# Patient Record
Sex: MALE | Race: WHITE | NOT HISPANIC OR LATINO | Employment: PART TIME | URBAN - METROPOLITAN AREA
[De-identification: names, ages, dates, MRNs, and addresses within clinical notes are randomized per-mention and may not be internally consistent; named-entity substitution may affect disease eponyms.]

---

## 2018-01-19 ENCOUNTER — OFFICE VISIT CONVERTED (OUTPATIENT)
Dept: FAMILY MEDICINE CLINIC | Facility: CLINIC | Age: 73
End: 2018-01-19
Attending: FAMILY MEDICINE

## 2018-02-01 ENCOUNTER — CONVERSION ENCOUNTER (OUTPATIENT)
Dept: CARDIOLOGY | Facility: CLINIC | Age: 73
End: 2018-02-01

## 2018-02-01 ENCOUNTER — OFFICE VISIT CONVERTED (OUTPATIENT)
Dept: CARDIOLOGY | Facility: CLINIC | Age: 73
End: 2018-02-01
Attending: INTERNAL MEDICINE

## 2018-02-06 ENCOUNTER — OFFICE VISIT CONVERTED (OUTPATIENT)
Dept: PULMONOLOGY | Facility: CLINIC | Age: 73
End: 2018-02-06
Attending: INTERNAL MEDICINE

## 2018-05-31 ENCOUNTER — OFFICE VISIT CONVERTED (OUTPATIENT)
Dept: PULMONOLOGY | Facility: CLINIC | Age: 73
End: 2018-05-31
Attending: INTERNAL MEDICINE

## 2018-07-19 ENCOUNTER — OFFICE VISIT CONVERTED (OUTPATIENT)
Dept: FAMILY MEDICINE CLINIC | Facility: CLINIC | Age: 73
End: 2018-07-19
Attending: FAMILY MEDICINE

## 2018-09-11 ENCOUNTER — OFFICE VISIT CONVERTED (OUTPATIENT)
Dept: OTOLARYNGOLOGY | Facility: CLINIC | Age: 73
End: 2018-09-11
Attending: OTOLARYNGOLOGY

## 2018-09-11 ENCOUNTER — CONVERSION ENCOUNTER (OUTPATIENT)
Dept: OTOLARYNGOLOGY | Facility: CLINIC | Age: 73
End: 2018-09-11

## 2018-09-18 ENCOUNTER — CONVERSION ENCOUNTER (OUTPATIENT)
Dept: CARDIOLOGY | Facility: CLINIC | Age: 73
End: 2018-09-18
Attending: INTERNAL MEDICINE

## 2018-09-18 ENCOUNTER — CONVERSION ENCOUNTER (OUTPATIENT)
Dept: CARDIOLOGY | Facility: CLINIC | Age: 73
End: 2018-09-18

## 2018-09-26 ENCOUNTER — OFFICE VISIT CONVERTED (OUTPATIENT)
Dept: PULMONOLOGY | Facility: CLINIC | Age: 73
End: 2018-09-26
Attending: INTERNAL MEDICINE

## 2018-10-26 ENCOUNTER — OFFICE VISIT CONVERTED (OUTPATIENT)
Dept: PULMONOLOGY | Facility: CLINIC | Age: 73
End: 2018-10-26
Attending: INTERNAL MEDICINE

## 2018-11-26 ENCOUNTER — OFFICE VISIT CONVERTED (OUTPATIENT)
Dept: PULMONOLOGY | Facility: CLINIC | Age: 73
End: 2018-11-26
Attending: INTERNAL MEDICINE

## 2018-12-03 ENCOUNTER — OFFICE VISIT CONVERTED (OUTPATIENT)
Dept: OTOLARYNGOLOGY | Facility: CLINIC | Age: 73
End: 2018-12-03
Attending: OTOLARYNGOLOGY

## 2018-12-06 ENCOUNTER — CONVERSION ENCOUNTER (OUTPATIENT)
Dept: CARDIOLOGY | Facility: CLINIC | Age: 73
End: 2018-12-06

## 2018-12-06 ENCOUNTER — OFFICE VISIT CONVERTED (OUTPATIENT)
Dept: CARDIOLOGY | Facility: CLINIC | Age: 73
End: 2018-12-06
Attending: INTERNAL MEDICINE

## 2019-01-14 ENCOUNTER — OFFICE VISIT CONVERTED (OUTPATIENT)
Dept: PULMONOLOGY | Facility: CLINIC | Age: 74
End: 2019-01-14
Attending: INTERNAL MEDICINE

## 2019-01-21 ENCOUNTER — OFFICE VISIT CONVERTED (OUTPATIENT)
Dept: FAMILY MEDICINE CLINIC | Facility: CLINIC | Age: 74
End: 2019-01-21
Attending: FAMILY MEDICINE

## 2019-01-21 ENCOUNTER — CONVERSION ENCOUNTER (OUTPATIENT)
Dept: FAMILY MEDICINE CLINIC | Facility: CLINIC | Age: 74
End: 2019-01-21

## 2019-01-24 ENCOUNTER — HOSPITAL ENCOUNTER (OUTPATIENT)
Dept: GASTROENTEROLOGY | Facility: HOSPITAL | Age: 74
Setting detail: HOSPITAL OUTPATIENT SURGERY
Discharge: HOME | End: 2019-01-24
Attending: INTERNAL MEDICINE

## 2019-01-24 LAB
CONV RHEUMATOID FACTOR IGM: 15.9 [IU]/ML (ref 0–14)
CRP SERPL HS-MCNC: <0.15 MG/DL (ref 0–0.5)
EPI CELLS NFR FLD: 20 %
ERYTHROCYTE [SEDIMENTATION RATE] IN BLOOD: 2 MM/H (ref 0–20)
LYMPHOCYTES NFR FLD MANUAL: 28 %
MACROPHAGE FLUID: 24 /100{WBCS}
NEUTROPHILS NFR FLD MANUAL: 28 %
VISUAL PRESENCE OF BLOOD: NORMAL

## 2019-01-26 LAB
BACTERIA SPEC AEROBE CULT: NORMAL
CCP IGA+IGG SERPL IA-ACNC: 9 UNITS (ref 0–19)

## 2019-01-28 LAB — CONV NOCARDIA STAIN (PARTIAL,MODIFIED ACID FAST): NORMAL

## 2019-01-29 ENCOUNTER — OFFICE VISIT CONVERTED (OUTPATIENT)
Dept: GASTROENTEROLOGY | Facility: CLINIC | Age: 74
End: 2019-01-29
Attending: INTERNAL MEDICINE

## 2019-02-04 ENCOUNTER — HOSPITAL ENCOUNTER (OUTPATIENT)
Dept: LAB | Facility: HOSPITAL | Age: 74
Discharge: HOME | End: 2019-02-04
Attending: UROLOGY

## 2019-02-04 LAB — PSA SERPL-MCNC: 0.76 NG/ML (ref 0–4)

## 2019-02-05 ENCOUNTER — OFFICE VISIT CONVERTED (OUTPATIENT)
Dept: UROLOGY | Facility: CLINIC | Age: 74
End: 2019-02-05
Attending: UROLOGY

## 2019-02-05 LAB
CONV COMMENT: NORMAL
CONV LEGIONELLA CULTURE: NORMAL

## 2019-02-19 ENCOUNTER — HOSPITAL ENCOUNTER (OUTPATIENT)
Dept: GASTROENTEROLOGY | Facility: HOSPITAL | Age: 74
Setting detail: HOSPITAL OUTPATIENT SURGERY
Discharge: HOME | End: 2019-02-19
Attending: INTERNAL MEDICINE

## 2019-03-07 ENCOUNTER — TRANSCRIBE ORDERS (OUTPATIENT)
Dept: ADMINISTRATIVE | Facility: HOSPITAL | Age: 74
End: 2019-03-07

## 2019-03-07 ENCOUNTER — OFFICE VISIT CONVERTED (OUTPATIENT)
Dept: PULMONOLOGY | Facility: CLINIC | Age: 74
End: 2019-03-07
Attending: INTERNAL MEDICINE

## 2019-03-07 DIAGNOSIS — R06.09 OTHER FORM OF DYSPNEA: Primary | ICD-10-CM

## 2019-03-08 ENCOUNTER — TRANSCRIBE ORDERS (OUTPATIENT)
Dept: CARDIOLOGY | Facility: CLINIC | Age: 74
End: 2019-03-08

## 2019-03-08 DIAGNOSIS — R06.00 DYSPNEA, UNSPECIFIED TYPE: Primary | ICD-10-CM

## 2019-03-12 ENCOUNTER — APPOINTMENT (OUTPATIENT)
Dept: CARDIOLOGY | Facility: HOSPITAL | Age: 74
End: 2019-03-12

## 2019-03-19 ENCOUNTER — HOSPITAL ENCOUNTER (OUTPATIENT)
Dept: CARDIOLOGY | Facility: HOSPITAL | Age: 74
Discharge: HOME | End: 2019-03-19
Admitting: INTERNAL MEDICINE

## 2019-03-19 VITALS
SYSTOLIC BLOOD PRESSURE: 134 MMHG | BODY MASS INDEX: 26.95 KG/M2 | WEIGHT: 210 LBS | HEART RATE: 75 BPM | HEIGHT: 74 IN | DIASTOLIC BLOOD PRESSURE: 98 MMHG

## 2019-03-19 DIAGNOSIS — R06.00 DYSPNEA, UNSPECIFIED TYPE: ICD-10-CM

## 2019-03-19 PROCEDURE — 94621 CARDIOPULM EXERCISE TESTING: CPT

## 2019-03-19 PROCEDURE — 94621 CARDIOPULM EXERCISE TESTING: CPT | Performed by: INTERNAL MEDICINE

## 2019-03-25 LAB
BH CV SE - AT - BREATHING RESERVE: 82.9
BH CV SE - AT - P ETCO2: 39
BH CV SE - AT - PEAK METS: 2.4
BH CV SE - AT - PEAK RER: 0.99
BH CV SE - AT - PEAK VO2: 8.5
BH CV SE - AT - RESPIRATORY RATE: 15
BH CV SE - AT - RESTING O2 PULSE: 98
BH CV SE - AT - VE/VCO2 SLOPE: 32
BH CV SE - REST - BREATHING RESERVE: 92.9
BH CV SE - REST - P ETCO2: 35
BH CV SE - REST - PEAK METS: 0.9
BH CV SE - REST - PEAK RER: 0.93
BH CV SE - REST - PEAK VO2: 3
BH CV SE - REST - RESPIRATORY RATE: 14
BH CV SE - REST - RESTING O2 PULSE: 98
BH CV SE - REST - VE/VCO2 SLOPE: 39
BH CV SE - VO2 MAX - BREATHING RESERVE: 54.3
BH CV SE - VO2 MAX - P ETCO2: 38
BH CV SE - VO2 MAX - PEAK METS: 4.7
BH CV SE - VO2 MAX - PEAK RER: 1.32
BH CV SE - VO2 MAX - RESPIRATORY RATE: 24
BH CV SE - VO2 MAX - RESTING O2 PULSE: 98
BH CV SE - VO2 MAX - VE/VCO2 SLOPE: 32
BH CV STRESS - BICYCLE POST ESTIMATED PEAK WATTS: 128 WATTS
BH CV STRESS BP STAGE 1: NORMAL
BH CV STRESS BP STAGE 2: NORMAL
BH CV STRESS BP STAGE 3: NORMAL
BH CV STRESS DURATION MIN STAGE 1: 3
BH CV STRESS DURATION MIN STAGE 2: 3
BH CV STRESS DURATION MIN STAGE 3: 3
BH CV STRESS DURATION MIN STAGE 4: 0
BH CV STRESS DURATION SEC STAGE 1: 0
BH CV STRESS DURATION SEC STAGE 2: 0
BH CV STRESS DURATION SEC STAGE 3: 0
BH CV STRESS DURATION SEC STAGE 4: 15
BH CV STRESS HR STAGE 1: 81
BH CV STRESS HR STAGE 2: 83
BH CV STRESS HR STAGE 3: 107
BH CV STRESS HR STAGE 4: 111
BH CV STRESS O2 STAGE 1: 98
BH CV STRESS O2 STAGE 2: 98
BH CV STRESS O2 STAGE 3: 98
BH CV STRESS PROTOCOL 1: NORMAL
BH CV STRESS STAGE 1: 1
BH CV STRESS STAGE 2: 2
BH CV STRESS STAGE 3: 3
BH CV STRESS STAGE 4: 4
BHC CV SE - VO2 MAX - PEAK VO2: 16.6
MAXIMAL PREDICTED HEART RATE: 147 BPM
PERCENT MAX PREDICTED HR: 75.51 %
STRESS BASELINE BP: NORMAL MMHG
STRESS BASELINE HR: 75 BPM
STRESS O2 SAT REST: 98 %
STRESS PERCENT HR: 89 %
STRESS POST ESTIMATED WORKLOAD: 4.7 METS
STRESS POST EXERCISE DUR MIN: 9 MIN
STRESS POST EXERCISE DUR SEC: 11 SEC
STRESS POST O2 SAT PEAK: 98 %
STRESS POST PEAK BP: NORMAL MMHG
STRESS POST PEAK HR: 111 BPM
STRESS TARGET HR: 125 BPM

## 2019-03-28 ENCOUNTER — OFFICE VISIT CONVERTED (OUTPATIENT)
Dept: CARDIOLOGY | Facility: CLINIC | Age: 74
End: 2019-03-28
Attending: INTERNAL MEDICINE

## 2019-03-28 ENCOUNTER — CONVERSION ENCOUNTER (OUTPATIENT)
Dept: CARDIOLOGY | Facility: CLINIC | Age: 74
End: 2019-03-28

## 2019-04-24 ENCOUNTER — HOSPITAL ENCOUNTER (OUTPATIENT)
Dept: OTHER | Facility: HOSPITAL | Age: 74
Discharge: HOME | End: 2019-04-24

## 2019-04-24 LAB
T4 FREE SERPL-MCNC: 1.5 NG/DL (ref 0.9–1.8)
TSH SERPL-ACNC: 2.42 M[IU]/L (ref 0.27–4.2)

## 2019-05-03 ENCOUNTER — OFFICE VISIT CONVERTED (OUTPATIENT)
Dept: PULMONOLOGY | Facility: CLINIC | Age: 74
End: 2019-05-03
Attending: INTERNAL MEDICINE

## 2019-05-14 ENCOUNTER — OFFICE VISIT CONVERTED (OUTPATIENT)
Dept: CARDIOLOGY | Facility: CLINIC | Age: 74
End: 2019-05-14
Attending: INTERNAL MEDICINE

## 2019-05-14 ENCOUNTER — CONVERSION ENCOUNTER (OUTPATIENT)
Dept: CARDIOLOGY | Facility: CLINIC | Age: 74
End: 2019-05-14

## 2019-07-22 ENCOUNTER — HOSPITAL ENCOUNTER (OUTPATIENT)
Dept: FAMILY MEDICINE CLINIC | Facility: CLINIC | Age: 74
Discharge: HOME | End: 2019-07-22
Attending: FAMILY MEDICINE

## 2019-07-22 ENCOUNTER — CONVERSION ENCOUNTER (OUTPATIENT)
Dept: FAMILY MEDICINE CLINIC | Facility: CLINIC | Age: 74
End: 2019-07-22

## 2019-07-22 ENCOUNTER — OFFICE VISIT CONVERTED (OUTPATIENT)
Dept: FAMILY MEDICINE CLINIC | Facility: CLINIC | Age: 74
End: 2019-07-22
Attending: FAMILY MEDICINE

## 2019-07-22 LAB
ALBUMIN SERPL-MCNC: 4.1 G/DL (ref 3.5–5)
ALBUMIN/GLOB SERPL: 1.5 {RATIO} (ref 1.4–2.6)
ALP SERPL-CCNC: 48 U/L (ref 56–155)
ALT SERPL-CCNC: 13 U/L (ref 10–40)
ANION GAP SERPL CALC-SCNC: 17 MMOL/L (ref 8–19)
AST SERPL-CCNC: 14 U/L (ref 15–50)
BASOPHILS # BLD AUTO: 0.03 10*3/UL (ref 0–0.2)
BASOPHILS NFR BLD AUTO: 0.7 % (ref 0–3)
BILIRUB SERPL-MCNC: 0.69 MG/DL (ref 0.2–1.3)
BUN SERPL-MCNC: 18 MG/DL (ref 5–25)
BUN/CREAT SERPL: 14 {RATIO} (ref 6–20)
CALCIUM SERPL-MCNC: 9.1 MG/DL (ref 8.7–10.4)
CHLORIDE SERPL-SCNC: 105 MMOL/L (ref 99–111)
CONV ABS IMM GRAN: 0.02 10*3/UL (ref 0–0.2)
CONV CO2: 24 MMOL/L (ref 22–32)
CONV IMMATURE GRAN: 0.5 % (ref 0–1.8)
CONV TOTAL PROTEIN: 6.8 G/DL (ref 6.3–8.2)
CREAT UR-MCNC: 1.3 MG/DL (ref 0.7–1.2)
DEPRECATED RDW RBC AUTO: 42.3 FL (ref 35.1–43.9)
EOSINOPHIL # BLD AUTO: 0.07 10*3/UL (ref 0–0.7)
EOSINOPHIL # BLD AUTO: 1.6 % (ref 0–7)
ERYTHROCYTE [DISTWIDTH] IN BLOOD BY AUTOMATED COUNT: 13.4 % (ref 11.6–14.4)
GFR SERPLBLD BASED ON 1.73 SQ M-ARVRAT: 54 ML/MIN/{1.73_M2}
GLOBULIN UR ELPH-MCNC: 2.7 G/DL (ref 2–3.5)
GLUCOSE SERPL-MCNC: 91 MG/DL (ref 70–99)
HBA1C MFR BLD: 13.9 G/DL (ref 14–18)
HCT VFR BLD AUTO: 43.8 % (ref 42–52)
LYMPHOCYTES # BLD AUTO: 1.57 10*3/UL (ref 1–5)
MCH RBC QN AUTO: 27.6 PG (ref 27–31)
MCHC RBC AUTO-ENTMCNC: 31.7 G/DL (ref 33–37)
MCV RBC AUTO: 87.1 FL (ref 80–96)
MONOCYTES # BLD AUTO: 0.33 10*3/UL (ref 0.2–1.2)
MONOCYTES NFR BLD AUTO: 7.4 % (ref 3–10)
NEUTROPHILS # BLD AUTO: 2.42 10*3/UL (ref 2–8)
NEUTROPHILS NFR BLD AUTO: 54.4 % (ref 30–85)
NRBC CBCN: 0 % (ref 0–0.7)
OSMOLALITY SERPL CALC.SUM OF ELEC: 295 MOSM/KG (ref 273–304)
PLATELET # BLD AUTO: 222 10*3/UL (ref 130–400)
PMV BLD AUTO: 10.9 FL (ref 9.4–12.4)
POTASSIUM SERPL-SCNC: 4.4 MMOL/L (ref 3.5–5.3)
RBC # BLD AUTO: 5.03 10*6/UL (ref 4.7–6.1)
SODIUM SERPL-SCNC: 142 MMOL/L (ref 135–147)
T4 FREE SERPL-MCNC: 1.4 NG/DL (ref 0.9–1.8)
TSH SERPL-ACNC: 2.45 M[IU]/L (ref 0.27–4.2)
VARIANT LYMPHS NFR BLD MANUAL: 35.4 % (ref 20–45)
WBC # BLD AUTO: 4.44 10*3/UL (ref 4.8–10.8)

## 2019-07-23 RX ORDER — UBIDECARENONE 75 MG
CAPSULE ORAL
COMMUNITY
End: 2021-04-16 | Stop reason: ALTCHOICE

## 2019-07-23 RX ORDER — LEVOTHYROXINE SODIUM 0.1 MG/1
100 TABLET ORAL DAILY
Status: ON HOLD | COMMUNITY
End: 2022-01-10 | Stop reason: SDUPTHER

## 2019-07-23 RX ORDER — SULINDAC 200 MG/1
200 TABLET ORAL 2 TIMES DAILY
COMMUNITY
End: 2020-04-08 | Stop reason: ALTCHOICE

## 2019-07-24 ENCOUNTER — LAB (OUTPATIENT)
Dept: LAB | Facility: HOSPITAL | Age: 74
End: 2019-07-24

## 2019-07-24 ENCOUNTER — TRANSCRIBE ORDERS (OUTPATIENT)
Dept: LAB | Facility: HOSPITAL | Age: 74
End: 2019-07-24

## 2019-07-24 ENCOUNTER — OFFICE VISIT (OUTPATIENT)
Dept: CARDIOLOGY | Facility: CLINIC | Age: 74
End: 2019-07-24

## 2019-07-24 VITALS
SYSTOLIC BLOOD PRESSURE: 180 MMHG | BODY MASS INDEX: 28.11 KG/M2 | HEIGHT: 74 IN | HEART RATE: 64 BPM | DIASTOLIC BLOOD PRESSURE: 100 MMHG | WEIGHT: 219 LBS

## 2019-07-24 DIAGNOSIS — Z13.6 SCREENING FOR ISCHEMIC HEART DISEASE: ICD-10-CM

## 2019-07-24 DIAGNOSIS — I20.8 ANGINA AT REST (HCC): Primary | ICD-10-CM

## 2019-07-24 DIAGNOSIS — Z01.810 PRE-OPERATIVE CARDIOVASCULAR EXAMINATION: Primary | ICD-10-CM

## 2019-07-24 DIAGNOSIS — I20.8 ANGINA DECUBITUS (HCC): ICD-10-CM

## 2019-07-24 DIAGNOSIS — Z01.810 PRE-OPERATIVE CARDIOVASCULAR EXAMINATION: ICD-10-CM

## 2019-07-24 PROBLEM — I20.89 ANGINA AT REST: Status: ACTIVE | Noted: 2019-07-24

## 2019-07-24 LAB
ANION GAP SERPL CALCULATED.3IONS-SCNC: 10.5 MMOL/L (ref 5–15)
BASOPHILS # BLD AUTO: 0.03 10*3/MM3 (ref 0–0.2)
BASOPHILS NFR BLD AUTO: 0.5 % (ref 0–1.5)
BUN BLD-MCNC: 17 MG/DL (ref 8–23)
BUN/CREAT SERPL: 12.7 (ref 7–25)
CALCIUM SPEC-SCNC: 9 MG/DL (ref 8.6–10.5)
CHLORIDE SERPL-SCNC: 105 MMOL/L (ref 98–107)
CO2 SERPL-SCNC: 26.5 MMOL/L (ref 22–29)
CREAT BLD-MCNC: 1.34 MG/DL (ref 0.76–1.27)
DEPRECATED RDW RBC AUTO: 42.7 FL (ref 37–54)
EOSINOPHIL # BLD AUTO: 0.09 10*3/MM3 (ref 0–0.4)
EOSINOPHIL NFR BLD AUTO: 1.6 % (ref 0.3–6.2)
ERYTHROCYTE [DISTWIDTH] IN BLOOD BY AUTOMATED COUNT: 13.3 % (ref 12.3–15.4)
GFR SERPL CREATININE-BSD FRML MDRD: 52 ML/MIN/1.73
GLUCOSE BLD-MCNC: 96 MG/DL (ref 65–99)
HCT VFR BLD AUTO: 46 % (ref 37.5–51)
HGB BLD-MCNC: 14.5 G/DL (ref 13–17.7)
IMM GRANULOCYTES # BLD AUTO: 0.02 10*3/MM3 (ref 0–0.05)
IMM GRANULOCYTES NFR BLD AUTO: 0.4 % (ref 0–0.5)
LYMPHOCYTES # BLD AUTO: 1.89 10*3/MM3 (ref 0.7–3.1)
LYMPHOCYTES NFR BLD AUTO: 33.7 % (ref 19.6–45.3)
MCH RBC QN AUTO: 27.6 PG (ref 26.6–33)
MCHC RBC AUTO-ENTMCNC: 31.5 G/DL (ref 31.5–35.7)
MCV RBC AUTO: 87.5 FL (ref 79–97)
MONOCYTES # BLD AUTO: 0.44 10*3/MM3 (ref 0.1–0.9)
MONOCYTES NFR BLD AUTO: 7.8 % (ref 5–12)
NEUTROPHILS # BLD AUTO: 3.14 10*3/MM3 (ref 1.7–7)
NEUTROPHILS NFR BLD AUTO: 56 % (ref 42.7–76)
NRBC BLD AUTO-RTO: 0.2 /100 WBC (ref 0–0.2)
PLATELET # BLD AUTO: 207 10*3/MM3 (ref 140–450)
PMV BLD AUTO: 10.8 FL (ref 6–12)
POTASSIUM BLD-SCNC: 4.3 MMOL/L (ref 3.5–5.2)
RBC # BLD AUTO: 5.26 10*6/MM3 (ref 4.14–5.8)
SODIUM BLD-SCNC: 142 MMOL/L (ref 136–145)
WBC NRBC COR # BLD: 5.61 10*3/MM3 (ref 3.4–10.8)

## 2019-07-24 PROCEDURE — 36415 COLL VENOUS BLD VENIPUNCTURE: CPT

## 2019-07-24 PROCEDURE — 85025 COMPLETE CBC W/AUTO DIFF WBC: CPT

## 2019-07-24 PROCEDURE — 80048 BASIC METABOLIC PNL TOTAL CA: CPT

## 2019-07-24 PROCEDURE — 93000 ELECTROCARDIOGRAM COMPLETE: CPT | Performed by: INTERNAL MEDICINE

## 2019-07-24 PROCEDURE — 99204 OFFICE O/P NEW MOD 45 MIN: CPT | Performed by: INTERNAL MEDICINE

## 2019-07-24 NOTE — PROGRESS NOTES
Subjective:     Encounter Date:07/24/2019      Patient ID: Elmer Garvin is a 74 y.o. male.    Chief Complaint:  Hypertension   This is a chronic problem. The problem is controlled. Associated symptoms include malaise/fatigue and shortness of breath.   Shortness of Breath   This is a recurrent problem. The current episode started more than 1 month ago.     74-year old gentleman who presents today for evaluation.  Patient has been complaining of extreme shortness of breath.  Patient said several years ago he had seen Dr. Boo.  At that time he had a mild blockage in 1 of his coronary arteries and his shortness of breath was thought to be secondary to slow heart rates.  He had a pacemaker placed and actually said his symptoms resolved and he was feeling very well.  He then started having more more episodes of shortness of breath.  He now complains of substernal chest burning associated with the shortness of breath.  He said sometimes the burning can get so bad that he gets nauseated and feels like he is going to vomit.  He was seen by pulmonologist and not found to have a pulmonary etiology but with the symptoms was referred for further evaluation.  He had seen another cardiologist in town and subsequently sought me out for my opinion.    Review of Systems   Constitution: Positive for malaise/fatigue.   Respiratory: Positive for shortness of breath.    Musculoskeletal: Positive for joint pain and joint swelling.   All other systems reviewed and are negative.        ECG 12 Lead  Date/Time: 7/24/2019 3:18 PM  Performed by: Cyrus Mccray MD  Authorized by: Cyrus Mccray MD   Comparison: compared with previous ECG   Similar to previous ECG  Comparison to previous ECG: I cannot read date on ECG done at [facility] it is a faxed copy.  Rhythm: sinus rhythm    Clinical impression: normal ECG               Objective:     Physical Exam   Constitutional: He is oriented to person, place, and time. He  appears well-developed.   HENT:   Head: Normocephalic.   Eyes: Conjunctivae are normal.   Neck: Normal range of motion.   Cardiovascular: Normal rate, regular rhythm and normal heart sounds.   Pulmonary/Chest: Breath sounds normal.   Abdominal: Soft. Bowel sounds are normal.   Musculoskeletal: Normal range of motion. He exhibits no edema.   Neurological: He is alert and oriented to person, place, and time.   Skin: Skin is warm and dry.   Psychiatric: He has a normal mood and affect. His behavior is normal.   Vitals reviewed.      Lab Review:       Assessment:          Diagnosis Plan   1. Angina at rest (CMS/[facility])  Case Request Cath Lab: Coronary angiography, Left heart cath, Left ventriculography, Right heart cath          Plan:       1.  Sick sinus syndrome status post pacemaker.  Ultimately we will get him changed over to my office to follow.  2.  Exertional burning in his chest with shortness of breath.  Sounds to be classic angina it recurrent exertional nature and is occurring more frequently increase intensity and taking less to bring it on.  In light of that we will set him up for a right and left heart cath.  The right heart cath is to assess the dyspnea if no significant coronary artery disease is found although I have a very high suspicion that that is what we are going to find.  Risk and benefits of the cath were reviewed.

## 2019-07-25 ENCOUNTER — HOSPITAL ENCOUNTER (OUTPATIENT)
Facility: HOSPITAL | Age: 74
Setting detail: HOSPITAL OUTPATIENT SURGERY
Discharge: HOME | End: 2019-07-25
Attending: INTERNAL MEDICINE | Admitting: INTERNAL MEDICINE

## 2019-07-25 VITALS
DIASTOLIC BLOOD PRESSURE: 73 MMHG | RESPIRATION RATE: 16 BRPM | TEMPERATURE: 97.4 F | SYSTOLIC BLOOD PRESSURE: 149 MMHG | WEIGHT: 213 LBS | BODY MASS INDEX: 27.34 KG/M2 | HEIGHT: 74 IN | OXYGEN SATURATION: 97 % | HEART RATE: 60 BPM

## 2019-07-25 DIAGNOSIS — I20.8 ANGINA AT REST (HCC): ICD-10-CM

## 2019-07-25 PROCEDURE — 93460 R&L HRT ART/VENTRICLE ANGIO: CPT | Performed by: INTERNAL MEDICINE

## 2019-07-25 PROCEDURE — C1769 GUIDE WIRE: HCPCS | Performed by: INTERNAL MEDICINE

## 2019-07-25 PROCEDURE — 99152 MOD SED SAME PHYS/QHP 5/>YRS: CPT | Performed by: INTERNAL MEDICINE

## 2019-07-25 PROCEDURE — 25010000002 FENTANYL CITRATE (PF) 100 MCG/2ML SOLUTION: Performed by: INTERNAL MEDICINE

## 2019-07-25 PROCEDURE — 85018 HEMOGLOBIN: CPT

## 2019-07-25 PROCEDURE — C1894 INTRO/SHEATH, NON-LASER: HCPCS | Performed by: INTERNAL MEDICINE

## 2019-07-25 PROCEDURE — 25010000002 HYDRALAZINE PER 20 MG: Performed by: INTERNAL MEDICINE

## 2019-07-25 PROCEDURE — 85014 HEMATOCRIT: CPT

## 2019-07-25 PROCEDURE — 0 IOPAMIDOL PER 1 ML: Performed by: INTERNAL MEDICINE

## 2019-07-25 PROCEDURE — 25010000002 HEPARIN (PORCINE) PER 1000 UNITS: Performed by: INTERNAL MEDICINE

## 2019-07-25 PROCEDURE — 25010000002 MIDAZOLAM PER 1 MG: Performed by: INTERNAL MEDICINE

## 2019-07-25 RX ORDER — SODIUM CHLORIDE 0.9 % (FLUSH) 0.9 %
3-10 SYRINGE (ML) INJECTION AS NEEDED
Status: DISCONTINUED | OUTPATIENT
Start: 2019-07-25 | End: 2019-07-25 | Stop reason: HOSPADM

## 2019-07-25 RX ORDER — ASPIRIN 81 MG/1
81 TABLET, CHEWABLE ORAL DAILY
COMMUNITY
End: 2021-04-16 | Stop reason: ALTCHOICE

## 2019-07-25 RX ORDER — FENTANYL CITRATE 50 UG/ML
INJECTION, SOLUTION INTRAMUSCULAR; INTRAVENOUS AS NEEDED
Status: DISCONTINUED | OUTPATIENT
Start: 2019-07-25 | End: 2019-07-25 | Stop reason: HOSPADM

## 2019-07-25 RX ORDER — MIDAZOLAM HYDROCHLORIDE 1 MG/ML
INJECTION INTRAMUSCULAR; INTRAVENOUS AS NEEDED
Status: DISCONTINUED | OUTPATIENT
Start: 2019-07-25 | End: 2019-07-25 | Stop reason: HOSPADM

## 2019-07-25 RX ORDER — LIDOCAINE HYDROCHLORIDE 20 MG/ML
INJECTION, SOLUTION INFILTRATION; PERINEURAL AS NEEDED
Status: DISCONTINUED | OUTPATIENT
Start: 2019-07-25 | End: 2019-07-25 | Stop reason: HOSPADM

## 2019-07-25 RX ORDER — SODIUM CHLORIDE 9 MG/ML
100 INJECTION, SOLUTION INTRAVENOUS CONTINUOUS
Status: DISCONTINUED | OUTPATIENT
Start: 2019-07-25 | End: 2019-07-25 | Stop reason: HOSPADM

## 2019-07-25 RX ORDER — VIT C/B6/B5/MAGNESIUM/HERB 173 50-5-6-5MG
1000 CAPSULE ORAL
COMMUNITY
End: 2020-04-08 | Stop reason: ALTCHOICE

## 2019-07-25 RX ORDER — SODIUM CHLORIDE 0.9 % (FLUSH) 0.9 %
3 SYRINGE (ML) INJECTION EVERY 12 HOURS SCHEDULED
Status: DISCONTINUED | OUTPATIENT
Start: 2019-07-25 | End: 2019-07-25 | Stop reason: HOSPADM

## 2019-07-25 RX ORDER — LIDOCAINE HYDROCHLORIDE 10 MG/ML
0.1 INJECTION, SOLUTION EPIDURAL; INFILTRATION; INTRACAUDAL; PERINEURAL ONCE AS NEEDED
Status: DISCONTINUED | OUTPATIENT
Start: 2019-07-25 | End: 2019-07-25 | Stop reason: HOSPADM

## 2019-07-25 RX ORDER — SODIUM CHLORIDE 9 MG/ML
125 INJECTION, SOLUTION INTRAVENOUS CONTINUOUS
Status: DISCONTINUED | OUTPATIENT
Start: 2019-07-25 | End: 2019-07-25 | Stop reason: HOSPADM

## 2019-07-25 RX ORDER — SODIUM CHLORIDE 9 MG/ML
INJECTION, SOLUTION INTRAVENOUS CONTINUOUS PRN
Status: COMPLETED | OUTPATIENT
Start: 2019-07-25 | End: 2019-07-25

## 2019-07-25 RX ORDER — CHOLECALCIFEROL (VITAMIN D3) 125 MCG
10 CAPSULE ORAL
COMMUNITY
End: 2022-01-10 | Stop reason: HOSPADM

## 2019-07-25 RX ORDER — OMEPRAZOLE 40 MG/1
40 CAPSULE, DELAYED RELEASE ORAL DAILY
COMMUNITY
End: 2021-04-16

## 2019-07-25 RX ORDER — HYDRALAZINE HYDROCHLORIDE 20 MG/ML
10 INJECTION INTRAMUSCULAR; INTRAVENOUS ONCE
Status: COMPLETED | OUTPATIENT
Start: 2019-07-25 | End: 2019-07-25

## 2019-07-25 RX ADMIN — SODIUM CHLORIDE 125 ML/HR: 9 INJECTION, SOLUTION INTRAVENOUS at 09:22

## 2019-07-25 RX ADMIN — HYDRALAZINE HYDROCHLORIDE 10 MG: 20 INJECTION INTRAMUSCULAR; INTRAVENOUS at 10:11

## 2019-07-26 LAB
HCT VFR BLDA CALC: 40 % (ref 38–51)
HCT VFR BLDA CALC: 41 % (ref 38–51)
HGB BLDA-MCNC: 13.6 G/DL (ref 12–17)
HGB BLDA-MCNC: 13.9 G/DL (ref 12–17)
SAO2 % BLDA: 75 % (ref 95–98)
SAO2 % BLDA: 98 % (ref 95–98)

## 2019-07-31 ENCOUNTER — TELEPHONE (OUTPATIENT)
Dept: CARDIOLOGY | Facility: CLINIC | Age: 74
End: 2019-07-31

## 2019-07-31 NOTE — TELEPHONE ENCOUNTER
"Irma:    This patients wife called and states that he had a heart cath a week ago. Yesterday he noticed a \"pop\" in his right wrist and it is now swollen and discolored.  I recommended that he go to the ER.  "

## 2019-08-15 ENCOUNTER — CLINICAL SUPPORT NO REQUIREMENTS (OUTPATIENT)
Dept: CARDIOLOGY | Facility: CLINIC | Age: 74
End: 2019-08-15

## 2019-08-15 ENCOUNTER — TELEPHONE (OUTPATIENT)
Dept: CARDIOLOGY | Facility: CLINIC | Age: 74
End: 2019-08-15

## 2019-08-15 ENCOUNTER — OFFICE VISIT (OUTPATIENT)
Dept: CARDIOLOGY | Facility: CLINIC | Age: 74
End: 2019-08-15

## 2019-08-15 VITALS
DIASTOLIC BLOOD PRESSURE: 92 MMHG | HEART RATE: 72 BPM | SYSTOLIC BLOOD PRESSURE: 162 MMHG | HEIGHT: 74 IN | OXYGEN SATURATION: 98 % | BODY MASS INDEX: 27.59 KG/M2 | WEIGHT: 215 LBS

## 2019-08-15 DIAGNOSIS — I49.5 SICK SINUS SYNDROME (HCC): Primary | ICD-10-CM

## 2019-08-15 DIAGNOSIS — R06.02 SHORTNESS OF BREATH: Primary | ICD-10-CM

## 2019-08-15 DIAGNOSIS — Z95.0 PACEMAKER: ICD-10-CM

## 2019-08-15 DIAGNOSIS — R53.83 FATIGUE, UNSPECIFIED TYPE: ICD-10-CM

## 2019-08-15 PROCEDURE — 99213 OFFICE O/P EST LOW 20 MIN: CPT | Performed by: NURSE PRACTITIONER

## 2019-08-15 PROCEDURE — 93280 PM DEVICE PROGR EVAL DUAL: CPT | Performed by: INTERNAL MEDICINE

## 2019-08-15 PROCEDURE — 93000 ELECTROCARDIOGRAM COMPLETE: CPT | Performed by: NURSE PRACTITIONER

## 2019-08-15 NOTE — PROGRESS NOTES
[facility]      Patient Name: Elmer Garvin  :1945  Age: 74 y.o.  Primary Cardiologist: Cyrus Mccray MD  Encounter Provider:  EDWINA Herzog      Chief Complaint:   Chief Complaint   Patient presents with   • Follow-up     Hospital          HPI  Elmer Garvin is a 74 y.o. male who presents today for reevaluation. Pt has a  history significant for coronary artery disease, sick sinus syndrome with pacemaker present.  Patient was initially evaluated by Dr. Mccray on 2019 for shortness of breath.  At that time patient was complaining of extreme shortness of breath, he had been evaluated by pulmonology but not found to have any pulmonology etiology.  Dr. Mccray felt that patient's shortness of breath was consistent with angina.  He was scheduled for a right and left diagnostic heart catheterization.  Right heart catheterization was normal and revealed systolic function and diastolic pressure.  Patient exhibited very mild nonobstructive coronary artery disease on left diagnostic heart catheterization.    Patient states that he continues to have episodes of shortness of breath.  He states that it is not gotten any worse but it is also not improved.  He states that he has constant fatigue and that the shortness of breath worsens with very minimal to moderate exertion.  He also notes that shortness of breath is worse in the heat.  He has had pulmonary function tests, sleep apnea evaluation and multiple biopsies by pulmonology.  They are unable to determine a pulmonology etiology.  Patient does see endocrinology and has routine thyroid checks as well as other routine blood work all of which have been unremarkable.  Patient's pacemaker was last interrogated in May.  He does wish to have pacemaker interrogation moved to our facility.    The following portions of the patient's history were reviewed and updated as appropriate: allergies, current medications, past family  "history, past medical history, past social history, past surgical history and problem list.      Review of Systems   Constitution: Negative for malaise/fatigue.   Cardiovascular: Positive for dyspnea on exertion. Negative for chest pain and leg swelling.   Respiratory: Positive for shortness of breath.    Musculoskeletal: Positive for joint pain.   Neurological: Positive for excessive daytime sleepiness. Negative for light-headedness.   All other systems reviewed and are negative.      OBJECTIVE:   Vital Signs  Vitals:    08/15/19 1325   BP: 162/92   Pulse: 72   SpO2: 98%     Estimated body mass index is 27.6 kg/m² as calculated from the following:    Height as of this encounter: 188 cm (74\").    Weight as of this encounter: 97.5 kg (215 lb).    Physical Exam   Constitutional: He is oriented to person, place, and time. Vital signs are normal. He appears well-developed and well-nourished.   Eyes: Conjunctivae are normal.   Neck: Carotid bruit is not present.   Cardiovascular: Normal rate, regular rhythm and normal heart sounds.   No murmur heard.  Pulmonary/Chest: Effort normal and breath sounds normal.   Abdominal: Normal appearance.   Musculoskeletal: Normal range of motion.   No pedal edema   Neurological: He is alert and oriented to person, place, and time. GCS eye subscore is 4. GCS verbal subscore is 5. GCS motor subscore is 6.   Skin: Skin is warm and dry.   Psychiatric: He has a normal mood and affect. His speech is normal and behavior is normal. Judgment and thought content normal. Cognition and memory are normal.         ECG 12 Lead  Date/Time: 8/15/2019 1:35 PM  Performed by: Dori Ramirez APRN  Authorized by: Dori Ramirez APRN   Comparison: compared with previous ECG from 7/24/2019  Rhythm: sinus rhythm  Rate: normal  BPM: 67  Conduction: 1st degree AV block  ST Segments: ST segments normal  T Waves: T waves normal  QRS axis: normal    Clinical impression: abnormal EKG            Cardiac " Procedures:  1. Left and right diagnostic heart catheterization 7/25/2019.  Normal right heart cath with normal LV systolic function, normal LV end-diastolic pressure very mild nonobstructive coronary artery disease.  Recommending considering noncardiac causes of symptoms.        ASSESSMENT:      Diagnosis Plan   1. Shortness of breath  ECG 12 Lead   2. Pacemaker     3. Fatigue, unspecified type           PLAN OF CARE:     1. Shortness of breath.  Patient continues to have episodes of shortness of breath that worsened with exertion and when he is in the heat.  He recently had left and right diagnostic heart catheterization which was unrevealing.  He has had full evaluation by pulmonology in [place].  Patient states that he routinely sees a endocrinologist who checks his thyroid levels and vitamin levels.  It is unclear what the source of patient's shortness of breath and fatigue is.  I am going to have his pacemaker interrogated today to establish pacemaker checks with our office.  I have recommended that he follow-up with his primary care physician.  Wife is questioning if patient could have something wrong with his liver.  I deferred this to her his primary care physician.  2. Fatigue.  As above.  3. Pacemaker.  To be interrogated in our office today and then future checks will also be done at our office.  4. Follow-up in 4 months with Dr. Mccray.  Sooner for problems or complications.             Discharge Medications           Accurate as of 8/15/19  1:40 PM. If you have any questions, ask your nurse or doctor.               Continue These Medications      Instructions Start Date   aspirin 81 MG chewable tablet   81 mg, Oral, Daily      Co Q-10 200 MG capsule   Oral      levothyroxine 100 MCG tablet  Commonly known as:  SYNTHROID, LEVOTHROID   100 mcg, Oral, Daily      losartan 50 MG tablet 2 tablet, hydrochlorothiazide 25 MG tablet 1 tablet   Oral, Daily      melatonin 5 MG tablet tablet   10 mg, Oral       omeprazole 40 MG capsule  Commonly known as:  priLOSEC   40 mg, Oral, Daily      sulindac 200 MG tablet  Commonly known as:  CLINORIL   200 mg, Oral, 2 Times Daily      Turmeric 500 MG capsule   1,000 mg, Oral             Thank you for allowing me to participate in the care of your patient,      Sincerely,   EDWINA Herzog  [facility]  08/15/19  1:40 PM    **Sarah Disclaimer:**  Much of this encounter note is an electronic transcription/translation of spoken language to printed text. The electronic translation of spoken language may permit erroneous, or at times, nonsensical words or phrases to be inadvertently transcribed. Although I have reviewed the note for such errors, some may still exist.

## 2019-09-24 ENCOUNTER — HOSPITAL ENCOUNTER (OUTPATIENT)
Dept: FAMILY MEDICINE CLINIC | Facility: CLINIC | Age: 74
Discharge: HOME | End: 2019-09-24
Attending: FAMILY MEDICINE

## 2019-09-24 ENCOUNTER — OFFICE VISIT CONVERTED (OUTPATIENT)
Dept: FAMILY MEDICINE CLINIC | Facility: CLINIC | Age: 74
End: 2019-09-24
Attending: FAMILY MEDICINE

## 2019-09-24 ENCOUNTER — CONVERSION ENCOUNTER (OUTPATIENT)
Dept: FAMILY MEDICINE CLINIC | Facility: CLINIC | Age: 74
End: 2019-09-24

## 2019-09-24 LAB
ALBUMIN SERPL-MCNC: 4.4 G/DL (ref 3.5–5)
ALBUMIN/GLOB SERPL: 1.8 {RATIO} (ref 1.4–2.6)
ALP SERPL-CCNC: 50 U/L (ref 56–155)
ALT SERPL-CCNC: 17 U/L (ref 10–40)
ANION GAP SERPL CALC-SCNC: 11 MMOL/L (ref 8–19)
AST SERPL-CCNC: 15 U/L (ref 15–50)
BASOPHILS # BLD AUTO: 0.04 10*3/UL (ref 0–0.2)
BASOPHILS NFR BLD AUTO: 0.8 % (ref 0–3)
BILIRUB SERPL-MCNC: 0.64 MG/DL (ref 0.2–1.3)
BUN SERPL-MCNC: 18 MG/DL (ref 5–25)
BUN/CREAT SERPL: 16 {RATIO} (ref 6–20)
CALCIUM SERPL-MCNC: 9.1 MG/DL (ref 8.7–10.4)
CHLORIDE SERPL-SCNC: 106 MMOL/L (ref 99–111)
CONV ABS IMM GRAN: 0.02 10*3/UL (ref 0–0.2)
CONV CO2: 25 MMOL/L (ref 22–32)
CONV IMMATURE GRAN: 0.4 % (ref 0–1.8)
CONV TOTAL PROTEIN: 6.9 G/DL (ref 6.3–8.2)
CREAT UR-MCNC: 1.15 MG/DL (ref 0.7–1.2)
DEPRECATED RDW RBC AUTO: 44.3 FL (ref 35.1–43.9)
EOSINOPHIL # BLD AUTO: 0.06 10*3/UL (ref 0–0.7)
EOSINOPHIL # BLD AUTO: 1.2 % (ref 0–7)
ERYTHROCYTE [DISTWIDTH] IN BLOOD BY AUTOMATED COUNT: 13.6 % (ref 11.6–14.4)
GFR SERPLBLD BASED ON 1.73 SQ M-ARVRAT: >60 ML/MIN/{1.73_M2}
GLOBULIN UR ELPH-MCNC: 2.5 G/DL (ref 2–3.5)
GLUCOSE SERPL-MCNC: 108 MG/DL (ref 70–99)
HCT VFR BLD AUTO: 44.1 % (ref 42–52)
HGB BLD-MCNC: 13.7 G/DL (ref 14–18)
LYMPHOCYTES # BLD AUTO: 1.65 10*3/UL (ref 1–5)
LYMPHOCYTES NFR BLD AUTO: 33.2 % (ref 20–45)
MAGNESIUM SERPL-MCNC: 1.95 MG/DL (ref 1.6–2.3)
MCH RBC QN AUTO: 27.6 PG (ref 27–31)
MCHC RBC AUTO-ENTMCNC: 31.1 G/DL (ref 33–37)
MCV RBC AUTO: 88.7 FL (ref 80–96)
MONOCYTES # BLD AUTO: 0.32 10*3/UL (ref 0.2–1.2)
MONOCYTES NFR BLD AUTO: 6.4 % (ref 3–10)
NEUTROPHILS # BLD AUTO: 2.88 10*3/UL (ref 2–8)
NEUTROPHILS NFR BLD AUTO: 58 % (ref 30–85)
NRBC CBCN: 0 % (ref 0–0.7)
OSMOLALITY SERPL CALC.SUM OF ELEC: 288 MOSM/KG (ref 273–304)
PLATELET # BLD AUTO: 207 10*3/UL (ref 130–400)
PMV BLD AUTO: 10.9 FL (ref 9.4–12.4)
POTASSIUM SERPL-SCNC: 4.1 MMOL/L (ref 3.5–5.3)
RBC # BLD AUTO: 4.97 10*6/UL (ref 4.7–6.1)
SODIUM SERPL-SCNC: 138 MMOL/L (ref 135–147)
T4 FREE SERPL-MCNC: 1.4 NG/DL (ref 0.9–1.8)
TSH SERPL-ACNC: 2.53 M[IU]/L (ref 0.27–4.2)
WBC # BLD AUTO: 4.97 10*3/UL (ref 4.8–10.8)

## 2019-10-08 ENCOUNTER — OFFICE VISIT CONVERTED (OUTPATIENT)
Dept: GASTROENTEROLOGY | Facility: CLINIC | Age: 74
End: 2019-10-08
Attending: PHYSICIAN ASSISTANT

## 2019-10-08 ENCOUNTER — HOSPITAL ENCOUNTER (OUTPATIENT)
Dept: LAB | Facility: HOSPITAL | Age: 74
Discharge: HOME | End: 2019-10-08
Attending: INTERNAL MEDICINE

## 2019-10-08 ENCOUNTER — HOSPITAL ENCOUNTER (OUTPATIENT)
Dept: GENERAL RADIOLOGY | Facility: HOSPITAL | Age: 74
Discharge: HOME | End: 2019-10-08
Attending: INTERNAL MEDICINE

## 2019-10-08 LAB
CONV RHEUMATOID FACTOR IGM: 17 [IU]/ML (ref 0–14)
CREAT UR-MCNC: 1.32 MG/DL (ref 0.7–1.2)
ERYTHROCYTE [SEDIMENTATION RATE] IN BLOOD: 2 MM/H (ref 0–20)

## 2019-10-10 ENCOUNTER — HOSPITAL ENCOUNTER (OUTPATIENT)
Dept: ULTRASOUND IMAGING | Facility: HOSPITAL | Age: 74
Discharge: HOME | End: 2019-10-10
Attending: PHYSICIAN ASSISTANT

## 2019-10-10 LAB
ALBUMIN SERPL-MCNC: 4.4 G/DL (ref 3.5–5)
ALBUMIN/GLOB SERPL: 1.6 {RATIO} (ref 1.4–2.6)
ALP SERPL-CCNC: 42 U/L (ref 56–155)
ALT SERPL-CCNC: 15 U/L (ref 10–40)
ANION GAP SERPL CALC-SCNC: 16 MMOL/L (ref 8–19)
AST SERPL-CCNC: 13 U/L (ref 15–50)
BASOPHILS # BLD AUTO: 0.03 10*3/UL (ref 0–0.2)
BASOPHILS NFR BLD AUTO: 0.5 % (ref 0–3)
BILIRUB SERPL-MCNC: 0.95 MG/DL (ref 0.2–1.3)
BUN SERPL-MCNC: 18 MG/DL (ref 5–25)
BUN/CREAT SERPL: 14 {RATIO} (ref 6–20)
CALCIUM SERPL-MCNC: 9.3 MG/DL (ref 8.7–10.4)
CCP IGA+IGG SERPL IA-ACNC: 8 UNITS (ref 0–19)
CHLORIDE SERPL-SCNC: 102 MMOL/L (ref 99–111)
CONV ABS IMM GRAN: 0.01 10*3/UL (ref 0–0.2)
CONV CO2: 26 MMOL/L (ref 22–32)
CONV IMMATURE GRAN: 0.2 % (ref 0–1.8)
CONV TOTAL PROTEIN: 7.2 G/DL (ref 6.3–8.2)
CREAT UR-MCNC: 1.33 MG/DL (ref 0.7–1.2)
DEPRECATED RDW RBC AUTO: 44.2 FL (ref 35.1–43.9)
EOSINOPHIL # BLD AUTO: 0.07 10*3/UL (ref 0–0.7)
EOSINOPHIL # BLD AUTO: 1.2 % (ref 0–7)
ERYTHROCYTE [DISTWIDTH] IN BLOOD BY AUTOMATED COUNT: 13.8 % (ref 11.6–14.4)
FOLATE SERPL-MCNC: 9.4 NG/ML (ref 4.8–20)
GFR SERPLBLD BASED ON 1.73 SQ M-ARVRAT: 52 ML/MIN/{1.73_M2}
GLOBULIN UR ELPH-MCNC: 2.8 G/DL (ref 2–3.5)
GLUCOSE SERPL-MCNC: 106 MG/DL (ref 70–99)
HCT VFR BLD AUTO: 43.1 % (ref 42–52)
HGB BLD-MCNC: 13.6 G/DL (ref 14–18)
IRON SATN MFR SERPL: 29 % (ref 20–55)
IRON SERPL-MCNC: 106 UG/DL (ref 70–180)
LIPASE SERPL-CCNC: 23 U/L (ref 5–51)
LYMPHOCYTES # BLD AUTO: 1.65 10*3/UL (ref 1–5)
LYMPHOCYTES NFR BLD AUTO: 28.4 % (ref 20–45)
MCH RBC QN AUTO: 27.8 PG (ref 27–31)
MCHC RBC AUTO-ENTMCNC: 31.6 G/DL (ref 33–37)
MCV RBC AUTO: 88.1 FL (ref 80–96)
MONOCYTES # BLD AUTO: 0.44 10*3/UL (ref 0.2–1.2)
MONOCYTES NFR BLD AUTO: 7.6 % (ref 3–10)
NEUTROPHILS # BLD AUTO: 3.62 10*3/UL (ref 2–8)
NEUTROPHILS NFR BLD AUTO: 62.1 % (ref 30–85)
NRBC CBCN: 0 % (ref 0–0.7)
OSMOLALITY SERPL CALC.SUM OF ELEC: 292 MOSM/KG (ref 273–304)
PLATELET # BLD AUTO: 191 10*3/UL (ref 130–400)
PMV BLD AUTO: 10.5 FL (ref 9.4–12.4)
POTASSIUM SERPL-SCNC: 4.3 MMOL/L (ref 3.5–5.3)
RBC # BLD AUTO: 4.89 10*6/UL (ref 4.7–6.1)
SODIUM SERPL-SCNC: 140 MMOL/L (ref 135–147)
T4 FREE SERPL-MCNC: 1.4 NG/DL (ref 0.9–1.8)
TESTOST SERPL-MCNC: 378 NG/DL (ref 193–740)
TIBC SERPL-MCNC: 362 UG/DL (ref 245–450)
TRANSFERRIN SERPL-MCNC: 253 MG/DL (ref 215–365)
TSH SERPL-ACNC: 2.22 M[IU]/L (ref 0.27–4.2)
VIT B12 SERPL-MCNC: 215 PG/ML (ref 211–911)
WBC # BLD AUTO: 5.82 10*3/UL (ref 4.8–10.8)

## 2019-10-12 LAB — TESTOSTERONE, FREE: 6 PG/ML (ref 6.6–18.1)

## 2019-10-13 LAB
CONV EBV EARLY ANTIGEN: <5 U/ML (ref 0–10.9)
CONV EBV NUCLEAR ANTIGEN: 33 U/ML (ref 0–21.9)
CONV EPSTEIN BARR VIRAL CAPSID IGM: 41.5 U/ML (ref 0–43.9)
CONV EPSTEIN BARR VIRUS ANTIBODY TO VIRAL CAPSID IGG: 298 U/ML (ref 0–21.9)

## 2019-11-22 ENCOUNTER — CLINICAL SUPPORT NO REQUIREMENTS (OUTPATIENT)
Dept: CARDIOLOGY | Facility: CLINIC | Age: 74
End: 2019-11-22

## 2019-11-22 DIAGNOSIS — I49.5 SICK SINUS SYNDROME (HCC): Primary | ICD-10-CM

## 2019-11-22 PROCEDURE — 93294 REM INTERROG EVL PM/LDLS PM: CPT | Performed by: INTERNAL MEDICINE

## 2019-11-22 PROCEDURE — 93296 REM INTERROG EVL PM/IDS: CPT | Performed by: INTERNAL MEDICINE

## 2020-01-20 ENCOUNTER — HOSPITAL ENCOUNTER (OUTPATIENT)
Dept: LAB | Facility: HOSPITAL | Age: 75
Discharge: HOME | End: 2020-01-20
Attending: INTERNAL MEDICINE

## 2020-01-20 LAB
ALBUMIN SERPL-MCNC: 4.5 G/DL (ref 3.5–5)
ALP SERPL-CCNC: 46 U/L (ref 56–155)
ALT SERPL-CCNC: 25 U/L (ref 10–40)
AST SERPL-CCNC: 20 U/L (ref 15–50)
BILIRUB SERPL-MCNC: 0.41 MG/DL (ref 0.2–1.3)
CONV BILI, CONJUGATED: <0.2 MG/DL (ref 0–0.6)
CONV TOTAL PROTEIN: 7.5 G/DL (ref 6.3–8.2)
CONV UNCONJUGATED BILIRUBIN: 0.2 MG/DL (ref 0–1.1)
CREAT UR-MCNC: 1.31 MG/DL (ref 0.7–1.2)

## 2020-02-10 ENCOUNTER — HOSPITAL ENCOUNTER (OUTPATIENT)
Dept: LAB | Facility: HOSPITAL | Age: 75
Discharge: HOME | End: 2020-02-10
Attending: UROLOGY

## 2020-02-10 LAB — PSA SERPL-MCNC: 0.74 NG/ML (ref 0–4)

## 2020-02-12 ENCOUNTER — OFFICE VISIT CONVERTED (OUTPATIENT)
Dept: UROLOGY | Facility: CLINIC | Age: 75
End: 2020-02-12
Attending: UROLOGY

## 2020-02-12 ENCOUNTER — HOSPITAL ENCOUNTER (OUTPATIENT)
Dept: SURGERY | Facility: CLINIC | Age: 75
Discharge: HOME | End: 2020-02-12
Attending: UROLOGY

## 2020-02-12 LAB
APPEARANCE UR: CLEAR
BILIRUB UR QL: NEGATIVE
COLOR UR: YELLOW
CONV COLLECTION SOURCE (UA): NORMAL
CONV UROBILINOGEN IN URINE BY AUTOMATED TEST STRIP: 0.2 {EHRLICHU}/DL (ref 0.1–1)
GLUCOSE UR QL: NEGATIVE MG/DL
HGB UR QL STRIP: NEGATIVE
KETONES UR QL STRIP: NEGATIVE MG/DL
LEUKOCYTE ESTERASE UR QL STRIP: NEGATIVE
NITRITE UR QL STRIP: NEGATIVE
PH UR STRIP.AUTO: 6.5 [PH] (ref 5–8)
PROT UR QL: NEGATIVE MG/DL
SP GR UR: 1.01 (ref 1–1.03)

## 2020-03-24 ENCOUNTER — TELEMEDICINE CONVERTED (OUTPATIENT)
Dept: FAMILY MEDICINE CLINIC | Facility: CLINIC | Age: 75
End: 2020-03-24
Attending: FAMILY MEDICINE

## 2020-04-01 ENCOUNTER — CLINICAL SUPPORT NO REQUIREMENTS (OUTPATIENT)
Dept: CARDIOLOGY | Facility: CLINIC | Age: 75
End: 2020-04-01

## 2020-04-01 DIAGNOSIS — I49.5 SICK SINUS SYNDROME (HCC): Primary | ICD-10-CM

## 2020-04-01 PROCEDURE — 93296 REM INTERROG EVL PM/IDS: CPT | Performed by: INTERNAL MEDICINE

## 2020-04-01 PROCEDURE — 93294 REM INTERROG EVL PM/LDLS PM: CPT | Performed by: INTERNAL MEDICINE

## 2020-04-08 ENCOUNTER — OFFICE VISIT (OUTPATIENT)
Dept: CARDIOLOGY | Facility: CLINIC | Age: 75
End: 2020-04-08

## 2020-04-08 VITALS
HEIGHT: 74 IN | DIASTOLIC BLOOD PRESSURE: 82 MMHG | BODY MASS INDEX: 26.18 KG/M2 | SYSTOLIC BLOOD PRESSURE: 137 MMHG | HEART RATE: 73 BPM | WEIGHT: 204 LBS

## 2020-04-08 DIAGNOSIS — I10 ESSENTIAL HYPERTENSION: Primary | ICD-10-CM

## 2020-04-08 DIAGNOSIS — I49.5 SSS (SICK SINUS SYNDROME) (HCC): ICD-10-CM

## 2020-04-08 PROCEDURE — 99213 OFFICE O/P EST LOW 20 MIN: CPT | Performed by: INTERNAL MEDICINE

## 2020-04-08 RX ORDER — SENNOSIDES 8.6 MG
650 CAPSULE ORAL EVERY 8 HOURS PRN
COMMUNITY
End: 2021-04-16 | Stop reason: ALTCHOICE

## 2020-04-08 RX ORDER — LOSARTAN POTASSIUM 100 MG/1
100 TABLET ORAL DAILY
COMMUNITY
End: 2021-09-27 | Stop reason: SDUPTHER

## 2020-04-09 PROBLEM — I10 HYPERTENSION: Status: ACTIVE | Noted: 2020-04-09

## 2020-04-09 PROBLEM — I49.5 SSS (SICK SINUS SYNDROME): Status: ACTIVE | Noted: 2020-04-09

## 2020-04-09 NOTE — PROGRESS NOTES
Subjective:     Encounter Date:4/8/2020      Patient ID: Elmer Garvin is a 75 y.o. male.    Chief Complaint:  Hypertension   This is a chronic problem. The problem is controlled. Associated symptoms include malaise/fatigue and shortness of breath.   Shortness of Breath   This is a recurrent problem. The current episode started more than 1 month ago.     75-year-old gentleman who is evaluated today via video visit due to the current pandemic.  Patient has a history of hypertension shortness of breath and some mild coronary artery disease as well as sick sinus syndrome.  Clinically he has been doing great he really had no complaints says he felt good.  He has had some shortness of breath but it has not been significant..  Blood pressure has been running good    Review of Systems   Constitution: Positive for malaise/fatigue.   Respiratory: Positive for shortness of breath.    Musculoskeletal: Positive for joint pain and joint swelling.   All other systems reviewed and are negative.      Procedures         Objective:     Physical Exam   Constitutional: He is oriented to person, place, and time. He appears well-developed.   HENT:   Head: Normocephalic.   Eyes: Conjunctivae are normal.   Musculoskeletal: He exhibits no edema.   Neurological: He is alert and oriented to person, place, and time.   Skin: Skin is warm and dry.   Psychiatric: He has a normal mood and affect. His behavior is normal.   Vitals reviewed.      Lab Review:       Assessment:          Diagnosis Plan   1. Essential hypertension     2. SSS (sick sinus syndrome) (CMS/[facility])            Plan:       1.  Sick sinus syndrome status post pacemaker.  Patient's pacemaker was checked through our office.  2.  Chronic shortness of breath.  Overall may be a little bit better encouraged to continue to exercise.  3.  Patient told to follow-up in 1 year sooner if issues

## 2020-04-13 ENCOUNTER — HOSPITAL ENCOUNTER (OUTPATIENT)
Dept: LAB | Facility: HOSPITAL | Age: 75
Discharge: HOME | End: 2020-04-13
Attending: INTERNAL MEDICINE

## 2020-04-13 LAB
ALBUMIN SERPL-MCNC: 4.3 G/DL (ref 3.5–5)
ALBUMIN/GLOB SERPL: 1.6 {RATIO} (ref 1.4–2.6)
ALP SERPL-CCNC: 36 U/L (ref 56–155)
ALT SERPL-CCNC: 21 U/L (ref 10–40)
ANION GAP SERPL CALC-SCNC: 17 MMOL/L (ref 8–19)
AST SERPL-CCNC: 18 U/L (ref 15–50)
BILIRUB SERPL-MCNC: 0.41 MG/DL (ref 0.2–1.3)
BUN SERPL-MCNC: 17 MG/DL (ref 5–25)
BUN/CREAT SERPL: 14 {RATIO} (ref 6–20)
CALCIUM SERPL-MCNC: 9.2 MG/DL (ref 8.7–10.4)
CHLORIDE SERPL-SCNC: 105 MMOL/L (ref 99–111)
CHOLEST SERPL-MCNC: 222 MG/DL (ref 107–200)
CHOLEST/HDLC SERPL: 4.4 {RATIO} (ref 3–6)
CONV CO2: 25 MMOL/L (ref 22–32)
CONV TOTAL PROTEIN: 7 G/DL (ref 6.3–8.2)
CREAT UR-MCNC: 1.19 MG/DL (ref 0.7–1.2)
ERYTHROCYTE [SEDIMENTATION RATE] IN BLOOD: 2 MM/H (ref 0–20)
EST. AVERAGE GLUCOSE BLD GHB EST-MCNC: 126 MG/DL
GFR SERPLBLD BASED ON 1.73 SQ M-ARVRAT: 60 ML/MIN/{1.73_M2}
GLOBULIN UR ELPH-MCNC: 2.7 G/DL (ref 2–3.5)
GLUCOSE SERPL-MCNC: 97 MG/DL (ref 70–99)
HBA1C MFR BLD: 6 % (ref 3.5–5.7)
HDLC SERPL-MCNC: 50 MG/DL (ref 40–60)
LDLC SERPL CALC-MCNC: 150 MG/DL (ref 70–100)
OSMOLALITY SERPL CALC.SUM OF ELEC: 295 MOSM/KG (ref 273–304)
POTASSIUM SERPL-SCNC: 4.5 MMOL/L (ref 3.5–5.3)
SODIUM SERPL-SCNC: 142 MMOL/L (ref 135–147)
T4 FREE SERPL-MCNC: 1.5 NG/DL (ref 0.9–1.8)
TRIGL SERPL-MCNC: 108 MG/DL (ref 40–150)
TSH SERPL-ACNC: 3.51 M[IU]/L (ref 0.27–4.2)
VLDLC SERPL-MCNC: 22 MG/DL (ref 5–37)

## 2020-09-24 ENCOUNTER — OFFICE VISIT CONVERTED (OUTPATIENT)
Dept: FAMILY MEDICINE CLINIC | Facility: CLINIC | Age: 75
End: 2020-09-24
Attending: FAMILY MEDICINE

## 2020-09-24 ENCOUNTER — HOSPITAL ENCOUNTER (OUTPATIENT)
Dept: LAB | Facility: HOSPITAL | Age: 75
Discharge: HOME | End: 2020-09-24
Attending: FAMILY MEDICINE

## 2020-09-24 LAB
ALBUMIN SERPL-MCNC: 4.1 G/DL (ref 3.5–5)
ALBUMIN/GLOB SERPL: 1.5 {RATIO} (ref 1.4–2.6)
ALP SERPL-CCNC: 67 U/L (ref 56–155)
ALT SERPL-CCNC: 25 U/L (ref 10–40)
ANION GAP SERPL CALC-SCNC: 16 MMOL/L (ref 8–19)
AST SERPL-CCNC: 20 U/L (ref 15–50)
BASOPHILS # BLD AUTO: 0.02 10*3/UL (ref 0–0.2)
BASOPHILS NFR BLD AUTO: 0.4 % (ref 0–3)
BILIRUB SERPL-MCNC: 0.46 MG/DL (ref 0.2–1.3)
BUN SERPL-MCNC: 16 MG/DL (ref 5–25)
BUN/CREAT SERPL: 13 {RATIO} (ref 6–20)
CALCIUM SERPL-MCNC: 9.3 MG/DL (ref 8.7–10.4)
CHLORIDE SERPL-SCNC: 103 MMOL/L (ref 99–111)
CHOLEST SERPL-MCNC: 194 MG/DL (ref 107–200)
CHOLEST/HDLC SERPL: 4.1 {RATIO} (ref 3–6)
CONV ABS IMM GRAN: 0.02 10*3/UL (ref 0–0.2)
CONV CO2: 27 MMOL/L (ref 22–32)
CONV IMMATURE GRAN: 0.4 % (ref 0–1.8)
CONV TOTAL PROTEIN: 6.8 G/DL (ref 6.3–8.2)
CREAT UR-MCNC: 1.28 MG/DL (ref 0.7–1.2)
DEPRECATED RDW RBC AUTO: 43 FL (ref 35.1–43.9)
EOSINOPHIL # BLD AUTO: 0.11 10*3/UL (ref 0–0.7)
EOSINOPHIL # BLD AUTO: 2.2 % (ref 0–7)
ERYTHROCYTE [DISTWIDTH] IN BLOOD BY AUTOMATED COUNT: 13.3 % (ref 11.6–14.4)
EST. AVERAGE GLUCOSE BLD GHB EST-MCNC: 123 MG/DL
GFR SERPLBLD BASED ON 1.73 SQ M-ARVRAT: 55 ML/MIN/{1.73_M2}
GLOBULIN UR ELPH-MCNC: 2.7 G/DL (ref 2–3.5)
GLUCOSE SERPL-MCNC: 92 MG/DL (ref 70–99)
HBA1C MFR BLD: 5.9 % (ref 3.5–5.7)
HCT VFR BLD AUTO: 44.1 % (ref 42–52)
HDLC SERPL-MCNC: 47 MG/DL (ref 40–60)
HGB BLD-MCNC: 13.7 G/DL (ref 14–18)
LDLC SERPL CALC-MCNC: 125 MG/DL (ref 70–100)
LYMPHOCYTES # BLD AUTO: 1.5 10*3/UL (ref 1–5)
LYMPHOCYTES NFR BLD AUTO: 29.6 % (ref 20–45)
MCH RBC QN AUTO: 27.5 PG (ref 27–31)
MCHC RBC AUTO-ENTMCNC: 31.1 G/DL (ref 33–37)
MCV RBC AUTO: 88.4 FL (ref 80–96)
MONOCYTES # BLD AUTO: 0.33 10*3/UL (ref 0.2–1.2)
MONOCYTES NFR BLD AUTO: 6.5 % (ref 3–10)
NEUTROPHILS # BLD AUTO: 3.09 10*3/UL (ref 2–8)
NEUTROPHILS NFR BLD AUTO: 60.9 % (ref 30–85)
NRBC CBCN: 0 % (ref 0–0.7)
OSMOLALITY SERPL CALC.SUM OF ELEC: 293 MOSM/KG (ref 273–304)
PLATELET # BLD AUTO: 238 10*3/UL (ref 130–400)
PMV BLD AUTO: 10.2 FL (ref 9.4–12.4)
POTASSIUM SERPL-SCNC: 4.7 MMOL/L (ref 3.5–5.3)
RBC # BLD AUTO: 4.99 10*6/UL (ref 4.7–6.1)
SODIUM SERPL-SCNC: 141 MMOL/L (ref 135–147)
TRIGL SERPL-MCNC: 112 MG/DL (ref 40–150)
VLDLC SERPL-MCNC: 22 MG/DL (ref 5–37)
WBC # BLD AUTO: 5.07 10*3/UL (ref 4.8–10.8)

## 2020-09-25 LAB
T4 FREE SERPL-MCNC: 1.4 NG/DL (ref 0.9–1.8)
TSH SERPL-ACNC: 3.14 M[IU]/L (ref 0.27–4.2)

## 2020-12-09 ENCOUNTER — OFFICE VISIT CONVERTED (OUTPATIENT)
Dept: FAMILY MEDICINE CLINIC | Facility: CLINIC | Age: 75
End: 2020-12-09
Attending: FAMILY MEDICINE

## 2020-12-09 ENCOUNTER — CONVERSION ENCOUNTER (OUTPATIENT)
Dept: FAMILY MEDICINE CLINIC | Facility: CLINIC | Age: 75
End: 2020-12-09

## 2021-02-18 ENCOUNTER — HOSPITAL ENCOUNTER (OUTPATIENT)
Dept: LAB | Facility: HOSPITAL | Age: 76
Discharge: HOME | End: 2021-02-18
Attending: UROLOGY

## 2021-02-18 LAB — PSA SERPL-MCNC: 0.83 NG/ML (ref 0–4)

## 2021-02-25 ENCOUNTER — CONVERSION ENCOUNTER (OUTPATIENT)
Dept: SURGERY | Facility: CLINIC | Age: 76
End: 2021-02-25

## 2021-02-25 ENCOUNTER — OFFICE VISIT CONVERTED (OUTPATIENT)
Dept: UROLOGY | Facility: CLINIC | Age: 76
End: 2021-02-25
Attending: UROLOGY

## 2021-03-25 ENCOUNTER — OFFICE VISIT CONVERTED (OUTPATIENT)
Dept: FAMILY MEDICINE CLINIC | Facility: CLINIC | Age: 76
End: 2021-03-25
Attending: FAMILY MEDICINE

## 2021-03-25 ENCOUNTER — HOSPITAL ENCOUNTER (OUTPATIENT)
Dept: FAMILY MEDICINE CLINIC | Facility: CLINIC | Age: 76
Discharge: HOME | End: 2021-03-25
Attending: FAMILY MEDICINE

## 2021-03-25 LAB
ALBUMIN SERPL-MCNC: 4 G/DL (ref 3.5–5)
ALBUMIN/GLOB SERPL: 1.5 {RATIO} (ref 1.4–2.6)
ALP SERPL-CCNC: 46 U/L (ref 56–155)
ALT SERPL-CCNC: 11 U/L (ref 10–40)
ANION GAP SERPL CALC-SCNC: 15 MMOL/L (ref 8–19)
AST SERPL-CCNC: 14 U/L (ref 15–50)
BILIRUB SERPL-MCNC: 0.44 MG/DL (ref 0.2–1.3)
BUN SERPL-MCNC: 16 MG/DL (ref 5–25)
BUN/CREAT SERPL: 13 {RATIO} (ref 6–20)
CALCIUM SERPL-MCNC: 9 MG/DL (ref 8.7–10.4)
CHLORIDE SERPL-SCNC: 104 MMOL/L (ref 99–111)
CHOLEST SERPL-MCNC: 183 MG/DL (ref 107–200)
CHOLEST/HDLC SERPL: 4.3 {RATIO} (ref 3–6)
CONV CO2: 28 MMOL/L (ref 22–32)
CONV TOTAL PROTEIN: 6.7 G/DL (ref 6.3–8.2)
CREAT UR-MCNC: 1.24 MG/DL (ref 0.7–1.2)
EST. AVERAGE GLUCOSE BLD GHB EST-MCNC: 117 MG/DL
GFR SERPLBLD BASED ON 1.73 SQ M-ARVRAT: 57 ML/MIN/{1.73_M2}
GLOBULIN UR ELPH-MCNC: 2.7 G/DL (ref 2–3.5)
GLUCOSE SERPL-MCNC: 89 MG/DL (ref 70–99)
HBA1C MFR BLD: 5.7 % (ref 3.5–5.7)
HDLC SERPL-MCNC: 43 MG/DL (ref 40–60)
LDLC SERPL CALC-MCNC: 109 MG/DL (ref 70–100)
OSMOLALITY SERPL CALC.SUM OF ELEC: 297 MOSM/KG (ref 273–304)
POTASSIUM SERPL-SCNC: 4 MMOL/L (ref 3.5–5.3)
SODIUM SERPL-SCNC: 143 MMOL/L (ref 135–147)
T4 FREE SERPL-MCNC: 1.5 NG/DL (ref 0.9–1.8)
TRIGL SERPL-MCNC: 157 MG/DL (ref 40–150)
TSH SERPL-ACNC: 2.87 M[IU]/L (ref 0.27–4.2)
VLDLC SERPL-MCNC: 31 MG/DL (ref 5–37)

## 2021-04-16 ENCOUNTER — CLINICAL SUPPORT NO REQUIREMENTS (OUTPATIENT)
Dept: CARDIOLOGY | Facility: CLINIC | Age: 76
End: 2021-04-16

## 2021-04-16 ENCOUNTER — OFFICE VISIT (OUTPATIENT)
Dept: CARDIOLOGY | Facility: CLINIC | Age: 76
End: 2021-04-16

## 2021-04-16 VITALS
SYSTOLIC BLOOD PRESSURE: 112 MMHG | HEART RATE: 67 BPM | DIASTOLIC BLOOD PRESSURE: 78 MMHG | BODY MASS INDEX: 26.44 KG/M2 | OXYGEN SATURATION: 98 % | WEIGHT: 206 LBS | HEIGHT: 74 IN

## 2021-04-16 DIAGNOSIS — I49.5 SICK SINUS SYNDROME (HCC): Primary | ICD-10-CM

## 2021-04-16 DIAGNOSIS — I49.5 SSS (SICK SINUS SYNDROME) (HCC): ICD-10-CM

## 2021-04-16 DIAGNOSIS — Z95.0 PACEMAKER: ICD-10-CM

## 2021-04-16 DIAGNOSIS — I10 ESSENTIAL HYPERTENSION: Primary | ICD-10-CM

## 2021-04-16 PROCEDURE — 99213 OFFICE O/P EST LOW 20 MIN: CPT | Performed by: NURSE PRACTITIONER

## 2021-04-16 PROCEDURE — 93280 PM DEVICE PROGR EVAL DUAL: CPT | Performed by: INTERNAL MEDICINE

## 2021-04-16 RX ORDER — FLUTICASONE PROPIONATE 50 MCG
SPRAY, SUSPENSION (ML) NASAL
Status: ON HOLD | COMMUNITY
Start: 2021-03-29 | End: 2022-01-10 | Stop reason: SDUPTHER

## 2021-04-16 NOTE — PROGRESS NOTES
CARDIOLOGY        Patient Name: Elmer Garvin  :1945  Age: 76 y.o.  Primary Cardiologist: Cyrus Mccray MD  Encounter Provider:  EDWINA Herzog    Date of Service: 21          CHIEF COMPLAINT / REASON FOR OFFICE VISIT     Hypertension (1 yr f/u )      HISTORY OF PRESENT ILLNESS       HPI  Elmer Garvin is a 76 y.o. male who presents today for annual evaluation.     Pt has a  history significant for hypertension, SSS with PPM.    Patient has done well over the past year.  He had his device interrogated prior to my appointment today.  Largest complaint is joint pain secondary to arthritis.  He reports that he was previously taking an NSAID for his arthritis pain but reports that his PCP recommended that it be discontinued when his creatinine started elevating and this caused his blood pressure to elevate.  Since discontinuing this medication his blood pressure has been more normotensive in the 110s-120s.  He reports that he does have some intermittent episodes of lightheadedness that are mainly positional.  He is concerned that his blood pressure may be becoming too low, it has never been below 110.  Patient states that he does have some dyspnea with exertion when bending and with moderate exertion that is chronic and unchanged.  He does not routinely exercise secondary to his arthritis pain but he does remain active.  He does reports that he is having problems with the pain from his arthritis as his PCP told him that he could not even take Tylenol for his pain.  He is unsure why this was told to him as he has no history of any liver diseases.  He denies any chest discomfort, shortness of breath at rest, palpitations, lower extremity edema, increased fatigue.    The following portions of the patient's history were reviewed and updated as appropriate: allergies, current medications, past family history, past medical history, past social history, past surgical history and problem  "list.      VITAL SIGNS     Visit Vitals  /78 (BP Location: Left arm, Patient Position: Sitting, Cuff Size: Adult)   Pulse 67   Ht 188 cm (74\")   Wt 93.4 kg (206 lb)   SpO2 98%   BMI 26.45 kg/m²         Wt Readings from Last 3 Encounters:   04/16/21 93.4 kg (206 lb)   04/08/20 92.5 kg (204 lb)   08/15/19 97.5 kg (215 lb)     Body mass index is 26.45 kg/m².      REVIEW OF SYSTEMS   Review of Systems   Constitutional: Negative for chills, fever, weight gain and weight loss.   Cardiovascular: Positive for dyspnea on exertion. Negative for leg swelling.   Respiratory: Negative for cough, snoring and wheezing.    Hematologic/Lymphatic: Negative for bleeding problem. Does not bruise/bleed easily.   Skin: Negative for color change.   Musculoskeletal: Positive for joint pain. Negative for falls and myalgias.   Gastrointestinal: Negative for melena.   Genitourinary: Negative for hematuria.   Neurological: Negative for excessive daytime sleepiness.   Psychiatric/Behavioral: Negative for depression. The patient is not nervous/anxious.            PHYSICAL EXAMINATION     Constitutional:       Appearance: Normal appearance. Well-developed.   Eyes:      Conjunctiva/sclera: Conjunctivae normal.   Neck:      Vascular: No carotid bruit.   Pulmonary:      Effort: Pulmonary effort is normal.      Breath sounds: Normal breath sounds.   Cardiovascular:      Normal rate. Regular rhythm. Normal S1. Normal S2.      Murmurs: There is no murmur.      No gallop. No click. No rub.   Musculoskeletal: Normal range of motion.      Comments: No pedal edema Skin:     General: Skin is warm and dry.   Neurological:      Mental Status: Alert and oriented to person, place, and time.      GCS: GCS eye subscore is 4. GCS verbal subscore is 5. GCS motor subscore is 6.   Psychiatric:         Speech: Speech normal.         Behavior: Behavior normal.         Thought Content: Thought content normal.         Judgment: Judgment normal.           REVIEWED " DATA     Procedures    Cardiac Procedures:  1. Left and right diagnostic heart catheterization 7/25/2019.  Normal right heart cath with normal LV systolic function, normal LV end-diastolic pressure very mild nonobstructive coronary artery disease.  Recommending considering noncardiac causes of symptoms.            ASSESSMENT & PLAN      Diagnosis Plan   1. Essential hypertension  Comprehensive Metabolic Panel   2. SSS (sick sinus syndrome) (CMS/[facility])     3. Pacemaker           SUMMARY/DISCUSSION  1. Hypertension.  Blood pressure currently very controlled today at 117/78.  Patient reports that at home and is averaging in the 110s-120s.  He does have intermittent episodes of positional lightheadedness.  I advised patient to continue to monitor the symptoms and if they become more frequent to let me know and we would decrease his losartan to 75 mg/day.  At this time with minimal symptoms and patient's blood pressure being controlled we will continue 100 mg/day.  2. SSS with pacemaker.  Interrogated in clinic today with 2 episodes of an atrial tachycardia lasting 3 seconds.  3. Patient's largest complaint is joint pain from arthritis since needing to discontinue his NSAID secondary to elevating creatinine.  I informed patient that he should be able to safely take Tylenol as long as he does not have any history of liver disease.  He would like to assess both his renal and liver functions and this will be done on a CMP that will be drawn in [facility].  If liver functions are normal I informed patient that he should be able to take Tylenol 1000 mg every 4 hours as needed for pain.  4. No changes to his regimen today.  He will follow-up with Dr. Mccray in 1 year.  Sooner for problems or complications.        MEDICATIONS         Discharge Medications          Accurate as of April 16, 2021  1:34 PM. If you have any questions, ask your nurse or doctor.            Continue These Medications      Instructions Start Date    fluticasone 50 MCG/ACT nasal spray  Commonly known as: FLONASE   No dose, route, or frequency recorded.      levothyroxine 100 MCG tablet  Commonly known as: SYNTHROID, LEVOTHROID   100 mcg, Oral, Daily      losartan 100 MG tablet  Commonly known as: COZAAR   100 mg, Oral, Daily      melatonin 5 MG tablet tablet   10 mg, Oral         Stop These Medications    omeprazole 40 MG capsule  Commonly known as: priLOSEC  Stopped by: EDWINA Herzog                **Dragon Disclaimer:   Much of this encounter note is an electronic transcription/translation of spoken language to printed text. The electronic translation of spoken language may permit erroneous, or at times, nonsensical words or phrases to be inadvertently transcribed. Although I have reviewed the note for such errors, some may still exist.

## 2021-04-19 ENCOUNTER — OFFICE VISIT CONVERTED (OUTPATIENT)
Dept: OTOLARYNGOLOGY | Facility: CLINIC | Age: 76
End: 2021-04-19
Attending: OTOLARYNGOLOGY

## 2021-04-19 ENCOUNTER — HOSPITAL ENCOUNTER (OUTPATIENT)
Dept: LAB | Facility: HOSPITAL | Age: 76
Discharge: HOME | End: 2021-04-19

## 2021-04-19 ENCOUNTER — CONVERSION ENCOUNTER (OUTPATIENT)
Dept: OTOLARYNGOLOGY | Facility: CLINIC | Age: 76
End: 2021-04-19

## 2021-04-19 LAB
ALBUMIN SERPL-MCNC: 4 G/DL (ref 3.5–5)
ALBUMIN/GLOB SERPL: 1.5 {RATIO} (ref 1.4–2.6)
ALP SERPL-CCNC: 46 U/L (ref 56–155)
ALT SERPL-CCNC: 11 U/L (ref 10–40)
ANION GAP SERPL CALC-SCNC: 14 MMOL/L (ref 8–19)
AST SERPL-CCNC: 12 U/L (ref 15–50)
BILIRUB SERPL-MCNC: 0.28 MG/DL (ref 0.2–1.3)
BUN SERPL-MCNC: 13 MG/DL (ref 5–25)
BUN/CREAT SERPL: 11 {RATIO} (ref 6–20)
CALCIUM SERPL-MCNC: 9.1 MG/DL (ref 8.7–10.4)
CHLORIDE SERPL-SCNC: 103 MMOL/L (ref 99–111)
CONV CO2: 26 MMOL/L (ref 22–32)
CONV TOTAL PROTEIN: 6.7 G/DL (ref 6.3–8.2)
CREAT UR-MCNC: 1.2 MG/DL (ref 0.7–1.2)
GFR SERPLBLD BASED ON 1.73 SQ M-ARVRAT: 59 ML/MIN/{1.73_M2}
GLOBULIN UR ELPH-MCNC: 2.7 G/DL (ref 2–3.5)
GLUCOSE SERPL-MCNC: 129 MG/DL (ref 70–99)
OSMOLALITY SERPL CALC.SUM OF ELEC: 290 MOSM/KG (ref 273–304)
POTASSIUM SERPL-SCNC: 4.1 MMOL/L (ref 3.5–5.3)
SODIUM SERPL-SCNC: 139 MMOL/L (ref 135–147)

## 2021-04-27 ENCOUNTER — HOSPITAL ENCOUNTER (OUTPATIENT)
Dept: CT IMAGING | Facility: HOSPITAL | Age: 76
Discharge: HOME | End: 2021-04-27
Attending: OTOLARYNGOLOGY

## 2021-05-10 ENCOUNTER — OFFICE VISIT CONVERTED (OUTPATIENT)
Dept: OTOLARYNGOLOGY | Facility: CLINIC | Age: 76
End: 2021-05-10
Attending: OTOLARYNGOLOGY

## 2021-05-13 NOTE — PROGRESS NOTES
Progress Note      Patient Name: Elmer Garvin   Patient ID: [id number]   Sex: Male   YOB: 1945    Primary Care Provider: Erik Macias DO   Referring Provider: Jeanette Blum MD    Visit Date: September 24, 2020    Provider: Erik Macias DO   Location: [facility]   Location Address: [address] [place] [address]  [id number]   Location Phone: [phone]          Chief Complaint  · 6 month follow up - HTN, Hypothyroidism, MONROE, Muscle Cramps  · medication refills      History Of Present Illness  Elmer Garvin is a 75 year old /White male who presents for evaluation and treatment of: HTN, hypothyroid, and GERD. He states that he does NOT check his BP. He is taking all his meds as prescribed.       Past Medical History  Disease Name Date Onset Notes   Arthritis --  --    Asthma 01/19/2018 He sees Dr Guadarrama on a regular basis.    Barretts esophagus --  --    BPH (benign prostatic hypertrophy) --  --    Cataract --  --    Deviated nasal septum --  --    Family history of prostate cancer in father 02/06/2019 --    GERD (gastroesophageal reflux disease) 01/19/2018 --    Hypertension --  --    Hypertrophy of both inferior nasal turbinates --  --    Hypothyroidism --  --    MRSA (methicillin resistant staph aureus) culture positive from buttock boils --  --    Nasal obstruction --  --    Pacemaker --  --    Pre-diabetes 04/02/2020 --          Past Surgical History  Procedure Name Date Notes   Cataract exctraction with lens implant --  --    Colonoscopy 2019 --    EGD 2007 2018 --    Incision and Drainage of Abscess --  --    Pacemaker --  --          Medication List  Name Date Started Instructions   cetirizine 10 mg oral tablet  take 1 tablet (10 mg) by oral route once daily   famotidine 20 mg oral tablet  take 1 tablet (20 mg) by oral route 2 hours before cluster injections   fluticasone propionate 50 mcg/actuation nasal spray,suspension  spray  1 spray (50 mcg) in each nostril by intranasal route once daily   losartan 100 mg oral tablet  take 1 tablet (100 mg) by oral route once daily   montelukast 10 mg oral tablet  take 1 tablet (10 mg) by oral route once daily in the evening   omeprazole 20 mg oral capsule,delayed release(DR/EC)  take 1 capsule (20 mg) by oral route once daily before a meal   prednisone 20 mg oral tablet  take 1 tab by mouth 2 hours before cluster injections   sulindac 200 mg oral tablet  take 1 tablet (200 mg) by oral route 2 times per day with food   Synthroid 100 mcg oral tablet 2020 take 1 tablet (100 mcg) by oral route once daily for 90 days   Tylenol Extra Strength 500 mg oral tablet  --          Allergy List  Allergen Name Date Reaction Notes   NO KNOWN DRUG ALLERGIES --  --  --          Family Medical History  Disease Name Relative/Age Notes   Emphysema Brother/   --    Breast Neoplasm, Malignant Mother/49      Stroke Uncle/   --    - No Family History of Colorectal Cancer  --    Heart Attack (MI) Sister/   --    Prostate cancer Father/70   --    Family history of breast cancer Mother/   Mother  Mother/40s   Diabetes  Child         Social History  Finding Status Start/Stop Quantity Notes   Alcohol Never --/-- --  does not drink  2019 - drinks no   Caffeine Current every day 0/0 --  drinks regularly; coffee; 3-4 times per day   lives with spouse --  --/-- --  --     --  --/-- --  --    Second hand smoke exposure Never 0/0 --  no   Tobacco Former --/-- --  former smoker  Quit 1976   Working --  --/-- --  --          Review of Systems  · Constitutional  o Admits  o : fatigue  · Eyes  o Denies  o : blurred vision, changes in vision  · HENT  o Denies  o : headaches  · Cardiovascular  o Denies  o : chest pain, irregular heart beats, rapid heart rate  · Respiratory  o Admits  o : cough, dyspnea on exertion  o Denies  o : shortness of breath, wheezing  · Gastrointestinal  o Denies  o : nausea, vomiting,  "diarrhea, constipation, reflux  · Endocrine  o Admits  o : central obesity  o Denies  o : polyuria, polydipsia  · Psychiatric  o Denies  o : anxiety, depression      Vitals  Date Time BP Position Site L\R Cuff Size HR RR TEMP (F) WT  HT  BMI kg/m2 BSA m2 O2 Sat HC       09/24/2019 11:27 /65 Sitting    78 - R  97.6 216lbs 3oz 6'  2\" 27.76 2.26 98 %    10/08/2019 01:41 /72 Sitting    80 - R   212lbs 16oz 6'  2\" 27.35 2.25 100 %    09/24/2020 10:18 /75 Sitting    65 - R  98.2 214lbs 4oz 6'  1\" 28.27 2.24 99 %    09/24/2020 10:53 /72 Sitting                     Physical Examination  · Constitutional  o Appearance  o : well-nourished, well developed, alert, in no acute distress, well-tended appearance  · Head and Face  o Head  o :   § Inspection  § : atraumatic, normocephalic  o Face  o :   § Inspection  § : no facial lesions  o HEENT  o : Unremarkable  · Eyes  o Conjunctivae  o : conjunctivae normal  o Sclerae  o : sclerae white  o Pupils and Irises  o : pupils equal and round, pupils reactive to light bilaterally  o Eyelids/Ocular Adnexae  o : eyelid appearance normal  · Ears, Nose, Mouth and Throat  o Ears  o :   § External Ears  § : appearance within normal limits, no lesions present  § Otoscopic Examination  § : tympanic membrane appearance within normal limits bilaterally without perforations, mobility normal  o Nose  o :   § External Nose  § : appearance normal  o Oral Cavity  o :   § Oral Mucosa  § : oral mucosa normal  § Lips  § : lip appearance normal  § Teeth  § : normal dentition for age  § Gums  § : gums pink, non-swollen, no bleeding present  § Tongue  § : tongue appearance normal  § Palate  § : hard palate normal, soft palate appearance normal  o Throat  o :   § Oropharynx  § : no inflammation or lesions present, tonsils within normal limits  · Neck  o Inspection/Palpation  o : normal appearance, no masses or tenderness, trachea midline  o Thyroid  o : gland size normal, nontender, " no nodules or masses present on palpation  · Respiratory  o Respiratory Effort  o : breathing unlabored  o Auscultation of Lungs  o : normal breath sounds  · Cardiovascular  o Heart  o :   § Auscultation of Heart  § : regular rate, normal rhythm, no murmurs present  o Peripheral Vascular System  o :   § Extremities  § : no edema  · Lymphatic  o Neck  o : no lymphadenopathy           Assessment  · Screening for depression     V79.0/Z13.89  · Fatigue     780.79/R53.83  will repeat labs  · GERD (gastroesophageal reflux disease)     530.81/K21.9  controlled  · Family history of prostate cancer in father     V16.42/Z80.42  will update   · Pre-diabetes     790.29/R73.03  will update   · Hypertension     401.9/I10  his initial BP is elevated. Upon repeat it was very good      Plan  · Orders  o ACO-18: Positive screen for clinical depression using a standardized tool and a follow-up plan documented () - V79.0/Z13.89 - 09/24/2020  o CBC with Auto Diff [facility] ([id number]) - 780.79/R53.83 - 09/24/2020  o CMP [facility] ([id number]) - 790.29/R73.03, 401.9/I10, 780.79/R53.83 - 09/24/2020  o Hgb A1c [facility] ([id number]) - 790.29/R73.03 - 09/24/2020  o Lipid Panel [facility] ([id number]) - 790.29/R73.03, 401.9/I10 - 09/24/2020  o Thyroid Profile (43078, 48024, THYII) - 780.79/R53.83 - 09/24/2020  o ACO-39: Current medications updated and reviewed () - - 09/24/2020  o PSA Screening, Ultrasensitive, MEDICARE [facility] () - V16.42/Z80.42 - 09/24/2020  · Medications  o Medications have been Reconciled  o Transition of Care or Provider Policy  · Instructions  o Depression Screen completed and scanned into the EMR under the designated folder within the patient's documents.  o Today's PHQ-9 result is 7  o Maintain a healthy weight. Avoid tight fitting clothes. Avoid fried, fatty foods, tomato sauce, chocolate, mint, garlic, onion, alcohol. caffeine. Eat smaller meals, dont lie down after a meal, dont smoke. Elevate the head of your bed 6-9 inches.  o Patient is taking  medications as prescribed and doing well.   o Take all medications as prescribed/directed.  o Patient instructed/educated on their diet and exercise program.  o Patient was educated/instructed on their diagnosis, treatment and medications prior to discharge from the clinic today.  o Patient instructed to seek medical attention urgently for new or worsening symptoms.  o Call the office with any concerns or questions.  o Bring all medicines with their bottles to each office visit.  · Disposition  o Call or Return if symptoms worsen or persist.  o Return Visit Request in/on 6 months +/- 2 days ([id number]).            Electronically Signed by: Erik Macias, DO -Author on September 24, 2020 10:59:56 AM

## 2021-05-13 NOTE — PROGRESS NOTES
Progress Note      Patient Name: Elmer Garvin   Patient ID: [id number]   Sex: Male   YOB: 1945    Primary Care Provider: Erik Macias DO   Referring Provider: Jeanette Blum MD    Visit Date: December 9, 2020    Provider: Erik Macias DO   Location: [facility]   Location Address: [address] [place] [address]  [id number]   Location Phone: [phone]          Chief Complaint  · Welcome to Medicare Visit      History Of Present Illness  Elmer Garvin is a 75 year old /White male who presents for evaluation and treatment of:   The patient is a 75 year old /White male who has come to this office for his Welcome to Medicare Visit. His Primary Care Provider is Erik Macias DO. His comprehensive Care Team list, including suppliers, has been updated on the Facesheet. His medical/surgical/family history, height, weight, BMI, and blood pressure have been reviewed and are in the chart.   Medications are listed in the medication list.   The active problem list includes: Arthritis, Asthma, Barretts esophagus, BPH (benign prostatic hypertrophy), Cataract, Deviated nasal septum, Family history of prostate cancer in father, GERD (gastroesophageal reflux disease), Hypertension, Hypertrophy of both inferior nasal turbinates, Hypothyroidism, MRSA (methicillin resistant staph aureus) culture positive from buttock boils, Nasal obstruction, Pacemaker, and Pre-diabetes   The patient does not have a history of substance use.   Patient reports his diet is adequate.   Patient reports his physical activity is adequate.   A hearing loss screen was completed today and the result is negative.   Patient does not have any risk factors for depression. Patient completed the PHQ-9 today and it has been scanned in the chart. The total score is 1-4.   The Timed-Up-and-Go screen was administered today and the result is negative.   The Bauman Index of  [place] in ADLs indicated full function (score of 6).   A Falls Risk Assessment has been completed, including a review of home fall hazards and medication review.   His level of safety is noted to be within normal limits. His balance/gait is within normal limits. There WHAT IS FALL ASSESSMENT in the past year. Patient-specific home safety recommendations have been reviewed and a copy has been given to patient.   He denies issues with leaking urine.   There are no additional risk factors identified.   Living Will/Advanced Directive previously executed but not in chart.   Personalized health advice was given to the patient and a written health screening schedule was established; see Plan for details.       Past Medical History  Disease Name Date Onset Notes   Arthritis --  --    Asthma 01/19/2018 He sees Dr Guadarrama on a regular basis.    Barretts esophagus --  --    BPH (benign prostatic hypertrophy) --  --    Cataract --  --    Deviated nasal septum --  --    Family history of prostate cancer in father 02/06/2019 --    GERD (gastroesophageal reflux disease) 01/19/2018 --    Hypertension --  --    Hypertrophy of both inferior nasal turbinates --  --    Hypothyroidism --  --    MRSA (methicillin resistant staph aureus) culture positive from buttock boils --  --    Nasal obstruction --  --    Pacemaker --  --    Pre-diabetes 04/02/2020 --          Past Surgical History  Procedure Name Date Notes   Cataract exctraction with lens implant --  --    Colonoscopy 2019 --    EGD 2007 2018 --    Incision and Drainage of Abscess --  --    Pacemaker --  --          Medication List  Name Date Started Instructions   cetirizine 10 mg oral tablet  take 1 tablet (10 mg) by oral route once daily   famotidine 20 mg oral tablet  take 1 tablet (20 mg) by oral route 2 hours before cluster injections   fluticasone propionate 50 mcg/actuation nasal spray,suspension  spray 1 spray (50 mcg) in each nostril by intranasal route once daily    losartan 100 mg oral tablet 2020 take 1 tablet (100 mg) by oral route once daily for 90 days   montelukast 10 mg oral tablet  take 1 tablet (10 mg) by oral route once daily in the evening   omeprazole 20 mg oral capsule,delayed release(DR/EC)  take 1 capsule (20 mg) by oral route once daily before a meal   prednisone 20 mg oral tablet  take 1 tab by mouth 2 hours before cluster injections   sulindac 200 mg oral tablet  take 1 tablet (200 mg) by oral route 2 times per day with food   Synthroid 100 mcg oral tablet 2020 take 1 tablet (100 mcg) by oral route once daily for 90 days   Tylenol Extra Strength 500 mg oral tablet  --          Allergy List  Allergen Name Date Reaction Notes   NO KNOWN DRUG ALLERGIES --  --  --          Family Medical History  Disease Name Relative/Age Notes   Emphysema Brother/   --    Breast Neoplasm, Malignant Mother/49      Stroke Uncle/   --    - No Family History of Colorectal Cancer  --    Heart Attack (MI) Sister/   --    Prostate cancer Father/70   --    Family history of breast cancer Mother/   Mother  Mother/40s   Diabetes  Child         Social History  Finding Status Start/Stop Quantity Notes   Alcohol Never --/-- --  does not drink  2019 - drinks no   Caffeine Current every day 0/0 --  drinks regularly; coffee; 3-4 times per day   lives with spouse --  --/-- --  --     --  --/-- --  --    Second hand smoke exposure Never 0/0 --  no   Tobacco Former --/-- --  former smoker  Quit    Working --  --/-- --  --          Review of Systems  · Constitutional  o Denies  o : fatigue  · Eyes  o Denies  o : blurred vision, changes in vision  · HENT  o Denies  o : headaches  · Cardiovascular  o Denies  o : chest pain, irregular heart beats, rapid heart rate, dyspnea on exertion  · Respiratory  o Denies  o : shortness of breath, wheezing, cough  · Gastrointestinal  o Denies  o : nausea, vomiting, diarrhea, constipation, abdominal pain, blood in stools,  "melena  · Genitourinary  o Denies  o : frequency, dysuria, hematuria  · Integument  o Denies  o : rash, new skin lesions  · Musculoskeletal  o Denies  o : joint pain, joint swelling, muscle pain  · Endocrine  o Admits  o : central obesity  o Denies  o : polyuria, polydipsia      Vitals  Date Time BP Position Site L\R Cuff Size HR RR TEMP (F) WT  HT  BMI kg/m2 BSA m2 O2 Sat FR L/min FiO2 HC       10/08/2019 01:41 /72 Sitting    80 - R   212lbs 16oz 6'  2\" 27.35 2.25 100 %      09/24/2020 10:18 /75 Sitting    65 - R  98.2 214lbs 4oz 6'  1\" 28.27 2.24 99 %  21%    12/09/2020 02:26 /72 Sitting    63 - R  97 214lbs 4oz 6'  1\" 28.27 2.24 98 %  21%              Results  · In-Office Procedures  o Medical procedure  § Vision screening (80093)   § Wearing glasses or contacts?: Yes   § OU (Both): 20/15       Assessment  · Screening for alcoholism     V79.1/Z13.89  he is a nondrinker  · Screening for depression     V79.0/Z13.89  negative  · Need for influenza vaccination     V04.81/Z23  he declined  · Need for pneumococcal vaccination     V03.82/Z23  he declined  · Need for hepatitis C screening test     V73.89/Z11.59  he declined  · Welcome to Medicare preventive visit     V70.0/Z00.00  Overall he is doing well. He declines routine immunizations.       Plan  · Orders  o Falls Risk Assessment Completed (0518F) - V70.0/Z00.00 - 12/09/2020  o Brief hearing screening (written) [facility] () - V70.0/Z00.00 - 12/09/2020  o Welcome to Medicare Exam () - V70.0/Z00.00 - 12/09/2020  o ACO-39: Current medications updated and reviewed (, 6774F) - - 12/09/2020  · Medications  o Medications have been Reconciled  o Transition of Care or Provider Policy  · Instructions  o Depression Screen completed and scanned into the EMR under the designated folder within the patient's documents.  o Flu vaccine declined.  o Pneumonia vaccine declined.  o Medicare suggests a once in a lifetime screening for Hepatitis C for all " Medicare beneficiaries born between 2455-2989.  o Written health screening schedule for next 5-10 years was established with patient; information scanned in chart and given to patient.  o Fall prevention methods discussed and a copy of recommendations given to patient.  o Take all medications as prescribed/directed.  o Patient instructed/educated on their diet and exercise program.  o Patient was educated/instructed on their diagnosis, treatment and medications prior to discharge from the clinic today.  o Patient instructed to seek medical attention urgently for new or worsening symptoms.  o Call the office with any concerns or questions.  o Bring all medicines with their bottles to each office visit.  · Disposition  o Call or Return if symptoms worsen or persist.  o needs routine f/u appointment for chronic health issues            Electronically Signed by: Erik Macias DO -Author on December 9, 2020 03:01:50 PM

## 2021-05-14 VITALS
DIASTOLIC BLOOD PRESSURE: 75 MMHG | HEART RATE: 65 BPM | SYSTOLIC BLOOD PRESSURE: 153 MMHG | OXYGEN SATURATION: 99 % | WEIGHT: 214.25 LBS | BODY MASS INDEX: 28.39 KG/M2 | HEIGHT: 73 IN | TEMPERATURE: 98.2 F

## 2021-05-14 VITALS — HEIGHT: 73 IN | BODY MASS INDEX: 28.23 KG/M2 | RESPIRATION RATE: 12 BRPM | WEIGHT: 213 LBS

## 2021-05-14 VITALS
TEMPERATURE: 97.2 F | BODY MASS INDEX: 28.01 KG/M2 | DIASTOLIC BLOOD PRESSURE: 57 MMHG | SYSTOLIC BLOOD PRESSURE: 121 MMHG | OXYGEN SATURATION: 99 % | WEIGHT: 211.37 LBS | HEART RATE: 65 BPM | HEIGHT: 73 IN

## 2021-05-14 VITALS — HEIGHT: 73 IN | TEMPERATURE: 97.4 F | BODY MASS INDEX: 28.41 KG/M2 | WEIGHT: 214.37 LBS

## 2021-05-14 VITALS
WEIGHT: 214.25 LBS | BODY MASS INDEX: 28.39 KG/M2 | HEIGHT: 73 IN | DIASTOLIC BLOOD PRESSURE: 72 MMHG | OXYGEN SATURATION: 98 % | HEART RATE: 63 BPM | TEMPERATURE: 97 F | SYSTOLIC BLOOD PRESSURE: 150 MMHG

## 2021-05-14 NOTE — PROGRESS NOTES
"   Progress Note      Patient Name: Elmer Garvin   Patient ID: [id number]   Sex: Male   YOB: 1945    Primary Care Provider: Erik Macias DO    Visit Date: April 19, 2021    Provider: Jason Kay MD   Location: [facility]   Location Address: [address] [place] [address], [address] [place] [address]  [id number]   Location Phone: [phone]          Chief Complaint     \"I am still having trouble breathing through my nose and it constantly feels like there is something on the right side.\"       History Of Present Illness     Elmer Garvin is a 76 year old /White male with past medical history significant for BPH, GERD, hypertension, and hypothyroidism who returns today for follow-up of right greater than left nasal congestion secondary to deviated nasal septum and inferior turbinate hypertrophy.  He was originally seen on 9/11/18 at which time he described difficulty breathing through both sides of his nose since the 1980s.  This was associated with occasional clear rhinorrhea and diminished sense of smell.  Examination that day revealed a slightly right deviated nasal septum and bilateral inferior turbinate hypertrophy as well as a narrow nasal cavity.  He was placed on a combination of Astepro and Flonase.  He was last seen on 12/3/2018 at which time he did not feel like the sprays helped much.  We discussed surgical options and he elected to continue with medical management.  He tells me that since that time he has recently started immunotherapy but has not noticed any change in his symptoms.  He continues to experience bilateral nasal congestion, thick rhinorrhea, right-sided facial pressure, and a foul taste in his mouth.  He tells me that he has not had any issues with epistaxis, diminished sense of smell, or cough.  He is currently only using fluticasone.  As an aside his wife mentions that he seems to be having more difficulty hearing even while wearing his hearing " aids.  He has had these for a number of years.         Past Medical History  Arthritis; Asthma; Barretts esophagus; BPH (benign prostatic hypertrophy); Cataract; Deviated nasal septum; Family history of prostate cancer in father; GERD (gastroesophageal reflux disease); Hypertension; Hypertrophy of both inferior nasal turbinates; Hypothyroidism; MRSA (methicillin resistant staph aureus) culture positive from buttock boils; Nasal congestion; Nasal obstruction; Pacemaker; Pre-diabetes         Past Surgical History  Cataract exctraction with lens implant; Colonoscopy; EGD; Incision and Drainage of Abscess; Pacemaker         Medication List  cetirizine 10 mg oral tablet; fluticasone propionate 50 mcg/actuation nasal spray,suspension; losartan 100 mg oral tablet; montelukast 10 mg oral tablet; Synthroid 100 mcg oral tablet; Tylenol Extra Strength 500 mg oral tablet         Allergy List  NO KNOWN DRUG ALLERGIES         Family Medical History  Emphysema; Breast Neoplasm, Malignant; Stroke; - No Family History of Colorectal Cancer; Heart Attack (MI); Prostate cancer; Family history of breast cancer; Diabetes         Social History  Alcohol (Never); Caffeine (Current every day); lives with spouse; ; Second hand smoke exposure (Never); Tobacco (Former); Working         Review of Systems  · Constitutional  o Denies  o : fever, night sweats, weight loss  · Eyes  o Denies  o : discharge from eye, impaired vision  · HENT  o Admits  o : *See HPI  · Cardiovascular  o Admits  o : irregular heart beats  o Denies  o : chest pain  · Respiratory  o Admits  o : shortness of breath  o Denies  o : wheezing, coughing up blood  · Gastrointestinal  o Admits  o : heartburn  o Denies  o : reflux, vomiting blood  · Genitourinary  o Denies  o : frequency  · Integument  o Denies  o : rash, skin dryness  · Neurologic  o Admits  o : dizziness  o Denies  o : seizures, loss of balance, loss of consciousness  · Endocrine  o Denies  o : cold  "intolerance, heat intolerance  · Heme-Lymph  o Denies  o : easy bleeding, anemia      Vitals  Date Time BP Position Site L\R Cuff Size HR RR TEMP (F) WT  HT  BMI kg/m2 BSA m2 O2 Sat FR L/min FiO2 HC       04/19/2021 02:00 PM        97.4 214lbs 6oz 6'  1\" 28.28 2.24             Physical Examination  · Constitutional  o Appearance  o : well developed, well-nourished, alert and in no acute distress, voice clear and strong  · Head and Face  o Head  o :   § Inspection  § : no deformities or lesions  o Face  o :   § Inspection  § : No facial lesions; House-Brackmann I/VI bilaterally  § Palpation  § : No TMJ crepitus nor  muscle tenderness bilaterally  · Eyes  o Vision  o :   § Visual Fields  § : Extraocular movements are intact. No spontaneous or gaze-induced nystagmus.  o Conjunctivae  o : clear  o Sclerae  o : clear  o Pupils and Irises  o : pupils equal, round, and reactive to light.   · Ears, Nose, Mouth and Throat  o Ears  o :   § External Ears  § : appearance within normal limits, no lesions present  § Otoscopic Examination  § : tympanic membrane appearance within normal limits bilaterally without perforations, well-aerated middle ears  § Hearing  § : intact to conversational voice both ears  o Nose  o :   § External Nose  § : appearance normal  § Intranasal Exam  § : mucosa within normal limits, vestibules normal, no intranasal lesions present, septum slightly deviated to the right, bilateral inferior turbinates are hypertrophied, sinuses non tender to percussion  o Oral Cavity  o :   § Oral Mucosa  § : oral mucosa normal without pallor or cyanosis  § Lips  § : lip appearance normal  § Teeth  § : normal dentition for age  § Gums  § : gums pink, non-swollen, no bleeding present  § Tongue  § : tongue appearance normal; normal mobility  § Palate  § : hard palate normal, soft palate appearance normal with symmetric mobility  o Throat  o :   § Oropharynx  § : no inflammation or lesions present, tonsils within " normal limits  · Neck  o Inspection/Palpation  o : normal appearance, no masses or tenderness, trachea midline; thyroid size normal, nontender, no nodules or masses present on palpation  · Respiratory  o Respiratory Effort  o : breathing unlabored  o Inspection of Chest  o : normal appearance, no retractions  · Cardiovascular  o Heart  o : regular rate and rhythm  · Lymphatic  o Neck  o : no lymphadenopathy present  o Supraclavicular Nodes  o : no lymphadenopathy present  o Preauricular Nodes  o : no lymphadenopathy present  · Skin and Subcutaneous Tissue  o General Inspection  o : Regarding face and neck - there are no rashes present, no lesions present, and no areas of discoloration  · Neurologic  o Cranial Nerves  o : cranial nerves II-XII are grossly intact bilaterally  o Gait and Station  o : normal gait, able to stand without diffculty  · Psychiatric  o Judgement and Insight  o : judgment and insight intact  o Mood and Affect  o : mood normal, affect appropriate          Assessment  · Hearing loss     389.9/H91.90  · Chronic rhinosinusitis     473.9/J32.9  · Hypertrophy of both inferior nasal turbinates     478.0/J34.3      Plan  · Orders  o Audiometry, comprehensive, threshold evaluation and speech recognition [facility] ([id number]) - 389.9/H91.90 - 04/19/2021  o Tympanogram (Impedance Testing) [facility] ([id number]) - 389.9/H91.90 - 04/19/2021  o CT Maxillofacial Area without IV Contrast [facility] ([id number]) - 473.9/J32.9 - 04/19/2021   With sagittal images and navigation protocol please  · Medications  o Medications have been Reconciled  o Transition of Care or Provider Policy  · Instructions  o Impressions and findings were discussed with Mr. Garvin and his wife at great length. Currently, he returns today for evaluation of bilateral nasal congestion, thick rhinorrhea, right facial pressure, and foul taste in his mouth. This has been constant for years and has not improved on antibiotics, steroid sprays, antihistamine sprays, and now  immunotherapy. Examination today again reveals a slight right septal deviation with a prominent maxillary crest along with bilateral inferior turbinate hypertrophy. Options for further evaluation and management were discussed and he would like to pursue further work-up. Therefore, we will pursue a CT scan of his sinuses to rule out any chronic rhinosinusitis given the duration of his symptoms. We will also schedule him for an audiogram as his wife is concerned about his hearing.            Electronically Signed by: Jason Kay MD -Author on April 19, 2021 02:23:08 PM

## 2021-05-14 NOTE — PROGRESS NOTES
Progress Note      Patient Name: Elmer Garvin   Patient ID: [id number]   Sex: Male   YOB: 1945    Primary Care Provider: Erik Macias DO    Visit Date: 2021    Provider: Jeanette Blum MD   [facility]   Location Address: [address] [place] [address]  [id number]   Location Phone: [phone]          Chief Complaint  · pt here for urologic issues      History Of Present Illness     The patient is a very pleasant 72-year-old male who is seen in follow-up for BPH with urinary frequency. He is doing well at the present time.  He has had occasional nocturia and feels like he is emptying his bladder well.    Update 19: Presents for annual follow up. Patient without complaints. Denies bothersome LUTS, believes he is emptying his bladder completely. Denies bothersome nocturia. Had PSA drawn recently. Father  of prostate cancer in his 70s; otherwise good longevity in his family.   Also reports having recent lung biopsy, results are pending. Denies other changes in history since last visit.    Update 2021: Patient presents for and annual routine follow-up with PSA prior.  Patient reports some urinary hesitancy as well as we can affect of stream since last visit.  These things are not particularly bothersome.  Denies sensation of incomplete emptying.  No bothersome nocturia at this time.  Patient wishes to avoid medications if possible.  Biggest concern is his arthritis and being taken off his NSAID; notes significant discomfort due to this.  PVR today: 18 cc    PSA   2021: 0.83  19: 0.76       Past Medical History  Arthritis; Asthma; Barretts esophagus; BPH (benign prostatic hypertrophy); Cataract; Deviated nasal septum; Family history of prostate cancer in father; GERD (gastroesophageal reflux disease); Hypertension; Hypertrophy of both inferior nasal turbinates; Hypothyroidism; MRSA (methicillin resistant staph aureus) culture  "positive from buttock boils; Nasal obstruction; Pacemaker; Pre-diabetes         Past Surgical History  Cataract exctraction with lens implant; Colonoscopy; EGD; Incision and Drainage of Abscess; Pacemaker         Medication List  cetirizine 10 mg oral tablet; famotidine 20 mg oral tablet; fluticasone propionate 50 mcg/actuation nasal spray,suspension; losartan 100 mg oral tablet; montelukast 10 mg oral tablet; omeprazole 20 mg oral capsule,delayed release(DR/EC); prednisone 20 mg oral tablet; sulindac 200 mg oral tablet; Synthroid 100 mcg oral tablet; Tylenol Extra Strength 500 mg oral tablet         Allergy List  NO KNOWN DRUG ALLERGIES       Allergies Reconciled  Family Medical History  Emphysema; Breast Neoplasm, Malignant; Stroke; - No Family History of Colorectal Cancer; Heart Attack (MI); Prostate cancer; Family history of breast cancer; Diabetes         Social History  Alcohol (Never); Caffeine (Current every day); lives with spouse; ; Second hand smoke exposure (Never); Tobacco (Former); Working         Review of Systems  · Constitutional  o Denies  o : chills, fever  · Gastrointestinal  o Denies  o : nausea, vomiting      Vitals  Date Time BP Position Site L\R Cuff Size HR RR TEMP (F) WT  HT  BMI kg/m2 BSA m2 O2 Sat FR L/min FiO2 HC       02/25/2021 02:10 PM       12  212lbs 16oz 6'  1\" 28.1 2.23             Physical Examination  · Constitutional  o Appearance  o : Well nourished, well developed patient in no acute distress.  · Eyes  o Conjunctivae  o : Conjunctivae normal  o Sclerae  o : Sclerae white  · Ears, Nose, Mouth and Throat  o Ears  o :   § Hearing  § : Intact to conversational voice both ears  § Ears  § : Normal  o Nose  o :   § External Nose  § : Appearance normal  · Respiratory  o Respiratory Effort  o : Breathing unlabored without accessory muscle use  o Inspection of Chest  o : Normal appearance, no retractions  o Auscultation of Lungs  o : Normal breath " "sounds  · Genitourinary  o Bladder  o : nontender to palpation  · Skin and Subcutaneous Tissue  o General Inspection  o : No rashes, lesions, or areas of discoloration present  o General Palpation  o : Skin Turgor Normal  · Neurologic  o Mental Status Examination  o :   § Orientation  § : Alert and Oriented X3  o Gait and Station  o :   § Gait Screening  § : Ambulating without difficulty  · Psychiatric  o Mood and Affect  o : Mood normal, affect appropriate          Results  · In-Office Procedures  o Surgical procedure  § Bladder Scan/Residual Urine ([id number])   § Specimen vol Ur: 18       Assessment  · Benign prostatic hyperplasia, presence of lower urinary tract symptoms unspecified     600.00/N40.0  · Family history of prostate cancer in father     V16.42/Z80.42    Problems Reconciled  Plan  · Orders  o PSA Ultrasensitive, ANNUAL SCREENING [facility] ([id number], ) - V16.42/Z80.42 - 02/25/2022  · Medications  o Medications have been Reconciled  o Transition of Care or Provider Policy  · Instructions  o Electronically Identified Patient Education Materials Provided Electronically     10. Patient participated in the discussion and asked appropriate questions.  Patient wishes to continue PSA screening at this point.    Encourage patient to arrange follow-up with PCP for further discussion of management for his arthritis as current regimen with Tylenol is not adequately controlling his symptoms and CKD appears stable since 2019.    Follow up in 1 year with PSA prior or earlier as needed  All questions addressed      MDM low: 2 stable chronic illnesses; review of result of PSA; ordering of PSA with interpretation  \">Voiding without significant post void residual  BPH/LUTscurrently managed without intervention cont timed and double voiding; monitor bother to guide treatment    PSA screening; PSA 0.83 from 0.76  Discussed again AUA guidelines for prostate cancer detection noting that PSA screening is not recommended after the age " of 70 give the indolent nature of most prostate cancers but may be performed on an individual basis according risk factors, comorbidities, and life expectancy.  Discussed options with patient including to stop further PSA screening or to continue to monitor and trend psa with consideration of biopsy if PSA > 10. Patient participated in the discussion and asked appropriate questions.  Patient wishes to continue PSA screening at this point.    Encourage patient to arrange follow-up with PCP for further discussion of management for his arthritis as current regimen with Tylenol is not adequately controlling his symptoms and CKD appears stable since 2019.    Follow up in 1 year with PSA prior or earlier as needed  All questions addressed      MDM low: 2 stable chronic illnesses; review of result of PSA; ordering of PSA with interpretation               Electronically Signed by: Jeanette Blum MD -Author on February 25, 2021 06:23:43 PM

## 2021-05-14 NOTE — PROGRESS NOTES
Progress Note      Patient Name: Elmer Garvin   Patient ID: [id number]   Sex: Male   YOB: 1945    Primary Care Provider: Erik Macias DO    Visit Date: March 25, 2021    Provider: Erik Macias DO   Location: [facility]   Location Address: [address] [place] [address]  [id number]   Location Phone: [phone]          Chief Complaint  · 6 month follow up - GERD, HTN, Pre-Diabetes  · medication refills      History Of Present Illness  Elmer Garvin is a 76 year old /White male who presents for evaluation and treatment of: pre-DM, HTN, and hypothyroid.      pre-DM- He does NOT check his bs. He does not have any excessive thirst or urination.        HTN- he does NOT check is bp outside the office. He is taking his med        Hypothyroid- He takes his thyroid med every am.         Chronic sinusitis- He has been doing allergy injections for the past 6 months w/o any benefit. He wants to f/u with ENT again       Past Medical History  Disease Name Date Onset Notes   Arthritis --  --    Asthma 01/19/2018 He sees Dr Guadarrama on a regular basis.    Barretts esophagus --  --    BPH (benign prostatic hypertrophy) --  --    Cataract --  --    Deviated nasal septum --  --    Family history of prostate cancer in father 02/06/2019 --    GERD (gastroesophageal reflux disease) 01/19/2018 --    Hypertension --  --    Hypertrophy of both inferior nasal turbinates --  --    Hypothyroidism --  --    MRSA (methicillin resistant staph aureus) culture positive from buttock boils --  --    Nasal obstruction --  --    Pacemaker --  --    Pre-diabetes 04/02/2020 --          Past Surgical History  Procedure Name Date Notes   Cataract exctraction with lens implant --  --    Colonoscopy 2019 --    EGD 2007 2018 --    Incision and Drainage of Abscess --  --    Pacemaker --  --          Medication List  Name Date Started Instructions   cetirizine 10 mg oral tablet  take  1 tablet (10 mg) by oral route once daily   fluticasone propionate 50 mcg/actuation nasal spray,suspension  spray 1 spray (50 mcg) in each nostril by intranasal route once daily   losartan 100 mg oral tablet 2020 take 1 tablet (100 mg) by oral route once daily for 90 days   montelukast 10 mg oral tablet  take 1 tablet (10 mg) by oral route once daily in the evening   Synthroid 100 mcg oral tablet 2021 Take 1 tablet by mouth every morning *on empty stomach*   Tylenol Extra Strength 500 mg oral tablet  --          Allergy List  Allergen Name Date Reaction Notes   NO KNOWN DRUG ALLERGIES --  --  --          Family Medical History  Disease Name Relative/Age Notes   Emphysema Brother/   --    Breast Neoplasm, Malignant Mother/49      Stroke Uncle/   --    - No Family History of Colorectal Cancer  --    Heart Attack (MI) Sister/   --    Prostate cancer Father/70   --    Family history of breast cancer Mother/   Mother  Mother/40s   Diabetes  Child         Social History  Finding Status Start/Stop Quantity Notes   Alcohol Never --/-- --  does not drink  2019 - drinks no   Caffeine Current every day 0/0 --  drinks regularly; coffee; 3-4 times per day   lives with spouse --  --/-- --  --     --  --/-- --  --    Second hand smoke exposure Never 0/0 --  no   Tobacco Former --/-- --  former smoker  Quit 1976   Working --  --/-- --  --          Review of Systems  · Constitutional  o Admits  o : fatigue  · Eyes  o Denies  o : blurred vision, changes in vision  · HENT  o Denies  o : headaches  · Cardiovascular  o Denies  o : chest pain, irregular heart beats, rapid heart rate  · Respiratory  o Admits  o : dyspnea on exertion  o Denies  o : shortness of breath, wheezing, cough  · Gastrointestinal  o Denies  o : diarrhea, constipation  · Endocrine  o Admits  o : central obesity  o Denies  o : polyuria, polydipsia      Vitals  Date Time BP Position Site L\R Cuff Size HR RR TEMP (F) WT  HT  BMI  "kg/m2 BSA m2 O2 Sat FR L/min FiO2 HC       09/24/2020 10:18 /75 Sitting    65 - R  98.2 214lbs 4oz 6'  1\" 28.27 2.24 99 %  21%    09/24/2020 10:53 /72 Sitting                 12/09/2020 02:26 /72 Sitting    63 - R  97 214lbs 4oz 6'  1\" 28.27 2.24 98 %  21%    03/25/2021 09:55 /57 Sitting    65 - R  97.2 211lbs 6oz 6'  1\" 27.89 2.22 99 %  21%          Physical Examination  · Constitutional  o Appearance  o : well-nourished, well developed, alert, in no acute distress, well-tended appearance  · Head and Face  o Head  o :   § Inspection  § : atraumatic, normocephalic  o Face  o :   § Inspection  § : no facial lesions  o HEENT  o : Unremarkable  · Eyes  o Conjunctivae  o : conjunctivae normal  o Sclerae  o : sclerae white  o Pupils and Irises  o : pupils equal and round, pupils reactive to light bilaterally  o Eyelids/Ocular Adnexae  o : eyelid appearance normal  · Ears, Nose, Mouth and Throat  o Ears  o :   § External Ears  § : appearance within normal limits, no lesions present  § Otoscopic Examination  § : tympanic membrane appearance within normal limits bilaterally without perforations, mobility normal  o Nose  o :   § External Nose  § : appearance normal  o Oral Cavity  o :   § Oral Mucosa  § : oral mucosa normal  § Lips  § : lip appearance normal  § Teeth  § : normal dentition for age  § Gums  § : gums pink, non-swollen, no bleeding present  § Tongue  § : tongue appearance normal  § Palate  § : hard palate normal, soft palate appearance normal  o Throat  o :   § Oropharynx  § : no inflammation or lesions present, tonsils within normal limits  · Neck  o Inspection/Palpation  o : normal appearance, no masses or tenderness, trachea midline  o Thyroid  o : gland size normal, nontender, no nodules or masses present on palpation  · Respiratory  o Respiratory Effort  o : breathing unlabored  o Auscultation of Lungs  o : normal breath sounds  · Cardiovascular  o Heart  o :   § Auscultation of " Heart  § : regular rate, normal rhythm, no murmurs present  o Peripheral Vascular System  o :   § Extremities  § : no edema  · Lymphatic  o Neck  o : no lymphadenopathy               Assessment  · Hypothyroidism     244.9/E03.9  will update   · Pre-diabetes     790.29/R73.03  will update his A1C  · Hypertension     401.9/I10  stable  · Nasal obstruction     478.19/J34.89  will have him see Dr Kay again      Plan  · Orders  o CMP [facility] ([id number]) - 790.29/R73.03, 401.9/I10 - 03/25/2021  o Hgb A1c [facility] ([id number]) - 790.29/R73.03 - 03/25/2021  o Lipid Panel [facility] ([id number]) - 790.29/R73.03, 401.9/I10, 244.9/E03.9 - 03/25/2021  o Thyroid Profile (79907, 30208, THYII) - 244.9/E03.9 - 03/25/2021  o ACO-39: Current medications updated and reviewed (1159F, ) - - 03/25/2021  o ENT CONSULTATION ([facility]) - 478.19/J34.89 - 03/25/2021   Dr Kay  · Medications  o Medications have been Reconciled  o Transition of Care or Provider Policy  · Instructions  o Patient is taking medications as prescribed and doing well.   o Take all medications as prescribed/directed.  o Patient instructed/educated on their diet and exercise program.  o Patient was educated/instructed on their diagnosis, treatment and medications prior to discharge from the clinic today.  o Patient instructed to seek medical attention urgently for new or worsening symptoms.  o Call the office with any concerns or questions.  o Bring all medicines with their bottles to each office visit.  · Disposition  o Call or Return if symptoms worsen or persist.  o Return Visit Request in/on 6 months +/- 2 days ([id number]).            Electronically Signed by: Erik Macias DO -Author on March 25, 2021 10:37:25 AM

## 2021-05-15 VITALS
WEIGHT: 218 LBS | HEIGHT: 72 IN | BODY MASS INDEX: 29.53 KG/M2 | DIASTOLIC BLOOD PRESSURE: 88 MMHG | SYSTOLIC BLOOD PRESSURE: 138 MMHG | HEART RATE: 73 BPM

## 2021-05-15 VITALS
DIASTOLIC BLOOD PRESSURE: 72 MMHG | WEIGHT: 213 LBS | HEART RATE: 80 BPM | SYSTOLIC BLOOD PRESSURE: 123 MMHG | BODY MASS INDEX: 27.34 KG/M2 | OXYGEN SATURATION: 100 % | HEIGHT: 74 IN

## 2021-05-15 VITALS
SYSTOLIC BLOOD PRESSURE: 126 MMHG | BODY MASS INDEX: 29.77 KG/M2 | HEIGHT: 74 IN | DIASTOLIC BLOOD PRESSURE: 88 MMHG | HEART RATE: 78 BPM | WEIGHT: 232 LBS

## 2021-05-15 VITALS — RESPIRATION RATE: 12 BRPM | HEIGHT: 73 IN | BODY MASS INDEX: 28.3 KG/M2 | WEIGHT: 213.5 LBS

## 2021-05-15 VITALS
WEIGHT: 219 LBS | SYSTOLIC BLOOD PRESSURE: 158 MMHG | DIASTOLIC BLOOD PRESSURE: 84 MMHG | SYSTOLIC BLOOD PRESSURE: 152 MMHG | BODY MASS INDEX: 28.11 KG/M2 | HEIGHT: 74 IN | HEART RATE: 79 BPM | DIASTOLIC BLOOD PRESSURE: 82 MMHG

## 2021-05-15 VITALS
DIASTOLIC BLOOD PRESSURE: 96 MMHG | HEART RATE: 74 BPM | BODY MASS INDEX: 29.66 KG/M2 | SYSTOLIC BLOOD PRESSURE: 152 MMHG | WEIGHT: 219 LBS | HEIGHT: 72 IN

## 2021-05-15 VITALS
TEMPERATURE: 97.6 F | OXYGEN SATURATION: 98 % | WEIGHT: 216.19 LBS | DIASTOLIC BLOOD PRESSURE: 65 MMHG | SYSTOLIC BLOOD PRESSURE: 119 MMHG | HEIGHT: 74 IN | BODY MASS INDEX: 27.74 KG/M2 | HEART RATE: 78 BPM

## 2021-05-16 VITALS
DIASTOLIC BLOOD PRESSURE: 84 MMHG | SYSTOLIC BLOOD PRESSURE: 142 MMHG | HEIGHT: 74 IN | WEIGHT: 215 LBS | HEART RATE: 68 BPM | BODY MASS INDEX: 27.59 KG/M2

## 2021-05-16 VITALS
HEART RATE: 84 BPM | HEIGHT: 74 IN | DIASTOLIC BLOOD PRESSURE: 80 MMHG | WEIGHT: 223 LBS | SYSTOLIC BLOOD PRESSURE: 138 MMHG | BODY MASS INDEX: 28.62 KG/M2

## 2021-05-16 VITALS
SYSTOLIC BLOOD PRESSURE: 135 MMHG | OXYGEN SATURATION: 98 % | DIASTOLIC BLOOD PRESSURE: 68 MMHG | HEIGHT: 73 IN | BODY MASS INDEX: 28.49 KG/M2 | WEIGHT: 215 LBS | HEART RATE: 67 BPM

## 2021-05-16 VITALS
OXYGEN SATURATION: 99 % | HEIGHT: 74 IN | RESPIRATION RATE: 16 BRPM | SYSTOLIC BLOOD PRESSURE: 154 MMHG | WEIGHT: 224.12 LBS | HEART RATE: 85 BPM | DIASTOLIC BLOOD PRESSURE: 74 MMHG | TEMPERATURE: 97.3 F | BODY MASS INDEX: 28.76 KG/M2

## 2021-05-16 VITALS
HEIGHT: 74 IN | BODY MASS INDEX: 28.3 KG/M2 | OXYGEN SATURATION: 95 % | HEART RATE: 64 BPM | RESPIRATION RATE: 16 BRPM | TEMPERATURE: 98 F | SYSTOLIC BLOOD PRESSURE: 125 MMHG | WEIGHT: 220.5 LBS | DIASTOLIC BLOOD PRESSURE: 66 MMHG

## 2021-05-16 VITALS
DIASTOLIC BLOOD PRESSURE: 82 MMHG | SYSTOLIC BLOOD PRESSURE: 143 MMHG | BODY MASS INDEX: 28.55 KG/M2 | HEIGHT: 74 IN | WEIGHT: 222.5 LBS | OXYGEN SATURATION: 98 % | TEMPERATURE: 98.7 F | HEART RATE: 77 BPM

## 2021-05-16 VITALS
HEART RATE: 77 BPM | BODY MASS INDEX: 28.91 KG/M2 | HEIGHT: 74 IN | OXYGEN SATURATION: 100 % | WEIGHT: 225.25 LBS | SYSTOLIC BLOOD PRESSURE: 157 MMHG | RESPIRATION RATE: 12 BRPM | DIASTOLIC BLOOD PRESSURE: 91 MMHG

## 2021-05-16 VITALS — HEIGHT: 74 IN | RESPIRATION RATE: 15 BRPM | BODY MASS INDEX: 28.36 KG/M2 | WEIGHT: 221 LBS

## 2021-05-16 VITALS
BODY MASS INDEX: 30.48 KG/M2 | HEART RATE: 88 BPM | SYSTOLIC BLOOD PRESSURE: 138 MMHG | WEIGHT: 225 LBS | HEIGHT: 72 IN | DIASTOLIC BLOOD PRESSURE: 92 MMHG

## 2021-05-16 VITALS
SYSTOLIC BLOOD PRESSURE: 144 MMHG | DIASTOLIC BLOOD PRESSURE: 80 MMHG | WEIGHT: 221 LBS | HEIGHT: 72 IN | HEART RATE: 70 BPM | BODY MASS INDEX: 29.93 KG/M2

## 2021-05-28 VITALS
SYSTOLIC BLOOD PRESSURE: 150 MMHG | SYSTOLIC BLOOD PRESSURE: 138 MMHG | DIASTOLIC BLOOD PRESSURE: 78 MMHG | HEIGHT: 74 IN | SYSTOLIC BLOOD PRESSURE: 140 MMHG | TEMPERATURE: 97.8 F | RESPIRATION RATE: 14 BRPM | WEIGHT: 214 LBS | HEIGHT: 74 IN | RESPIRATION RATE: 16 BRPM | BODY MASS INDEX: 28.77 KG/M2 | WEIGHT: 223.37 LBS | SYSTOLIC BLOOD PRESSURE: 134 MMHG | TEMPERATURE: 97.9 F | OXYGEN SATURATION: 97 % | OXYGEN SATURATION: 95 % | SYSTOLIC BLOOD PRESSURE: 131 MMHG | HEART RATE: 76 BPM | RESPIRATION RATE: 12 BRPM | RESPIRATION RATE: 16 BRPM | OXYGEN SATURATION: 97 % | HEART RATE: 77 BPM | BODY MASS INDEX: 27.46 KG/M2 | BODY MASS INDEX: 28.02 KG/M2 | HEART RATE: 79 BPM | TEMPERATURE: 98.3 F | OXYGEN SATURATION: 97 % | OXYGEN SATURATION: 97 % | HEART RATE: 79 BPM | RESPIRATION RATE: 12 BRPM | HEART RATE: 83 BPM | HEIGHT: 74 IN | HEART RATE: 72 BPM | TEMPERATURE: 98.1 F | HEIGHT: 74 IN | RESPIRATION RATE: 14 BRPM | WEIGHT: 206.12 LBS | BODY MASS INDEX: 28.69 KG/M2 | HEART RATE: 85 BPM | WEIGHT: 219.37 LBS | TEMPERATURE: 98.1 F | OXYGEN SATURATION: 97 % | WEIGHT: 218.37 LBS | OXYGEN SATURATION: 98 % | HEIGHT: 74 IN | RESPIRATION RATE: 14 BRPM | DIASTOLIC BLOOD PRESSURE: 87 MMHG | TEMPERATURE: 98 F | HEART RATE: 80 BPM | RESPIRATION RATE: 16 BRPM | WEIGHT: 214.56 LBS | SYSTOLIC BLOOD PRESSURE: 150 MMHG | BODY MASS INDEX: 26.13 KG/M2 | BODY MASS INDEX: 28.67 KG/M2 | TEMPERATURE: 98.2 F | SYSTOLIC BLOOD PRESSURE: 150 MMHG | WEIGHT: 223.56 LBS | HEIGHT: 74 IN | HEIGHT: 74 IN | TEMPERATURE: 98.2 F | SYSTOLIC BLOOD PRESSURE: 158 MMHG | DIASTOLIC BLOOD PRESSURE: 66 MMHG | WEIGHT: 224.19 LBS | BODY MASS INDEX: 26.45 KG/M2 | DIASTOLIC BLOOD PRESSURE: 76 MMHG | DIASTOLIC BLOOD PRESSURE: 78 MMHG | DIASTOLIC BLOOD PRESSURE: 72 MMHG | BODY MASS INDEX: 28.15 KG/M2 | DIASTOLIC BLOOD PRESSURE: 70 MMHG | HEIGHT: 76 IN | DIASTOLIC BLOOD PRESSURE: 67 MMHG | OXYGEN SATURATION: 97 %

## 2021-05-28 NOTE — PROGRESS NOTES
Patient: ANTONINO MUÑIZ     Acct: [id number]     Report: #[id number]  UNIT #: [id number]     : 1945    Encounter Date:2018  PRIMARY CARE: BRYANT ABRAHAM  ***Signed***  --------------------------------------------------------------------------------------------------------------------  Chief Complaint      Encounter Date      2018            Primary Care Provider      BRYANT ABRAHAM            Referring Provider      BRYANT ABRAHAM            Patient Complaint      Patient is complaining of      Pt here for 1m f/u, ct and pft results            VITALS      Height 6 ft 2 in / 187.96 cm      Weight 224 lbs 3 oz / 101.892494 kg      BSA 2.28 m2      BMI 28.8 kg/m2      Temperature 98.0 F / 36.67 C - Oral      Pulse 76      Respirations 14      Blood Pressure 131/72 Sitting, Right Arm      Pulse Oximetry 95%, Room air            HPI      The patient is a 73 year old white male here today for follow up.  The patient     has persistent shortness of breath and had extensive evaluation with pulmonary     function studies that were unremarkable, methacholine challenge was     unremarkable, has had bronchodilator, but no significant improvement in his     symptoms.  He still has shortness of breath, worse with exertion, better with     rest.  He does have complaints of excessive daytime sleepiness.  The patient has    an [place] sleepiness scale score of 12 here today in the office. The patient     states that he has been having significant fatigue as well, it is hard to     elucidate if he has shortness of breath or his shortness of breath is because of    severe fatigue. He takes no hypnotic medications, has not missed out on any new     sedating medications.  He does have some episodic chest pain and has been seen     by cardiology in the past for this.  Chest pain is no worse than normal.      Shortness of breath is limited, described as moderate to severe in nature,     better with rest  only, worse with exertion.            ROS      Constitutional:  Denies: Fatigue, Fever, Weight gain, Weight loss, Chills,     Insomnia, Other      Respiratory/Breathing:  Complains of: Shortness of air, Wheezing, Cough; Denies:    Hemoptysis, Pleuritic pain, Other      Endocrine:  Denies: Polydipsia, Polyuria, Heat/cold intolerance, Diabetes, Other      Eyes:  Denies: Blurred vision, Vision Changes, Other      Ears, nose, mouth, throat:  Denies: Mouth lesions, Thrush, Throat pain,     Hoarseness, Allergies/Hay Fever, Post Nasal Drip, Headaches, Recent Head Injury,    Nose Bleeding, Neck Stiffness, Thyroid Mass, Hearing Loss, Ear Fullness, Dry     Mouth, Nasal or Sinus Pain, Dry Lips, Nasal discharge, Nasal congestion, Other      Cardiovascular:  Denies: Palpitations, Syncope, Claudication, Chest Pain, Wake     up Gasping for air, Leg Swelling, Irregular Heart Rate, Cyanosis, Dyspnea on     Exertion, Other      Gastrointestinal:  Denies: Nausea, Constipation, Diarrhea, Abdominal pain,     Vomiting, Difficulty Swallowing, Reflux/Heartburn, Dysphagia, Jaundice,     Bloating, Melena, Bloody stools, Other      Genitourinary:  Denies: Urinary frequency, Incontinence, Hematuria, Urgency,     Nocturia, Dysuria, Testicular problems, Other      Musculoskeletal:  Denies: Joint Pain, Joint Stiffness, Joint Swelling, Myalgias,    Other      Hematologic/lymphatic:  DENIES: Lymphadenopathy, Bruising, Bleeding tendencies,     Other      Neurological:  Denies: Headache, Numbness, Weakness, Seizures, Other      Psychiatric:  Denies: Anxiety, Appropriate Effect, Depression, Other      Sleep:  No: Excessive daytime sleep, Morning Headache?, Snoring, Insomnia?, Stop    breathing at sleep?, Other      Integumentary:  Denies: Rash, Dry skin, Skin Warm to Touch, Other      Immunologic/Allergic:  Denies: Latex allergy, Seasonal allergies, Asthma, Urti    caria, Eczema, Other      Immunization status:  No: Up to date             FAMILY/SOCIAL/MEDICAL HX      Surgical History:  Yes: Eye Surgery, Head Surgery (CATARACTS BILATERALLY ),     Other Surgeries; No: AAA Repair, Abdominal Surgery, Adenoids, Angioplasty,     Appendectomy, Back Surgery, Bladder Surgery, Bowel Surgery, Breast Surgery,     CABG, Carotid Stenosis, Cholecystectomy, Ear Surgery, Hernia Surgery, Kidney     Surgery, Nose Surgery, Oral Surgery, Orthopedic Surgery, Prostatectomy, Rectal     Surgery, Spinal Surgery, Testicular Surgery, Throat Surgery, Tonsils, Valve     Replacement, Vascular Surgery      Stroke - Family Hx:  Uncle      Heart - Family Hx:  Brother, Sister, Uncle      Cancer/Type - Family Hx:  Mother, Father, Grandparent      Other Family Medical History:  Brother      Is Father Still Living?:  No      Is Mother Still Living?:  No       Family History:  Yes      Social History:  No Tobacco Use, No Alcohol Use, No Recreational Drug use      Smoking status:  Former smoker (71-2 ppd x 10 years)      Anticoagulation Therapy:  No      Antibiotic Prophylaxis:  No      Medical History:  Yes: Allergies, Arthritis, Deafness or Ringing Ears,     Hemorrhoids/Rectal Prob (REFLUX), High Blood Pressure (MED CONTROLLED), Reflux     Disease; No: Alcoholism, Anemia, Asthma, Atrial Fibrillation, Blood Disease, Bro    drew Bones, Cataracts, Chemical Dependency, Chemotherapy/Cancer, Chronic     Bronchitis/COPD, Emphysema, Chronic Liver Disease, Colon Trouble, Colitis,     Diverticulitis, Congestive Heart Failu, Convulsions, Depression, Anxiety,     Bipolar Disorder, PTSD, Diabetes, Epilepsy, Seizures, Forgetfullness, Glaucoma,     Gall Stones, Gout, Head Injury, Heart Attack, Heart Murmur, GERD, Hepatitis,     Hiatal Hernia, High Cholesterol, HIV (Do not ask - volu, Jaundice, Kidney or     Bladder Disease, Kidney Stones, Migrane Headaches, Mitral Valve Prolapse, Night     sweats, Phlebitis, Psychiatric Care, Rheumatic Fever, Sexually Transmitted Dis,     Shortness Of Breath, Sinus  Trouble, Skin Disease/Psoriais/Ecz, Stroke, Thyroid     Problem, Tuberculosis or Pos TB Te, Miscellaneous Medical/oth      Psychiatric History      None            PREVENTION      Hx Influenza Vaccination:  No      Influenza Vaccine Declined:  Yes      2 or More Falls Past Year?:  No      Fall Past Year with Injury?:  No      Hx Pneumococcal Vaccination:  No      Encouraged to follow-up with:  PCP regarding preventative exams.      Chart initiated by      Amairani Murphy MA            ALLERGIES/MEDICATIONS      Allergies:        Coded Allergies:             NO KNOWN DRUG ALLERGIES (Verified  Allergy, Severe, 11/26/18)      Medications    Last Reconciled on 10/26/18 10:27 by WILTON GONZALEZ MD      Amino Acids (L-Alanine) 1 Each Capsule      1 CAP PO, CAP         Reported         10/29/18       Omeprazole (Omeprazole*) 40 Mg Capsule      40 MG PO QDAY, #30 CAP 0 Refills         Reported         10/26/18       Montelukast Sodium (Singulair*) 10 Mg Tablet      10 MG PO HS, #30 TAB 11 Refills         Prov: Wilton Gonzalez         2/6/18       Levothyroxine (Synthroid) 100 Mcg Tablet      0.1 MG PO QDAY, #30 TAB 0 Refills         Reported         9/27/17       Aspirin Chew (Aspirin Chew) 81 Mg Tab.chew      81 MG PO QDAY, #30 TAB.CHEW 0 Refills         Reported         9/27/17       Sulindac (Clinoril) 200 Mg Tab      200 MG PO BID, TAB         Reported         9/27/17       Losartan Potassium (Losartan*) 100 Mg Tablet      100 MG PO QDAY, #30 TAB 0 Refills         Reported         9/27/17      Current Medications      Current Medications Reviewed 11/26/18            EXAM      GEN-patient appears stated age resting comfortable in no acute distress      Eyes-PERRL,  conjunctiva are normal in appearance extraocular muscles are     intact, no scleral icterus      Lymphatic-no swollen or enlarged cervical nodes, or axillary node, or femoral     nodes, or supraclavicular nodes      Mouth normal dentition, no erythema no  ulcerations oropharynx appears normal no     exudate no evidence of postnasal drip, MP(1)      Neck-there are no palpable supraclavicular or cervical adenopathy, thyroid is     normal in appearance no apparent nodules, there is no inspiratory or expiratory     stridor      Respiratory-patient exhibits normal work of breathing, speaking in full     sentences without difficulty, the chest is normal in appearance, clear to     auscultation with no wheezes rales or rhonchi, chest is normal to percussion on     both the right and left sides      Cardiovascular-the heart rate is normal and regular S1 and S2 present with no     murmur or extra heart sounds, there is no JVD or pedal edema present      GI-the abdomen is normal in appearance, bowel sounds present and normal in all     quadrants no hepatosplenomegaly or masses felt      Extremities-no clubbing is present, pulses present in all extremities, capillary     refill time is normal      Skin-skin is normal in appearance it is warm and dry, no rashes present, no     evidence of cyanosis, palpation reveals no masses      Neurological-the patient is alert and oriented to time place and person, moves     all 4 extremities, normal gait, normal affect and mood, CN2-12 intact      Psych-normal judgment and insight is good, normal mood and affect, alert and     oriented to person, place, and time, and date      Vtials      Vitals:             Height 6 ft 2 in / 187.96 cm           Weight 224 lbs 3 oz / 101.680054 kg           BSA 2.28 m2           BMI 28.8 kg/m2           Temperature 98.0 F / 36.67 C - Oral           Pulse 76           Respirations 14           Blood Pressure 131/72 Sitting, Right Arm           Pulse Oximetry 95%, Room air            REVIEW      Results Reviewed      PCCS Results Reviewed?:  Yes Prev Lab Results, Yes Prev Radiology Results, Yes     [facility]            Assessment      Excessive daytime sleepiness - G47.19            Notes       New Diagnostics      * Basic Sleep Study, Week         Dx: Excessive daytime sleepiness - G47.19      ASSESSMENT/PLAN:      1.  Excessive daytime sleepiness.  [place] sleepiness score of 12.  We will     obtain sleep study.      2. Shortness of breath.  Normal pulmonary function studies.  We will send back     to cardiology to assess for underlying cardiac causes of patient's persistent     shortness of breath.      3.  Reflux.  Continue following up with GI.      4.  I have personally reviewed laboratory data, imaging as well as previous     medical records.            Patient Education      Education resources provided:  Yes      Patient Education Provided:  Sleep Apnea                 Disclaimer: Converted document may not contain table formatting or lab diagrams. Please see [employer] [facility] for the authenticated document.

## 2021-05-28 NOTE — PROGRESS NOTES
Patient: ANTONINO MUÑIZ     Acct: [id number]     Report: #[id number]  UNIT #: [id number]     : 1945    Encounter Date:2019  PRIMARY CARE: BRYANT ABRAHAM  ***Signed***  --------------------------------------------------------------------------------------------------------------------  Chief Complaint      Encounter Date      Mar 7, 2019            Primary Care Provider      BRYANT ABRAHAM            Referring Provider      BRYANT ABRAHAM            Patient Complaint      Patient is complaining of      Pt here for f/u. dyspnea            VITALS      Height 6 ft 2 in / 187.96 cm      Weight 223 lbs 6 oz / 101.326532 kg      BSA 2.28 m2      BMI 28.7 kg/m2      Temperature 97.9 F / 36.61 C - Oral      Pulse 72      Respirations 16      Blood Pressure 138/67 Sitting, Right Arm      Pulse Oximetry 97%, room air            HPI      The patient is a very pleasant 73 year old white male with a past medical     history significant for dyspnea.  He had bronchoscopy in January that showed no     growth of any organisms and had a biopsy that was unremarkable.  She still feels    dyspneic everyday, worse with exertion, sometimes occurs spontaneously.  He does    have some associated chest pain and does see cardiology.            ROS      Constitutional:  Complains of: Fatigue; Denies: Fever, Weight gain, Weight loss,    Chills, Insomnia, Other      Respiratory/Breathing:  Complains of: Shortness of air, Wheezing, Cough; Denies:    Hemoptysis, Pleuritic pain, Other      Endocrine:  Denies: Polydipsia, Polyuria, Heat/cold intolerance, Diabetes, Other      Eyes:  Denies: Blurred vision, Vision Changes, Other      Ears, nose, mouth, throat:  Denies: Mouth lesions, Thrush, Throat pain,     Hoarseness, Allergies/Hay Fever, Post Nasal Drip, Headaches, Recent Head Injury,    Nose Bleeding, Neck Stiffness, Thyroid Mass, Hearing Loss, Ear Fullness, Dry     Mouth, Nasal or Sinus Pain, Dry Lips, Nasal discharge, Nasal  congestion, Other      Cardiovascular:  Denies: Palpitations, Syncope, Claudication, Chest Pain, Wake     up Gasping for air, Leg Swelling, Irregular Heart Rate, Cyanosis, Dyspnea on     Exertion, Other      Gastrointestinal:  Denies: Nausea, Constipation, Diarrhea, Abdominal pain,     Vomiting, Difficulty Swallowing, Reflux/Heartburn, Dysphagia, Jaundice,     Bloating, Melena, Bloody stools, Other      Genitourinary:  Denies: Urinary frequency, Incontinence, Hematuria, Urgency,     Nocturia, Dysuria, Testicular problems, Other      Musculoskeletal:  Denies: Joint Pain, Joint Stiffness, Joint Swelling, Myalgias,    Other      Hematologic/lymphatic:  DENIES: Lymphadenopathy, Bruising, Bleeding tendencies,     Other      Neurological:  Denies: Headache, Numbness, Weakness, Seizures, Other      Psychiatric:  Denies: Anxiety, Appropriate Effect, Depression, Other      Sleep:  No: Excessive daytime sleep, Morning Headache?, Snoring, Insomnia?, Stop    breathing at sleep?, Other      Integumentary:  Denies: Rash, Dry skin, Skin Warm to Touch, Other      Immunologic/Allergic:  Denies: Latex allergy, Seasonal allergies, Asthma,     Urticaria, Eczema, Other      Immunization status:  No: Up to date            FAMILY/SOCIAL/MEDICAL HX      Surgical History:  Yes: Bowel Surgery (COLONOSCOPY), Eye Surgery, Head Surgery     (CATARACTS BILATERALLY ), Other Surgeries; No: AAA Repair, Abdominal Surgery,     Adenoids, Angioplasty, Appendectomy, Back Surgery, Bladder Surgery, Breast     Surgery, CABG, Carotid Stenosis, Cholecystectomy, Ear Surgery, Hernia Surgery,     Kidney Surgery, Nose Surgery, Oral Surgery, Orthopedic Surgery, Prostatectomy,     Rectal Surgery, Spinal Surgery, Testicular Surgery, Throat Surgery, Tonsils,     Valve Replacement, Vascular Surgery      Stroke - Family Hx:  Uncle      Heart - Family Hx:  Brother, Sister, Uncle      Cancer/Type - Family Hx:  Mother, Father, Grandparent      Other Family Medical  History:  Brother      Is Father Still Living?:  No      Is Mother Still Living?:  No       Family History:  Yes      Social History:  No Tobacco Use, No Alcohol Use, No Recreational Drug use      Smoking status:  Former smoker (71-2 ppd x 10 years)      Anticoagulation Therapy:  No      Antibiotic Prophylaxis:  No      Medical History:  Yes: Allergies, Arthritis, 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), Hemorrhoids/Rectal Prob (REFLUX, POSSIBLE SMALL HIATAL HERNIA),     High Blood Pressure (MED CONTROLLED), Reflux Disease, Shortness Of Breath     (COUGH-SHORTNESS OF BREATH AT TIMES); No: Alcoholism, Anemia, Asthma, Atrial     Fibrillation, Blood Disease, Broken Bones, Cataracts, Chemical Dependency,     Chemotherapy/Cancer, Chronic Bronchitis/COPD, Emphysema, Chronic Liver Disease,     Colon Trouble, Colitis, Diverticulitis, Congestive Heart Failu, Convulsions,     Depression, Anxiety, Bipolar Disorder, PTSD, Diabetes, Epilepsy, Seizures,     Forgetfullness, Glaucoma, Gall Stones, Gout, Head Injury, Heart Attack, Heart     Murmur, GERD, Hepatitis, Hiatal Hernia, High Cholesterol, HIV (Do not ask -     volu, Jaundice, Kidney or Bladder Disease, Kidney Stones, Migrane Headaches,     Mitral Valve Prolapse, Night sweats, Phlebitis, Psychiatric Care, Rheumatic     Fever, Sexually Transmitted Dis, Sinus Trouble, Skin Disease/Psoriais/Ecz,     Stroke, Thyroid Problem, Tuberculosis or Pos TB Te, Miscellaneous Medical/oth      Psychiatric History      none            PREVENTION      Hx Influenza Vaccination:  No      Influenza Vaccine Declined:  Yes      2 or More Falls Past Year?:  No      Fall Past Year with Injury?:  No      Hx Pneumococcal Vaccination:  No      Encouraged to follow-up with:  PCP regarding preventative exams.      Chart initiated by      Jennifer Wakefield MA            ALLERGIES/MEDICATIONS      Allergies:        Coded Allergies:             NO KNOWN DRUG ALLERGIES (Verified  Allergy, Unknown,  3/7/19)      Medications    Last Reconciled on 3/7/19 16:49 by MAGUI GONZALEZ MD      Melatonin (Melatonin) Unknown Strength Tablet      PO HS, TAB         Reported         3/7/19       Turmeric Root Extract (Turmeric) 1 Each Capsule      1000 MG PO, CAP         Reported         3/7/19       Omeprazole (Omeprazole*) 40 Mg Capsule      40 MG PO QDAY, #30 CAP 0 Refills         Reported         10/26/18       Levothyroxine (Synthroid) 100 Mcg Tablet      0.1 MG PO QDAY, #30 TAB 0 Refills         Reported         9/27/17       Aspirin Chew (Aspirin Chew) 81 Mg Tab.chew      81 MG PO QDAY, #30 TAB 0 Refills         Reported         9/27/17       Sulindac (Clinoril) 200 Mg Tab      200 MG PO BID, TAB         Reported         9/27/17       Losartan Potassium (Losartan*) 100 Mg Tablet      100 MG PO QDAY, #30 TAB 0 Refills         Reported         9/27/17      Current Medications      Current Medications Reviewed 3/7/19            EXAM      GEN-patient appears stated age resting comfortable in no acute distress      Eyes-PERRL,  conjunctiva are normal in appearance extraocular muscles are     intact, no scleral icterus      Lymphatic-no swollen or enlarged cervical nodes, or axillary node, or femoral     nodes, or supraclavicular nodes      Mouth normal dentition, no erythema no ulcerations oropharynx appears normal no     exudate no evidence of postnasal drip, MP(1)      Neck-there are no palpable supraclavicular or cervical adenopathy, thyroid is     normal in appearance no apparent nodules, there is no inspiratory or expiratory     stridor      Respiratory-patient exhibits normal work of breathing, speaking in full     sentences without difficulty, the chest is normal in appearance, clear to     auscultation with no wheezes rales or rhonchi, chest is normal to percussion on     both the right and left sides      Cardiovascular-the heart rate is normal and regular S1 and S2 present with no     murmur or extra heart  sounds, there is no JVD or pedal edema present      GI-the abdomen is normal in appearance, bowel sounds present and normal in all     quadrants no hepatosplenomegaly or masses felt      Extremities-no clubbing is present, pulses present in all extremities, capillary     refill time is normal      Skin-skin is normal in appearance it is warm and dry, no rashes present, no     evidence of cyanosis, palpation reveals no masses      Neurological-the patient is alert and oriented to time place and person, moves     all 4 extremities, normal gait, normal affect and mood, CN2-12 intact      Psych-normal judgment and insight is good, normal mood and affect, alert and     oriented to person, place, and time, and date      Vtials      Vitals:             Height 6 ft 2 in / 187.96 cm           Weight 223 lbs 6 oz / 101.969163 kg           BSA 2.28 m2           BMI 28.7 kg/m2           Temperature 97.9 F / 36.61 C - Oral           Pulse 72           Respirations 16           Blood Pressure 138/67 Sitting, Right Arm           Pulse Oximetry 97%, room air            REVIEW      Results Reviewed      PCCS Results Reviewed?:  Yes Prev Lab Results, Yes Prev Radiology Results, Yes     Previous [facility] Records            Assessment      [place] (dyspnea on exertion) - R06.09            Notes      New Medications      * Turmeric Root Extract (Turmeric) 1 EACH CAPSULE: 1,000 MG PO      * Melatonin Unknown Strength TABLET: PO HS      Changed Medications      * Aspirin Chew 81 MG TAB.CHEW: 81 MG PO QDAY #30      New Diagnostics      * Cardiopulm Metabolic Stress Te, SCHEDULED PROCEDURE         Dx: [place] (dyspnea on exertion) - R06.09      ASSESSMENT/PLAN:       1. Dyspnea, unspecified.  Unclear etiology at this time, has had extensive     pulmonary evaluation.  Sees cardiology who does not feel this is from his heart.      At this time, we will obtain a cardiopulmonary exercise test.      2.  I have personally reviewed laboratory data, imaging  as well as previous     medical records.            Patient Education      Education resources provided:  Yes ([facility])                 Disclaimer: Converted document may not contain table formatting or lab diagrams. Please see [employer] [facility] for the authenticated document.

## 2021-05-28 NOTE — PROGRESS NOTES
"Patient: ANTONINO MUÑIZ     Acct: [id number]     Report: #[id number]  UNIT #: [id number]     : 1945    Encounter Date:10/26/2018  PRIMARY CARE: BRYANT ABRAHAM  ***Signed***  --------------------------------------------------------------------------------------------------------------------  Chief Complaint      Encounter Date      Oct 26, 2018            Primary Care Provider      BRYANT ABRAHAM            Referring Provider      BRYANT ABRAHAM            Patient Complaint      Patient is complaining of      Pt here for f/u, results, cough            VITALS      Height 6 ft 4 in / 193.04 cm      Weight 214 lbs 9 oz / 97.156712 kg      BSA 2.28 m2      BMI 26.1 kg/m2      Temperature 98.2 F / 36.78 C - Oral      Pulse 83      Respirations 14      Blood Pressure 134/76 Sitting, Left Arm      Pulse Oximetry 97%, Room air            HPI      The patient is a very pleasant 73 year old white male here today for follow up.             The patient has numerous complaints today including being weak and difficulty     swallowing.  The patient had extensive testing since his last office visit for     tick borne illness that was unremarkable. The patient also had methacholine     challenge test that was also unremarkable. The patient states that he has fatig    ue and weakness occurring over the past 5-6 months according him and his wife.     The patient does have some heart palpitations but denies having any chest pain     overtly at this time. He denies having any lower extremity edema. The patient     feels that he cannot do his activities due to his severe fatigue. He has been     tried on bronchodilator therapies with no significant improvement in his     symptoms. He describes his symptoms as severe in nature limiting his day to day     activities. His wife with him at this visit today asking numerous questions and     having numerous requests. She states she is tired of him just \"sitting around     the " "house\" without any energy, willingness or want to do any activities.             The patient is also complaining of some difficulty swallowing with significant     gastrointestinal esophageal reflux disease. He has history of Taylor's     esophagus and takes his proton pump inhibitor but not always. Oftentimes he will    have such severe reflux that he says he will \"vomit shortly after he eats     something and the food quickly comes out of his throat.\"  The patient has not     seen gastrointestinal services recently and has not had any recent scopes for     this. He denies any difficulty with food or anything sticking in his throat.             In regards to the patient's weakness, he has no focal motor weakness according     to him and denies having any visual changes and denies any difficulty with     shoulder muscle or hip muscle weakness. He also denies any difficulty chewing or    swallowing food.            ROS      Constitutional:  Denies: Fatigue, Fever, Weight gain, Weight loss, Chills,     Insomnia, Other      Respiratory/Breathing:  Complains of: Shortness of air, Wheezing, Cough; Denies:    Hemoptysis, Pleuritic pain, Other      Endocrine:  Denies: Polydipsia, Polyuria, Heat/cold intolerance, Diabetes, Other      Eyes:  Denies: Blurred vision, Vision Changes, Other      Ears, nose, mouth, throat:  Denies: Mouth lesions, Thrush, Throat pain,     Hoarseness, Allergies/Hay Fever, Post Nasal Drip, Headaches, Recent Head Injury,    Nose Bleeding, Neck Stiffness, Thyroid Mass, Hearing Loss, Ear Fullness, Dry     Mouth, Nasal or Sinus Pain, Dry Lips, Nasal discharge, Nasal congestion, Other      Cardiovascular:  Denies: Palpitations, Syncope, Claudication, Chest Pain, Wake     up Gasping for air, Leg Swelling, Irregular Heart Rate, Cyanosis, Dyspnea on     Exertion, Other      Gastrointestinal:  Denies: Nausea, Constipation, Diarrhea, Abdominal pain,     Vomiting, Difficulty Swallowing, Reflux/Heartburn, " Dysphagia, Jaundice,     Bloating, Melena, Bloody stools, Other      Genitourinary:  Denies: Urinary frequency, Incontinence, Hematuria, Urgency,     Nocturia, Dysuria, Testicular problems, Other      Musculoskeletal:  Denies: Joint Pain, Joint Stiffness, Joint Swelling, Myalgias,    Other      Hematologic/lymphatic:  DENIES: Lymphadenopathy, Bruising, Bleeding tendencies,     Other      Neurological:  Denies: Headache, Numbness, Weakness, Seizures, Other      Psychiatric:  Denies: Anxiety, Appropriate Effect, Depression, Other      Sleep:  No: Excessive daytime sleep, Morning Headache?, Snoring, Insomnia?, Stop    breathing at sleep?, Other      Integumentary:  Denies: Rash, Dry skin, Skin Warm to Touch, Other      Immunologic/Allergic:  Denies: Latex allergy, Seasonal allergies, Asthma,     Urticaria, Eczema, Other      Immunization status:  No: Up to date            FAMILY/SOCIAL/MEDICAL HX      Surgical History:  Yes: Eye Surgery, Other Surgeries; No: AAA Repair, Abdominal     Surgery, Adenoids, Angioplasty, Appendectomy, Back Surgery, Bladder Surgery,     Bowel Surgery, Breast Surgery, CABG, Carotid Stenosis, Cholecystectomy, Ear     Surgery, Head Surgery, Hernia Surgery, Kidney Surgery, Nose Surgery, Oral Surge    ry, Orthopedic Surgery, Prostatectomy, Rectal Surgery, Spinal Surgery,     Testicular Surgery, Throat Surgery, Tonsils, Valve Replacement, Vascular Surgery      Stroke - Family Hx:  Uncle      Heart - Family Hx:  Brother, Sister, Uncle      Cancer/Type - Family Hx:  Mother, Father, Grandparent      Other Family Medical History:  Brother      Is Father Still Living?:  No      Is Mother Still Living?:  No       Family History:  Yes      Social History:  No Tobacco Use, No Alcohol Use, No Recreational Drug use      Smoking status:  Former smoker (71-2 ppd x 10 years)      Anticoagulation Therapy:  No      Antibiotic Prophylaxis:  No      Medical History:  Yes: Allergies, Arthritis, High Blood Pressure,  Reflux     Disease; No: Alcoholism, Anemia, Asthma, Atrial Fibrillation, Blood Disease,     Broken Bones, Cataracts, Chemical Dependency, Chemotherapy/Cancer, Chronic     Bronchitis/COPD, Emphysema, Chronic Liver Disease, Colon Trouble, Colitis,     Diverticulitis, Congestive Heart Failu, Deafness or Ringing Ears, Convulsions,     Depression, Anxiety, Bipolar Disorder, PTSD, Diabetes, Epilepsy, Seizures,     Forgetfullness, Glaucoma, Gall Stones, Gout, Head Injury, Heart Attack, Heart     Murmur, GERD, Hemorrhoids/Rectal Prob, Hepatitis, Hiatal Hernia, High     Cholesterol, HIV (Do not ask - volu, Jaundice, Kidney or Bladder Disease, Kidney    Stones, Migrane Headaches, Mitral Valve Prolapse, Night sweats, Phlebitis,     Psychiatric Care, Rheumatic Fever, Sexually Transmitted Dis, Shortness Of B    reath, Sinus Trouble, Skin Disease/Psoriais/Ecz, Stroke, Thyroid Problem,     Tuberculosis or Pos TB Te, Miscellaneous Medical/oth      Psychiatric History      None            PREVENTION      Hx Influenza Vaccination:  No      Influenza Vaccine Declined:  Yes      2 or More Falls Past Year?:  No      Fall Past Year with Injury?:  No      Hx Pneumococcal Vaccination:  No      Encouraged to follow-up with:  PCP regarding preventative exams.      Chart initiated by      Amairani Murphy MA            ALLERGIES/MEDICATIONS      Allergies:        Coded Allergies:             NO KNOWN DRUG ALLERGIES (Verified  Allergy, Severe, 10/26/18)      Medications    Last Reconciled on 10/26/18 10:27 by WILTON GONZALEZ MD      Omeprazole (Omeprazole*) 40 Mg Capsule      40 MG PO QDAY, #30 CAP 0 Refills         Reported         10/26/18       Lansoprazole (Lansoprazole*) 15 Mg Capsule.dr      15 MG PO QDAY, CAP         Reported         5/31/18       Azelastine Hcl (Azelastine Nasal) 137 Mcg/0.137 Ml Spray.pump      2 PUFFS NARE EACH QDAY, #1 BOTTLE 3 Refills         Prov: Wilton Gonzalez         2/6/18       Michael-Fluticasone (Fluticasone 50  mcg) 16 Gm Spray.susp      2 PUFFS NARE EACH QDAY, #1 BOTTLE 3 Refills         Prov: Wilton Gong         2/6/18       Montelukast Sodium (Singulair*) 10 Mg Tablet      10 MG PO HS, #30 TAB 11 Refills         Prov: Wilton Gong         2/6/18       Levothyroxine (Synthroid) 100 Mcg Tablet      0.1 MG PO QDAY, #30 TAB 0 Refills         Reported         9/27/17       Aspirin Chew (Aspirin Chew) 81 Mg Tab.chew      81 MG PO QDAY, #30 TAB.CHEW 0 Refills         Reported         9/27/17       Sulindac (Clinoril) 200 Mg Tab      200 MG PO BID, TAB         Reported         9/27/17       Losartan Potassium (Losartan*) 100 Mg Tablet      100 MG PO QDAY, #30 TAB 0 Refills         Reported         9/27/17      Current Medications      Current Medications Reviewed 10/26/18            EXAM      GEN-patient appears stated age resting comfortable in no acute distress      Eyes-PERRL,  conjunctiva are normal in appearance extraocular muscles are     intact, no scleral icterus      Lymphatic-no swollen or enlarged cervical nodes, or axillary node, or femoral     nodes, or supraclavicular nodes      Mouth normal dentition, no erythema no ulcerations oropharynx appears normal no     exudate no evidence of postnasal drip, MP(1)      Neck-there are no palpable supraclavicular or cervical adenopathy, thyroid is     normal in appearance no apparent nodules, there is no inspiratory or expiratory     stridor      Respiratory-patient exhibits normal work of breathing, speaking in full     sentences without difficulty, the chest is normal in appearance, clear to     auscultation with no wheezes rales or rhonchi, chest is normal to percussion on     both the right and left sides      Cardiovascular-the heart rate is normal and regular S1 and S2 present with no     murmur or extra heart sounds, there is no JVD or pedal edema present      GI-the abdomen is normal in appearance, bowel sounds present and normal in all     quadrants no  hepatosplenomegaly or masses felt      Extremities-no clubbing is present, pulses present in all extremities, capillary     refill time is normal      Skin-skin is normal in appearance it is warm and dry, no rashes present, no     evidence of cyanosis, palpation reveals no masses      Neurological-the patient is alert and oriented to time place and person, moves     all 4 extremities, normal gait, normal affect and mood, CN2-12 intact      Psych-normal judgment and insight is good, normal mood and affect, alert and     oriented to person, place, and time, and date      Vtials      Vitals:             Height 6 ft 4 in / 193.04 cm           Weight 214 lbs 9 oz / 97.312010 kg           BSA 2.28 m2           BMI 26.1 kg/m2           Temperature 98.2 F / 36.78 C - Oral           Pulse 83           Respirations 14           Blood Pressure 134/76 Sitting, Left Arm           Pulse Oximetry 97%, Room air            REVIEW      Results Reviewed      [facility] Results Reviewed?:  Yes Prev Lab Results, Yes Prev Radiology Results, Yes     Previous [facility] Records            Assessment      SOB (shortness of breath) - R06.02            Weakness - R53.1            GERD (gastroesophageal reflux disease) - K21.9            Notes      New Medications      * Omeprazole (Omeprazole*) 40 MG CAPSULE: 40 MG PO QDAY #30      Discontinued Medications      * Famotidine 40 MG TABLET: 40 MG PO HS 30 Days #30      * Dexlansoprazole (Dexilant) 30 MG CAP.DR.BP: 30 MG PO QDAY #30      New Diagnostics      * Chest W/O Cont CT, SCHEDULED PROCEDURE         Dx: SOB (shortness of breath) - R06.02      * Upper Resp [place] [facility] Prof, Month         Dx: SOB (shortness of breath) - R06.02      * Echo Complete, Week         Dx: SOB (shortness of breath) - R06.02      * Myositis Panel, Routine         Dx: Weakness - R53.1      * Aldolase, Routine         Dx: Weakness - R53.1      * Cpk Isoenzymes, Routine         Dx: Weakness - R53.1      * CBC, Month         Dx:  Weakness - R53.1      New Referrals      * Gastroenterology, SCHEDULED PROCEDURE         Dx: GERD (gastroesophageal reflux disease) - K21.9      ASSESSMENT:      1. Fatigue.       2. Weakness.        3. Shortness of breath.       4. Severe reflux.            PLAN:      1. Obtain myositis panel, aldolase, CPK and CBC to assess for the possibility of     myositis.       2. We will obtain alpha-gal at the patient's request as well as upper [facility] respiratory immunology profile.       3. We will obtain echocardiogram to rule out pulmonary hypertension or cardiac     causes of the patient's dyspnea.       4. We have made referral for gastrointestinal who has agreed to scope the     patient this coming Monday.       5. I explained to the patient at this time if his symptoms are persistent     despite negative evaluation I would recommend that he have a sleep study to     assess for the possibility of obstructive sleep apnea.       6. We will see the patient back in 4-6 weeks.       7. I have personally reviewed laboratory data, imaging as well as previous     medical records.            Patient Education      Education resources provided:  Yes (sob)                 Disclaimer: Converted document may not contain table formatting or lab diagrams. Please see [employer] for the authenticated document.

## 2021-05-28 NOTE — PROGRESS NOTES
Patient: ANTONINO MUÑIZ     Acct: [id number]     Report: #[id number]  UNIT #: [id number]     : 1945    Encounter Date:2019  PRIMARY CARE: BRYANT ABRAHAM  ***Signed***  --------------------------------------------------------------------------------------------------------------------  Chief Complaint      Encounter Date      2019            Primary Care Provider      BRYANT ABRAHAM            Referring Provider      BRYANT ABRAHAM            Patient Complaint      Patient is complaining of      PT here for f/u, Excessive daytime sleepiness            VITALS      Height 6 ft 2 in / 187.96 cm      Weight 223 lbs 9 oz / 101.866037 kg      BSA 2.28 m2      BMI 28.7 kg/m2      Temperature 98.1 F / 36.72 C - Oral      Pulse 77      Respirations 14      Blood Pressure 150/87 Sitting, Right Arm      Pulse Oximetry 97%, Room air            HPI      The patient is a very pleasant 73 year old white male who presents here today     for follow up.             The patient had sleep apnea testing done since his last office visit that was     unremarkable. The patient is still complaining of fatigue and shortness of     breath as well as episodic cough. The patient has had extensive evaluation     including cardiac evaluation which is normal, echocardiogram was normal,     pulmonary function tests were normal, methacholine challenge normal and a high     resolution CT scan of the chest that was unremarkable. The patient states that     he has been tried on inhaled corticosteroid with no significant improvement in     his symptoms. He complains of fatigue with body aches in general and takes     Sulindac for but it is still persistent. He denies having any chest pain, lower     extremity edema or orthopnea. He has had some chronic chest pain but it has     mostly been at the costovertebral angle where he has arthritic changes. The     patient feels his shortness of breath is the same with no significant      progression since his last office visit. He complains of overwhelmingly of     fatigue which decreased his activity and his ability to do any kind of     activities. The patient does have some episodic cough and it is occasionally     productive. The patient does have significant exposures to mold which preceded     his entire symptoms. His cough is now unexplained for months now.            ROS      Constitutional:  Denies: Fatigue, Fever, Weight gain, Weight loss, Chills,     Insomnia, Other      Respiratory/Breathing:  Complains of: Shortness of air, Wheezing, Cough; Denies:    Hemoptysis, Pleuritic pain, Other      Endocrine:  Denies: Polydipsia, Polyuria, Heat/cold intolerance, Diabetes, Other      Eyes:  Denies: Blurred vision, Vision Changes, Other      Ears, nose, mouth, throat:  Denies: Mouth lesions, Thrush, Throat pain,     Hoarseness, Allergies/Hay Fever, Post Nasal Drip, Headaches, Recent Head Injury,    Nose Bleeding, Neck Stiffness, Thyroid Mass, Hearing Loss, Ear Fullness, Dry     Mouth, Nasal or Sinus Pain, Dry Lips, Nasal discharge, Nasal congestion, Other      Cardiovascular:  Denies: Palpitations, Syncope, Claudication, Chest Pain, Wake     up Gasping for air, Leg Swelling, Irregular Heart Rate, Cyanosis, Dyspnea on     Exertion, Other      Gastrointestinal:  Denies: Nausea, Constipation, Diarrhea, Abdominal pain,     Vomiting, Difficulty Swallowing, Reflux/Heartburn, Dysphagia, Jaundice,     Bloating, Melena, Bloody stools, Other      Genitourinary:  Denies: Urinary frequency, Incontinence, Hematuria, Urgency,     Nocturia, Dysuria, Testicular problems, Other      Musculoskeletal:  Denies: Joint Pain, Joint Stiffness, Joint Swelling, Myalgias,    Other      Neurological:  Denies: Headache, Numbness, Weakness, Seizures, Other      Psychiatric:  Denies: Anxiety, Appropriate Effect, Depression, Other      Sleep:  No: Excessive daytime sleep, Morning Headache?, Snoring, Insomnia?, Stop     breathing at sleep?, Other      Integumentary:  Denies: Rash, Dry skin, Skin Warm to Touch, Other      Immunologic/Allergic:  Denies: Latex allergy, Seasonal allergies, Asthma,     Urticaria, Eczema, Other      Immunization status:  No: Up to date            FAMILY/SOCIAL/MEDICAL HX      Surgical History:  Yes: Eye Surgery, Head Surgery (CATARACTS BILATERALLY ),     Other Surgeries; No: AAA Repair, Abdominal Surgery, Adenoids, Angioplasty,     Appendectomy, Back Surgery, Bladder Surgery, Bowel Surgery, Breast Surgery,     CABG, Carotid Stenosis, Cholecystectomy, Ear Surgery, Hernia Surgery, Kidney     Surgery, Nose Surgery, Oral Surgery, Orthopedic Surgery, Prostatectomy, Rectal     Surgery, Spinal Surgery, Testicular Surgery, Throat Surgery, Tonsils, Valve     Replacement, Vascular Surgery      Stroke - Family Hx:  Uncle      Heart - Family Hx:  Brother, Sister, Uncle      Cancer/Type - Family Hx:  Mother, Father, Grandparent      Other Family Medical History:  Brother      Is Father Still Living?:  No      Is Mother Still Living?:  No       Family History:  Yes      Social History:  No Tobacco Use, No Alcohol Use, No Recreational Drug use      Smoking status:  Former smoker (71-2 ppd x 10 years)      Anticoagulation Therapy:  No      Antibiotic Prophylaxis:  No      Medical History:  Yes: Allergies, Arthritis, Deafness or Ringing Ears,     Hemorrhoids/Rectal Prob (REFLUX), High Blood Pressure (MED CONTROLLED), Reflux     Disease; No: Alcoholism, Anemia, Asthma, Atrial Fibrillation, Blood Disease,     Broken Bones, Cataracts, Chemical Dependency, Chemotherapy/Cancer, Chronic     Bronchitis/COPD, Emphysema, Chronic Liver Disease, Colon Trouble, Colitis,     Diverticulitis, Congestive Heart Failu, Convulsions, Depression, Anxiety,     Bipolar Disorder, PTSD, Diabetes, Epilepsy, Seizures, Forgetfullness, Glaucoma,     Gall Stones, Gout, Head Injury, Heart Attack, Heart Murmur, GERD, Hepatitis,     Hiatal Hernia,  High Cholesterol, HIV (Do not ask - volu, Jaundice, Kidney or     Bladder Disease, Kidney Stones, Migrane Headaches, Mitral Valve Prolapse, Night     sweats, Phlebitis, Psychiatric Care, Rheumatic Fever, Sexually Transmitted Dis,     Shortness Of Breath, Sinus Trouble, Skin Disease/Psoriais/Ecz, Stroke, Thyroid     Problem, Tuberculosis or Pos TB Te, Miscellaneous Medical/oth            PREVENTION      Hx Influenza Vaccination:  No      Influenza Vaccine Declined:  Yes      2 or More Falls Past Year?:  No      Fall Past Year with Injury?:  No      Hx Pneumococcal Vaccination:  No      Encouraged to follow-up with:  PCP regarding preventative exams.      Chart initiated by      Amairani Murphy MA            ALLERGIES/MEDICATIONS      Allergies:        Coded Allergies:             NO KNOWN DRUG ALLERGIES (Verified  Allergy, Severe, 1/14/19)      Medications    Last Reconciled on 1/14/19 09:02 by MAGUI GONZALEZ MD      Omeprazole (Omeprazole*) 40 Mg Capsule      40 MG PO QDAY, #30 CAP 0 Refills         Reported         10/26/18       Levothyroxine (Synthroid) 100 Mcg Tablet      0.1 MG PO QDAY, #30 TAB 0 Refills         Reported         9/27/17       Aspirin Chew (Aspirin Chew) 81 Mg Tab.chew      81 MG PO QDAY, #30 TAB.CHEW 0 Refills         Reported         9/27/17       Sulindac (Clinoril) 200 Mg Tab      200 MG PO BID, TAB         Reported         9/27/17       Losartan Potassium (Losartan*) 100 Mg Tablet      100 MG PO QDAY, #30 TAB 0 Refills         Reported         9/27/17      Current Medications      Current Medications Reviewed 1/14/19            EXAM      GEN-patient appears stated age resting comfortable in no acute distress      Eyes-PERRL,  conjunctiva are normal in appearance extraocular muscles are     intact, no scleral icterus      Lymphatic-no swollen or enlarged cervical nodes, or axillary node, or femoral     nodes, or supraclavicular nodes      Mouth normal dentition, no erythema no ulcerations  oropharynx appears normal no     exudate no evidence of postnasal drip, MP(1)      Neck-there are no palpable supraclavicular or cervical adenopathy, thyroid is     normal in appearance no apparent nodules, there is no inspiratory or expiratory     stridor      Respiratory-patient exhibits normal work of breathing, speaking in full     sentences without difficulty, the chest is normal in appearance, clear to     auscultation with no wheezes rales or rhonchi, chest is normal to percussion on     both the right and left sides      Cardiovascular-the heart rate is normal and regular S1 and S2 present with no     murmur or extra heart sounds, there is no JVD or pedal edema present      GI-the abdomen is normal in appearance, bowel sounds present and normal in all     quadrants no hepatosplenomegaly or masses felt      Extremities-no clubbing is present, pulses present in all extremities, capillary     refill time is normal      Skin-skin is normal in appearance it is warm and dry, no rashes present, no     evidence of cyanosis, palpation reveals no masses      Neurological-the patient is alert and oriented to time place and person, moves     all 4 extremities, normal gait, normal affect and mood, CN2-12 intact      Psych-normal judgment and insight is good, normal mood and affect, alert and     oriented to person, place, and time, and date      Vtials      Vitals:             Height 6 ft 2 in / 187.96 cm           Weight 223 lbs 9 oz / 101.399851 kg           BSA 2.28 m2           BMI 28.7 kg/m2           Temperature 98.1 F / 36.72 C - Oral           Pulse 77           Respirations 14           Blood Pressure 150/87 Sitting, Right Arm           Pulse Oximetry 97%, Room air            REVIEW      Results Reviewed      PCCS Results Reviewed?:  Yes Prev Lab Results, Yes Prev Radiology Results, Yes     [facility]            Assessment      Fatigue - R53.83            Notes      Discontinued Medications      *  MONTELUKAST SODIUM (Singulair*) 10 MG TABLET: 10 MG PO HS #30      New Diagnostics      * CRP HIGH SENSITIVE, Routine         Dx: Fatigue - R53.83      * Cyclic Citrull Peptide, Routine         Dx: Fatigue - R53.83      * Sed Rate, Routine         Dx: Fatigue - R53.83      * Rheumatoid Factor IGM, Routine         Dx: Fatigue - R53.83      ASSESSMENT:      1.  Unexplained cough which is now chronic for greater than 3 months.       2. Unexplained dyspnea.       3. Fatigue.             PLAN:      1. At this time the patient has unexplained cough and dyspnea.  He is concerned     about the possibility of mold exposure.  He has episodic wheezing and coughing.     I have discussed with the patient that we have done a very broad evaluation     excluding bronchoscopy. The patient would like to have bronchoscopy at this time     and I feel that bronchoscopy is not unreasonable given his cough is explained     and he does have some exposure to mold. We will plan on obtaining bronchoscopy     with bronchoalveolar lavage and endobronchial biopsy.  We will look for atypical     infections with the bronchoalveolar lavage and we will look to exclude     infiltrative processes such as sarcoidosis or eosinophilic bronchoscopy with the     biopsy.       2. I am also concerned that the patient may have underlying rheumatoid arthritis     as he does have some joint pain. He has a RF screening factor that was obtained     6 years ago that was extraordinarily high and he is not on any DMARD agents.       3. We will obtain ESR, CRP, anti-citrullinated antibodies as well as rheumatoid     factor. I explained to the patient that rheumatoid arthritis can affect the lung     and cause bronchiolitis. The patient verbalized understanding. If the patient     is indeed found to have rheumatoid arthritis this could explain his fatigue as     well as his shortness of breath. He may actually benefit from nebulized     Pulmicort.       4.  I have  personally reviewed laboratory data, imaging as well as previous     medical records.            Patient Education      Education resources provided:  Yes      Patient Education Provided:  Bronchoscopy            Procedure Orders      Get Consent signed for:        Bronchoscopy with bronchoalveolar lavage and endobronchial biopsies.      Risks and Benefits:        I explained the risks versus benefits to the patient including      pneumothorax, respiratory failure, bleeding and death. The patient voices      understanding and wishes to proceed with the procedure.                 Disclaimer: Converted document may not contain table formatting or lab diagrams. Please see [employer] for the authenticated document.

## 2021-05-28 NOTE — PROGRESS NOTES
Patient: ANTONINO MUÑIZ     Acct: [id number]     Report: #[id number]  UNIT #: [id number]     : 1945    Encounter Date:2019  PRIMARY CARE: BRYANT ARBAHAM  ***Signed***  --------------------------------------------------------------------------------------------------------------------  Chief Complaint      Encounter Date      May 3, 2019            Primary Care Provider      BRYANT ABRAHAM            Referring Provider      BRYANT ABRAHAM            Patient Complaint      Patient is complaining of      Pt here for f/u, Dyspnea            VITALS      Height 6 ft 2 in / 187.96 cm      Weight 218 lbs 6 oz / 99.40169 kg      BSA 2.26 m2      BMI 28.0 kg/m2      Temperature 98.3 F / 36.83 C - Oral      Pulse 80      Respirations 16      Blood Pressure 158/78 Sitting, Right Arm      Pulse Oximetry 97%, Room air            HPI      The patient is a 74 year old male here today for follow up who has unexplained     dyspnea.  He had cardiac C-PET that showed blunted heart rate response and did     not appear to have any ventilatory limitations to breathing.  He still has     shortness of breath, worse with exertion, better with rest, feels it could be in    his bronchial tubes. He has had extensive evaluation including bronchoscopy,     methacholine challenge and pulmonary function studies and they were all     unremarkable.  Pulmonary function studies actually showed lung volumes in the     110 to 120% of predicted.            ROS      Constitutional:  Denies: Fatigue, Fever, Weight gain, Weight loss, Chills,     Insomnia, Other      Respiratory/Breathing:  Complains of: Shortness of air, Wheezing, Cough; Denies:    Hemoptysis, Pleuritic pain, Other      Endocrine:  Denies: Polydipsia, Polyuria, Heat/cold intolerance, Diabetes, Other      Eyes:  Denies: Blurred vision, Vision Changes, Other      Ears, nose, mouth, throat:  Complains of: Nasal discharge, Nasal congestion;     Denies: Mouth lesions,  Thrush, Throat pain, Hoarseness, Allergies/Hay Fever,     Post Nasal Drip, Headaches, Recent Head Injury, Nose Bleeding, Neck Stiffness,     Thyroid Mass, Hearing Loss, Ear Fullness, Dry Mouth, Nasal or Sinus Pain, Dry     Lips, Other      Cardiovascular:  Denies: Palpitations, Syncope, Claudication, Chest Pain, Wake     up Gasping for air, Leg Swelling, Irregular Heart Rate, Cyanosis, Dyspnea on     Exertion, Other      Gastrointestinal:  Denies: Nausea, Constipation, Diarrhea, Abdominal pain,     Vomiting, Difficulty Swallowing, Reflux/Heartburn, Dysphagia, Jaundice,     Bloating, Melena, Bloody stools, Other      Genitourinary:  Denies: Urinary frequency, Incontinence, Hematuria, Urgency,     Nocturia, Dysuria, Testicular problems, Other      Musculoskeletal:  Denies: Joint Pain, Joint Stiffness, Joint Swelling, Myalgias,    Other      Hematologic/lymphatic:  DENIES: Lymphadenopathy, Bruising, Bleeding tendencies,     Other      Neurological:  Denies: Headache, Numbness, Weakness, Seizures, Other      Psychiatric:  Denies: Anxiety, Appropriate Effect, Depression, Other      Sleep:  No: Excessive daytime sleep, Morning Headache?, Snoring, Insomnia?, Stop    breathing at sleep?, Other      Integumentary:  Denies: Rash, Dry skin, Skin Warm to Touch, Other      Immunologic/Allergic:  Complains of: Seasonal allergies; Denies: Latex allergy,     Asthma, Urticaria, Eczema, Other      Immunization status:  No: Up to date            FAMILY/SOCIAL/MEDICAL HX      Surgical History:  Yes: Bowel Surgery (COLONOSCOPY), Eye Surgery, Head Surgery     (CATARACTS BILATERALLY ), Other Surgeries; No: AAA Repair, Abdominal Surgery,     Adenoids, Angioplasty, Appendectomy, Back Surgery, Bladder Surgery, Breast     Surgery, CABG, Carotid Stenosis, Cholecystectomy, Ear Surgery, Hernia Surgery,     Kidney Surgery, Nose Surgery, Oral Surgery, Orthopedic Surgery, Prostatectomy,     Rectal Surgery, Spinal Surgery, Testicular Surgery, Throat  Surgery, Tonsils,     Valve Replacement, Vascular Surgery      Stroke - Family Hx:  Uncle      Heart - Family Hx:  Brother, Sister, Uncle      Cancer/Type - Family Hx:  Mother, Father, Grandparent      Other Family Medical History:  Brother      Is Father Still Living?:  No      Is Mother Still Living?:  No       Family History:  Yes      Social History:  No Tobacco Use, No Alcohol Use, No Recreational Drug use      Smoking status:  Former smoker (71-2 ppd x 10 years)      Anticoagulation Therapy:  No      Antibiotic Prophylaxis:  No      Medical History:  Yes: Allergies, Arthritis, 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), Hemorrhoids/Rectal Prob (REFLUX, POSSIBLE SMALL HIATAL HERNIA),     High Blood Pressure (MED CONTROLLED), Reflux Disease, Shortness Of Breath     (COUGH-SHORTNESS OF BREATH AT TIMES); No: Alcoholism, Anemia, Asthma, Atrial     Fibrillation, Blood Disease, Broken Bones, Cataracts, Chemical Dependency,     Chemotherapy/Cancer, Chronic Bronchitis/COPD, Emphysema, Chronic Liver Disease,     Colon Trouble, Colitis, Diverticulitis, Congestive Heart Failu, Convulsions, Dep    ression, Anxiety, Bipolar Disorder, PTSD, Diabetes, Epilepsy, Seizures,     Forgetfullness, Glaucoma, Gall Stones, Gout, Head Injury, Heart Attack, Heart     Murmur, GERD, Hepatitis, Hiatal Hernia, High Cholesterol, HIV (Do not ask -     volu, Jaundice, Kidney or Bladder Disease, Kidney Stones, Migrane Headaches,     Mitral Valve Prolapse, Night sweats, Phlebitis, Psychiatric Care, Rheumatic     Fever, Sexually Transmitted Dis, Sinus Trouble, Skin Disease/Psoriais/Ecz,     Stroke, Thyroid Problem, Tuberculosis or Pos TB Te, Miscellaneous Medical/oth      Psychiatric History      None            PREVENTION      Hx Influenza Vaccination:  No      Influenza Vaccine Declined:  Yes      2 or More Falls Past Year?:  No      Fall Past Year with Injury?:  No      Hx Pneumococcal Vaccination:  No      Encouraged to follow-up  with:  PCP regarding preventative exams.      Chart initiated by      Amairani Murphy MA            ALLERGIES/MEDICATIONS      Allergies:        Coded Allergies:             NO KNOWN DRUG ALLERGIES (Verified  Allergy, Unknown, 5/3/19)      Medications    Last Reconciled on 5/3/19 12:07 by MAGUI GONZALEZ MD      Melatonin (Melatonin) Unknown Strength Tablet      10 PO HS, TAB         Reported         3/7/19       Turmeric Root Extract (Turmeric) 1 Each Capsule      1000 MG PO, CAP         Reported         3/7/19       Omeprazole (Omeprazole*) 40 Mg Capsule      40 MG PO QDAY, #30 CAP 0 Refills         Reported         10/26/18       Levothyroxine (Synthroid) 100 Mcg Tablet      0.1 MG PO QDAY, #30 TAB 0 Refills         Reported         9/27/17       Aspirin Chew (Aspirin Chew) 81 Mg Tab.chew      81 MG PO QDAY, #30 TAB 0 Refills         Reported         9/27/17       Sulindac (Clinoril) 200 Mg Tab      200 MG PO BID, TAB         Reported         9/27/17       Losartan Potassium (Losartan*) 100 Mg Tablet      100 MG PO QDAY, #30 TAB 0 Refills         Reported         9/27/17      Current Medications      Current Medications Reviewed 5/3/19            EXAM      GEN-patient appears stated age resting comfortable in no acute distress      Eyes-PERRL,  conjunctiva are normal in appearance extraocular muscles are     intact, no scleral icterus      Lymphatic-no swollen or enlarged cervical nodes, or axillary node, or femoral     nodes, or supraclavicular nodes      Mouth normal dentition, no erythema no ulcerations oropharynx appears normal no     exudate no evidence of postnasal drip, MP(1)      Neck-there are no palpable supraclavicular or cervical adenopathy, thyroid is     normal in appearance no apparent nodules, there is no inspiratory or expiratory     stridor      Respiratory-patient exhibits normal work of breathing, speaking in full     sentences without difficulty, the chest is normal in appearance, clear to      auscultation with no wheezes rales or rhonchi, chest is normal to percussion on     both the right and left sides      Cardiovascular-the heart rate is normal and regular S1 and S2 present with no     murmur or extra heart sounds, there is no JVD or pedal edema present      GI-the abdomen is normal in appearance, bowel sounds present and normal in all     quadrants no hepatosplenomegaly or masses felt      Extremities-no clubbing is present, pulses present in all extremities, capillary     refill time is normal      Skin-skin is normal in appearance it is warm and dry, no rashes present, no     evidence of cyanosis, palpation reveals no masses      Neurological-the patient is alert and oriented to time place and person, moves     all 4 extremities, normal gait, normal affect and mood, CN2-12 intact      Psych-normal judgment and insight is good, normal mood and affect, alert and     oriented to person, place, and time, and date      Vtials      Vitals:             Height 6 ft 2 in / 187.96 cm           Weight 218 lbs 6 oz / 99.96414 kg           BSA 2.26 m2           BMI 28.0 kg/m2           Temperature 98.3 F / 36.83 C - Oral           Pulse 80           Respirations 16           Blood Pressure 158/78 Sitting, Right Arm           Pulse Oximetry 97%, Room air            REVIEW      Results Reviewed      PCCS Results Reviewed?:  Yes Prev Lab Results, Yes Prev Radiology Results, Yes     Previous [facility] Records            Assessment      Notes      Changed Medications      * Melatonin Unknown Strength TABLET: 10 PO HS      ASSESSMENT:       1.  Unexplained dyspnea.              PLAN:        1. Does not appear to be from pulmonary cause.      2. Explained to the patient has blunted heart rate response.      3.  At this point, it would not be unreasonable to proceed with left heart     catheterization along with right heart catheterization to rule out pulmonary     hypertension. We will defer this to cardiology at this  tie.      4. Patient needs to follow up with me on an as needed basis.            Patient Education      Education resources provided:  Yes (CPET)                 Disclaimer: Converted document may not contain table formatting or lab diagrams. Please see [employer] [facility] for the authenticated document.

## 2021-05-28 NOTE — PROGRESS NOTES
Patient: ANTONINO MUÑIZ     Acct: [id number]     Report: #[id number]  UNIT #: [id number]     : 1945    Encounter Date:2018  PRIMARY CARE: BRYANT ABRAHAM  ***Signed***  --------------------------------------------------------------------------------------------------------------------  Chief Complaint      Encounter Date      2018            Referring Provider      BRYANT ABRAHAM            Patient Complaint      Patient is complaining of      2 mo f/u            VITALS      Height 6 ft 2 in / 187.96 cm      Weight 206 lbs 2 oz / 93.628883 kg      BSA 2.22 m2      BMI 26.5 kg/m2      Temperature 97.8 F / 36.56 C - Oral      Pulse 79      Respirations 12      Blood Pressure 150/78 Sitting, Left Arm      Pulse Oximetry 97%, room air            HPI      The patient is a very pleasant 72 year old white male patient who is here today     for follow up.  The patient was started on Arnuity for his asthma since last     office visit. This does help with his symptoms. The patient feels that he has     less wheezing, dyspnea and less chest congestion.  The patient's symptoms still     are there, but not completely resolved.  He states that he still has some     occasional shortness of breath with wheezing, worse when he is around certain     fragrances as well as worse when the weather changes when it goes from hot to     cold. The patient's symptoms tend to flare. He has no increased cough and no     increased sputum production. The patient has persistent gastroesophageal reflux     symptoms despite being put on Pepcid.  The patient still has symptoms     approximately four nights per week according to him at this time.  The patient     is still complaining of some nasal congestion with postnasal drainage with no     aggravating or relieving factors at this time.            ROS      Constitutional:  Denies: Fatigue, Fever, Weight gain, Weight loss, Chills,     Insomnia, Other       Respiratory/Breathing:  Complains of: Shortness of air, Wheezing, Denies: Cough    , Hemoptysis, Pleuritic pain, Other      Endocrine:  Denies: Polydipsia, Polyuria, Heat/cold intolerance, Diabetes, Other      Eyes:  Denies: Blurred vision, Vision Changes, Other      Ears, nose, mouth, throat:  Denies: Mouth lesions, Thrush, Throat pain,     Hoarseness, Allergies/Hay Fever, Post Nasal Drip, Headaches, Recent Head Injury    , Nose Bleeding, Neck Stiffness, Thyroid Mass, Hearing Loss, Ear Fullness, Dry     Mouth, Nasal or Sinus Pain, Dry Lips, Nasal discharge, Nasal congestion, Other      Cardiovascular:  Denies: Palpitations, Syncope, Claudication, Chest Pain, Wake     up Gasping for air, Leg Swelling, Irregular Heart Rate, Cyanosis, Dyspnea on     Exertion, Other      Gastrointestinal:  Denies: Nausea, Constipation, Diarrhea, Abdominal pain,     Vomiting, Difficulty Swallowing, Reflux/Heartburn, Dysphagia, Jaundice, Bloating    , Melena, Bloody stools, Other      Genitourinary:  Denies: Urinary frequency, Incontinence, Hematuria, Urgency,     Nocturia, Dysuria, Testicular problems, Other      Musculoskeletal:  Denies: Joint Pain, Joint Stiffness, Joint Swelling, Myalgias    , Other      Hematologic/lymphatic:  DENIES: Lymphadenopathy, Bruising, Bleeding tendencies,     Other      Neurological:  Denies: Headache, Numbness, Weakness, Seizures, Other      Psychiatric:  Denies: Anxiety, Appropriate Effect, Depression, Other      Sleep:  No: Excessive daytime sleep, Morning Headache?, Snoring, Insomnia?,     Stop breathing at sleep?, Other      Integumentary:  Denies: Rash, Dry skin, Skin Warm to Touch, Other      Immunologic/Allergic:  Denies: Latex allergy, Seasonal allergies, Asthma,     Urticaria, Eczema, Other      Immunization status:  No: Up to date            FAMILY/SOCIAL/MEDICAL HX      Surgical History:  Yes: Eye Surgery, Other Surgeries      Stroke - Family Hx:  Uncle      Heart - Family Hx:  Brother,  Sister, Uncle      Cancer/Type - Family Hx:  Mother, Father, Grandparent      Other Family Medical History:  Brother      Is Father Still Living?:  No      Is Mother Still Living?:  No      Social History:  No Tobacco Use, No Alcohol Use, No Recreational Drug use      Smoking status:  Former smoker (71-2 ppd x 10 years)      Anticoagulation Therapy:  No      Antibiotic Prophylaxis:  No      Medical History:  Yes: Allergies, Arthritis, High Blood Pressure, Reflux Disease    , No: Sinus Trouble            Hx Influenza Vaccination:  No      Influenza Vaccine Declined:  Yes      2 or More Falls Past Year?:  No      Fall Past Year with Injury?:  No      Hx Pneumococcal Vaccination:  No      Encouraged to follow-up with:  PCP regarding preventative exams.      Chart initiated by      yahaira joy/ ma            ALLERGIES/MEDICATIONS      Allergies:        Coded Allergies:             NO KNOWN DRUG ALLERGIES (Verified  Allergy, Severe, 2/6/18)      Medications    Last Reconciled on 11/20/17 08:25 by MAGUI GONG MD      Fluticasone Furoate (Arnuity Ellipta) 100 Mcg Blst.w.dev      1 PUFF INH RTQDAY, #1 INH 6 Refills         Prov: Magui Gong         11/20/17       Famotidine (Famotidine) 40 Mg Tablet      40 MG PO HS for 30 Days, #30 TAB 6 Refills         Prov: Magui Gong         11/20/17       Neb-Levalbuterol (Xopenex) 1.25 Mg/3 Ml Vial.neb      1.25 MG INH RTQ4H, #120 ML 4 Refills         Prov: Magui Gong         10/6/17       Levothyroxine (Synthroid) 100 Mcg Tablet      0.1 MG PO QDAY, #30 TAB 0 Refills         Reported         9/27/17       Aspirin (Aspirin*) 81 Mg Tab.chew      81 MG PO QDAY, #30 TAB.CHEW 0 Refills         Reported         9/27/17       Sulindac (Clinoril) 200 Mg Tab      200 MG PO BID, TAB         Reported         9/27/17       Losartan Potassium (Losartan*) 100 Mg Tablet      100 MG PO QDAY, #30 TAB 0 Refills         Reported         9/27/17      Current Medications      Current  Medications Reviewed 2/6/18            EXAM      GEN-patient appears stated age resting comfortable in no acute distress      Eyes-PERRL,  conjunctiva are normal in appearance extraocular muscles are intact    , no scleral icterus      Lymphatic-no swollen or enlarged cervical nodes, or axillary node, or femoral     nodes, or supraclavicular nodes      Mouth normal dentition, no erythema no ulcerations oropharynx appears normal no     exudate no evidence of postnasal drip, MP(1)      Neck-there are no palpable supraclavicular or cervical adenopathy, thyroid is     normal in appearance no apparent nodules, there is no inspiratory or expiratory     stridor      Respiratory-patient exhibits normal work of breathing, speaking in full     sentences without difficulty, the chest is normal in appearance, clear to     auscultation with no wheezes rales or rhonchi, chest is normal to percussion on     both the right and left sides      Cardiovascular-the heart rate is normal and regular S1 and S2 present with no     murmur or extra heart sounds, there is no JVD or pedal edema present      GI-the abdomen is normal in appearance, bowel sounds present and normal in all     quadrants no hepatosplenomegaly or masses felt      Extremities-no clubbing is present, pulses present in all extremities,     capillary refill time is normal      Skin-skin is normal in appearance it is warm and dry, no rashes present, no     evidence of cyanosis, palpation reveals no masses      Neurological-the patient is alert and oriented to time place and person, moves     all 4 extremities, normal gait, normal affect and mood, CN2-12 intact      Psych-normal judgment and insight is good, normal mood and affect, alert and     oriented to person, place, and time, and date      Vtials      Vitals:             Height 6 ft 2 in / 187.96 cm           Weight 206 lbs 2 oz / 93.675202 kg           BSA 2.22 m2           BMI 26.5 kg/m2           Temperature 97.8  F / 36.56 C - Oral           Pulse 79           Respirations 12           Blood Pressure 150/78 Sitting, Left Arm           Pulse Oximetry 97%, room air            REVIEW      Results Reviewed      PCCS Results Reviewed?:  Yes Prev Lab Results, Yes Prev Radiology Results, Yes     Previous [facility] Records            PLAN      Assessment      Notes      New Medications      * MONTELUKAST SODIUM (Singulair*) 10 MG TABLET: 10 MG PO HS #30      * Fluticasone Furoate (Arnuity Ellipta) 200 MCG BLST.W.DEV: 1 PUFF INH RTQDAY #1      * AZELASTINE/FLUTICASONE (Dymista Nasal [place]) 23 GM SPRAY.PUMP: 1 SPRAYS NARE     EACH BID #1      * Dexlansoprazole (Dexilant) 30 MG CAP.BP: 30 MG PO QDAY #30      * Michael-Fluticasone (Fluticasone 50 mcg) 16 GM SPRAY.SUSP: 2 PUFFS NARE EACH QDAY     #1       Instructions: TO REPLACE DYMISTA       * AZELASTINE HCL (Azelastine Nasal*) 137 MCG/0.137 ML SPRAY.PUMP: 2 PUFFS NARE     EACH QDAY #1       Instructions: TO REPLACE DYMISTA      ASSESSMENT/PLAN:       1.  Mild intermittent asthma.  Increase Arnuity to 200 and start Singulair 10     mg po qhs.      2.  Allergic rhinitis.  We will start the patient on Dymista nasal spray.      3.  Gastroesophageal reflux disease. The patient's symptoms have persisted     despite Pepcid. We will discontinue Pepcid and start patient on Dexilant.       4.  I have personally reviewed laboratory data, imaging as well as previous     medical records.            Patient Education      Education resources provided:  Yes      Patient Education Provided:  Acute Asthma                 Disclaimer: Converted document may not contain table formatting or lab diagrams. Please see [employer] for the authenticated document.

## 2021-05-28 NOTE — PROGRESS NOTES
Patient: ANTONINO MUÑIZ     Acct: [id number]     Report: #[id number]  UNIT #: [id number]     : 1945    Encounter Date:2018  PRIMARY CARE: BRYANT ABRAHAM  ***Signed***  --------------------------------------------------------------------------------------------------------------------  Chief Complaint      Encounter Date      Sep 26, 2018            Primary Care Provider      BRYANT ABRAHAM            Referring Provider      BRYANT ABRAHAM            Patient Complaint      Patient is complaining of      4 month follow up            VITALS      Height 6 ft 2 in / 187.96 cm      Weight 214 lbs 0 oz / 97.975578 kg      BSA 2.27 m2      BMI 27.5 kg/m2      Temperature 98.1 F / 36.72 C - Oral      Pulse 79      Respirations 16      Blood Pressure 140/70 Sitting, Right Arm      Pulse Oximetry 97%, room air            HPI      The patient is a very pleasant 73 year old white male with numerous problems     today complaining of weakness throughout, no focal weakness. He does have     fatigue and wonders if it could be from a tick bite. He has no aggravating or     relieving factors, no worsening of his symptoms. He has shortness of breath     worse with exertion, better with rest. He thinks it could be from his weight     gain.  He also thinks it could be nasal congestion which he endorses. He has     symptoms despite taking over the counter allergy medications. He does not take     his inhalers as prescribed but when he does take his inhalers his breathing is     better.            ROS      Constitutional:  Complains of: Fatigue; Denies: Fever, Weight gain, Weight loss,    Chills, Insomnia, Other      Respiratory/Breathing:  Complains of: Shortness of air; Denies: Wheezing, Cough,    Hemoptysis, Pleuritic pain, Other      Endocrine:  Denies: Polydipsia, Polyuria, Heat/cold intolerance, Diabetes, Other      Eyes:  Denies: Blurred vision, Vision Changes, Other      Ears, nose, mouth, throat:  Denies:  Mouth lesions, Thrush, Throat pain, Ho    arseness, Allergies/Hay Fever, Post Nasal Drip, Headaches, Recent Head Injury,     Nose Bleeding, Neck Stiffness, Thyroid Mass, Hearing Loss, Ear Fullness, Dry     Mouth, Nasal or Sinus Pain, Dry Lips, Nasal discharge, Nasal congestion, Other      Cardiovascular:  Denies: Palpitations, Syncope, Claudication, Chest Pain, Wake     up Gasping for air, Leg Swelling, Irregular Heart Rate, Cyanosis, Dyspnea on     Exertion, Other      Gastrointestinal:  Denies: Nausea, Constipation, Diarrhea, Abdominal pain,     Vomiting, Difficulty Swallowing, Reflux/Heartburn, Dysphagia, Jaundice,     Bloating, Melena, Bloody stools, Other      Genitourinary:  Denies: Urinary frequency, Incontinence, Hematuria, Urgency,     Nocturia, Dysuria, Testicular problems, Other      Musculoskeletal:  Denies: Joint Pain, Joint Stiffness, Joint Swelling, Myalgias,    Other      Hematologic/lymphatic:  DENIES: Lymphadenopathy, Bruising, Bleeding tendencies,     Other      Neurological:  Denies: Headache, Numbness, Weakness, Seizures, Other      Psychiatric:  Denies: Anxiety, Appropriate Effect, Depression, Other      Sleep:  No: Excessive daytime sleep, Morning Headache?, Snoring, Insomnia?, Stop    breathing at sleep?, Other      Integumentary:  Denies: Rash, Dry skin, Skin Warm to Touch, Other      Immunologic/Allergic:  Denies: Latex allergy, Seasonal allergies, Asthma,     Urticaria, Eczema, Other      Immunization status:  No: Up to date            FAMILY/SOCIAL/MEDICAL HX      Surgical History:  Yes: Eye Surgery, Other Surgeries; No: AAA Repair, Abdominal     Surgery, Adenoids, Angioplasty, Appendectomy, Back Surgery, Bladder Surgery,     Bowel Surgery, Breast Surgery, CABG, Carotid Stenosis, Cholecystectomy, Ear     Surgery, Head Surgery, Hernia Surgery, Kidney Surgery, Nose Surgery, Oral     Surgery, Orthopedic Surgery, Prostatectomy, Rectal Surgery, Spinal Surgery,     Testicular Surgery, Throat  Surgery, Tonsils, Valve Replacement, Vascular Surgery      Stroke - Family Hx:  Uncle      Heart - Family Hx:  Brother, Sister, Uncle      Cancer/Type - Family Hx:  Mother, Father, Grandparent      Other Family Medical History:  Brother      Is Father Still Living?:  No      Is Mother Still Living?:  No       Family History:  Yes      Social History:  No Tobacco Use, No Alcohol Use, No Recreational Drug use      Smoking status:  Former smoker (71-2 ppd x 10 years)      Anticoagulation Therapy:  No      Antibiotic Prophylaxis:  No      Medical History:  Yes: Allergies, Arthritis, High Blood Pressure, Reflux     Disease; No: Alcoholism, Anemia, Asthma, Atrial Fibrillation, Blood Disease,     Broken Bones, Cataracts, Chemical Dependency, Chemotherapy/Cancer, Chronic     Bronchitis/COPD, Emphysema, Chronic Liver Disease, Colon Trouble, Colitis, Di    verticulitis, Congestive Heart Failu, Deafness or Ringing Ears, Convulsions,     Depression, Anxiety, Bipolar Disorder, PTSD, Diabetes, Epilepsy, Seizures,     Forgetfullness, Glaucoma, Gall Stones, Gout, Head Injury, Heart Attack, Heart     Murmur, GERD, Hemorrhoids/Rectal Prob, Hepatitis, Hiatal Hernia, High     Cholesterol, HIV (Do not ask - volu, Jaundice, Kidney or Bladder Disease, Kidney    Stones, Migrane Headaches, Mitral Valve Prolapse, Night sweats, Phlebitis,     Psychiatric Care, Rheumatic Fever, Sexually Transmitted Dis, Shortness Of     Breath, Sinus Trouble, Skin Disease/Psoriais/Ecz, Stroke, Thyroid Problem,     Tuberculosis or Pos TB Te, Miscellaneous Medical/oth      Psychiatric History      none            PREVENTION      Hx Influenza Vaccination:  No      Influenza Vaccine Declined:  Yes      2 or More Falls Past Year?:  No      Fall Past Year with Injury?:  No      Hx Pneumococcal Vaccination:  No      Encouraged to follow-up with:  PCP regarding preventative exams.      Chart initiated by      butch knapp ma            ALLERGIES/MEDICATIONS       Allergies:        Coded Allergies:             NO KNOWN DRUG ALLERGIES (Verified  Allergy, Severe, 9/26/18)      Medications    Last Reconciled on 9/26/18 11:02 by MAGUI GONG MD      Lansoprazole (Lansoprazole*) 15 Mg Capsule.      15 MG PO QDAY, CAP         Reported         5/31/18       Azelastine Hcl (Azelastine Nasal*) 137 Mcg/0.137 Ml Spray.pump      2 PUFFS NARE EACH QDAY, #1 BOTTLE 3 Refills         Prov: Magui Gong         2/6/18       Michael-Fluticasone (Fluticasone 50 mcg) 16 Gm Spray.susp      2 PUFFS NARE EACH QDAY, #1 BOTTLE 3 Refills         Prov: Magui Gong         2/6/18       Dexlansoprazole (Dexilant) 30 Mg Cap.bp      30 MG PO QDAY, #30 CAP 4 Refills         Prov: Magui Gong         2/6/18       Montelukast Sodium (Singulair*) 10 Mg Tablet      10 MG PO HS, #30 TAB 11 Refills         Prov: Magui Gong         2/6/18       Famotidine (Famotidine) 40 Mg Tablet      40 MG PO HS for 30 Days, #30 TAB 6 Refills         Prov: Magui Gong         11/20/17       Levothyroxine (Synthroid) 100 Mcg Tablet      0.1 MG PO QDAY, #30 TAB 0 Refills         Reported         9/27/17       Aspirin (Aspirin*) 81 Mg Tab.chew      81 MG PO QDAY, #30 TAB.CHEW 0 Refills         Reported         9/27/17       Sulindac (Clinoril) 200 Mg Tab      200 MG PO BID, TAB         Reported         9/27/17       Losartan Potassium (Losartan*) 100 Mg Tablet      100 MG PO QDAY, #30 TAB 0 Refills         Reported         9/27/17      Current Medications      Current Medications Reviewed 9/26/18            EXAM      GEN-patient appears stated age resting comfortable in no acute distress      Eyes-PERRL,  conjunctiva are normal in appearance extraocular muscles are     intact, no scleral icterus      Lymphatic-no swollen or enlarged cervical nodes, or axillary node, or femoral     nodes, or supraclavicular nodes      Mouth normal dentition, no erythema no ulcerations oropharynx appears normal no      exudate no evidence of postnasal drip, MP(1)      Neck-there are no palpable supraclavicular or cervical adenopathy, thyroid is     normal in appearance no apparent nodules, there is no inspiratory or expiratory     stridor      Respiratory-patient exhibits normal work of breathing, speaking in full     sentences without difficulty, the chest is normal in appearance, clear to     auscultation with no wheezes rales or rhonchi, chest is normal to percussion on     both the right and left sides      Cardiovascular-the heart rate is normal and regular S1 and S2 present with no     murmur or extra heart sounds, there is no JVD or pedal edema present      GI-the abdomen is normal in appearance, bowel sounds present and normal in all     quadrants no hepatosplenomegaly or masses felt      Extremities-no clubbing is present, pulses present in all extremities, capillary     refill time is normal      Skin-skin is normal in appearance it is warm and dry, no rashes present, no     evidence of cyanosis, palpation reveals no masses      Neurological-the patient is alert and oriented to time place and person, moves     all 4 extremities, normal gait, normal affect and mood, CN2-12 intact      Psych-normal judgment and insight is good, normal mood and affect, alert and     oriented to person, place, and time, and date      Vtials      Vitals:             Height 6 ft 2 in / 187.96 cm           Weight 214 lbs 0 oz / 97.651904 kg           BSA 2.27 m2           BMI 27.5 kg/m2           Temperature 98.1 F / 36.72 C - Oral           Pulse 79           Respirations 16           Blood Pressure 140/70 Sitting, Right Arm           Pulse Oximetry 97%, room air            REVIEW      Results Reviewed      PCCS Results Reviewed?:  Yes Prev Lab Results, Yes Prev Radiology Results, Yes     [facility]            Assessment      Fatigue - R53.83            SOB (shortness of breath) - R06.02            Notes      Discontinued  Medications      * Neb-Levalbuterol (Xopenex) 1.25 MG/3 ML VIAL.NEB: 1.25 MG INH RTQ4H #120         Instructions: DIAGNOSIS CODE REQUIRED PRIOR TO PRESCRIBING.      * Fluticasone Furoate (Arnuity Ellipta) 100 MCG BLST.W.DEV: 1 PUFF INH RTQDAY #1      * Fluticasone Furoate (Arnuity Ellipta) 200 MCG BLST.W.DEV: 1 PUFF INH RTQDAY #1         Instructions: INS. WONT PAY      * Fluticasone Furoate (Arnuity Ellipta) 100 MCG BLST.W.DEV: 1 PUFF INH RTQDAY #1      New Diagnostics      * Lyme Disease Igg/Igm, Routine         Dx: Fatigue - R53.83      * TICK BORNE DISEASE PANEL PCR, Routine      * Methacholine Challenge Test, SCHEDULED PROCEDURE         Dx: SOB (shortness of breath) - R06.02      ASSESSMENT/PLAN:       1. Fatigue.  Check lyme disease and tick panel at his request      2.  ? Mild intermittent asthma. Methacholine challenge test      3. Allergic rhinitis. We will start patient on Singulair.      4.  I have personally reviewed laboratory data, imaging as well as previous     medical records.            Patient Education      Education resources provided:  Yes                 Disclaimer: Converted document may not contain table formatting or lab diagrams. Please see [employer] for the authenticated document.

## 2021-05-28 NOTE — PROGRESS NOTES
"Patient: ANTONINO MUÑIZ     Acct: [id number]     Report: #[id number]  UNIT #: [id number]     : 1945    Encounter Date:2018  PRIMARY CARE: BRYANT ABRAHAM  ***Signed***  --------------------------------------------------------------------------------------------------------------------  Chief Complaint      Encounter Date      May 31, 2018            Primary Care Provider      BRYANT ABRAHAM            Referring Provider      BRYANT ABRAHAM            Patient Complaint      Patient is complaining of      f/u asthma            VITALS      Height 6 ft 2 in / 187.96 cm      Weight 219 lbs 6 oz / 99.815563 kg      BSA 2.30 m2      BMI 28.2 kg/m2      Temperature 98.2 F / 36.78 C - Oral      Pulse 85      Respirations 12      Blood Pressure 150/66 Sitting, Left Arm      Pulse Oximetry 98%, ROOMAIR@REST            HPI      The patient is a very pleasant 73 year old white male with history of asthma     here today for follow up. The patient had been complaining of some fatigue     going on now for the past two months. He has not had any change in his     medications.  He feels \"run down\" with poor energy. Asthma has been doing very     well.  He stopped taking his inhalers because his breathing has returned to his     baseline. He has however noticed he has decreased exercise tolerance when he is     out in the humid air.  He denies having any chest pains, heart palpitations or     lower extremity edema.  No fevers, chills or recent illnesses.  The patient is     complaining of some nasal congestion with postnasal drainage, not currently     taking any medications for his allergies at this time.  He has been plagued by     his allergies in the past, however has not wanted to take any medications     because he does not like taking medications.            ROS      Constitutional:  Denies: Fatigue, Fever, Weight gain, Weight loss, Chills,     Insomnia, Other      Respiratory/Breathing:  Complains of: " Shortness of air, Denies: Wheezing, Cough    , Hemoptysis, Pleuritic pain, Other      Endocrine:  Denies: Polydipsia, Polyuria, Heat/cold intolerance, Diabetes, Other      Eyes:  Denies: Blurred vision, Vision Changes, Other      Ears, nose, mouth, throat:  Denies: Mouth lesions, Thrush, Throat pain,     Hoarseness, Allergies/Hay Fever, Post Nasal Drip, Headaches, Recent Head Injury    , Nose Bleeding, Neck Stiffness, Thyroid Mass, Hearing Loss, Ear Fullness, Dry     Mouth, Nasal or Sinus Pain, Dry Lips, Nasal discharge, Nasal congestion, Other      Cardiovascular:  Denies: Palpitations, Syncope, Claudication, Chest Pain, Wake     up Gasping for air, Leg Swelling, Irregular Heart Rate, Cyanosis, Dyspnea on     Exertion, Other      Gastrointestinal:  Denies: Nausea, Constipation, Diarrhea, Abdominal pain,     Vomiting, Difficulty Swallowing, Reflux/Heartburn, Dysphagia, Jaundice, Bloating    , Melena, Bloody stools, Other      Genitourinary:  Denies: Urinary frequency, Incontinence, Hematuria, Urgency,     Nocturia, Dysuria, Testicular problems, Other      Musculoskeletal:  Denies: Joint Pain, Joint Stiffness, Joint Swelling, Myalgias    , Other      Hematologic/lymphatic:  DENIES: Lymphadenopathy, Bruising, Bleeding tendencies,     Other      Neurological:  Denies: Headache, Numbness, Weakness, Seizures, Other      Psychiatric:  Denies: Anxiety, Appropriate Effect, Depression, Other      Sleep:  No: Excessive daytime sleep, Morning Headache?, Snoring, Insomnia?,     Stop breathing at sleep?, Other      Integumentary:  Denies: Rash, Dry skin, Skin Warm to Touch, Other      Immunologic/Allergic:  Denies: Latex allergy, Seasonal allergies, Asthma,     Urticaria, Eczema, Other      Immunization status:  No: Up to date            FAMILY/SOCIAL/MEDICAL HX      Surgical History:  Yes: Eye Surgery, Other Surgeries      Stroke - Family Hx:  Uncle      Heart - Family Hx:  Brother, Sister, Uncle      Cancer/Type - Family Hx:   Mother, Father, Grandparent      Other Family Medical History:  Brother      Is Father Still Living?:  No      Is Mother Still Living?:  No      Social History:  No Tobacco Use, No Alcohol Use, No Recreational Drug use      Smoking status:  Former smoker (71-2 ppd x 10 years)      Anticoagulation Therapy:  No      Antibiotic Prophylaxis:  No      Medical History:  Yes: Allergies, Arthritis, High Blood Pressure, Reflux Disease    , No: Sinus Trouble            PREVENTION      Hx Influenza Vaccination:  No      Influenza Vaccine Declined:  Yes      2 or More Falls Past Year?:  No      Fall Past Year with Injury?:  No      Hx Pneumococcal Vaccination:  No      Encouraged to follow-up with:  PCP regarding preventative exams.      Chart initiated by      Ann-Marie hall ma            ALLERGIES/MEDICATIONS      Allergies:        Coded Allergies:             NO KNOWN DRUG ALLERGIES (Verified  Allergy, Severe, 5/31/18)      Medications    Last Reconciled on 5/31/18 10:42 by WILTON GONG MD      Lansoprazole (Lansoprazole*) 15 Mg Capsule.      15 MG PO QDAY, CAP         Reported         5/31/18       Fluticasone Furoate (Arnuity Ellipta) 200 Mcg Blst.w.dev      1 PUFF INH RTQDAY, #1 INH 11 Refills         Prov: Wilton Gong         5/31/18       Azelastine Hcl (Azelastine Nasal*) 137 Mcg/0.137 Ml Spray.pump      2 PUFFS NARE EACH QDAY, #1 BOTTLE 3 Refills         Prov: Wilton Gong         2/6/18       Michael-Fluticasone (Fluticasone 50 mcg) 16 Gm Spray.susp      2 PUFFS NARE EACH QDAY, #1 BOTTLE 3 Refills         Prov: Wilton Gong         2/6/18       Dexlansoprazole (Dexilant) 30 Mg Cap.bp      30 MG PO QDAY, #30 CAP 4 Refills         Prov: Wilton Gong         2/6/18       Montelukast Sodium (Singulair*) 10 Mg Tablet      10 MG PO HS, #30 TAB 11 Refills         Prov: Wilton Gong         2/6/18       Fluticasone Furoate (Arnuity Ellipta) 100 Mcg Blst.w.dev      1 PUFF INH RTQDAY, #1 INH 6 Refills          Prov: Wilton Gong         11/20/17       Famotidine (Famotidine) 40 Mg Tablet      40 MG PO HS for 30 Days, #30 TAB 6 Refills         Prov: Wilton Gong         11/20/17       Neb-Levalbuterol (Xopenex) 1.25 Mg/3 Ml Vial.neb      1.25 MG INH RTQ4H, #120 ML 4 Refills         Prov: Wilton Gong         10/6/17       Levothyroxine (Synthroid) 100 Mcg Tablet      0.1 MG PO QDAY, #30 TAB 0 Refills         Reported         9/27/17       Aspirin (Aspirin*) 81 Mg Tab.chew      81 MG PO QDAY, #30 TAB.CHEW 0 Refills         Reported         9/27/17       Sulindac (Clinoril) 200 Mg Tab      200 MG PO BID, TAB         Reported         9/27/17       Losartan Potassium (Losartan*) 100 Mg Tablet      100 MG PO QDAY, #30 TAB 0 Refills         Reported         9/27/17      Current Medications      Current Medications Reviewed 5/31/18            EXAM      GEN-patient appears stated age resting comfortable in no acute distress      Eyes-PERRL,  conjunctiva are normal in appearance extraocular muscles are intact    , no scleral icterus      Lymphatic-no swollen or enlarged cervical nodes, or axillary node, or femoral     nodes, or supraclavicular nodes      Mouth normal dentition, no erythema no ulcerations oropharynx appears normal no     exudate no evidence of postnasal drip, MP(1)      Neck-there are no palpable supraclavicular or cervical adenopathy, thyroid is     normal in appearance no apparent nodules, there is no inspiratory or expiratory     stridor      Respiratory-patient exhibits normal work of breathing, speaking in full     sentences without difficulty, the chest is normal in appearance, clear to     auscultation with no wheezes rales or rhonchi, chest is normal to percussion on     both the right and left sides      Cardiovascular-the heart rate is normal and regular S1 and S2 present with no     murmur or extra heart sounds, there is no JVD or pedal edema present      GI-the abdomen is normal in appearance,  bowel sounds present and normal in all     quadrants no hepatosplenomegaly or masses felt      Extremities-no clubbing is present, pulses present in all extremities,     capillary refill time is normal      Skin-skin is normal in appearance it is warm and dry, no rashes present, no     evidence of cyanosis, palpation reveals no masses      Neurological-the patient is alert and oriented to time place and person, moves     all 4 extremities, normal gait, normal affect and mood, CN2-12 intact      Psych-normal judgment and insight is good, normal mood and affect, alert and     oriented to person, place, and time, and date      Vtials      Vitals:             Height 6 ft 2 in / 187.96 cm           Weight 219 lbs 6 oz / 99.740972 kg           BSA 2.30 m2           BMI 28.2 kg/m2           Temperature 98.2 F / 36.78 C - Oral           Pulse 85           Respirations 12           Blood Pressure 150/66 Sitting, Left Arm           Pulse Oximetry 98%, ROOMAIR@REST            REVIEW      Results Reviewed      P[facility] Results Reviewed?:  Yes Prev Lab Results, Yes Prev Radiology Results, Yes     Previous [facility] Records            Assessment      Fatigue - R53.83            Notes      New Medications      * Lansoprazole (Lansoprazole*) 15 MG CAPSULE.DR: 15 MG PO QDAY      * Fluticasone Furoate (Arnuity Ellipta) 100 MCG BLST.W.DEV: 1 PUFF INH RTQDAY #1      Changed Medications      * Fluticasone Furoate (Arnuity Ellipta) 200 MCG BLST.W.DEV: 1 PUFF INH RTQDAY #1       Instructions: INS. WONT PAY      New Diagnostics      * CMP Comp Metabolic Panel, Month       Dx: Fatigue - R53.83      * CBC, Month       Dx: Fatigue - R53.83      ASSESSMENT/PLAN:       1. Fatigue.  We will check CBC and CMP, patient recently had thyroid function     study checked by his PCP.      2.  Mild intermittent asthma. Instructed the patient to resume his arnuity. We     will decrease dose to 100.      3. Allergic rhinitis. We will start patient on Singulair.       4.  I have personally reviewed laboratory data, imaging as well as previous     medical records.            Patient Education      Education resources provided:  Yes      Patient Education Provided:  Acute Asthma                 Disclaimer: Converted document may not contain table formatting or lab diagrams. Please see [employer] for the authenticated document.

## 2021-06-05 NOTE — PROGRESS NOTES
"   Progress Note      Patient Name: Elmer Garvin   Patient ID: [id number]   Sex: Male   YOB: 1945    Primary Care Provider: Erik Macias DO    Visit Date: May 10, 2021    Provider: Jason Kay MD   Location: [facility]   Location Address: [address] [place] [address], [address] [place] [address]  [id number]   Location Phone: [phone]          Chief Complaint     \"Everything is about the same.\"       History Of Present Illness     Elmer Garvin is a 76 year old /White male with past medical history significant for BPH, GERD, hypertension, and hypothyroidism who returns today for follow-up of right greater than left nasal congestion secondary to deviated nasal septum and inferior turbinate hypertrophy.  He was originally seen on 9/11/18 at which time he described difficulty breathing through both sides of his nose since the 1980s.  This was associated with occasional clear rhinorrhea and diminished sense of smell.  Examination that day revealed a slightly right deviated nasal septum and bilateral inferior turbinate hypertrophy as well as a narrow nasal cavity.  He was placed on a combination of Astepro and Flonase.  He was seen on 12/3/2018 at which time he did not feel like the sprays helped much.  We discussed surgical options and he elected to continue with medical management.  He was most recently seen on 4/19/2021 at which time he reported bilateral nasal congestion, thick rhinorrhea, right facial pressure, and a foul taste in his mouth.  His CT scan was ordered for further evaluation as it had not improved on antibiotics, steroid sprays, antihistamine sprays, and with immunotherapy.    He returns today for follow-up and tells me his symptoms are stable.  CT scan of the face without contrast on 4/27/2021 revealed bilateral maxillary sinus mucous retention cysts and mild mucosal thickening to the dependent maxillary sinus on the right with a periapical abscess and " surrounding bone erosion of tooth #3 with a likely communication into the maxillary sinus on the right.  There is also very mild mucosal thickening in the frontals and right anterior ethmoids.  He continues use Flonase, cetirizine, and Singulair.  He is also scheduled for an audiogram.           Past Medical History  Arthritis; Asthma; Barretts esophagus; BPH (benign prostatic hypertrophy); Cataract; Deviated nasal septum; Family history of prostate cancer in father; GERD (gastroesophageal reflux disease); Hypertension; Hypertrophy of both inferior nasal turbinates; Hypothyroidism; MRSA (methicillin resistant staph aureus) culture positive from buttock boils; Nasal congestion; Nasal obstruction; Pacemaker; Pre-diabetes         Past Surgical History  Cataract exctraction with lens implant; Colonoscopy; EGD; Incision and Drainage of Abscess; Pacemaker         Medication List  cetirizine 10 mg oral tablet; fluticasone propionate 50 mcg/actuation nasal spray,suspension; losartan 100 mg oral tablet; montelukast 10 mg oral tablet; Synthroid 100 mcg oral tablet         Allergy List  NO KNOWN DRUG ALLERGIES         Family Medical History  Emphysema; Breast Neoplasm, Malignant; Stroke; - No Family History of Colorectal Cancer; Heart Attack (MI); Prostate cancer; Family history of breast cancer         Social History  Alcohol (Never); Caffeine (Current every day); lives with spouse; ; Second hand smoke exposure (Never); Tobacco (Former); Working         Review of Systems  · Constitutional  o Denies  o : fever, night sweats, weight loss  · Eyes  o Denies  o : discharge from eye, impaired vision  · HENT  o Admits  o : *See HPI  · Cardiovascular  o Denies  o : chest pain, irregular heart beats  · Respiratory  o Denies  o : shortness of breath, wheezing, coughing up blood  · Gastrointestinal  o Denies  o : heartburn, reflux, vomiting blood  · Genitourinary  o Denies  o : frequency  · Integument  o Denies  o : rash, skin  "dryness  · Neurologic  o Denies  o : seizures, loss of balance, loss of consciousness, dizziness  · Endocrine  o Denies  o : cold intolerance, heat intolerance  · Heme-Lymph  o Denies  o : easy bleeding, anemia      Vitals  Date Time BP Position Site L\R Cuff Size HR RR TEMP (F) WT  HT  BMI kg/m2 BSA m2 O2 Sat FR L/min FiO2        05/10/2021 02:52 PM        97.4 213lbs 8oz 6'  1\" 28.17 2.23             Physical Examination  · Constitutional  o Appearance  o : well developed, well-nourished, alert and in no acute distress, voice clear and strong  · Head and Face  o Head  o :   § Inspection  § : no deformities or lesions  o Face  o :   § Inspection  § : No facial lesions; House-Brackmann I/VI bilaterally  § Palpation  § : No TMJ crepitus nor  muscle tenderness bilaterally  · Eyes  o Vision  o :   § Visual Fields  § : Extraocular movements are intact. No spontaneous or gaze-induced nystagmus.  o Conjunctivae  o : clear  o Sclerae  o : clear  o Pupils and Irises  o : pupils equal, round, and reactive to light.   · Ears, Nose, Mouth and Throat  o Ears  o :   § External Ears  § : appearance within normal limits, no lesions present  § Otoscopic Examination  § : tympanic membrane appearance within normal limits bilaterally without perforations, well-aerated middle ears  § Hearing  § : intact to conversational voice both ears  o Nose  o :   § External Nose  § : appearance normal  § Intranasal Exam  § : mucosa within normal limits, vestibules normal, no intranasal lesions present, septum slightly deviated to the right, bilateral inferior turbinate is hypertrophied,, sinuses non tender to percussion  o Oral Cavity  o :   § Oral Mucosa  § : oral mucosa normal without pallor or cyanosis  § Lips  § : lip appearance normal  § Teeth  § : Partial dentition for age  § Gums  § : gums pink, non-swollen, no bleeding present  § Tongue  § : tongue appearance normal; normal mobility  § Palate  § : hard palate normal, soft " palate appearance normal with symmetric mobility  o Throat  o :   § Oropharynx  § : no inflammation or lesions present, tonsils within normal limits  · Neck  o Inspection/Palpation  o : normal appearance, no masses or tenderness, trachea midline; thyroid size normal, nontender, no nodules or masses present on palpation  · Respiratory  o Respiratory Effort  o : breathing unlabored  o Inspection of Chest  o : normal appearance, no retractions  · Cardiovascular  o Heart  o : regular rate and rhythm  · Lymphatic  o Neck  o : no lymphadenopathy present  o Supraclavicular Nodes  o : no lymphadenopathy present  o Preauricular Nodes  o : no lymphadenopathy present  · Skin and Subcutaneous Tissue  o General Inspection  o : Regarding face and neck - there are no rashes present, no lesions present, and no areas of discoloration  · Neurologic  o Cranial Nerves  o : cranial nerves II-XII are grossly intact bilaterally  o Gait and Station  o : normal gait, able to stand without diffculty  · Psychiatric  o Judgement and Insight  o : judgment and insight intact  o Mood and Affect  o : mood normal, affect appropriate          Assessment  · Chronic rhinosinusitis     473.9/J32.9  · Periapical abscess of tooth with fistula     522.7/K04.6  · Hypertrophy of both inferior nasal turbinates     478.0/J34.3      Plan  · Medications  o Augmentin 875-125 mg oral tablet   SIG: take 1 tablet by oral route every 12 hours for 14 days   DISP: (56) Tablet with 0 refills  Prescribed on 05/10/2021     o prednisone 20 mg oral tablet   SIG: take 2 tablets (40 mg) by oral route once daily for 5 days   DISP: (10) Tablet with 0 refills  Prescribed on 05/10/2021     o Medications have been Reconciled  o Transition of Care or Provider Policy  · Instructions  o Impressions and findings were discussed Mr. Garvin and his wife at great length. Currently, we reviewed the images from his 4/27/2021 CT scan of the sinuses without contrast which revealed a  periapical abscess of tooth #3 eroding into the right maxillary sinus. There was right greater than left maxillary sinus mucosal thickening which was mild. We discussed the pathophysiology and natural history of this condition. We discussed the importance of seeing his dentist about removing the tooth. Options for management were discussed including continued observation while waiting for them to get in with his dentist versus antibiotic and steroid therapy. After a thorough discussion he would like to pursue placed on a course of Augmentin and prednisone. He will follow-up after completion in his audiogram or sooner if needed.            Electronically Signed by: Jason Kay MD -Author on May 10, 2021 05:42:17 PM

## 2021-06-07 ENCOUNTER — OFFICE VISIT (OUTPATIENT)
Dept: OTOLARYNGOLOGY | Facility: CLINIC | Age: 76
End: 2021-06-07

## 2021-06-07 ENCOUNTER — PROCEDURE VISIT (OUTPATIENT)
Dept: OTOLARYNGOLOGY | Facility: CLINIC | Age: 76
End: 2021-06-07

## 2021-06-07 VITALS — HEIGHT: 74 IN | WEIGHT: 206 LBS | TEMPERATURE: 97.4 F | BODY MASS INDEX: 26.44 KG/M2

## 2021-06-07 DIAGNOSIS — H93.13 TINNITUS OF BOTH EARS: ICD-10-CM

## 2021-06-07 DIAGNOSIS — H90.3 SENSORY HEARING LOSS, BILATERAL: ICD-10-CM

## 2021-06-07 DIAGNOSIS — H90.3 SENSORINEURAL HEARING LOSS (SNHL) OF BOTH EARS: Primary | ICD-10-CM

## 2021-06-07 PROCEDURE — 92557 COMPREHENSIVE HEARING TEST: CPT | Performed by: AUDIOLOGIST

## 2021-06-07 PROCEDURE — 99212 OFFICE O/P EST SF 10 MIN: CPT | Performed by: OTOLARYNGOLOGY

## 2021-06-07 PROCEDURE — 92567 TYMPANOMETRY: CPT | Performed by: AUDIOLOGIST

## 2021-06-07 RX ORDER — CETIRIZINE HYDROCHLORIDE 10 MG/1
TABLET ORAL
COMMUNITY
End: 2021-12-26

## 2021-06-07 RX ORDER — ALBUTEROL SULFATE 2.5 MG/3ML
SOLUTION RESPIRATORY (INHALATION)
COMMUNITY
End: 2021-12-26

## 2021-06-07 RX ORDER — MONTELUKAST SODIUM 10 MG/1
TABLET ORAL
COMMUNITY
End: 2021-12-26

## 2021-06-07 RX ORDER — ACETAMINOPHEN 500 MG
TABLET ORAL
COMMUNITY
End: 2021-12-26

## 2021-06-07 RX ORDER — FAMOTIDINE 20 MG/1
TABLET, FILM COATED ORAL
COMMUNITY
End: 2021-06-07 | Stop reason: SDUPTHER

## 2021-06-07 RX ORDER — OMEPRAZOLE 20 MG/1
CAPSULE, DELAYED RELEASE ORAL
COMMUNITY
End: 2021-12-26

## 2021-06-07 RX ORDER — SULINDAC 200 MG/1
TABLET ORAL
COMMUNITY
End: 2021-12-26

## 2021-06-07 NOTE — PROGRESS NOTES
Patient Name: Elmer Garvin   Visit Date: 06/07/2021   Patient ID: [id number]  Provider: Jason Kay MD    Sex: male  Location: [facility]   YOB: 1945  Location Address: [address] [place] [address], [address] [place] [address],?[id number]    Primary Care Provider Erik Macias,   Location Phone: [phone]    Referring Provider: No ref. provider found        Chief Complaint  Audiology test/results    History of Present Illness  Elmer Garvin is a 76 y.o. male who presents to [facility] today for follow-up of hearing loss.  He was most recently seen on 5/10/2021 for chronic rhinosinusitis.  CT scan of the face without contrast on 4/27/2021 revealed bilateral maxillary sinus mucous retention cyst and mild mucosal thickening of the dependent maxillary sinus mucosa on the right.  There was a periapical abscess and surrounding bone erosion of tooth #3 with a likely communication of the maxillary sinus.  There is also very mild mucosal thickening in the frontals and right anterior ethmoids.  He was placed on a 14-day course of Augmentin and burst of prednisone and we discussed that he follow-up with his dentist.    Returns today for evaluation of hearing loss.  His wife had mentioned previously that he seemed to be having more difficulty with his hearing even while wearing his hearing aids which he has done for 7 years.  He also experiences bilateral high-pitched tinnitus which is quite bothersome.  He denies any otalgia, otorrhea, or vertigo.  Audiogram on 6/7/2021 revealed bilateral normal downsloping to moderate to severe sensorineural hearing loss from 2000 8000 Hz.  SRT is 30 on the right and 20 on the left.  Speech discrimination is 64% bilaterally at 70 dB on the right and 65 dB on the left.  Tympanograms are type A.    Past Medical History:   Diagnosis Date   • Hypertension    • Shortness of breath    • SSS (sick sinus syndrome)  (CMS/[facility])        Past Surgical History:   Procedure Laterality Date   • CARDIAC CATHETERIZATION N/A 7/25/2019    Procedure: Coronary angiography;  Surgeon: Wilton Brown MD;  Location:  [facility];  Service: Cardiovascular   • CARDIAC CATHETERIZATION N/A 7/25/2019    Procedure: Left heart cath;  Surgeon: Wilton Brown MD;  Location:  [facility];  Service: Cardiovascular   • CARDIAC CATHETERIZATION N/A 7/25/2019    Procedure: Left ventriculography;  Surgeon: Wilton Brown MD;  Location:  [facility];  Service: Cardiovascular   • CARDIAC CATHETERIZATION N/A 7/25/2019    Procedure: Right heart cath;  Surgeon: Wilton Brown MD;  Location:  [facility]IVE [facility];  Service: Cardiovascular   • CATARACT EXTRACTION     • INSERT / REPLACE / REMOVE PACEMAKER  08/2014   • PACEMAKER IMPLANTATION           Current Outpatient Medications:   •  acetaminophen (TYLENOL) 500 MG tablet, , Disp: , Rfl:   •  albuterol (PROVENTIL) (2.5 MG/3ML) 0.083% nebulizer solution, albuterol sulfate 2.5 mg /3 mL (0.083 %) inhalation solution for nebulization uses it twice daily   Suspended, Disp: , Rfl:   •  cetirizine (zyrTEC) 10 MG tablet, cetirizine 10 mg oral tablet take 1 tablet (10 mg) by oral route once daily   Active, Disp: , Rfl:   •  fluticasone (FLONASE) 50 MCG/ACT nasal spray, , Disp: , Rfl:   •  levothyroxine (SYNTHROID, LEVOTHROID) 100 MCG tablet, Take 100 mcg by mouth Daily., Disp: , Rfl:   •  losartan (COZAAR) 100 MG tablet, Take 100 mg by mouth Daily., Disp: , Rfl:   •  melatonin 5 MG tablet tablet, Take 10 mg by mouth., Disp: , Rfl:   •  montelukast (SINGULAIR) 10 MG tablet, montelukast 10 mg oral tablet take 1 tablet (10 mg) by oral route once daily in the evening for 30 days   Suspended, Disp: , Rfl:   •  omeprazole (priLOSEC) 20 MG capsule, omeprazole 20 mg oral capsule,delayed release(DR/EC) take 1 capsule (20 mg) by oral route once daily before a meal    "Suspended, Disp: , Rfl:   •  sulindac (CLINORIL) 200 MG tablet, sulindac 200 mg oral tablet take 1 tablet (200 mg) by oral route 2 times per day with food for 30 days   Suspended, Disp: , Rfl:      Allergies   Allergen Reactions   • Latex Other (See Comments)     Sinus congestion       Social History     Tobacco Use   • Smoking status: Former Smoker     Years: 12.00     Types: Pipe   • Smokeless tobacco: Never Used   • Tobacco comment: CAFFEINE USE 3 CUPS COFFEE DAILY   Substance Use Topics   • Alcohol use: No   • Drug use: Not on file        Objective     Vital Signs:   Temp 97.4 °F (36.3 °C) (Temporal)   Ht 188 cm (74\")   Wt 93.4 kg (206 lb)   BMI 26.45 kg/m²       Physical Exam    General: Well developed, well nourished patient of stated age in no acute distress. Voice is strong and clear.   Head: Normocephalic and atraumatic.   Face: No lesions. House-Brackmann I/VI bilaterally.   Eyes: PERRLA, sclerae anicteric, no conjunctival injection. Extra ocular movements are intact and full. No nystagmus.   Ears: Auricles are normal in appearance. Bilateral external auditory canals are unremarkable. Bilateral tympanic membranes are clear and without effusion. Hearing normal to conversational voice.   Nose: External nose is normal in appearance. Bilateral nares are patent with normal appearing mucosa. Septum slightly deviated to the right.  Bilateral inferior turbinates are mildly hypertrophied.  No lesions.   Oral Cavity: Lips are normal in appearance. Oral mucosa is unremarkable. Gingiva is unremarkable.  Partial dentition for age. Tongue is unremarkable with good movement. Hard palate is unremarkable.   Oropharynx: Soft palate is unremarkable with full movement. Uvula is unremarkable. Bilateral tonsils are unremarkable. Posterior oropharynx is unremarkable.    Neck: Supple, no thyromegaly, no lymphadenopathy, trachea midline. Thyroid normal size and without nodules to palpation.   Respiratory: Clear to auscultation " bilaterally, nonlabored respirations    Cardiovascular: RRR, no murmurs, rubs, or gallops,   Psychiatric: Appropriate affect, cooperative   Neurologic: Oriented x 3, strength symmetric in all extremities, Cranial Nerves II-XII are grossly intact to confrontation   Skin: Warm and dry. No rashes.    Procedures     Result Review :               Assessment and Plan {CC Problem List  Visit Diagnosis  ROS  Review (Popup)  Health Maintenance  Quality  BestPractice  Medications  SmartSets  SnapShot Encounters  Media :23}   Diagnoses and all orders for this visit:    1. Sensorineural hearing loss (SNHL) of both ears (Primary)  Impressions and findings were discussed with Mr. Garvin and his wife at great length.  We reviewed the results of his 6/7/2021 audiogram.  We discussed that unfortunately speech discrimination is poor but not poor enough to qualify for cochlear implantation.  He is welcome to try an updated pair of hearing aids if he so desires.  He would like to follow-up on an as-needed basis.        Follow Up   Return if symptoms worsen or fail to improve.  Patient was given instructions and counseling regarding his condition or for health maintenance advice. Please see specific information pulled into the AVS if appropriate.

## 2021-07-15 VITALS — BODY MASS INDEX: 28.3 KG/M2 | TEMPERATURE: 97.4 F | HEIGHT: 73 IN | WEIGHT: 213.5 LBS

## 2021-07-20 ENCOUNTER — LAB (OUTPATIENT)
Dept: LAB | Facility: HOSPITAL | Age: 76
End: 2021-07-20

## 2021-07-20 ENCOUNTER — HOSPITAL ENCOUNTER (OUTPATIENT)
Dept: CARDIOLOGY | Facility: HOSPITAL | Age: 76
Discharge: HOME | End: 2021-07-20

## 2021-07-20 ENCOUNTER — TRANSCRIBE ORDERS (OUTPATIENT)
Dept: ADMINISTRATIVE | Facility: HOSPITAL | Age: 76
End: 2021-07-20

## 2021-07-20 DIAGNOSIS — R06.00 DYSPNEA, UNSPECIFIED TYPE: ICD-10-CM

## 2021-07-20 DIAGNOSIS — R06.00 DYSPNEA, UNSPECIFIED TYPE: Primary | ICD-10-CM

## 2021-07-20 DIAGNOSIS — R06.02 SHORTNESS OF BREATH: Primary | ICD-10-CM

## 2021-07-20 DIAGNOSIS — Z91.018 ALLERGY TO OTHER FOODS: ICD-10-CM

## 2021-07-20 DIAGNOSIS — J30.1 ALLERGIC RHINITIS DUE TO POLLEN, UNSPECIFIED SEASONALITY: ICD-10-CM

## 2021-07-20 DIAGNOSIS — K21.9 CHALASIA OF LOWER ESOPHAGEAL SPHINCTER: ICD-10-CM

## 2021-07-20 DIAGNOSIS — J30.89 PERENNIAL ALLERGIC RHINITIS: ICD-10-CM

## 2021-07-20 DIAGNOSIS — R06.02 SHORTNESS OF BREATH: ICD-10-CM

## 2021-07-20 LAB — QT INTERVAL: 379 MS

## 2021-07-20 PROCEDURE — 86003 ALLG SPEC IGE CRUDE XTRC EA: CPT

## 2021-07-20 PROCEDURE — 82785 ASSAY OF IGE: CPT

## 2021-07-20 PROCEDURE — 83516 IMMUNOASSAY NONANTIBODY: CPT

## 2021-07-20 PROCEDURE — 36415 COLL VENOUS BLD VENIPUNCTURE: CPT

## 2021-07-20 PROCEDURE — 93010 ELECTROCARDIOGRAM REPORT: CPT | Performed by: INTERNAL MEDICINE

## 2021-07-20 PROCEDURE — 93005 ELECTROCARDIOGRAM TRACING: CPT | Performed by: STUDENT IN AN ORGANIZED HEALTH CARE EDUCATION/TRAINING PROGRAM

## 2021-07-23 LAB
A ALTERNATA IGE QN: <0.1 KU/L
A FUMIGATUS IGE QN: <0.1 KU/L
BERMUDA GRASS IGE QN: <0.1 KU/L
BOXELDER IGE QN: <0.1 KU/L
C HERBARUM IGE QN: <0.1 KU/L
CALIF WALNUT POLN IGE QN: <0.1 KU/L
CAT DANDER IGE QN: <0.1 KU/L
CMN PIGWEED IGE QN: <0.1 KU/L
COMMON RAGWEED IGE QN: <0.1 KU/L
CONV CLASS DESCRIPTION: NORMAL
COTTONWOOD IGE QN: <0.1 KU/L
D FARINAE IGE QN: <0.1 KU/L
D PTERONYSS IGE QN: <0.1 KU/L
DOG DANDER IGE QN: <0.1 KU/L
IGE SERPL-ACNC: 32 IU/ML (ref 6–495)
LONDON PLANE IGE QN: <0.1 KU/L
MOUSE URINE PROT IGE QN: <0.1 KU/L
MT JUNIPER IGE QN: <0.1 KU/L
P NOTATUM IGE QN: <0.1 KU/L
PECAN/HICK TREE IGE QN: <0.1 KU/L
ROACH IGE QN: <0.1 KU/L
SALTWORT IGE QN: <0.1 KU/L
SHEEP SORREL IGE QN: <0.1 KU/L
SILVER BIRCH IGE QN: <0.1 KU/L
TIMOTHY IGE QN: <0.1 KU/L
WHITE ASH IGE QN: <0.1 KU/L
WHITE ELM IGE QN: <0.1 KU/L
WHITE MULBERRY IGE QN: <0.1 KU/L
WHITE OAK IGE QN: <0.1 KU/L

## 2021-07-26 LAB — ALPHA-GAL IGE QN: <0.1 KU/L

## 2021-08-10 ENCOUNTER — TELEPHONE (OUTPATIENT)
Dept: CARDIOLOGY | Facility: CLINIC | Age: 76
End: 2021-08-10

## 2021-08-10 NOTE — TELEPHONE ENCOUNTER
Dr Richmond at [facility] did an ECG on the patient and would like your input as to if the patient needs to be seen by you. According to the patients wife he has taken Vancomycin and may have damaged his heart from long tem use.

## 2021-09-27 ENCOUNTER — OFFICE VISIT (OUTPATIENT)
Dept: FAMILY MEDICINE CLINIC | Facility: CLINIC | Age: 76
End: 2021-09-27

## 2021-09-27 VITALS
DIASTOLIC BLOOD PRESSURE: 66 MMHG | SYSTOLIC BLOOD PRESSURE: 124 MMHG | RESPIRATION RATE: 14 BRPM | BODY MASS INDEX: 26.31 KG/M2 | HEART RATE: 62 BPM | WEIGHT: 205 LBS | HEIGHT: 74 IN | TEMPERATURE: 97.9 F | OXYGEN SATURATION: 100 %

## 2021-09-27 DIAGNOSIS — K22.70 BARRETT'S ESOPHAGUS WITHOUT DYSPLASIA: ICD-10-CM

## 2021-09-27 DIAGNOSIS — E03.9 ACQUIRED HYPOTHYROIDISM: ICD-10-CM

## 2021-09-27 DIAGNOSIS — I10 ESSENTIAL HYPERTENSION: Primary | ICD-10-CM

## 2021-09-27 DIAGNOSIS — J45.20 MILD INTERMITTENT ASTHMA WITHOUT COMPLICATION: ICD-10-CM

## 2021-09-27 DIAGNOSIS — R73.03 PRE-DIABETES: ICD-10-CM

## 2021-09-27 DIAGNOSIS — K21.9 GASTROESOPHAGEAL REFLUX DISEASE WITHOUT ESOPHAGITIS: ICD-10-CM

## 2021-09-27 PROBLEM — A49.02 MRSA (METHICILLIN RESISTANT STAPHYLOCOCCUS AUREUS): Status: ACTIVE | Noted: 2021-09-27

## 2021-09-27 LAB
ALBUMIN SERPL-MCNC: 4.1 G/DL (ref 3.5–5.2)
ALBUMIN/GLOB SERPL: 1.6 G/DL
ALP SERPL-CCNC: 54 U/L (ref 39–117)
ALT SERPL W P-5'-P-CCNC: 48 U/L (ref 1–41)
ANION GAP SERPL CALCULATED.3IONS-SCNC: 9.7 MMOL/L (ref 5–15)
AST SERPL-CCNC: 29 U/L (ref 1–40)
BILIRUB SERPL-MCNC: 0.6 MG/DL (ref 0–1.2)
BUN SERPL-MCNC: 22 MG/DL (ref 8–23)
BUN/CREAT SERPL: 18.6 (ref 7–25)
CALCIUM SPEC-SCNC: 9.2 MG/DL (ref 8.6–10.5)
CHLORIDE SERPL-SCNC: 102 MMOL/L (ref 98–107)
CHOLEST SERPL-MCNC: 202 MG/DL (ref 0–200)
CO2 SERPL-SCNC: 26.3 MMOL/L (ref 22–29)
CREAT SERPL-MCNC: 1.18 MG/DL (ref 0.76–1.27)
GFR SERPL CREATININE-BSD FRML MDRD: 60 ML/MIN/1.73
GLOBULIN UR ELPH-MCNC: 2.6 GM/DL
GLUCOSE SERPL-MCNC: 100 MG/DL (ref 65–99)
HBA1C MFR BLD: 5.7 % (ref 4.8–5.6)
HDLC SERPL-MCNC: 50 MG/DL (ref 40–60)
LDLC SERPL CALC-MCNC: 114 MG/DL (ref 0–100)
LDLC/HDLC SERPL: 2.17 {RATIO}
POTASSIUM SERPL-SCNC: 4.5 MMOL/L (ref 3.5–5.2)
PROT SERPL-MCNC: 6.7 G/DL (ref 6–8.5)
SODIUM SERPL-SCNC: 138 MMOL/L (ref 136–145)
T4 FREE SERPL-MCNC: 1.5 NG/DL (ref 0.93–1.7)
TRIGL SERPL-MCNC: 218 MG/DL (ref 0–150)
TSH SERPL DL<=0.05 MIU/L-ACNC: 2.02 UIU/ML (ref 0.27–4.2)
VLDLC SERPL-MCNC: 38 MG/DL (ref 5–40)

## 2021-09-27 PROCEDURE — 80053 COMPREHEN METABOLIC PANEL: CPT | Performed by: FAMILY MEDICINE

## 2021-09-27 PROCEDURE — 36415 COLL VENOUS BLD VENIPUNCTURE: CPT | Performed by: FAMILY MEDICINE

## 2021-09-27 PROCEDURE — 80061 LIPID PANEL: CPT | Performed by: FAMILY MEDICINE

## 2021-09-27 PROCEDURE — 99214 OFFICE O/P EST MOD 30 MIN: CPT | Performed by: FAMILY MEDICINE

## 2021-09-27 PROCEDURE — 83036 HEMOGLOBIN GLYCOSYLATED A1C: CPT | Performed by: FAMILY MEDICINE

## 2021-09-27 PROCEDURE — 84439 ASSAY OF FREE THYROXINE: CPT | Performed by: FAMILY MEDICINE

## 2021-09-27 PROCEDURE — 84443 ASSAY THYROID STIM HORMONE: CPT | Performed by: FAMILY MEDICINE

## 2021-09-27 RX ORDER — NYSTATIN 500000 [USP'U]/1
TABLET, COATED ORAL
COMMUNITY
Start: 2021-09-17 | End: 2021-12-26

## 2021-09-27 RX ORDER — MINOCYCLINE HYDROCHLORIDE 100 MG/1
CAPSULE ORAL
COMMUNITY
Start: 2021-09-02 | End: 2021-12-26

## 2021-09-27 RX ORDER — LOSARTAN POTASSIUM 100 MG/1
100 TABLET ORAL DAILY
Qty: 90 TABLET | Refills: 3 | Status: SHIPPED | OUTPATIENT
Start: 2021-09-27 | End: 2022-06-24 | Stop reason: SDUPTHER

## 2021-09-27 NOTE — PROGRESS NOTES
Chief Complaint   Patient presents with   • Follow-up     6 month follow up   • Heartburn   • Hypertension        Subjective     Elmer Garvin  has a past medical history of Hypertension, Shortness of breath, and SSS (sick sinus syndrome) (CMS/[facility]).    Hypertension-he does check his blood pressure outside the office intermittently.  He states the highest it is been is 139/79.  He takes his medication daily.    Asthma-he has lots of seasonal allergies.  They are well controlled and rarely ever has to use his inhaler.    Hypothyroidism-he takes his levothyroxine every morning as prescribed.    Reflux-he does well particularly when he monitors his diet.  He is due for another upper endoscopy February 2022.      PHQ-2 Depression Screening  Little interest or pleasure in doing things?     Feeling down, depressed, or hopeless?     PHQ-2 Total Score     PHQ-9 Depression Screening  Little interest or pleasure in doing things?     Feeling down, depressed, or hopeless?     Trouble falling or staying asleep, or sleeping too much?     Feeling tired or having little energy?     Poor appetite or overeating?     Feeling bad about yourself - or that you are a failure or have let yourself or your family down?     Trouble concentrating on things, such as reading the newspaper or watching television?     Moving or speaking so slowly that other people could have noticed? Or the opposite - being so fidgety or restless that you have been moving around a lot more than usual?     Thoughts that you would be better off dead, or of hurting yourself in some way?     PHQ-9 Total Score     If you checked off any problems, how difficult have these problems made it for you to do your work, take care of things at home, or get along with other people?       Allergies   Allergen Reactions   • Latex Other (See Comments)     Sinus congestion       Prior to Admission medications    Medication Sig Start Date End Date Taking? Authorizing Provider    acetaminophen (TYLENOL) 500 MG tablet    Yes Ragini Villavicencio MD   albuterol (PROVENTIL) (2.5 MG/3ML) 0.083% nebulizer solution albuterol sulfate 2.5 mg /3 mL (0.083 %) inhalation solution for nebulization uses it twice daily   Suspended   Yes Ragini Villavicencio MD   cetirizine (zyrTEC) 10 MG tablet cetirizine 10 mg oral tablet take 1 tablet (10 mg) by oral route once daily   Active   Yes Ragini Villavicencio MD   fluticasone (FLONASE) 50 MCG/ACT nasal spray  3/29/21  Yes Ragini Villavicencio MD   levothyroxine (SYNTHROID, LEVOTHROID) 100 MCG tablet Take 100 mcg by mouth Daily.   Yes Ragini Villavicencio MD   losartan (COZAAR) 100 MG tablet Take 100 mg by mouth Daily.   Yes Ragini Villavicencio MD   melatonin 5 MG tablet tablet Take 10 mg by mouth.   Yes Ragini Villavicencio MD   minocycline (MINOCIN,DYNACIN) 100 MG capsule  9/2/21   Ragini Villavicencio MD   montelukast (SINGULAIR) 10 MG tablet montelukast 10 mg oral tablet take 1 tablet (10 mg) by oral route once daily in the evening for 30 days   Suspended    Ragini Villavicencio MD   nystatin (MYCOSTATIN) 730751 units tablet  9/17/21   Ragini Villavicencio MD   omeprazole (priLOSEC) 20 MG capsule omeprazole 20 mg oral capsule,delayed release(DR/EC) take 1 capsule (20 mg) by oral route once daily before a meal   Suspended    Ragini Villavicencio MD   sulindac (CLINORIL) 200 MG tablet sulindac 200 mg oral tablet take 1 tablet (200 mg) by oral route 2 times per day with food for 30 days   Suspended    Ragini Villavicencio MD        Patient Active Problem List   Diagnosis   • Angina at rest (CMS/[facility])   • Hypertension   • SSS (sick sinus syndrome) (CMS/[facility])   • Mild intermittent asthma without complication   • Gastroesophageal reflux disease without esophagitis   • Taylor's esophagus without dysplasia   • Acquired hypothyroidism   • MRSA (methicillin resistant Staphylococcus aureus)   • Pre-diabetes        Past Surgical History:   Procedure  Laterality Date   • CARDIAC CATHETERIZATION N/A 7/25/2019    Procedure: Coronary angiography;  Surgeon: Wilton Brown MD;  Location:  [facility];  Service: Cardiovascular   • CARDIAC CATHETERIZATION N/A 7/25/2019    Procedure: Left heart cath;  Surgeon: Wilton Brown MD;  Location:  [facility];  Service: Cardiovascular   • CARDIAC CATHETERIZATION N/A 7/25/2019    Procedure: Left ventriculography;  Surgeon: Wilton Brown MD;  [facility];  Service: Cardiovascular   • CARDIAC CATHETERIZATION N/A 7/25/2019    Procedure: Right heart cath;  Surgeon: Wilton Brown MD;  Location: [facility] CATH INVASIVE LOCATION;  Service: Cardiovascular   • CATARACT EXTRACTION     • INSERT / REPLACE / REMOVE PACEMAKER  08/2014   • PACEMAKER IMPLANTATION         Social History     Socioeconomic History   • Marital status:      Spouse name: Not on file   • Number of children: Not on file   • Years of education: Not on file   • Highest education level: Not on file   Tobacco Use   • Smoking status: Former Smoker     Years: 12.00     Types: Pipe   • Smokeless tobacco: Never Used   • Tobacco comment: CAFFEINE USE 3 CUPS COFFEE DAILY   Substance and Sexual Activity   • Alcohol use: No   • Sexual activity: Defer       Family History   Problem Relation Age of Onset   • Cancer Mother    • Heart disease Father    • Cancer Father    • Heart disease Sister    • Sudden death Sister    • Heart disease Brother    • Cancer Paternal Grandfather        Family history, surgical history, past medical history, Allergies and med's reviewed with patient today and updated in [facility] EMR.     ROS:  Review of Systems   Constitutional: Positive for fatigue.   HENT: Positive for postnasal drip and rhinorrhea. Negative for congestion.    Eyes: Negative for blurred vision and visual disturbance.   Respiratory: Positive for cough. Negative for chest tightness, shortness of breath and wheezing.   "  Cardiovascular: Negative for chest pain and palpitations.   Gastrointestinal: Negative for abdominal pain, constipation, diarrhea and indigestion.   Allergic/Immunologic: Positive for environmental allergies.   Neurological: Negative for headache.   Psychiatric/Behavioral: Negative for depressed mood. The patient is not nervous/anxious.        OBJECTIVE:  Vitals:    09/27/21 0957   BP: 124/66   BP Location: Left arm   Patient Position: Sitting   Cuff Size: Large Adult   Pulse: 62   Resp: 14   Temp: 97.9 °F (36.6 °C)   SpO2: 100%   Weight: 93 kg (205 lb)   Height: 188 cm (74\")     No exam data present   Body mass index is 26.32 kg/m².  No LMP for male patient.    Physical Exam  Vitals and nursing note reviewed.   Constitutional:       General: He is not in acute distress.     Appearance: Normal appearance. He is normal weight.   HENT:      Head: Normocephalic.      Right Ear: Tympanic membrane, ear canal and external ear normal.      Left Ear: Tympanic membrane, ear canal and external ear normal.      Nose: Nose normal.      Mouth/Throat:      Mouth: Mucous membranes are moist.      Pharynx: Oropharynx is clear.   Eyes:      General: No scleral icterus.     Conjunctiva/sclera: Conjunctivae normal.      Pupils: Pupils are equal, round, and reactive to light.   Cardiovascular:      Rate and Rhythm: Normal rate and regular rhythm.      Pulses: Normal pulses.      Heart sounds: Normal heart sounds. No murmur heard.     Pulmonary:      Effort: Pulmonary effort is normal.      Breath sounds: Normal breath sounds. No wheezing, rhonchi or rales.   Musculoskeletal:      Cervical back: No rigidity or tenderness.   Lymphadenopathy:      Cervical: No cervical adenopathy.   Skin:     General: Skin is warm and dry.      Coloration: Skin is not jaundiced.      Findings: No rash.   Neurological:      General: No focal deficit present.      Mental Status: He is alert and oriented to person, place, and time.      Gait: Gait normal. "   Psychiatric:         Mood and Affect: Mood normal.         Thought Content: Thought content normal.         Judgment: Judgment normal.         Procedures    No visits with results within 30 Day(s) from this visit.   Latest known visit with results is:   Hospital Outpatient Visit on 07/20/2021   Component Date Value Ref Range Status   • QT Interval 07/20/2021 379  ms Final       ASSESSMENT/ PLAN:    Diagnoses and all orders for this visit:    1. Essential hypertension (Primary)  Assessment & Plan:  His blood pressure is very good.  We will continue to monitor.    Orders:  -     Comprehensive Metabolic Panel  -     Lipid Panel    2. Mild intermittent asthma without complication    3. Acquired hypothyroidism  Assessment & Plan:  Update his thyroid function with his labs today.    Orders:  -     TSH+Free T4    4. Gastroesophageal reflux disease without esophagitis  Assessment & Plan:  His reflux is well controlled.  He does have a history of Taylor's and due in about another 5 to 6 months for repeat upper endoscopy.      5. Taylor's esophagus without dysplasia    6. Pre-diabetes  Assessment & Plan:  We will update his A1c.    Orders:  -     Comprehensive Metabolic Panel  -     Lipid Panel  -     Hemoglobin A1c    Other orders  -     losartan (COZAAR) 100 MG tablet; Take 1 tablet by mouth Daily for 90 days.  Dispense: 90 tablet; Refill: 3      Orders Placed Today:     New Medications Ordered This Visit   Medications   • losartan (COZAAR) 100 MG tablet     Sig: Take 1 tablet by mouth Daily for 90 days.     Dispense:  90 tablet     Refill:  3        Management Plan:     An After Visit Summary was printed and given to the patient at discharge.    Follow-up: Return in about 6 months (around 3/27/2022) for Recheck.    Erik Macias DO 9/27/2021 10:19 EDT  This note was electronically signed.

## 2021-09-27 NOTE — ASSESSMENT & PLAN NOTE
His reflux is well controlled.  He does have a history of Taylor's and due in about another 5 to 6 months for repeat upper endoscopy.

## 2021-10-14 PROCEDURE — 93294 REM INTERROG EVL PM/LDLS PM: CPT | Performed by: INTERNAL MEDICINE

## 2021-10-14 PROCEDURE — 93296 REM INTERROG EVL PM/IDS: CPT | Performed by: INTERNAL MEDICINE

## 2021-12-26 ENCOUNTER — HOSPITAL ENCOUNTER (INPATIENT)
Facility: HOSPITAL | Age: 76
LOS: 15 days | Discharge: HOME HEALTH | End: 2022-01-10
Attending: EMERGENCY MEDICINE | Admitting: FAMILY MEDICINE

## 2021-12-26 ENCOUNTER — APPOINTMENT (OUTPATIENT)
Dept: GENERAL RADIOLOGY | Facility: HOSPITAL | Age: 76
End: 2021-12-26

## 2021-12-26 DIAGNOSIS — J18.9 MULTIFOCAL PNEUMONIA: ICD-10-CM

## 2021-12-26 DIAGNOSIS — F51.01 PRIMARY INSOMNIA: Primary | ICD-10-CM

## 2021-12-26 DIAGNOSIS — R26.2 DIFFICULTY WALKING: ICD-10-CM

## 2021-12-26 DIAGNOSIS — J96.01 ACUTE RESPIRATORY FAILURE WITH HYPOXIA: ICD-10-CM

## 2021-12-26 DIAGNOSIS — Z78.9 DECREASED ACTIVITIES OF DAILY LIVING (ADL): ICD-10-CM

## 2021-12-26 LAB
ALBUMIN SERPL-MCNC: 3.9 G/DL (ref 3.5–5.2)
ALBUMIN/GLOB SERPL: 1.1 G/DL
ALP SERPL-CCNC: 50 U/L (ref 39–117)
ALT SERPL W P-5'-P-CCNC: 39 U/L (ref 1–41)
ANION GAP SERPL CALCULATED.3IONS-SCNC: 15.6 MMOL/L (ref 5–15)
AST SERPL-CCNC: 77 U/L (ref 1–40)
BASE EXCESS BLDA CALC-SCNC: -2.5 MMOL/L (ref -2–2)
BASOPHILS # BLD AUTO: 0.01 10*3/MM3 (ref 0–0.2)
BASOPHILS NFR BLD AUTO: 0.2 % (ref 0–1.5)
BDY SITE: ABNORMAL
BILIRUB SERPL-MCNC: 0.5 MG/DL (ref 0–1.2)
BUN SERPL-MCNC: 26 MG/DL (ref 8–23)
BUN/CREAT SERPL: 17.7 (ref 7–25)
CALCIUM SPEC-SCNC: 9.2 MG/DL (ref 8.6–10.5)
CHLORIDE SERPL-SCNC: 97 MMOL/L (ref 98–107)
CO2 SERPL-SCNC: 23.4 MMOL/L (ref 22–29)
COHGB MFR BLD: 0.6 % (ref 0–1.5)
CREAT SERPL-MCNC: 1.47 MG/DL (ref 0.76–1.27)
CRP SERPL-MCNC: 7.49 MG/DL (ref 0–0.5)
D DIMER PPP FEU-MCNC: 1.08 MG/L (FEU) (ref 0–0.59)
D-LACTATE SERPL-SCNC: 1.6 MMOL/L (ref 0.5–2)
DEPRECATED RDW RBC AUTO: 43.7 FL (ref 37–54)
EOSINOPHIL # BLD AUTO: 0 10*3/MM3 (ref 0–0.4)
EOSINOPHIL NFR BLD AUTO: 0 % (ref 0.3–6.2)
ERYTHROCYTE [DISTWIDTH] IN BLOOD BY AUTOMATED COUNT: 14.1 % (ref 12.3–15.4)
FHHB: 8.7 % (ref 0–5)
FLUAV AG NPH QL: NEGATIVE
FLUBV AG NPH QL IA: NEGATIVE
GAS FLOW AIRWAY: 3 LPM
GFR SERPL CREATININE-BSD FRML MDRD: 47 ML/MIN/1.73
GLOBULIN UR ELPH-MCNC: 3.5 GM/DL
GLUCOSE SERPL-MCNC: 105 MG/DL (ref 65–99)
HCO3 BLDA-SCNC: 20.3 MMOL/L (ref 22–26)
HCT VFR BLD AUTO: 42.8 % (ref 37.5–51)
HGB BLD-MCNC: 14.2 G/DL (ref 13–17.7)
HGB BLDA-MCNC: 14.3 G/DL (ref 13.8–16.4)
HOLD SPECIMEN: NORMAL
HOLD SPECIMEN: NORMAL
IMM GRANULOCYTES # BLD AUTO: 0.02 10*3/MM3 (ref 0–0.05)
IMM GRANULOCYTES NFR BLD AUTO: 0.5 % (ref 0–0.5)
INHALED O2 CONCENTRATION: 32 %
LACTATE BLDA-SCNC: ABNORMAL MMOL/L
LYMPHOCYTES # BLD AUTO: 0.63 10*3/MM3 (ref 0.7–3.1)
LYMPHOCYTES NFR BLD AUTO: 15.6 % (ref 19.6–45.3)
MCH RBC QN AUTO: 28.2 PG (ref 26.6–33)
MCHC RBC AUTO-ENTMCNC: 33.2 G/DL (ref 31.5–35.7)
MCV RBC AUTO: 85.1 FL (ref 79–97)
METHGB BLD QL: 0.2 % (ref 0–1.5)
MODALITY: ABNORMAL
MONOCYTES # BLD AUTO: 0.26 10*3/MM3 (ref 0.1–0.9)
MONOCYTES NFR BLD AUTO: 6.4 % (ref 5–12)
NEUTROPHILS NFR BLD AUTO: 3.12 10*3/MM3 (ref 1.7–7)
NEUTROPHILS NFR BLD AUTO: 77.3 % (ref 42.7–76)
NRBC BLD AUTO-RTO: 0 /100 WBC (ref 0–0.2)
NT-PROBNP SERPL-MCNC: 136.4 PG/ML (ref 0–1800)
OXYHGB MFR BLDV: 90.5 % (ref 94–99)
PCO2 BLDA: 30 MM HG (ref 35–45)
PH BLDA: 7.45 PH UNITS (ref 7.35–7.45)
PLATELET # BLD AUTO: 174 10*3/MM3 (ref 140–450)
PMV BLD AUTO: 10.6 FL (ref 6–12)
PO2 BLD: 194 MM[HG] (ref 0–500)
PO2 BLDA: 62 MM HG (ref 80–100)
POTASSIUM SERPL-SCNC: 4.4 MMOL/L (ref 3.5–5.2)
PROCALCITONIN SERPL-MCNC: 0.19 NG/ML (ref 0–0.25)
PROT SERPL-MCNC: 7.4 G/DL (ref 6–8.5)
RBC # BLD AUTO: 5.03 10*6/MM3 (ref 4.14–5.8)
SAO2 % BLDCOA: 91.2 % (ref 95–99)
SODIUM SERPL-SCNC: 136 MMOL/L (ref 136–145)
TROPONIN T SERPL-MCNC: <0.01 NG/ML (ref 0–0.03)
WBC NRBC COR # BLD: 4.04 10*3/MM3 (ref 3.4–10.8)
WHOLE BLOOD HOLD SPECIMEN: NORMAL
WHOLE BLOOD HOLD SPECIMEN: NORMAL

## 2021-12-26 PROCEDURE — 86140 C-REACTIVE PROTEIN: CPT | Performed by: INTERNAL MEDICINE

## 2021-12-26 PROCEDURE — 84145 PROCALCITONIN (PCT): CPT | Performed by: INTERNAL MEDICINE

## 2021-12-26 PROCEDURE — 85379 FIBRIN DEGRADATION QUANT: CPT | Performed by: INTERNAL MEDICINE

## 2021-12-26 PROCEDURE — 99285 EMERGENCY DEPT VISIT HI MDM: CPT

## 2021-12-26 PROCEDURE — 87150 DNA/RNA AMPLIFIED PROBE: CPT | Performed by: EMERGENCY MEDICINE

## 2021-12-26 PROCEDURE — 83880 ASSAY OF NATRIURETIC PEPTIDE: CPT

## 2021-12-26 PROCEDURE — 83605 ASSAY OF LACTIC ACID: CPT | Performed by: EMERGENCY MEDICINE

## 2021-12-26 PROCEDURE — 25010000002 CEFTRIAXONE PER 250 MG: Performed by: EMERGENCY MEDICINE

## 2021-12-26 PROCEDURE — 36600 WITHDRAWAL OF ARTERIAL BLOOD: CPT | Performed by: EMERGENCY MEDICINE

## 2021-12-26 PROCEDURE — 25010000002 DEXAMETHASONE PER 1 MG: Performed by: EMERGENCY MEDICINE

## 2021-12-26 PROCEDURE — 87147 CULTURE TYPE IMMUNOLOGIC: CPT | Performed by: EMERGENCY MEDICINE

## 2021-12-26 PROCEDURE — 87804 INFLUENZA ASSAY W/OPTIC: CPT | Performed by: EMERGENCY MEDICINE

## 2021-12-26 PROCEDURE — 93005 ELECTROCARDIOGRAM TRACING: CPT

## 2021-12-26 PROCEDURE — 71045 X-RAY EXAM CHEST 1 VIEW: CPT

## 2021-12-26 PROCEDURE — 82805 BLOOD GASES W/O2 SATURATION: CPT | Performed by: EMERGENCY MEDICINE

## 2021-12-26 PROCEDURE — 85025 COMPLETE CBC W/AUTO DIFF WBC: CPT

## 2021-12-26 PROCEDURE — 87040 BLOOD CULTURE FOR BACTERIA: CPT | Performed by: EMERGENCY MEDICINE

## 2021-12-26 PROCEDURE — 99223 1ST HOSP IP/OBS HIGH 75: CPT | Performed by: INTERNAL MEDICINE

## 2021-12-26 PROCEDURE — 84484 ASSAY OF TROPONIN QUANT: CPT

## 2021-12-26 PROCEDURE — U0004 COV-19 TEST NON-CDC HGH THRU: HCPCS | Performed by: EMERGENCY MEDICINE

## 2021-12-26 PROCEDURE — 82375 ASSAY CARBOXYHB QUANT: CPT | Performed by: EMERGENCY MEDICINE

## 2021-12-26 PROCEDURE — 83050 HGB METHEMOGLOBIN QUAN: CPT | Performed by: EMERGENCY MEDICINE

## 2021-12-26 PROCEDURE — U0005 INFEC AGEN DETEC AMPLI PROBE: HCPCS | Performed by: EMERGENCY MEDICINE

## 2021-12-26 PROCEDURE — 80053 COMPREHEN METABOLIC PANEL: CPT

## 2021-12-26 RX ORDER — CEFTRIAXONE SODIUM 1 G/50ML
1 INJECTION, SOLUTION INTRAVENOUS ONCE
Status: COMPLETED | OUTPATIENT
Start: 2021-12-26 | End: 2021-12-26

## 2021-12-26 RX ORDER — GUAIFENESIN/DEXTROMETHORPHAN 100-10MG/5
10 SYRUP ORAL EVERY 4 HOURS PRN
Status: DISCONTINUED | OUTPATIENT
Start: 2021-12-26 | End: 2022-01-10 | Stop reason: HOSPADM

## 2021-12-26 RX ORDER — DEXAMETHASONE SODIUM PHOSPHATE 10 MG/ML
10 INJECTION INTRAMUSCULAR; INTRAVENOUS ONCE
Status: COMPLETED | OUTPATIENT
Start: 2021-12-26 | End: 2021-12-26

## 2021-12-26 RX ORDER — DEXAMETHASONE SODIUM PHOSPHATE 10 MG/ML
6 INJECTION INTRAMUSCULAR; INTRAVENOUS DAILY
Status: DISCONTINUED | OUTPATIENT
Start: 2021-12-27 | End: 2021-12-27

## 2021-12-26 RX ORDER — SODIUM CHLORIDE 0.9 % (FLUSH) 0.9 %
10 SYRINGE (ML) INJECTION AS NEEDED
Status: DISCONTINUED | OUTPATIENT
Start: 2021-12-26 | End: 2022-01-10 | Stop reason: HOSPADM

## 2021-12-26 RX ADMIN — CEFTRIAXONE SODIUM 1 G: 1 INJECTION, SOLUTION INTRAVENOUS at 20:50

## 2021-12-26 RX ADMIN — SODIUM CHLORIDE 500 ML: 9 INJECTION, SOLUTION INTRAVENOUS at 22:01

## 2021-12-26 RX ADMIN — DEXAMETHASONE SODIUM PHOSPHATE 10 MG: 10 INJECTION INTRAMUSCULAR; INTRAVENOUS at 20:50

## 2021-12-27 ENCOUNTER — APPOINTMENT (OUTPATIENT)
Dept: CARDIOLOGY | Facility: HOSPITAL | Age: 76
End: 2021-12-27

## 2021-12-27 ENCOUNTER — APPOINTMENT (OUTPATIENT)
Dept: CT IMAGING | Facility: HOSPITAL | Age: 76
End: 2021-12-27

## 2021-12-27 ENCOUNTER — APPOINTMENT (OUTPATIENT)
Dept: GENERAL RADIOLOGY | Facility: HOSPITAL | Age: 76
End: 2021-12-27

## 2021-12-27 LAB
ALBUMIN SERPL-MCNC: 3.2 G/DL (ref 3.5–5.2)
ALBUMIN/GLOB SERPL: 0.9 G/DL
ALP SERPL-CCNC: 42 U/L (ref 39–117)
ALT SERPL W P-5'-P-CCNC: 40 U/L (ref 1–41)
ANION GAP SERPL CALCULATED.3IONS-SCNC: 14.1 MMOL/L (ref 5–15)
AST SERPL-CCNC: 63 U/L (ref 1–40)
BACTERIA BLD CULT: ABNORMAL
BASOPHILS # BLD AUTO: 0.01 10*3/MM3 (ref 0–0.2)
BASOPHILS NFR BLD AUTO: 0.4 % (ref 0–1.5)
BILIRUB SERPL-MCNC: 0.4 MG/DL (ref 0–1.2)
BUN SERPL-MCNC: 23 MG/DL (ref 8–23)
BUN/CREAT SERPL: 17.7 (ref 7–25)
CALCIUM SPEC-SCNC: 8.7 MG/DL (ref 8.6–10.5)
CHLORIDE SERPL-SCNC: 99 MMOL/L (ref 98–107)
CO2 SERPL-SCNC: 22.9 MMOL/L (ref 22–29)
CREAT SERPL-MCNC: 1.3 MG/DL (ref 0.76–1.27)
CRP SERPL-MCNC: 6.72 MG/DL (ref 0–0.5)
DEPRECATED RDW RBC AUTO: 41.2 FL (ref 37–54)
EOSINOPHIL # BLD AUTO: 0 10*3/MM3 (ref 0–0.4)
EOSINOPHIL NFR BLD AUTO: 0 % (ref 0.3–6.2)
ERYTHROCYTE [DISTWIDTH] IN BLOOD BY AUTOMATED COUNT: 13.8 % (ref 12.3–15.4)
GFR SERPL CREATININE-BSD FRML MDRD: 54 ML/MIN/1.73
GLOBULIN UR ELPH-MCNC: 3.5 GM/DL
GLUCOSE SERPL-MCNC: 199 MG/DL (ref 65–99)
HCT VFR BLD AUTO: 38.9 % (ref 37.5–51)
HGB BLD-MCNC: 13.1 G/DL (ref 13–17.7)
IMM GRANULOCYTES # BLD AUTO: 0.01 10*3/MM3 (ref 0–0.05)
IMM GRANULOCYTES NFR BLD AUTO: 0.4 % (ref 0–0.5)
LYMPHOCYTES # BLD AUTO: 0.42 10*3/MM3 (ref 0.7–3.1)
LYMPHOCYTES NFR BLD AUTO: 18.5 % (ref 19.6–45.3)
MCH RBC QN AUTO: 27.7 PG (ref 26.6–33)
MCHC RBC AUTO-ENTMCNC: 33.7 G/DL (ref 31.5–35.7)
MCV RBC AUTO: 82.2 FL (ref 79–97)
MONOCYTES # BLD AUTO: 0.08 10*3/MM3 (ref 0.1–0.9)
MONOCYTES NFR BLD AUTO: 3.5 % (ref 5–12)
NEUTROPHILS NFR BLD AUTO: 1.75 10*3/MM3 (ref 1.7–7)
NEUTROPHILS NFR BLD AUTO: 77.2 % (ref 42.7–76)
NRBC BLD AUTO-RTO: 0 /100 WBC (ref 0–0.2)
PLAT MORPH BLD: NORMAL
PLATELET # BLD AUTO: 148 10*3/MM3 (ref 140–450)
PMV BLD AUTO: 10.8 FL (ref 6–12)
POTASSIUM SERPL-SCNC: 4.5 MMOL/L (ref 3.5–5.2)
PROT SERPL-MCNC: 6.7 G/DL (ref 6–8.5)
RBC # BLD AUTO: 4.73 10*6/MM3 (ref 4.14–5.8)
RBC MORPH BLD: NORMAL
SARS-COV-2 RNA PNL SPEC NAA+PROBE: DETECTED
SODIUM SERPL-SCNC: 136 MMOL/L (ref 136–145)
WBC MORPH BLD: NORMAL
WBC NRBC COR # BLD: 2.27 10*3/MM3 (ref 3.4–10.8)

## 2021-12-27 PROCEDURE — 94799 UNLISTED PULMONARY SVC/PX: CPT

## 2021-12-27 PROCEDURE — 25010000002 ENOXAPARIN PER 10 MG: Performed by: INTERNAL MEDICINE

## 2021-12-27 PROCEDURE — 25010000002 TOCILIZUMAB 400 MG/20ML SOLUTION 20 ML VIAL: Performed by: INTERNAL MEDICINE

## 2021-12-27 PROCEDURE — 87040 BLOOD CULTURE FOR BACTERIA: CPT | Performed by: INTERNAL MEDICINE

## 2021-12-27 PROCEDURE — 94640 AIRWAY INHALATION TREATMENT: CPT

## 2021-12-27 PROCEDURE — 25010000002 FUROSEMIDE PER 20 MG: Performed by: INTERNAL MEDICINE

## 2021-12-27 PROCEDURE — 25010000002 TOCILIZUMAB 80 MG/4ML SOLUTION 4 ML VIAL: Performed by: INTERNAL MEDICINE

## 2021-12-27 PROCEDURE — 0 IOPAMIDOL PER 1 ML: Performed by: INTERNAL MEDICINE

## 2021-12-27 PROCEDURE — M0249 HC INTRAVENOUS INFUSION TOCILIZUMAB 1ST DOSE: HCPCS | Performed by: INTERNAL MEDICINE

## 2021-12-27 PROCEDURE — 99291 CRITICAL CARE FIRST HOUR: CPT | Performed by: INTERNAL MEDICINE

## 2021-12-27 PROCEDURE — XW033H5 INTRODUCTION OF TOCILIZUMAB INTO PERIPHERAL VEIN, PERCUTANEOUS APPROACH, NEW TECHNOLOGY GROUP 5: ICD-10-PCS | Performed by: INTERNAL MEDICINE

## 2021-12-27 PROCEDURE — 86140 C-REACTIVE PROTEIN: CPT | Performed by: INTERNAL MEDICINE

## 2021-12-27 PROCEDURE — 85007 BL SMEAR W/DIFF WBC COUNT: CPT | Performed by: INTERNAL MEDICINE

## 2021-12-27 PROCEDURE — 36415 COLL VENOUS BLD VENIPUNCTURE: CPT | Performed by: INTERNAL MEDICINE

## 2021-12-27 PROCEDURE — 71045 X-RAY EXAM CHEST 1 VIEW: CPT

## 2021-12-27 PROCEDURE — 85025 COMPLETE CBC W/AUTO DIFF WBC: CPT | Performed by: INTERNAL MEDICINE

## 2021-12-27 PROCEDURE — 71275 CT ANGIOGRAPHY CHEST: CPT

## 2021-12-27 PROCEDURE — 93970 EXTREMITY STUDY: CPT

## 2021-12-27 PROCEDURE — 99233 SBSQ HOSP IP/OBS HIGH 50: CPT | Performed by: INTERNAL MEDICINE

## 2021-12-27 PROCEDURE — 25010000002 DEXAMETHASONE PER 1 MG: Performed by: INTERNAL MEDICINE

## 2021-12-27 PROCEDURE — 80053 COMPREHEN METABOLIC PANEL: CPT | Performed by: INTERNAL MEDICINE

## 2021-12-27 PROCEDURE — 93970 EXTREMITY STUDY: CPT | Performed by: SURGERY

## 2021-12-27 RX ORDER — IPRATROPIUM BROMIDE AND ALBUTEROL SULFATE 2.5; .5 MG/3ML; MG/3ML
3 SOLUTION RESPIRATORY (INHALATION)
Status: DISCONTINUED | OUTPATIENT
Start: 2021-12-27 | End: 2022-01-03

## 2021-12-27 RX ORDER — ONDANSETRON 2 MG/ML
4 INJECTION INTRAMUSCULAR; INTRAVENOUS EVERY 6 HOURS PRN
Status: DISCONTINUED | OUTPATIENT
Start: 2021-12-27 | End: 2022-01-10 | Stop reason: HOSPADM

## 2021-12-27 RX ORDER — LEVOTHYROXINE SODIUM 0.1 MG/1
100 TABLET ORAL
Status: DISCONTINUED | OUTPATIENT
Start: 2021-12-27 | End: 2022-01-10 | Stop reason: HOSPADM

## 2021-12-27 RX ORDER — NITROGLYCERIN 0.4 MG/1
0.4 TABLET SUBLINGUAL
Status: DISCONTINUED | OUTPATIENT
Start: 2021-12-27 | End: 2022-01-10 | Stop reason: HOSPADM

## 2021-12-27 RX ORDER — ARFORMOTEROL TARTRATE 15 UG/2ML
15 SOLUTION RESPIRATORY (INHALATION)
Status: DISCONTINUED | OUTPATIENT
Start: 2021-12-27 | End: 2022-01-10 | Stop reason: HOSPADM

## 2021-12-27 RX ORDER — ACETAMINOPHEN 325 MG/1
650 TABLET ORAL EVERY 4 HOURS PRN
Status: DISCONTINUED | OUTPATIENT
Start: 2021-12-27 | End: 2022-01-10 | Stop reason: HOSPADM

## 2021-12-27 RX ORDER — BUDESONIDE 0.5 MG/2ML
0.5 INHALANT ORAL
Status: DISCONTINUED | OUTPATIENT
Start: 2021-12-27 | End: 2022-01-10 | Stop reason: HOSPADM

## 2021-12-27 RX ORDER — DEXAMETHASONE SODIUM PHOSPHATE 10 MG/ML
10 INJECTION INTRAMUSCULAR; INTRAVENOUS DAILY
Status: DISCONTINUED | OUTPATIENT
Start: 2021-12-27 | End: 2022-01-08

## 2021-12-27 RX ORDER — FUROSEMIDE 10 MG/ML
40 INJECTION INTRAMUSCULAR; INTRAVENOUS ONCE
Status: COMPLETED | OUTPATIENT
Start: 2021-12-27 | End: 2021-12-27

## 2021-12-27 RX ORDER — MONTELUKAST SODIUM 10 MG/1
10 TABLET ORAL DAILY
Status: DISCONTINUED | OUTPATIENT
Start: 2021-12-27 | End: 2022-01-10 | Stop reason: HOSPADM

## 2021-12-27 RX ORDER — SODIUM CHLORIDE 0.9 % (FLUSH) 0.9 %
10 SYRINGE (ML) INJECTION AS NEEDED
Status: DISCONTINUED | OUTPATIENT
Start: 2021-12-27 | End: 2022-01-10 | Stop reason: HOSPADM

## 2021-12-27 RX ORDER — SODIUM CHLORIDE 0.9 % (FLUSH) 0.9 %
10 SYRINGE (ML) INJECTION EVERY 12 HOURS SCHEDULED
Status: DISCONTINUED | OUTPATIENT
Start: 2021-12-27 | End: 2022-01-10 | Stop reason: HOSPADM

## 2021-12-27 RX ORDER — PANTOPRAZOLE SODIUM 40 MG/1
40 TABLET, DELAYED RELEASE ORAL EVERY MORNING
Status: DISCONTINUED | OUTPATIENT
Start: 2021-12-27 | End: 2022-01-10 | Stop reason: HOSPADM

## 2021-12-27 RX ADMIN — DEXAMETHASONE SODIUM PHOSPHATE 10 MG: 10 INJECTION INTRAMUSCULAR; INTRAVENOUS at 16:33

## 2021-12-27 RX ADMIN — SODIUM CHLORIDE, PRESERVATIVE FREE 10 ML: 5 INJECTION INTRAVENOUS at 20:19

## 2021-12-27 RX ADMIN — FUROSEMIDE 40 MG: 10 INJECTION INTRAMUSCULAR; INTRAVENOUS at 15:23

## 2021-12-27 RX ADMIN — IOPAMIDOL 100 ML: 755 INJECTION, SOLUTION INTRAVENOUS at 19:03

## 2021-12-27 RX ADMIN — SODIUM CHLORIDE, PRESERVATIVE FREE 10 ML: 5 INJECTION INTRAVENOUS at 01:40

## 2021-12-27 RX ADMIN — ENOXAPARIN SODIUM 40 MG: 40 INJECTION SUBCUTANEOUS at 08:11

## 2021-12-27 RX ADMIN — PANTOPRAZOLE SODIUM 40 MG: 40 TABLET, DELAYED RELEASE ORAL at 06:18

## 2021-12-27 RX ADMIN — BUDESONIDE 0.5 MG: 0.5 SUSPENSION RESPIRATORY (INHALATION) at 15:19

## 2021-12-27 RX ADMIN — IPRATROPIUM BROMIDE AND ALBUTEROL SULFATE 3 ML: 2.5; .5 SOLUTION RESPIRATORY (INHALATION) at 12:29

## 2021-12-27 RX ADMIN — SODIUM CHLORIDE, PRESERVATIVE FREE 10 ML: 5 INJECTION INTRAVENOUS at 08:11

## 2021-12-27 RX ADMIN — ARFORMOTEROL TARTRATE 15 MCG: 15 SOLUTION RESPIRATORY (INHALATION) at 15:19

## 2021-12-27 RX ADMIN — TOCILIZUMAB 720 MG: 20 INJECTION, SOLUTION, CONCENTRATE INTRAVENOUS at 15:23

## 2021-12-27 RX ADMIN — LEVOTHYROXINE SODIUM 100 MCG: 0.1 TABLET ORAL at 06:18

## 2021-12-27 RX ADMIN — ENOXAPARIN SODIUM 100 MG: 100 INJECTION SUBCUTANEOUS at 20:18

## 2021-12-27 RX ADMIN — MONTELUKAST 10 MG: 10 TABLET, FILM COATED ORAL at 08:10

## 2021-12-28 LAB
ALBUMIN SERPL-MCNC: 4 G/DL (ref 3.5–5.2)
ALBUMIN/GLOB SERPL: 1 G/DL
ALP SERPL-CCNC: 49 U/L (ref 39–117)
ALT SERPL W P-5'-P-CCNC: 44 U/L (ref 1–41)
ANION GAP SERPL CALCULATED.3IONS-SCNC: 15.9 MMOL/L (ref 5–15)
AST SERPL-CCNC: 44 U/L (ref 1–40)
BACTERIA SPEC AEROBE CULT: ABNORMAL
BASOPHILS # BLD AUTO: 0.01 10*3/MM3 (ref 0–0.2)
BASOPHILS NFR BLD AUTO: 0.1 % (ref 0–1.5)
BH CV LOWER VASCULAR LEFT COMMON FEMORAL AUGMENT: NORMAL
BH CV LOWER VASCULAR LEFT COMMON FEMORAL COMPRESS: NORMAL
BH CV LOWER VASCULAR LEFT COMMON FEMORAL SPONT: NORMAL
BH CV LOWER VASCULAR LEFT DISTAL FEMORAL COMPRESS: NORMAL
BH CV LOWER VASCULAR LEFT GREATER SAPH AK COMPRESS: NORMAL
BH CV LOWER VASCULAR LEFT MID FEMORAL AUGMENT: NORMAL
BH CV LOWER VASCULAR LEFT MID FEMORAL COMPRESS: NORMAL
BH CV LOWER VASCULAR LEFT MID FEMORAL SPONT: NORMAL
BH CV LOWER VASCULAR LEFT PERONEAL COMPRESS: NORMAL
BH CV LOWER VASCULAR LEFT POPLITEAL AUGMENT: NORMAL
BH CV LOWER VASCULAR LEFT POPLITEAL COMPRESS: NORMAL
BH CV LOWER VASCULAR LEFT POPLITEAL SPONT: NORMAL
BH CV LOWER VASCULAR LEFT POSTERIOR TIBIAL COMPRESS: NORMAL
BH CV LOWER VASCULAR LEFT PROXIMAL FEMORAL COMPRESS: NORMAL
BH CV LOWER VASCULAR RIGHT COMMON FEMORAL AUGMENT: NORMAL
BH CV LOWER VASCULAR RIGHT COMMON FEMORAL COMPRESS: NORMAL
BH CV LOWER VASCULAR RIGHT COMMON FEMORAL SPONT: NORMAL
BH CV LOWER VASCULAR RIGHT DISTAL FEMORAL COMPRESS: NORMAL
BH CV LOWER VASCULAR RIGHT GREATER SAPH AK COMPRESS: NORMAL
BH CV LOWER VASCULAR RIGHT MID FEMORAL AUGMENT: NORMAL
BH CV LOWER VASCULAR RIGHT MID FEMORAL COMPRESS: NORMAL
BH CV LOWER VASCULAR RIGHT MID FEMORAL SPONT: NORMAL
BH CV LOWER VASCULAR RIGHT PERONEAL COMPRESS: NORMAL
BH CV LOWER VASCULAR RIGHT POPLITEAL AUGMENT: NORMAL
BH CV LOWER VASCULAR RIGHT POPLITEAL COMPRESS: NORMAL
BH CV LOWER VASCULAR RIGHT POPLITEAL SPONT: NORMAL
BH CV LOWER VASCULAR RIGHT POSTERIOR TIBIAL COMPRESS: NORMAL
BH CV LOWER VASCULAR RIGHT PROXIMAL FEMORAL COMPRESS: NORMAL
BILIRUB SERPL-MCNC: 0.4 MG/DL (ref 0–1.2)
BUN SERPL-MCNC: 29 MG/DL (ref 8–23)
BUN/CREAT SERPL: 21.6 (ref 7–25)
CALCIUM SPEC-SCNC: 9.9 MG/DL (ref 8.6–10.5)
CHLORIDE SERPL-SCNC: 98 MMOL/L (ref 98–107)
CO2 SERPL-SCNC: 22.1 MMOL/L (ref 22–29)
CREAT SERPL-MCNC: 1.34 MG/DL (ref 0.76–1.27)
D DIMER PPP FEU-MCNC: 0.85 MG/L (FEU) (ref 0–0.59)
DEPRECATED RDW RBC AUTO: 42 FL (ref 37–54)
EOSINOPHIL # BLD AUTO: 0 10*3/MM3 (ref 0–0.4)
EOSINOPHIL NFR BLD AUTO: 0 % (ref 0.3–6.2)
ERYTHROCYTE [DISTWIDTH] IN BLOOD BY AUTOMATED COUNT: 13.8 % (ref 12.3–15.4)
GFR SERPL CREATININE-BSD FRML MDRD: 52 ML/MIN/1.73
GLOBULIN UR ELPH-MCNC: 3.9 GM/DL
GLUCOSE SERPL-MCNC: 163 MG/DL (ref 65–99)
GRAM STN SPEC: ABNORMAL
HCT VFR BLD AUTO: 44.7 % (ref 37.5–51)
HGB BLD-MCNC: 14.8 G/DL (ref 13–17.7)
IMM GRANULOCYTES # BLD AUTO: 0.07 10*3/MM3 (ref 0–0.05)
IMM GRANULOCYTES NFR BLD AUTO: 0.9 % (ref 0–0.5)
INR PPP: 0.95 (ref 2–3)
ISOLATED FROM: ABNORMAL
LYMPHOCYTES # BLD AUTO: 0.9 10*3/MM3 (ref 0.7–3.1)
LYMPHOCYTES NFR BLD AUTO: 12 % (ref 19.6–45.3)
MAXIMAL PREDICTED HEART RATE: 145 BPM
MCH RBC QN AUTO: 27.7 PG (ref 26.6–33)
MCHC RBC AUTO-ENTMCNC: 33.1 G/DL (ref 31.5–35.7)
MCV RBC AUTO: 83.7 FL (ref 79–97)
MONOCYTES # BLD AUTO: 0.31 10*3/MM3 (ref 0.1–0.9)
MONOCYTES NFR BLD AUTO: 4.1 % (ref 5–12)
NEUTROPHILS NFR BLD AUTO: 6.22 10*3/MM3 (ref 1.7–7)
NEUTROPHILS NFR BLD AUTO: 82.9 % (ref 42.7–76)
NRBC BLD AUTO-RTO: 0 /100 WBC (ref 0–0.2)
PHOSPHATE SERPL-MCNC: 3.9 MG/DL (ref 2.5–4.5)
PLATELET # BLD AUTO: 251 10*3/MM3 (ref 140–450)
PMV BLD AUTO: 11 FL (ref 6–12)
POTASSIUM SERPL-SCNC: 4.3 MMOL/L (ref 3.5–5.2)
PROT SERPL-MCNC: 7.9 G/DL (ref 6–8.5)
PROTHROMBIN TIME: 10.1 SECONDS (ref 9.4–12)
RBC # BLD AUTO: 5.34 10*6/MM3 (ref 4.14–5.8)
SODIUM SERPL-SCNC: 136 MMOL/L (ref 136–145)
STRESS TARGET HR: 123 BPM
WBC NRBC COR # BLD: 7.51 10*3/MM3 (ref 3.4–10.8)

## 2021-12-28 PROCEDURE — 25010000002 DEXAMETHASONE PER 1 MG: Performed by: INTERNAL MEDICINE

## 2021-12-28 PROCEDURE — 25010000002 ENOXAPARIN PER 10 MG: Performed by: INTERNAL MEDICINE

## 2021-12-28 PROCEDURE — 25010000002 FUROSEMIDE PER 20 MG: Performed by: INTERNAL MEDICINE

## 2021-12-28 PROCEDURE — 84100 ASSAY OF PHOSPHORUS: CPT | Performed by: INTERNAL MEDICINE

## 2021-12-28 PROCEDURE — 94799 UNLISTED PULMONARY SVC/PX: CPT

## 2021-12-28 PROCEDURE — 99233 SBSQ HOSP IP/OBS HIGH 50: CPT | Performed by: FAMILY MEDICINE

## 2021-12-28 PROCEDURE — 80053 COMPREHEN METABOLIC PANEL: CPT | Performed by: INTERNAL MEDICINE

## 2021-12-28 PROCEDURE — 99233 SBSQ HOSP IP/OBS HIGH 50: CPT | Performed by: INTERNAL MEDICINE

## 2021-12-28 PROCEDURE — 85610 PROTHROMBIN TIME: CPT | Performed by: INTERNAL MEDICINE

## 2021-12-28 PROCEDURE — 85025 COMPLETE CBC W/AUTO DIFF WBC: CPT | Performed by: INTERNAL MEDICINE

## 2021-12-28 PROCEDURE — 85379 FIBRIN DEGRADATION QUANT: CPT | Performed by: INTERNAL MEDICINE

## 2021-12-28 RX ORDER — FLUTICASONE PROPIONATE 50 MCG
2 SPRAY, SUSPENSION (ML) NASAL DAILY
Status: DISCONTINUED | OUTPATIENT
Start: 2021-12-28 | End: 2022-01-10 | Stop reason: HOSPADM

## 2021-12-28 RX ORDER — FUROSEMIDE 10 MG/ML
40 INJECTION INTRAMUSCULAR; INTRAVENOUS ONCE
Status: COMPLETED | OUTPATIENT
Start: 2021-12-28 | End: 2021-12-28

## 2021-12-28 RX ORDER — CHOLECALCIFEROL (VITAMIN D3) 125 MCG
10 CAPSULE ORAL NIGHTLY
Status: DISCONTINUED | OUTPATIENT
Start: 2021-12-28 | End: 2022-01-10 | Stop reason: HOSPADM

## 2021-12-28 RX ADMIN — IPRATROPIUM BROMIDE AND ALBUTEROL SULFATE 3 ML: 2.5; .5 SOLUTION RESPIRATORY (INHALATION) at 00:03

## 2021-12-28 RX ADMIN — IPRATROPIUM BROMIDE AND ALBUTEROL SULFATE 3 ML: 2.5; .5 SOLUTION RESPIRATORY (INHALATION) at 12:05

## 2021-12-28 RX ADMIN — ENOXAPARIN SODIUM 100 MG: 100 INJECTION SUBCUTANEOUS at 08:52

## 2021-12-28 RX ADMIN — ARFORMOTEROL TARTRATE 15 MCG: 15 SOLUTION RESPIRATORY (INHALATION) at 07:33

## 2021-12-28 RX ADMIN — PANTOPRAZOLE SODIUM 40 MG: 40 TABLET, DELAYED RELEASE ORAL at 06:02

## 2021-12-28 RX ADMIN — BUDESONIDE 0.5 MG: 0.5 SUSPENSION RESPIRATORY (INHALATION) at 07:33

## 2021-12-28 RX ADMIN — IPRATROPIUM BROMIDE AND ALBUTEROL SULFATE 3 ML: 2.5; .5 SOLUTION RESPIRATORY (INHALATION) at 07:33

## 2021-12-28 RX ADMIN — FUROSEMIDE 40 MG: 10 INJECTION INTRAMUSCULAR; INTRAVENOUS at 06:44

## 2021-12-28 RX ADMIN — IPRATROPIUM BROMIDE AND ALBUTEROL SULFATE 3 ML: 2.5; .5 SOLUTION RESPIRATORY (INHALATION) at 18:29

## 2021-12-28 RX ADMIN — SODIUM CHLORIDE, PRESERVATIVE FREE 10 ML: 5 INJECTION INTRAVENOUS at 20:57

## 2021-12-28 RX ADMIN — DEXAMETHASONE SODIUM PHOSPHATE 10 MG: 10 INJECTION INTRAMUSCULAR; INTRAVENOUS at 08:52

## 2021-12-28 RX ADMIN — ARFORMOTEROL TARTRATE 15 MCG: 15 SOLUTION RESPIRATORY (INHALATION) at 18:29

## 2021-12-28 RX ADMIN — SODIUM CHLORIDE, PRESERVATIVE FREE 10 ML: 5 INJECTION INTRAVENOUS at 08:52

## 2021-12-28 RX ADMIN — BUDESONIDE 0.5 MG: 0.5 SUSPENSION RESPIRATORY (INHALATION) at 18:29

## 2021-12-28 RX ADMIN — FLUTICASONE PROPIONATE 2 SPRAY: 50 SPRAY, METERED NASAL at 15:53

## 2021-12-28 RX ADMIN — Medication 10 MG: at 20:57

## 2021-12-28 RX ADMIN — ENOXAPARIN SODIUM 100 MG: 100 INJECTION SUBCUTANEOUS at 20:57

## 2021-12-28 RX ADMIN — MONTELUKAST 10 MG: 10 TABLET, FILM COATED ORAL at 08:52

## 2021-12-28 RX ADMIN — LEVOTHYROXINE SODIUM 100 MCG: 0.1 TABLET ORAL at 06:02

## 2021-12-29 LAB
ALBUMIN SERPL-MCNC: 3.7 G/DL (ref 3.5–5.2)
ALP SERPL-CCNC: 44 U/L (ref 39–117)
ALT SERPL W P-5'-P-CCNC: 35 U/L (ref 1–41)
ANION GAP SERPL CALCULATED.3IONS-SCNC: 15.3 MMOL/L (ref 5–15)
AST SERPL-CCNC: 29 U/L (ref 1–40)
BASOPHILS # BLD AUTO: 0.02 10*3/MM3 (ref 0–0.2)
BASOPHILS NFR BLD AUTO: 0.2 % (ref 0–1.5)
BILIRUB CONJ SERPL-MCNC: <0.2 MG/DL (ref 0–0.3)
BILIRUB INDIRECT SERPL-MCNC: NORMAL MG/DL
BILIRUB SERPL-MCNC: 0.3 MG/DL (ref 0–1.2)
BUN SERPL-MCNC: 31 MG/DL (ref 8–23)
BUN/CREAT SERPL: 25.2 (ref 7–25)
CALCIUM SPEC-SCNC: 9.4 MG/DL (ref 8.6–10.5)
CHLORIDE SERPL-SCNC: 98 MMOL/L (ref 98–107)
CO2 SERPL-SCNC: 20.7 MMOL/L (ref 22–29)
CREAT SERPL-MCNC: 1.23 MG/DL (ref 0.76–1.27)
CRP SERPL-MCNC: 2.19 MG/DL (ref 0–0.5)
D DIMER PPP FEU-MCNC: 0.56 MG/L (FEU) (ref 0–0.59)
DEPRECATED RDW RBC AUTO: 40.3 FL (ref 37–54)
EOSINOPHIL # BLD AUTO: 0 10*3/MM3 (ref 0–0.4)
EOSINOPHIL NFR BLD AUTO: 0 % (ref 0.3–6.2)
ERYTHROCYTE [DISTWIDTH] IN BLOOD BY AUTOMATED COUNT: 13.6 % (ref 12.3–15.4)
ERYTHROCYTE [SEDIMENTATION RATE] IN BLOOD: 46 MM/HR (ref 0–20)
FERRITIN SERPL-MCNC: 488.2 NG/ML (ref 30–400)
GFR SERPL CREATININE-BSD FRML MDRD: 57 ML/MIN/1.73
GLUCOSE SERPL-MCNC: 183 MG/DL (ref 65–99)
HCT VFR BLD AUTO: 39.4 % (ref 37.5–51)
HGB BLD-MCNC: 13.4 G/DL (ref 13–17.7)
IMM GRANULOCYTES # BLD AUTO: 0.21 10*3/MM3 (ref 0–0.05)
IMM GRANULOCYTES NFR BLD AUTO: 2 % (ref 0–0.5)
LYMPHOCYTES # BLD AUTO: 0.85 10*3/MM3 (ref 0.7–3.1)
LYMPHOCYTES NFR BLD AUTO: 7.9 % (ref 19.6–45.3)
MAGNESIUM SERPL-MCNC: 2.3 MG/DL (ref 1.6–2.4)
MCH RBC QN AUTO: 27.7 PG (ref 26.6–33)
MCHC RBC AUTO-ENTMCNC: 34 G/DL (ref 31.5–35.7)
MCV RBC AUTO: 81.4 FL (ref 79–97)
MONOCYTES # BLD AUTO: 0.55 10*3/MM3 (ref 0.1–0.9)
MONOCYTES NFR BLD AUTO: 5.1 % (ref 5–12)
NEUTROPHILS NFR BLD AUTO: 84.8 % (ref 42.7–76)
NEUTROPHILS NFR BLD AUTO: 9.09 10*3/MM3 (ref 1.7–7)
NRBC BLD AUTO-RTO: 0 /100 WBC (ref 0–0.2)
PHOSPHATE SERPL-MCNC: 3.9 MG/DL (ref 2.5–4.5)
PLATELET # BLD AUTO: 259 10*3/MM3 (ref 140–450)
PMV BLD AUTO: 11.4 FL (ref 6–12)
POTASSIUM SERPL-SCNC: 4 MMOL/L (ref 3.5–5.2)
PROT SERPL-MCNC: 7.1 G/DL (ref 6–8.5)
RBC # BLD AUTO: 4.84 10*6/MM3 (ref 4.14–5.8)
RBC MORPH BLD: NORMAL
SMALL PLATELETS BLD QL SMEAR: ADEQUATE
SODIUM SERPL-SCNC: 134 MMOL/L (ref 136–145)
WBC MORPH BLD: NORMAL
WBC NRBC COR # BLD: 10.72 10*3/MM3 (ref 3.4–10.8)

## 2021-12-29 PROCEDURE — 25010000002 ENOXAPARIN PER 10 MG: Performed by: INTERNAL MEDICINE

## 2021-12-29 PROCEDURE — 99233 SBSQ HOSP IP/OBS HIGH 50: CPT | Performed by: FAMILY MEDICINE

## 2021-12-29 PROCEDURE — 94799 UNLISTED PULMONARY SVC/PX: CPT

## 2021-12-29 PROCEDURE — 86140 C-REACTIVE PROTEIN: CPT | Performed by: FAMILY MEDICINE

## 2021-12-29 PROCEDURE — 84100 ASSAY OF PHOSPHORUS: CPT | Performed by: FAMILY MEDICINE

## 2021-12-29 PROCEDURE — 85025 COMPLETE CBC W/AUTO DIFF WBC: CPT | Performed by: FAMILY MEDICINE

## 2021-12-29 PROCEDURE — 80048 BASIC METABOLIC PNL TOTAL CA: CPT | Performed by: FAMILY MEDICINE

## 2021-12-29 PROCEDURE — 25010000002 FUROSEMIDE PER 20 MG: Performed by: INTERNAL MEDICINE

## 2021-12-29 PROCEDURE — 85007 BL SMEAR W/DIFF WBC COUNT: CPT | Performed by: FAMILY MEDICINE

## 2021-12-29 PROCEDURE — 25010000002 DEXAMETHASONE PER 1 MG: Performed by: INTERNAL MEDICINE

## 2021-12-29 PROCEDURE — 83735 ASSAY OF MAGNESIUM: CPT | Performed by: FAMILY MEDICINE

## 2021-12-29 PROCEDURE — 80076 HEPATIC FUNCTION PANEL: CPT | Performed by: FAMILY MEDICINE

## 2021-12-29 PROCEDURE — 99233 SBSQ HOSP IP/OBS HIGH 50: CPT | Performed by: INTERNAL MEDICINE

## 2021-12-29 PROCEDURE — 82728 ASSAY OF FERRITIN: CPT | Performed by: FAMILY MEDICINE

## 2021-12-29 PROCEDURE — 85652 RBC SED RATE AUTOMATED: CPT | Performed by: FAMILY MEDICINE

## 2021-12-29 PROCEDURE — 94761 N-INVAS EAR/PLS OXIMETRY MLT: CPT

## 2021-12-29 PROCEDURE — 85379 FIBRIN DEGRADATION QUANT: CPT | Performed by: FAMILY MEDICINE

## 2021-12-29 RX ORDER — FUROSEMIDE 10 MG/ML
40 INJECTION INTRAMUSCULAR; INTRAVENOUS ONCE
Status: COMPLETED | OUTPATIENT
Start: 2021-12-29 | End: 2021-12-29

## 2021-12-29 RX ORDER — TEMAZEPAM 15 MG/1
15 CAPSULE ORAL NIGHTLY PRN
Status: DISCONTINUED | OUTPATIENT
Start: 2021-12-29 | End: 2022-01-10 | Stop reason: HOSPADM

## 2021-12-29 RX ADMIN — FUROSEMIDE 40 MG: 10 INJECTION INTRAMUSCULAR; INTRAVENOUS at 08:39

## 2021-12-29 RX ADMIN — SODIUM CHLORIDE, PRESERVATIVE FREE 10 ML: 5 INJECTION INTRAVENOUS at 21:22

## 2021-12-29 RX ADMIN — LEVOTHYROXINE SODIUM 100 MCG: 0.1 TABLET ORAL at 06:19

## 2021-12-29 RX ADMIN — ARFORMOTEROL TARTRATE 15 MCG: 15 SOLUTION RESPIRATORY (INHALATION) at 06:38

## 2021-12-29 RX ADMIN — IPRATROPIUM BROMIDE AND ALBUTEROL SULFATE 3 ML: 2.5; .5 SOLUTION RESPIRATORY (INHALATION) at 06:38

## 2021-12-29 RX ADMIN — ARFORMOTEROL TARTRATE 15 MCG: 15 SOLUTION RESPIRATORY (INHALATION) at 18:20

## 2021-12-29 RX ADMIN — BUDESONIDE 0.5 MG: 0.5 SUSPENSION RESPIRATORY (INHALATION) at 06:38

## 2021-12-29 RX ADMIN — BUDESONIDE 0.5 MG: 0.5 SUSPENSION RESPIRATORY (INHALATION) at 18:20

## 2021-12-29 RX ADMIN — PANTOPRAZOLE SODIUM 40 MG: 40 TABLET, DELAYED RELEASE ORAL at 06:19

## 2021-12-29 RX ADMIN — IPRATROPIUM BROMIDE AND ALBUTEROL SULFATE 3 ML: 2.5; .5 SOLUTION RESPIRATORY (INHALATION) at 18:20

## 2021-12-29 RX ADMIN — SODIUM CHLORIDE, PRESERVATIVE FREE 10 ML: 5 INJECTION INTRAVENOUS at 08:39

## 2021-12-29 RX ADMIN — DEXAMETHASONE SODIUM PHOSPHATE 10 MG: 10 INJECTION INTRAMUSCULAR; INTRAVENOUS at 08:39

## 2021-12-29 RX ADMIN — IPRATROPIUM BROMIDE AND ALBUTEROL SULFATE 3 ML: 2.5; .5 SOLUTION RESPIRATORY (INHALATION) at 11:37

## 2021-12-29 RX ADMIN — Medication 10 MG: at 21:22

## 2021-12-29 RX ADMIN — IPRATROPIUM BROMIDE AND ALBUTEROL SULFATE 3 ML: 2.5; .5 SOLUTION RESPIRATORY (INHALATION) at 00:46

## 2021-12-29 RX ADMIN — ENOXAPARIN SODIUM 40 MG: 40 INJECTION SUBCUTANEOUS at 08:40

## 2021-12-29 RX ADMIN — MONTELUKAST 10 MG: 10 TABLET, FILM COATED ORAL at 08:39

## 2021-12-29 RX ADMIN — FLUTICASONE PROPIONATE 2 SPRAY: 50 SPRAY, METERED NASAL at 08:40

## 2021-12-30 ENCOUNTER — APPOINTMENT (OUTPATIENT)
Dept: GENERAL RADIOLOGY | Facility: HOSPITAL | Age: 76
End: 2021-12-30

## 2021-12-30 LAB
ALBUMIN SERPL-MCNC: 4.1 G/DL (ref 3.5–5.2)
ALP SERPL-CCNC: 51 U/L (ref 39–117)
ALT SERPL W P-5'-P-CCNC: 54 U/L (ref 1–41)
ANION GAP SERPL CALCULATED.3IONS-SCNC: 14.3 MMOL/L (ref 5–15)
AST SERPL-CCNC: 47 U/L (ref 1–40)
BASOPHILS # BLD AUTO: 0.01 10*3/MM3 (ref 0–0.2)
BASOPHILS NFR BLD AUTO: 0.1 % (ref 0–1.5)
BILIRUB CONJ SERPL-MCNC: <0.2 MG/DL (ref 0–0.3)
BILIRUB INDIRECT SERPL-MCNC: ABNORMAL MG/DL
BILIRUB SERPL-MCNC: 0.4 MG/DL (ref 0–1.2)
BUN SERPL-MCNC: 41 MG/DL (ref 8–23)
BUN/CREAT SERPL: 35 (ref 7–25)
CALCIUM SPEC-SCNC: 9.6 MG/DL (ref 8.6–10.5)
CHLORIDE SERPL-SCNC: 98 MMOL/L (ref 98–107)
CO2 SERPL-SCNC: 23.7 MMOL/L (ref 22–29)
CREAT SERPL-MCNC: 1.17 MG/DL (ref 0.76–1.27)
DEPRECATED RDW RBC AUTO: 42.1 FL (ref 37–54)
EOSINOPHIL # BLD AUTO: 0 10*3/MM3 (ref 0–0.4)
EOSINOPHIL NFR BLD AUTO: 0 % (ref 0.3–6.2)
ERYTHROCYTE [DISTWIDTH] IN BLOOD BY AUTOMATED COUNT: 13.9 % (ref 12.3–15.4)
GFR SERPL CREATININE-BSD FRML MDRD: 61 ML/MIN/1.73
GLUCOSE SERPL-MCNC: 147 MG/DL (ref 65–99)
HCT VFR BLD AUTO: 41.5 % (ref 37.5–51)
HGB BLD-MCNC: 13.8 G/DL (ref 13–17.7)
IMM GRANULOCYTES # BLD AUTO: 0.15 10*3/MM3 (ref 0–0.05)
IMM GRANULOCYTES NFR BLD AUTO: 1.6 % (ref 0–0.5)
LYMPHOCYTES # BLD AUTO: 0.65 10*3/MM3 (ref 0.7–3.1)
LYMPHOCYTES NFR BLD AUTO: 7.1 % (ref 19.6–45.3)
MAGNESIUM SERPL-MCNC: 2.3 MG/DL (ref 1.6–2.4)
MCH RBC QN AUTO: 27.8 PG (ref 26.6–33)
MCHC RBC AUTO-ENTMCNC: 33.3 G/DL (ref 31.5–35.7)
MCV RBC AUTO: 83.7 FL (ref 79–97)
MONOCYTES # BLD AUTO: 0.29 10*3/MM3 (ref 0.1–0.9)
MONOCYTES NFR BLD AUTO: 3.2 % (ref 5–12)
NEUTROPHILS NFR BLD AUTO: 8.04 10*3/MM3 (ref 1.7–7)
NEUTROPHILS NFR BLD AUTO: 88 % (ref 42.7–76)
NRBC BLD AUTO-RTO: 0 /100 WBC (ref 0–0.2)
PHOSPHATE SERPL-MCNC: 3.6 MG/DL (ref 2.5–4.5)
PLATELET # BLD AUTO: 290 10*3/MM3 (ref 140–450)
PMV BLD AUTO: 11.2 FL (ref 6–12)
POTASSIUM SERPL-SCNC: 4.3 MMOL/L (ref 3.5–5.2)
PROT SERPL-MCNC: 7.3 G/DL (ref 6–8.5)
RBC # BLD AUTO: 4.96 10*6/MM3 (ref 4.14–5.8)
SODIUM SERPL-SCNC: 136 MMOL/L (ref 136–145)
WBC NRBC COR # BLD: 9.14 10*3/MM3 (ref 3.4–10.8)

## 2021-12-30 PROCEDURE — 84100 ASSAY OF PHOSPHORUS: CPT | Performed by: FAMILY MEDICINE

## 2021-12-30 PROCEDURE — 94799 UNLISTED PULMONARY SVC/PX: CPT

## 2021-12-30 PROCEDURE — 25010000002 ENOXAPARIN PER 10 MG: Performed by: INTERNAL MEDICINE

## 2021-12-30 PROCEDURE — 94761 N-INVAS EAR/PLS OXIMETRY MLT: CPT

## 2021-12-30 PROCEDURE — 99233 SBSQ HOSP IP/OBS HIGH 50: CPT | Performed by: FAMILY MEDICINE

## 2021-12-30 PROCEDURE — 87070 CULTURE OTHR SPECIMN AEROBIC: CPT | Performed by: INTERNAL MEDICINE

## 2021-12-30 PROCEDURE — 85025 COMPLETE CBC W/AUTO DIFF WBC: CPT | Performed by: FAMILY MEDICINE

## 2021-12-30 PROCEDURE — 87205 SMEAR GRAM STAIN: CPT | Performed by: INTERNAL MEDICINE

## 2021-12-30 PROCEDURE — 94760 N-INVAS EAR/PLS OXIMETRY 1: CPT

## 2021-12-30 PROCEDURE — 25010000002 FUROSEMIDE PER 20 MG: Performed by: INTERNAL MEDICINE

## 2021-12-30 PROCEDURE — 80076 HEPATIC FUNCTION PANEL: CPT | Performed by: FAMILY MEDICINE

## 2021-12-30 PROCEDURE — 83735 ASSAY OF MAGNESIUM: CPT | Performed by: FAMILY MEDICINE

## 2021-12-30 PROCEDURE — 80048 BASIC METABOLIC PNL TOTAL CA: CPT | Performed by: FAMILY MEDICINE

## 2021-12-30 PROCEDURE — 71045 X-RAY EXAM CHEST 1 VIEW: CPT

## 2021-12-30 PROCEDURE — 99291 CRITICAL CARE FIRST HOUR: CPT | Performed by: INTERNAL MEDICINE

## 2021-12-30 PROCEDURE — 25010000002 DEXAMETHASONE PER 1 MG: Performed by: INTERNAL MEDICINE

## 2021-12-30 RX ORDER — FUROSEMIDE 10 MG/ML
40 INJECTION INTRAMUSCULAR; INTRAVENOUS ONCE
Status: COMPLETED | OUTPATIENT
Start: 2021-12-30 | End: 2021-12-30

## 2021-12-30 RX ORDER — OXYMETAZOLINE HYDROCHLORIDE 0.05 G/100ML
2 SPRAY NASAL 2 TIMES DAILY
Status: COMPLETED | OUTPATIENT
Start: 2021-12-30 | End: 2022-01-01

## 2021-12-30 RX ADMIN — LEVOTHYROXINE SODIUM 100 MCG: 0.1 TABLET ORAL at 06:14

## 2021-12-30 RX ADMIN — Medication 2 SPRAY: at 11:46

## 2021-12-30 RX ADMIN — MONTELUKAST 10 MG: 10 TABLET, FILM COATED ORAL at 08:56

## 2021-12-30 RX ADMIN — IPRATROPIUM BROMIDE AND ALBUTEROL SULFATE 3 ML: 2.5; .5 SOLUTION RESPIRATORY (INHALATION) at 18:29

## 2021-12-30 RX ADMIN — SODIUM CHLORIDE, PRESERVATIVE FREE 10 ML: 5 INJECTION INTRAVENOUS at 08:56

## 2021-12-30 RX ADMIN — FLUTICASONE PROPIONATE 2 SPRAY: 50 SPRAY, METERED NASAL at 11:46

## 2021-12-30 RX ADMIN — Medication 10 MG: at 20:55

## 2021-12-30 RX ADMIN — IPRATROPIUM BROMIDE AND ALBUTEROL SULFATE 3 ML: 2.5; .5 SOLUTION RESPIRATORY (INHALATION) at 11:54

## 2021-12-30 RX ADMIN — DEXAMETHASONE SODIUM PHOSPHATE 10 MG: 10 INJECTION INTRAMUSCULAR; INTRAVENOUS at 08:55

## 2021-12-30 RX ADMIN — FUROSEMIDE 40 MG: 10 INJECTION, SOLUTION INTRAVENOUS at 08:56

## 2021-12-30 RX ADMIN — IPRATROPIUM BROMIDE AND ALBUTEROL SULFATE 3 ML: 2.5; .5 SOLUTION RESPIRATORY (INHALATION) at 00:58

## 2021-12-30 RX ADMIN — Medication 10 MG: at 19:55

## 2021-12-30 RX ADMIN — BUDESONIDE 0.5 MG: 0.5 SUSPENSION RESPIRATORY (INHALATION) at 18:29

## 2021-12-30 RX ADMIN — ARFORMOTEROL TARTRATE 15 MCG: 15 SOLUTION RESPIRATORY (INHALATION) at 18:29

## 2021-12-30 RX ADMIN — BUDESONIDE 0.5 MG: 0.5 SUSPENSION RESPIRATORY (INHALATION) at 06:56

## 2021-12-30 RX ADMIN — ENOXAPARIN SODIUM 40 MG: 40 INJECTION SUBCUTANEOUS at 08:56

## 2021-12-30 RX ADMIN — IPRATROPIUM BROMIDE AND ALBUTEROL SULFATE 3 ML: 2.5; .5 SOLUTION RESPIRATORY (INHALATION) at 06:56

## 2021-12-30 RX ADMIN — ARFORMOTEROL TARTRATE 15 MCG: 15 SOLUTION RESPIRATORY (INHALATION) at 06:56

## 2021-12-30 RX ADMIN — Medication 2 SPRAY: at 20:56

## 2021-12-30 RX ADMIN — TEMAZEPAM 15 MG: 15 CAPSULE ORAL at 19:54

## 2021-12-30 RX ADMIN — SODIUM CHLORIDE, PRESERVATIVE FREE 10 ML: 5 INJECTION INTRAVENOUS at 20:56

## 2021-12-30 RX ADMIN — SODIUM CHLORIDE, PRESERVATIVE FREE 10 ML: 5 INJECTION INTRAVENOUS at 19:55

## 2021-12-30 RX ADMIN — PANTOPRAZOLE SODIUM 40 MG: 40 TABLET, DELAYED RELEASE ORAL at 06:14

## 2021-12-31 LAB
ALBUMIN SERPL-MCNC: 3.8 G/DL (ref 3.5–5.2)
ALP SERPL-CCNC: 47 U/L (ref 39–117)
ALT SERPL W P-5'-P-CCNC: 70 U/L (ref 1–41)
ANION GAP SERPL CALCULATED.3IONS-SCNC: 13.8 MMOL/L (ref 5–15)
AST SERPL-CCNC: 42 U/L (ref 1–40)
BACTERIA SPEC AEROBE CULT: NORMAL
BASOPHILS # BLD AUTO: 0.02 10*3/MM3 (ref 0–0.2)
BASOPHILS NFR BLD AUTO: 0.2 % (ref 0–1.5)
BILIRUB CONJ SERPL-MCNC: <0.2 MG/DL (ref 0–0.3)
BILIRUB INDIRECT SERPL-MCNC: ABNORMAL MG/DL
BILIRUB SERPL-MCNC: 0.5 MG/DL (ref 0–1.2)
BUN SERPL-MCNC: 34 MG/DL (ref 8–23)
BUN/CREAT SERPL: 28.3 (ref 7–25)
CALCIUM SPEC-SCNC: 9.4 MG/DL (ref 8.6–10.5)
CHLORIDE SERPL-SCNC: 97 MMOL/L (ref 98–107)
CO2 SERPL-SCNC: 23.2 MMOL/L (ref 22–29)
CREAT SERPL-MCNC: 1.2 MG/DL (ref 0.76–1.27)
CRP SERPL-MCNC: 0.86 MG/DL (ref 0–0.5)
D DIMER PPP FEU-MCNC: 0.83 MG/L (FEU) (ref 0–0.59)
DEPRECATED RDW RBC AUTO: 40.8 FL (ref 37–54)
EOSINOPHIL # BLD AUTO: 0.02 10*3/MM3 (ref 0–0.4)
EOSINOPHIL NFR BLD AUTO: 0.2 % (ref 0.3–6.2)
ERYTHROCYTE [DISTWIDTH] IN BLOOD BY AUTOMATED COUNT: 13.8 % (ref 12.3–15.4)
ERYTHROCYTE [SEDIMENTATION RATE] IN BLOOD: 37 MM/HR (ref 0–20)
FERRITIN SERPL-MCNC: 519.6 NG/ML (ref 30–400)
GFR SERPL CREATININE-BSD FRML MDRD: 59 ML/MIN/1.73
GLUCOSE SERPL-MCNC: 131 MG/DL (ref 65–99)
HCT VFR BLD AUTO: 40.9 % (ref 37.5–51)
HGB BLD-MCNC: 13.8 G/DL (ref 13–17.7)
IMM GRANULOCYTES # BLD AUTO: 0.17 10*3/MM3 (ref 0–0.05)
IMM GRANULOCYTES NFR BLD AUTO: 2 % (ref 0–0.5)
LYMPHOCYTES # BLD AUTO: 0.7 10*3/MM3 (ref 0.7–3.1)
LYMPHOCYTES NFR BLD AUTO: 8.3 % (ref 19.6–45.3)
MAGNESIUM SERPL-MCNC: 2.5 MG/DL (ref 1.6–2.4)
MCH RBC QN AUTO: 27.9 PG (ref 26.6–33)
MCHC RBC AUTO-ENTMCNC: 33.7 G/DL (ref 31.5–35.7)
MCV RBC AUTO: 82.6 FL (ref 79–97)
MONOCYTES # BLD AUTO: 0.3 10*3/MM3 (ref 0.1–0.9)
MONOCYTES NFR BLD AUTO: 3.5 % (ref 5–12)
NEUTROPHILS NFR BLD AUTO: 7.25 10*3/MM3 (ref 1.7–7)
NEUTROPHILS NFR BLD AUTO: 85.8 % (ref 42.7–76)
NRBC BLD AUTO-RTO: 0 /100 WBC (ref 0–0.2)
PHOSPHATE SERPL-MCNC: 4.3 MG/DL (ref 2.5–4.5)
PLATELET # BLD AUTO: 273 10*3/MM3 (ref 140–450)
PMV BLD AUTO: 10.9 FL (ref 6–12)
POTASSIUM SERPL-SCNC: 4.7 MMOL/L (ref 3.5–5.2)
PROT SERPL-MCNC: 6.9 G/DL (ref 6–8.5)
RBC # BLD AUTO: 4.95 10*6/MM3 (ref 4.14–5.8)
SODIUM SERPL-SCNC: 134 MMOL/L (ref 136–145)
WBC NRBC COR # BLD: 8.46 10*3/MM3 (ref 3.4–10.8)

## 2021-12-31 PROCEDURE — 85025 COMPLETE CBC W/AUTO DIFF WBC: CPT | Performed by: FAMILY MEDICINE

## 2021-12-31 PROCEDURE — 86140 C-REACTIVE PROTEIN: CPT | Performed by: FAMILY MEDICINE

## 2021-12-31 PROCEDURE — 94761 N-INVAS EAR/PLS OXIMETRY MLT: CPT

## 2021-12-31 PROCEDURE — 80076 HEPATIC FUNCTION PANEL: CPT | Performed by: FAMILY MEDICINE

## 2021-12-31 PROCEDURE — 94760 N-INVAS EAR/PLS OXIMETRY 1: CPT

## 2021-12-31 PROCEDURE — 94799 UNLISTED PULMONARY SVC/PX: CPT

## 2021-12-31 PROCEDURE — 99291 CRITICAL CARE FIRST HOUR: CPT | Performed by: INTERNAL MEDICINE

## 2021-12-31 PROCEDURE — 85652 RBC SED RATE AUTOMATED: CPT | Performed by: FAMILY MEDICINE

## 2021-12-31 PROCEDURE — 80048 BASIC METABOLIC PNL TOTAL CA: CPT | Performed by: FAMILY MEDICINE

## 2021-12-31 PROCEDURE — 85379 FIBRIN DEGRADATION QUANT: CPT | Performed by: FAMILY MEDICINE

## 2021-12-31 PROCEDURE — 99233 SBSQ HOSP IP/OBS HIGH 50: CPT | Performed by: FAMILY MEDICINE

## 2021-12-31 PROCEDURE — 83735 ASSAY OF MAGNESIUM: CPT | Performed by: FAMILY MEDICINE

## 2021-12-31 PROCEDURE — 82728 ASSAY OF FERRITIN: CPT | Performed by: FAMILY MEDICINE

## 2021-12-31 PROCEDURE — 25010000002 DEXAMETHASONE PER 1 MG: Performed by: INTERNAL MEDICINE

## 2021-12-31 PROCEDURE — 25010000002 ENOXAPARIN PER 10 MG: Performed by: INTERNAL MEDICINE

## 2021-12-31 PROCEDURE — 84100 ASSAY OF PHOSPHORUS: CPT | Performed by: FAMILY MEDICINE

## 2021-12-31 PROCEDURE — 25010000002 FUROSEMIDE PER 20 MG: Performed by: INTERNAL MEDICINE

## 2021-12-31 RX ORDER — FUROSEMIDE 10 MG/ML
40 INJECTION INTRAMUSCULAR; INTRAVENOUS ONCE
Status: COMPLETED | OUTPATIENT
Start: 2021-12-31 | End: 2021-12-31

## 2021-12-31 RX ORDER — DOCUSATE SODIUM 100 MG/1
100 CAPSULE, LIQUID FILLED ORAL 2 TIMES DAILY PRN
Status: DISCONTINUED | OUTPATIENT
Start: 2021-12-31 | End: 2022-01-10 | Stop reason: HOSPADM

## 2021-12-31 RX ADMIN — IPRATROPIUM BROMIDE AND ALBUTEROL SULFATE 3 ML: 2.5; .5 SOLUTION RESPIRATORY (INHALATION) at 12:13

## 2021-12-31 RX ADMIN — IPRATROPIUM BROMIDE AND ALBUTEROL SULFATE 3 ML: 2.5; .5 SOLUTION RESPIRATORY (INHALATION) at 18:54

## 2021-12-31 RX ADMIN — DEXAMETHASONE SODIUM PHOSPHATE 10 MG: 10 INJECTION INTRAMUSCULAR; INTRAVENOUS at 08:26

## 2021-12-31 RX ADMIN — BUDESONIDE 0.5 MG: 0.5 SUSPENSION RESPIRATORY (INHALATION) at 06:58

## 2021-12-31 RX ADMIN — SODIUM CHLORIDE, PRESERVATIVE FREE 10 ML: 5 INJECTION INTRAVENOUS at 08:26

## 2021-12-31 RX ADMIN — FLUTICASONE PROPIONATE 2 SPRAY: 50 SPRAY, METERED NASAL at 08:28

## 2021-12-31 RX ADMIN — LEVOTHYROXINE SODIUM 100 MCG: 0.1 TABLET ORAL at 06:10

## 2021-12-31 RX ADMIN — ARFORMOTEROL TARTRATE 15 MCG: 15 SOLUTION RESPIRATORY (INHALATION) at 06:58

## 2021-12-31 RX ADMIN — PANTOPRAZOLE SODIUM 40 MG: 40 TABLET, DELAYED RELEASE ORAL at 06:10

## 2021-12-31 RX ADMIN — HYDROCODONE POLISTIREX AND CHLORPHENIRAMINE POLISTIREX 2.5 ML: 10; 8 SUSPENSION, EXTENDED RELEASE ORAL at 20:18

## 2021-12-31 RX ADMIN — SODIUM CHLORIDE, PRESERVATIVE FREE 10 ML: 5 INJECTION INTRAVENOUS at 20:18

## 2021-12-31 RX ADMIN — MONTELUKAST 10 MG: 10 TABLET, FILM COATED ORAL at 08:26

## 2021-12-31 RX ADMIN — Medication 2 SPRAY: at 08:28

## 2021-12-31 RX ADMIN — ARFORMOTEROL TARTRATE 15 MCG: 15 SOLUTION RESPIRATORY (INHALATION) at 18:54

## 2021-12-31 RX ADMIN — ENOXAPARIN SODIUM 40 MG: 40 INJECTION SUBCUTANEOUS at 08:27

## 2021-12-31 RX ADMIN — Medication 10 MG: at 20:18

## 2021-12-31 RX ADMIN — DOCUSATE SODIUM 100 MG: 100 CAPSULE, LIQUID FILLED ORAL at 20:18

## 2021-12-31 RX ADMIN — TEMAZEPAM 15 MG: 15 CAPSULE ORAL at 19:54

## 2021-12-31 RX ADMIN — IPRATROPIUM BROMIDE AND ALBUTEROL SULFATE 3 ML: 2.5; .5 SOLUTION RESPIRATORY (INHALATION) at 06:58

## 2021-12-31 RX ADMIN — BUDESONIDE 0.5 MG: 0.5 SUSPENSION RESPIRATORY (INHALATION) at 18:54

## 2021-12-31 RX ADMIN — FUROSEMIDE 40 MG: 10 INJECTION INTRAMUSCULAR; INTRAVENOUS at 08:26

## 2021-12-31 RX ADMIN — Medication 2 SPRAY: at 20:20

## 2022-01-01 LAB
ALBUMIN SERPL-MCNC: 4.1 G/DL (ref 3.5–5.2)
ALP SERPL-CCNC: 48 U/L (ref 39–117)
ALT SERPL W P-5'-P-CCNC: 79 U/L (ref 1–41)
ANION GAP SERPL CALCULATED.3IONS-SCNC: 11.8 MMOL/L (ref 5–15)
AST SERPL-CCNC: 40 U/L (ref 1–40)
BACTERIA SPEC AEROBE CULT: NORMAL
BACTERIA SPEC AEROBE CULT: NORMAL
BACTERIA SPEC RESP CULT: NORMAL
BASOPHILS # BLD AUTO: 0.01 10*3/MM3 (ref 0–0.2)
BASOPHILS NFR BLD AUTO: 0.1 % (ref 0–1.5)
BILIRUB CONJ SERPL-MCNC: <0.2 MG/DL (ref 0–0.3)
BILIRUB INDIRECT SERPL-MCNC: ABNORMAL MG/DL
BILIRUB SERPL-MCNC: 0.6 MG/DL (ref 0–1.2)
BUN SERPL-MCNC: 42 MG/DL (ref 8–23)
BUN/CREAT SERPL: 29.2 (ref 7–25)
CALCIUM SPEC-SCNC: 9.7 MG/DL (ref 8.6–10.5)
CHLORIDE SERPL-SCNC: 94 MMOL/L (ref 98–107)
CO2 SERPL-SCNC: 28.2 MMOL/L (ref 22–29)
CREAT SERPL-MCNC: 1.44 MG/DL (ref 0.76–1.27)
CRP SERPL-MCNC: 0.57 MG/DL (ref 0–0.5)
D DIMER PPP FEU-MCNC: 0.97 MG/L (FEU) (ref 0–0.59)
DEPRECATED RDW RBC AUTO: 42.3 FL (ref 37–54)
EOSINOPHIL # BLD AUTO: 0.13 10*3/MM3 (ref 0–0.4)
EOSINOPHIL NFR BLD AUTO: 1.7 % (ref 0.3–6.2)
ERYTHROCYTE [DISTWIDTH] IN BLOOD BY AUTOMATED COUNT: 13.9 % (ref 12.3–15.4)
ERYTHROCYTE [SEDIMENTATION RATE] IN BLOOD: 31 MM/HR (ref 0–20)
FERRITIN SERPL-MCNC: 498.6 NG/ML (ref 30–400)
GFR SERPL CREATININE-BSD FRML MDRD: 48 ML/MIN/1.73
GLUCOSE SERPL-MCNC: 110 MG/DL (ref 65–99)
GRAM STN SPEC: NORMAL
HCT VFR BLD AUTO: 43.9 % (ref 37.5–51)
HGB BLD-MCNC: 14.6 G/DL (ref 13–17.7)
IMM GRANULOCYTES # BLD AUTO: 0.14 10*3/MM3 (ref 0–0.05)
IMM GRANULOCYTES NFR BLD AUTO: 1.8 % (ref 0–0.5)
LYMPHOCYTES # BLD AUTO: 0.81 10*3/MM3 (ref 0.7–3.1)
LYMPHOCYTES NFR BLD AUTO: 10.5 % (ref 19.6–45.3)
MAGNESIUM SERPL-MCNC: 2.6 MG/DL (ref 1.6–2.4)
MCH RBC QN AUTO: 28.1 PG (ref 26.6–33)
MCHC RBC AUTO-ENTMCNC: 33.3 G/DL (ref 31.5–35.7)
MCV RBC AUTO: 84.6 FL (ref 79–97)
MONOCYTES # BLD AUTO: 0.24 10*3/MM3 (ref 0.1–0.9)
MONOCYTES NFR BLD AUTO: 3.1 % (ref 5–12)
NEUTROPHILS NFR BLD AUTO: 6.39 10*3/MM3 (ref 1.7–7)
NEUTROPHILS NFR BLD AUTO: 82.8 % (ref 42.7–76)
NRBC BLD AUTO-RTO: 0 /100 WBC (ref 0–0.2)
PHOSPHATE SERPL-MCNC: 5.3 MG/DL (ref 2.5–4.5)
PLATELET # BLD AUTO: 278 10*3/MM3 (ref 140–450)
PMV BLD AUTO: 10.9 FL (ref 6–12)
POTASSIUM SERPL-SCNC: 5 MMOL/L (ref 3.5–5.2)
PROT SERPL-MCNC: 7.2 G/DL (ref 6–8.5)
RBC # BLD AUTO: 5.19 10*6/MM3 (ref 4.14–5.8)
SODIUM SERPL-SCNC: 134 MMOL/L (ref 136–145)
WBC NRBC COR # BLD: 7.72 10*3/MM3 (ref 3.4–10.8)

## 2022-01-01 PROCEDURE — 25010000002 ENOXAPARIN PER 10 MG: Performed by: INTERNAL MEDICINE

## 2022-01-01 PROCEDURE — 94799 UNLISTED PULMONARY SVC/PX: CPT

## 2022-01-01 PROCEDURE — 25010000002 DEXAMETHASONE PER 1 MG: Performed by: INTERNAL MEDICINE

## 2022-01-01 PROCEDURE — 94761 N-INVAS EAR/PLS OXIMETRY MLT: CPT

## 2022-01-01 PROCEDURE — 85652 RBC SED RATE AUTOMATED: CPT | Performed by: FAMILY MEDICINE

## 2022-01-01 PROCEDURE — 80076 HEPATIC FUNCTION PANEL: CPT | Performed by: FAMILY MEDICINE

## 2022-01-01 PROCEDURE — 99291 CRITICAL CARE FIRST HOUR: CPT | Performed by: INTERNAL MEDICINE

## 2022-01-01 PROCEDURE — 86140 C-REACTIVE PROTEIN: CPT | Performed by: FAMILY MEDICINE

## 2022-01-01 PROCEDURE — 85379 FIBRIN DEGRADATION QUANT: CPT | Performed by: FAMILY MEDICINE

## 2022-01-01 PROCEDURE — 82728 ASSAY OF FERRITIN: CPT | Performed by: FAMILY MEDICINE

## 2022-01-01 PROCEDURE — 99233 SBSQ HOSP IP/OBS HIGH 50: CPT | Performed by: FAMILY MEDICINE

## 2022-01-01 PROCEDURE — 84100 ASSAY OF PHOSPHORUS: CPT | Performed by: FAMILY MEDICINE

## 2022-01-01 PROCEDURE — 83735 ASSAY OF MAGNESIUM: CPT | Performed by: FAMILY MEDICINE

## 2022-01-01 PROCEDURE — 85025 COMPLETE CBC W/AUTO DIFF WBC: CPT | Performed by: FAMILY MEDICINE

## 2022-01-01 PROCEDURE — 80048 BASIC METABOLIC PNL TOTAL CA: CPT | Performed by: FAMILY MEDICINE

## 2022-01-01 RX ADMIN — BUDESONIDE 0.5 MG: 0.5 SUSPENSION RESPIRATORY (INHALATION) at 06:58

## 2022-01-01 RX ADMIN — SODIUM CHLORIDE, PRESERVATIVE FREE 10 ML: 5 INJECTION INTRAVENOUS at 21:58

## 2022-01-01 RX ADMIN — Medication 10 MG: at 21:58

## 2022-01-01 RX ADMIN — Medication 2 SPRAY: at 21:59

## 2022-01-01 RX ADMIN — DEXAMETHASONE SODIUM PHOSPHATE 10 MG: 10 INJECTION INTRAMUSCULAR; INTRAVENOUS at 09:09

## 2022-01-01 RX ADMIN — PANTOPRAZOLE SODIUM 40 MG: 40 TABLET, DELAYED RELEASE ORAL at 06:42

## 2022-01-01 RX ADMIN — ARFORMOTEROL TARTRATE 15 MCG: 15 SOLUTION RESPIRATORY (INHALATION) at 19:55

## 2022-01-01 RX ADMIN — ENOXAPARIN SODIUM 40 MG: 40 INJECTION SUBCUTANEOUS at 09:09

## 2022-01-01 RX ADMIN — IPRATROPIUM BROMIDE AND ALBUTEROL SULFATE 3 ML: 2.5; .5 SOLUTION RESPIRATORY (INHALATION) at 06:58

## 2022-01-01 RX ADMIN — HYDROCODONE POLISTIREX AND CHLORPHENIRAMINE POLISTIREX 2.5 ML: 10; 8 SUSPENSION, EXTENDED RELEASE ORAL at 09:09

## 2022-01-01 RX ADMIN — DOCUSATE SODIUM 100 MG: 100 CAPSULE, LIQUID FILLED ORAL at 09:09

## 2022-01-01 RX ADMIN — DOCUSATE SODIUM 100 MG: 100 CAPSULE, LIQUID FILLED ORAL at 22:12

## 2022-01-01 RX ADMIN — IPRATROPIUM BROMIDE AND ALBUTEROL SULFATE 3 ML: 2.5; .5 SOLUTION RESPIRATORY (INHALATION) at 19:55

## 2022-01-01 RX ADMIN — SODIUM CHLORIDE, PRESERVATIVE FREE 10 ML: 5 INJECTION INTRAVENOUS at 09:09

## 2022-01-01 RX ADMIN — ARFORMOTEROL TARTRATE 15 MCG: 15 SOLUTION RESPIRATORY (INHALATION) at 06:58

## 2022-01-01 RX ADMIN — IPRATROPIUM BROMIDE AND ALBUTEROL SULFATE 3 ML: 2.5; .5 SOLUTION RESPIRATORY (INHALATION) at 11:57

## 2022-01-01 RX ADMIN — Medication 2 SPRAY: at 09:10

## 2022-01-01 RX ADMIN — TEMAZEPAM 15 MG: 15 CAPSULE ORAL at 22:12

## 2022-01-01 RX ADMIN — LEVOTHYROXINE SODIUM 100 MCG: 0.1 TABLET ORAL at 06:42

## 2022-01-01 RX ADMIN — MONTELUKAST 10 MG: 10 TABLET, FILM COATED ORAL at 09:09

## 2022-01-01 RX ADMIN — FLUTICASONE PROPIONATE 2 SPRAY: 50 SPRAY, METERED NASAL at 09:10

## 2022-01-01 RX ADMIN — BUDESONIDE 0.5 MG: 0.5 SUSPENSION RESPIRATORY (INHALATION) at 19:55

## 2022-01-01 NOTE — PLAN OF CARE
Goal Outcome Evaluation:  Progress: no change  Outcome Summary: Pt's O2 sat has primarily been in the low 90s on Airvo 60L 100%. During episodes of exertion, pt's O2 sat drops to the low 80's and PRN NRB is utilized. Pt was able to recover well. PRN Tussionex and Colace were added and given.

## 2022-01-01 NOTE — PROGRESS NOTES
Pulmonary / Critical Care Progress Note      Patient Name: Elmer Garvin  : 1945  MRN: [id number]  Attending:  Dainn Cuevas MD  Date of admission: 2021    Subjective   Subjective     Follow-up for COVID-19 and hypoxia    Patient critically ill with respiratory failure and ARDS secondary to COVID-19  Patient remains on 100% air Vo at 60 L layered with 100% nonrebreather with oxygen saturations in the low 90s  Short of breath with any all activity movement  Extremely fatigued  Does not feel that his breathing is any better      Review of Systems  General: Fatigue and weakness, otherwise denied complaints  Cardiovascular:  Denied complaints  Respiratory: Dyspnea and cough, otherwise denied complaints  Gastrointestinal: Denied complaints        Objective   Objective     Vitals:   Temp:  [97.2 °F (36.2 °C)-98.2 °F (36.8 °C)] 97.7 °F (36.5 °C)  Heart Rate:  [59-90] 65  Resp:  [18-24] 20  BP: (119-143)/(62-71) 135/64  Flow (L/min):  [60] 60    Physical Exam   Vital Signs Reviewed  General WDWN, Alert, appears to have conversational dyspnea  HEENT:  PERRL, EOMI.  OP, nares clear, no sinus tenderness  Neck:  Supple, no JVD, no thyromegaly  Lymph: no axillary, cervical, supraclavicular lymphadenopathy noted bilaterally  Chest: Auscultation reveals diffuse crackles heard throughout all lung fields and coarse breath sounds moderate conversational dyspnea appreciated  CV: RRR, no MGR, pulses 2+, equal.  Abd:  Soft, NT, ND, + BS, no HSM  EXT:  no clubbing, no cyanosis, no edema, no joint tenderness  Neuro:  A&Ox3, CN grossly intact, no focal deficits.  Skin: No rashes or lesions noted    Result Review    Result Review:  I have personally reviewed the results from the time of this admission to 2022 10:16 EST and agree with these findings:  [x]  Laboratory  [x]  Microbiology  [x]  Radiology  []  EKG/Telemetry   []  Cardiology/Vascular   []  Pathology  []  Old records  []  Other:  Most notable findings  include: Creatinine elevated to 1.44, previously was 1.17.  Ferritin 498, CRP 0.57, D-dimer 0.97, respiratory culture is growing normal oral kenia      Assessment/Plan   Assessment / Plan     Active Hospital Problems:  Active Hospital Problems    Diagnosis    • Acute respiratory failure with hypoxia (HCC)        Impression:  Acute hypoxemic respiratory failure requiring air Vo  COVID-19 pneumonia, unvaccinated  ARDS  Therapeutic drug monitoring  Elevated liver enzymes  Stress versus steroid-induced hyperglycemia  Tobacco use of cigarettes in remission  Hyponatremia     Plan:  Respiratory status very tenuous patient appears to be exhausted  Currently is on air Vo +100% nonrebreather barely able to maintain sats in the 90s  Will use BiPAP 12/6 as needed  Has completed Actemra and is out of the window for remdesivir  Spot dose Lasix  Decadron 10 mg daily  Continue trending D-dimer  Encourage prone position  Spoke with the patient today about the modified prone position while sitting in the chair  Continue Singulair  Follow daily CMP's given transaminitis      DVT prophylaxis:  Medical DVT prophylaxis orders are present.    CODE STATUS:   Medical Intervention Limits: NO intubation (DNI)  Code Status (Patient has no pulse and is not breathing): CPR (Attempt to Resuscitate)  Medical Interventions (Patient has pulse or is breathing): Limited Support    Given tenuous respiratory status, I discussed CODE STATUS with the patient and his wife.  At this point, he would like to be a DNI.  He is still unsure about whether or not he would want CPR.  I have made these changes in his orders and I have consulted palliative care for further ongoing goals of care discussions.      Labs, imaging, microbiology, notes and medications personally reviewed.  Discussed with primary  30 minutes of critical care time spent managing this patient, excluding procedures       I, Fern Mata [facility]-NP, am scribing for & in the presence of Dr. Gong  on 1/1/2022, who was present during rounds with me.    Part of this note may be an electronic transcription/translation of spoken language to printed text using the Dragon Dictation System.

## 2022-01-01 NOTE — PROGRESS NOTES
[facility]   Hospitalist Progress Note  Date: 2022  Patient Name: Elmer Garvin  : 1945  MRN: [id number]  Date of admission: 2021      Subjective   Subjective     Chief Complaint: SOA                           Summary: 76-year-old male with hypertension, hypothyroidism, CKD and sick sinus syndrome presented with 2 weeks of worsening shortness of breath and cough with fatigue.  He has not been vaccinated against COVID-19.  He went to an urgent care where he was satting 79% on room air and was sent to the ED where he was admitted.  He has tested positive for COVID-19.  He was out of the window for remdesivir.  He was started on steroids, supplemental oxygen, scheduled nebs.  Pulmonary consulted with increasing oxygen requirement.  Concern for ARDS.  Status post Actemra.  Worsening oxygenation status, transitioned to air Vo/heated high flow nasal cannula     Interval Followup: Patient seen and examined this morning, no acute distress, no acute major overnight events, remains on air Vo/heated high flow nasal cannula oxygen on maximum settings requiring oxygen via nonrebreather mask, sats have been variable in the low 90s, getting down to the 80s, slow to rebound, hemoglobin 14.6, creatinine 1.44, sodium at 134, white blood cell count at 7.72.  He still has significant dyspnea on exertion, even at rest, not tolerating heated high flow, states the area is dry and cold, nares are plugged up despite inhalers.  Telemetry reviewed, no acute major arrhythmic events, baseline sinus rhythm in the 60s.  Denies chest pain or palpitations.  Still unclear what his CODE STATUS is.  Remains full code.    Review of Systems              All systems were reviewed and negative except for: Shortness of breath, cough, abdominal pain, generalized fatigue, anxious, conversational dyspnea, worsening hypoxemia, generalized weakness    Objective   Objective     Vitals:   Temp:  [97.7 °F (36.5 °C)-98.2 °F (36.8 °C)] 97.8  °F (36.6 °C)  Heart Rate:  [60-90] 61  Resp:  [18-20] 20  BP: (119-147)/(62-86) 147/86  Flow (L/min):  [60] 60  Physical Exam    Constitutional: Awake, alert, no acute distress breathing heated high flow nasal cannula maximum settings laying in bed, nonrebreather face              Eyes: Pupils equal, sclerae anicteric, no conjunctival injection              HENT: NCAT, mucous membranes moist              Neck: Supple, full range              Respiratory: Diminished breath sounds bilaterally laying in bed wearing heated high flow nasal cannula, conversational dyspnea                          Cardiovascular: RRR, no JVD              Gastrointestinal: Positive bowel sounds, soft, nontender, nondistended              Musculoskeletal: No bilateral ankle edema, no clubbing or cyanosis to extremities              Psychiatric: Appropriate affect, cooperative              Neurologic: Oriented x 3, speech clear              Skin: No rashes   Result Review    Result Review:  I have personally reviewed the results from the time of this admission to 1/1/2022 15:46 EST and agree with these findings:  [x]  Laboratory   CBC    CBC 12/30/21 12/31/21 1/1/22   WBC 9.14 8.46 7.72   RBC 4.96 4.95 5.19   Hemoglobin 13.8 13.8 14.6   Hematocrit 41.5 40.9 43.9   MCV 83.7 82.6 84.6   MCH 27.8 27.9 28.1   MCHC 33.3 33.7 33.3   RDW 13.9 13.8 13.9   Platelets 290 273 278           BMP    BMP 12/30/21 12/31/21 1/1/22   BUN 41 (A) 34 (A) 42 (A)   Creatinine 1.17 1.20 1.44 (A)   Sodium 136 134 (A) 134 (A)   Potassium 4.3 4.7 5.0   Chloride 98 97 (A) 94 (A)   CO2 23.7 23.2 28.2   Calcium 9.6 9.4 9.7   (A) Abnormal value       Comments are available for some flowsheets but are not being displayed.           LIVER FUNCTION TESTS:      Lab 01/01/22  0526 12/31/21  0548 12/30/21  0556 12/29/21  0610 12/28/21  0626 12/27/21  0526 12/27/21  0526 12/26/21  1746 12/26/21  1746   TOTAL PROTEIN 7.2 6.9 7.3 7.1 7.9   < > 6.7   < > 7.4   ALBUMIN 4.10 3.80 4.10  3.70 4.00   < > 3.20*   < > 3.90   GLOBULIN  --   --   --   --  3.9  --  3.5  --  3.5   ALT (SGPT) 79* 70* 54* 35 44*   < > 40   < > 39   AST (SGOT) 40 42* 47* 29 44*   < > 63*   < > 77*   BILIRUBIN 0.6 0.5 0.4 0.3 0.4   < > 0.4   < > 0.5   BILIRUBIN DIRECT <0.2 <0.2 <0.2 <0.2  --   --   --   --   --    ALK PHOS 48 47 51 44 49   < > 42   < > 50    < > = values in this interval not displayed.       [x]  Microbiology   Blood Culture   Date Value Ref Range Status   12/27/2021 No growth at 4 days  Preliminary   12/27/2021 No growth at 4 days  Preliminary     BCID, PCR   Date Value Ref Range Status   12/26/2021 (A) Negative by BCID PCR. Culture to Follow. Final    Staph spp, not aureus or lugdunesis. Identification by BCID2 PCR.     No results found for: CULTURES, HSVCX, URCX  No results found for: EYECULTURE, GCCX, HSVCULTURE, LABHSV  No results found for: LEGIONELLA, MRSACX, MUMPSCX, MYCOPLASCX  No results found for: NOCARDIACX, STOOLCX  No results found for: THROATCX, UNSTIMCULT, URINECX, CULTURE, VZVCULTUR  No results found for: VIRALCULTU, WOUNDCX    [x]  Radiology XR Chest 1 View    Result Date: 12/30/2021  PROCEDURE: XR CHEST 1 VW  COMPARISON: [facility], CT, CT CHEST PULMONARY EMBOLISM, 12/27/2021, 18:57.  [facility], CR, XR CHEST 1 VW, 12/27/2021, 13:35.  INDICATIONS: COVID, HYPOXIA  FINDINGS:  A left subclavian pacemaker is in place.  There are patchy bilateral airspace opacities.  The heart and mediastinal contours appear normal.  The osseous structures appear intact.  CONCLUSION: Patchy bilateral airspace opacities, suggesting ongoing pneumonia.       JOSE FRANCISCO TONG MD       Electronically Signed and Approved By: JOSE FRANCISCO TONG MD on 12/30/2021 at 5:51             XR Chest 1 View    Result Date: 12/27/2021  PROCEDURE: XR CHEST 1 VW  COMPARISON: [facility], CR, XR CHEST 1 VW, 12/26/2021, 17:59.  INDICATIONS: WORSENING HYPOXIA  FINDINGS:  There is stable patchy  bilateral airspace disease.  No pneumothorax, pleural effusion or lobar consolidation.  The heart size is normal.  Stable left-sided 2 lead pacemaker.  CONCLUSION: Stable patchy bilateral airspace disease.       GALE CONNELL MD       Electronically Signed and Approved By: GALE OCNNELL MD on 12/27/2021 at 14:22             XR Chest 1 View    Result Date: 12/26/2021  PROCEDURE: XR CHEST 1 VW  COMPARISON: [facility], CR, CHEST PA/AP & LAT 2V, 9/27/2017, 15:22.  INDICATIONS: SOA Triage Protocol  FINDINGS:  Left-sided AICD noted.  Heart size normal.  There is patchy bilateral midlung and bibasilar airspace disease most concerning for pneumonia.  No pneumothorax.  No large effusion.  Osseous structures grossly intact.  CONCLUSION:  Patchy bilateral airspace disease most pronounced at the lung bases concerning for multifocal pneumonia.       SHONNA HERMAN MD       Electronically Signed and Approved By: SHONNA HERMAN MD on 12/26/2021 at 18:31             CT Chest Pulmonary Embolism    Result Date: 12/28/2021  PROCEDURE: CT CHEST PULMONARY EMBOLISM W CONTRAST  COMPARISON:  [facility], CR, XR CHEST 1 VW, 12/27/2021, 13:35.  [facility], CT, CHEST W/O CONTRAST, 11/08/2018, 14:07. INDICATIONS: PE suspected, high prob  TECHNIQUE: After obtaining the patient's consent, CT images were obtained with non-ionic intravenous contrast material.   PROTOCOL:   Standard imaging protocol performed    RADIATION:   DLP: 386mGy*cm   Automated exposure control was utilized to minimize radiation dose. CONTRAST: 100cc Isovue 370 I.V. LABS:   eGFR: >60ml/min/1.73m2  FINDINGS:  The central tracheobronchial tree is clear.  There are diffuse bilateral airspace consolidations.  There is no pleural effusion.  A left subclavian pacemaker is in place.  The heart size appears normal, with mild partially calcified atherosclerotic disease in the coronary arteries.  The great vessels are normal in caliber.   There is no evidence of pulmonary embolus.  No abnormally enlarged lymph nodes are identified.  Partial evaluation of the upper abdomen is unremarkable.  No aggressive osseous lesions are identified.  CONCLUSION:  1. Negative for pulmonary embolus. 2. Diffuse bilateral airspace consolidations, suggesting multifocal pneumonia.     JOSE FRANCISCO TONG MD       Electronically Signed and Approved By: JOSE FRANCISCO TONG MD on 12/28/2021 at 1:12             Duplex Venous Lower Extremity - Bilateral CV-READ    Result Date: 12/28/2021  · Normal bilateral lower extremity venous duplex scan.        [x]  EKG/Telemetry   []  Cardiology/Vascular   []  Pathology  [x]  Old records  []  Other:    Assessment/Plan   Assessment / Plan     Assessment/Plan:  Assessment:  COVID-19 pneumonia  Acute hypoxemic respiratory failure secondary COVID-19  SARS-CoV-2 infection  ARDS  Transaminitis due to viral illness  Hyperglycemia due to steroids  History of tobacco smoke  Elevated D-dimer  CHENCHO on CKD3  Essential hypertension  Hypothyroidism  SSS s/p PPM     Plan:  Labs and imaging reviewed  Still has critical oxygen requirements with maximum settings of NIPPV  Still unsure of his CODE STATUS, remains full code  Continue with air Vo/heated high flow nasal with as needed use of nonrebreather  Continues to have declining status from a respiratory standpoint, no significant change, requiring additional nonrebreather more  May need transfer to ICU for closer monitoring  Status post Actemra, watch for opportunistic infections  Consulted pulmonary discussed with Dr. Gong, recommendations appreciated  Continue Decadron 10 mg daily IV  Continue Afrin, continue Flonase  Continue supplemental oxygen, upgrade to heated high flow/air Vo nasal cannula oxygen as required  Continue scheduled nebs Brovana Pulmicort twice daily  Continue DuoNebs every 6 hours  Continued GI prophylaxis Protonix 40 mg daily  Monitor abdominal pain symptoms  Watch for signs of  bleeding while on therapeutic Lovenox  Continue therapeutic Lovenox  A.m. labs  Full code  VTE prophylaxis ordered via Lovenox  Continue telemetry monitor  Clinical course to dictate further management  Critically ill with SARS-CoV-2 infection and COVID-19 pneumonia with oxygen requirement going up, concerned that he will rapidly decompensate and require the ICU for close monitoring  Discussed with nurse at bedside.  Prognosis worsening and guarded    DVT prophylaxis:  Medical DVT prophylaxis orders are present.    CODE STATUS:   Medical Intervention Limits: NO intubation (DNI)  Code Status (Patient has no pulse and is not breathing): CPR (Attempt to Resuscitate)  Medical Interventions (Patient has pulse or is breathing): Limited Support        Electronically signed by Diann Cuevas MD, 01/01/22, 3:46 PM EST.

## 2022-01-02 LAB
ALBUMIN SERPL-MCNC: 3.8 G/DL (ref 3.5–5.2)
ALP SERPL-CCNC: 46 U/L (ref 39–117)
ALT SERPL W P-5'-P-CCNC: 66 U/L (ref 1–41)
ANION GAP SERPL CALCULATED.3IONS-SCNC: 13.8 MMOL/L (ref 5–15)
AST SERPL-CCNC: 28 U/L (ref 1–40)
BASOPHILS # BLD AUTO: 0.01 10*3/MM3 (ref 0–0.2)
BASOPHILS NFR BLD AUTO: 0.1 % (ref 0–1.5)
BILIRUB CONJ SERPL-MCNC: <0.2 MG/DL (ref 0–0.3)
BILIRUB INDIRECT SERPL-MCNC: ABNORMAL MG/DL
BILIRUB SERPL-MCNC: 0.6 MG/DL (ref 0–1.2)
BUN SERPL-MCNC: 34 MG/DL (ref 8–23)
BUN/CREAT SERPL: 29.3 (ref 7–25)
CALCIUM SPEC-SCNC: 9.2 MG/DL (ref 8.6–10.5)
CHLORIDE SERPL-SCNC: 97 MMOL/L (ref 98–107)
CO2 SERPL-SCNC: 23.2 MMOL/L (ref 22–29)
CREAT SERPL-MCNC: 1.16 MG/DL (ref 0.76–1.27)
CRP SERPL-MCNC: 0.33 MG/DL (ref 0–0.5)
D DIMER PPP FEU-MCNC: 2.01 MG/L (FEU) (ref 0–0.59)
DEPRECATED RDW RBC AUTO: 41.3 FL (ref 37–54)
EOSINOPHIL # BLD AUTO: 0.21 10*3/MM3 (ref 0–0.4)
EOSINOPHIL NFR BLD AUTO: 3.1 % (ref 0.3–6.2)
ERYTHROCYTE [DISTWIDTH] IN BLOOD BY AUTOMATED COUNT: 13.7 % (ref 12.3–15.4)
ERYTHROCYTE [SEDIMENTATION RATE] IN BLOOD: 30 MM/HR (ref 0–20)
FERRITIN SERPL-MCNC: 473.9 NG/ML (ref 30–400)
GFR SERPL CREATININE-BSD FRML MDRD: 61 ML/MIN/1.73
GLUCOSE SERPL-MCNC: 94 MG/DL (ref 65–99)
HCT VFR BLD AUTO: 43.3 % (ref 37.5–51)
HGB BLD-MCNC: 14.5 G/DL (ref 13–17.7)
IMM GRANULOCYTES # BLD AUTO: 0.12 10*3/MM3 (ref 0–0.05)
IMM GRANULOCYTES NFR BLD AUTO: 1.8 % (ref 0–0.5)
LYMPHOCYTES # BLD AUTO: 0.61 10*3/MM3 (ref 0.7–3.1)
LYMPHOCYTES NFR BLD AUTO: 9 % (ref 19.6–45.3)
MAGNESIUM SERPL-MCNC: 2.5 MG/DL (ref 1.6–2.4)
MCH RBC QN AUTO: 27.8 PG (ref 26.6–33)
MCHC RBC AUTO-ENTMCNC: 33.5 G/DL (ref 31.5–35.7)
MCV RBC AUTO: 83.1 FL (ref 79–97)
MONOCYTES # BLD AUTO: 0.21 10*3/MM3 (ref 0.1–0.9)
MONOCYTES NFR BLD AUTO: 3.1 % (ref 5–12)
NEUTROPHILS NFR BLD AUTO: 5.61 10*3/MM3 (ref 1.7–7)
NEUTROPHILS NFR BLD AUTO: 82.9 % (ref 42.7–76)
NRBC BLD AUTO-RTO: 0 /100 WBC (ref 0–0.2)
PHOSPHATE SERPL-MCNC: 4.3 MG/DL (ref 2.5–4.5)
PLATELET # BLD AUTO: 298 10*3/MM3 (ref 140–450)
PMV BLD AUTO: 10.5 FL (ref 6–12)
POTASSIUM SERPL-SCNC: 4.3 MMOL/L (ref 3.5–5.2)
PROT SERPL-MCNC: 6.8 G/DL (ref 6–8.5)
RBC # BLD AUTO: 5.21 10*6/MM3 (ref 4.14–5.8)
SODIUM SERPL-SCNC: 134 MMOL/L (ref 136–145)
WBC NRBC COR # BLD: 6.77 10*3/MM3 (ref 3.4–10.8)

## 2022-01-02 PROCEDURE — 25010000002 ENOXAPARIN PER 10 MG: Performed by: FAMILY MEDICINE

## 2022-01-02 PROCEDURE — 80076 HEPATIC FUNCTION PANEL: CPT | Performed by: FAMILY MEDICINE

## 2022-01-02 PROCEDURE — 99233 SBSQ HOSP IP/OBS HIGH 50: CPT | Performed by: FAMILY MEDICINE

## 2022-01-02 PROCEDURE — 84100 ASSAY OF PHOSPHORUS: CPT | Performed by: FAMILY MEDICINE

## 2022-01-02 PROCEDURE — 82728 ASSAY OF FERRITIN: CPT | Performed by: FAMILY MEDICINE

## 2022-01-02 PROCEDURE — 85379 FIBRIN DEGRADATION QUANT: CPT | Performed by: FAMILY MEDICINE

## 2022-01-02 PROCEDURE — 94640 AIRWAY INHALATION TREATMENT: CPT

## 2022-01-02 PROCEDURE — 83735 ASSAY OF MAGNESIUM: CPT | Performed by: FAMILY MEDICINE

## 2022-01-02 PROCEDURE — 99291 CRITICAL CARE FIRST HOUR: CPT | Performed by: INTERNAL MEDICINE

## 2022-01-02 PROCEDURE — 94799 UNLISTED PULMONARY SVC/PX: CPT

## 2022-01-02 PROCEDURE — 80048 BASIC METABOLIC PNL TOTAL CA: CPT | Performed by: FAMILY MEDICINE

## 2022-01-02 PROCEDURE — 86140 C-REACTIVE PROTEIN: CPT | Performed by: FAMILY MEDICINE

## 2022-01-02 PROCEDURE — 85652 RBC SED RATE AUTOMATED: CPT | Performed by: FAMILY MEDICINE

## 2022-01-02 PROCEDURE — 25010000002 DEXAMETHASONE PER 1 MG: Performed by: INTERNAL MEDICINE

## 2022-01-02 PROCEDURE — 85025 COMPLETE CBC W/AUTO DIFF WBC: CPT | Performed by: FAMILY MEDICINE

## 2022-01-02 PROCEDURE — 25010000002 ENOXAPARIN PER 10 MG: Performed by: INTERNAL MEDICINE

## 2022-01-02 RX ADMIN — DEXAMETHASONE SODIUM PHOSPHATE 10 MG: 10 INJECTION INTRAMUSCULAR; INTRAVENOUS at 09:51

## 2022-01-02 RX ADMIN — DOCUSATE SODIUM 100 MG: 100 CAPSULE, LIQUID FILLED ORAL at 09:51

## 2022-01-02 RX ADMIN — ARFORMOTEROL TARTRATE 15 MCG: 15 SOLUTION RESPIRATORY (INHALATION) at 19:00

## 2022-01-02 RX ADMIN — MONTELUKAST 10 MG: 10 TABLET, FILM COATED ORAL at 09:51

## 2022-01-02 RX ADMIN — LEVOTHYROXINE SODIUM 100 MCG: 0.1 TABLET ORAL at 06:06

## 2022-01-02 RX ADMIN — ARFORMOTEROL TARTRATE 15 MCG: 15 SOLUTION RESPIRATORY (INHALATION) at 06:15

## 2022-01-02 RX ADMIN — ENOXAPARIN SODIUM 40 MG: 40 INJECTION SUBCUTANEOUS at 09:51

## 2022-01-02 RX ADMIN — SODIUM CHLORIDE, PRESERVATIVE FREE 10 ML: 5 INJECTION INTRAVENOUS at 09:51

## 2022-01-02 RX ADMIN — IPRATROPIUM BROMIDE AND ALBUTEROL SULFATE 3 ML: 2.5; .5 SOLUTION RESPIRATORY (INHALATION) at 12:19

## 2022-01-02 RX ADMIN — FLUTICASONE PROPIONATE 2 SPRAY: 50 SPRAY, METERED NASAL at 09:52

## 2022-01-02 RX ADMIN — IPRATROPIUM BROMIDE AND ALBUTEROL SULFATE 3 ML: 2.5; .5 SOLUTION RESPIRATORY (INHALATION) at 19:00

## 2022-01-02 RX ADMIN — SODIUM CHLORIDE, PRESERVATIVE FREE 10 ML: 5 INJECTION INTRAVENOUS at 20:41

## 2022-01-02 RX ADMIN — HYDROCODONE POLISTIREX AND CHLORPHENIRAMINE POLISTIREX 2.5 ML: 10; 8 SUSPENSION, EXTENDED RELEASE ORAL at 09:51

## 2022-01-02 RX ADMIN — TEMAZEPAM 15 MG: 15 CAPSULE ORAL at 19:40

## 2022-01-02 RX ADMIN — PANTOPRAZOLE SODIUM 40 MG: 40 TABLET, DELAYED RELEASE ORAL at 06:06

## 2022-01-02 RX ADMIN — IPRATROPIUM BROMIDE AND ALBUTEROL SULFATE 3 ML: 2.5; .5 SOLUTION RESPIRATORY (INHALATION) at 06:15

## 2022-01-02 RX ADMIN — IPRATROPIUM BROMIDE AND ALBUTEROL SULFATE 3 ML: 2.5; .5 SOLUTION RESPIRATORY (INHALATION) at 00:05

## 2022-01-02 RX ADMIN — BUDESONIDE 0.5 MG: 0.5 SUSPENSION RESPIRATORY (INHALATION) at 06:15

## 2022-01-02 RX ADMIN — Medication 10 MG: at 19:39

## 2022-01-02 RX ADMIN — ENOXAPARIN SODIUM 100 MG: 100 INJECTION SUBCUTANEOUS at 19:38

## 2022-01-02 RX ADMIN — BUDESONIDE 0.5 MG: 0.5 SUSPENSION RESPIRATORY (INHALATION) at 19:00

## 2022-01-02 NOTE — PLAN OF CARE
Goal Outcome Evaluation:              Outcome Summary: Pt maxed out on airvo. VSS. Non rebreather available if needed to titrate SPO2 above 90% with activity. Given PRN tussionex and colace. No significant change. Pt resting with wife at bedside.

## 2022-01-02 NOTE — PLAN OF CARE
Goal Outcome Evaluation:  Plan of Care Reviewed With: patient           Outcome Summary: PT. ON AIRVO AT 60L/95%, WITH NONREBREATHER AVAILABLE IF NEEDED. PT. MEDICATED WITH PRN COLACE AND PRN RESTORIL. PT. SPOUSE CONT. TO BE AT BEDSIDE. VSS

## 2022-01-02 NOTE — PLAN OF CARE
Goal Outcome Evaluation:  Plan of Care Reviewed With: patient, spouse        Progress: no change  Outcome Summary: VSS on airvo setting at 60 L 95%. No c/o of pain or nausea. Given PRN tussionex. Pt stated feeling less congested today. Wife is resting with wife at bedside.

## 2022-01-02 NOTE — PROGRESS NOTES
[facility]   Hospitalist Progress Note  Date: 2022  Patient Name: Elmer Garvin  : 1945  MRN: [id number]  Date of admission: 2021      Subjective   Subjective     Chief Complaint: SOA                           Summary: 76-year-old male with hypertension, hypothyroidism, CKD and sick sinus syndrome presented with 2 weeks of worsening shortness of breath and cough with fatigue.  He has not been vaccinated against COVID-19.  He went to an urgent care where he was satting 79% on room air and was sent to the ED where he was admitted.  He has tested positive for COVID-19.  He was out of the window for remdesivir.  He was started on steroids, supplemental oxygen, scheduled nebs.  Pulmonary consulted with increasing oxygen requirement.  Concern for ARDS.  Status post Actemra.  Worsening oxygenation status, transitioned to air Vo/heated high flow nasal cannula     Interval Followup: Patient seen and examined this morning, in mild respiratory distress, with visible signs of increased work of breathing, on maximum settings of heated high flow nasal cannula oxygen/air Vo/NIPPV, sats in the low 90s, tachypneic, unable to carry on a conversation, shortness of breath with minimal exertion continues, desaturates very quickly, takes a long time to recover.  Denies any chest pain or palpitations.  His ferritin is up to 473, his creatinine is 1.16, his D-dimer is 2, the previous day was just under 1, his white blood cell count 6.77.  His sed rate remains elevated at 30, he has been on Lovenox.  He denies chest pain or palpitations.  The 80s to 90s.  He is not tachycardic.  I reviewed his telemetry monitor for the past 24 hours, no acute saved arrhythmic events.  Baseline sinus rhythm in the 60s.    Review of systems:  All systems reviewed and negative except for cough, shortness of breath, fatigue, increased work of breathing with exertion, conversational dyspnea, hypoxemia, malaise.    Objective   Objective      Vitals:   Temp:  [97.3 °F (36.3 °C)-97.6 °F (36.4 °C)] 97.6 °F (36.4 °C)  Heart Rate:  [] 105  Resp:  [18-22] 22  BP: (111-154)/(55-74) 124/58  Flow (L/min):  [60] 60  Physical Exam    Constitutional: Awake, alert, no acute distress breathing heated high flow nasal cannula maximum settings laying in bed, watching [employer] sermon on his laptop              Eyes: Pupils equal, sclerae anicteric, no conjunctival injection              HENT: NCAT, mucous membranes moist              Neck: Supple, full range              Respiratory: Diminished breath sounds bilaterally laying in bed wearing heated high flow nasal cannula, conversational dyspnea                          Cardiovascular: RRR, no JVD              Gastrointestinal: Positive bowel sounds, soft, nontender, nondistended              Musculoskeletal: No bilateral ankle edema, no clubbing or cyanosis to extremities              Psychiatric: Appropriate affect, cooperative              Neurologic: Oriented x 3, speech clear              Skin: No rashes     Result Review    Result Review:  I have personally reviewed the results from the time of this admission to 1/2/2022 15:37 EST and agree with these findings:  [x]  Laboratory   CBC    CBC 12/31/21 1/1/22 1/2/22   WBC 8.46 7.72 6.77   RBC 4.95 5.19 5.21   Hemoglobin 13.8 14.6 14.5   Hematocrit 40.9 43.9 43.3   MCV 82.6 84.6 83.1   MCH 27.9 28.1 27.8   MCHC 33.7 33.3 33.5   RDW 13.8 13.9 13.7   Platelets 273 278 298           BMP    BMP 12/31/21 1/1/22 1/2/22   BUN 34 (A) 42 (A) 34 (A)   Creatinine 1.20 1.44 (A) 1.16   Sodium 134 (A) 134 (A) 134 (A)   Potassium 4.7 5.0 4.3   Chloride 97 (A) 94 (A) 97 (A)   CO2 23.2 28.2 23.2   Calcium 9.4 9.7 9.2   (A) Abnormal value            LIVER FUNCTION TESTS:      Lab 01/02/22  0647 01/01/22  0526 12/31/21  0548 12/30/21  0556 12/29/21  0610 12/28/21  0626 12/28/21  0626 12/27/21  0526 12/27/21  0526 12/26/21  1746 12/26/21  1746   TOTAL PROTEIN 6.8 7.2 6.9 7.3 7.1    < > 7.9   < > 6.7   < > 7.4   ALBUMIN 3.80 4.10 3.80 4.10 3.70   < > 4.00   < > 3.20*   < > 3.90   GLOBULIN  --   --   --   --   --   --  3.9  --  3.5  --  3.5   ALT (SGPT) 66* 79* 70* 54* 35   < > 44*   < > 40   < > 39   AST (SGOT) 28 40 42* 47* 29   < > 44*   < > 63*   < > 77*   BILIRUBIN 0.6 0.6 0.5 0.4 0.3   < > 0.4   < > 0.4   < > 0.5   BILIRUBIN DIRECT <0.2 <0.2 <0.2 <0.2 <0.2  --   --   --   --   --   --    ALK PHOS 46 48 47 51 44   < > 49   < > 42   < > 50    < > = values in this interval not displayed.       [x]  Microbiology   Blood Culture   Date Value Ref Range Status   12/27/2021 No growth at 5 days  Final   12/27/2021 No growth at 5 days  Final     BCID, PCR   Date Value Ref Range Status   12/26/2021 (A) Negative by BCID PCR. Culture to Follow. Final    Staph spp, not aureus or lugdunesis. Identification by BCID2 PCR.     No results found for: CULTURES, HSVCX, URCX  No results found for: EYECULTURE, GCCX, HSVCULTURE, LABHSV  No results found for: LEGIONELLA, MRSACX, MUMPSCX, MYCOPLASCX  No results found for: NOCARDIACX, STOOLCX  No results found for: THROATCX, UNSTIMCULT, URINECX, CULTURE, VZVCULTUR  No results found for: VIRALCULTU, WOUNDCX    [x]  Radiology XR Chest 1 View    Result Date: 12/30/2021  PROCEDURE: XR CHEST 1 VW  COMPARISON: [facility], CT, CT CHEST PULMONARY EMBOLISM, 12/27/2021, 18:57.  [facility], CR, XR CHEST 1 VW, 12/27/2021, 13:35.  INDICATIONS: COVID, HYPOXIA  FINDINGS:  A left subclavian pacemaker is in place.  There are patchy bilateral airspace opacities.  The heart and mediastinal contours appear normal.  The osseous structures appear intact.  CONCLUSION: Patchy bilateral airspace opacities, suggesting ongoing pneumonia.       JOSE FRANCISCO TONG MD       Electronically Signed and Approved By: JOSE FRANCISCO TONG MD on 12/30/2021 at 5:51             XR Chest 1 View    Result Date: 12/27/2021  PROCEDURE: XR CHEST 1 VW  COMPARISON: [facility],  CR, XR CHEST 1 VW, 12/26/2021, 17:59.  INDICATIONS: WORSENING HYPOXIA  FINDINGS:  There is stable patchy bilateral airspace disease.  No pneumothorax, pleural effusion or lobar consolidation.  The heart size is normal.  Stable left-sided 2 lead pacemaker.  CONCLUSION: Stable patchy bilateral airspace disease.       GALE CONNELL MD       Electronically Signed and Approved By: GALE CONNELL MD on 12/27/2021 at 14:22             XR Chest 1 View    Result Date: 12/26/2021  PROCEDURE: XR CHEST 1 VW  COMPARISON: [facility], CR, CHEST PA/AP & LAT 2V, 9/27/2017, 15:22.  INDICATIONS: SOA Triage Protocol  FINDINGS:  Left-sided AICD noted.  Heart size normal.  There is patchy bilateral midlung and bibasilar airspace disease most concerning for pneumonia.  No pneumothorax.  No large effusion.  Osseous structures grossly intact.  CONCLUSION:  Patchy bilateral airspace disease most pronounced at the lung bases concerning for multifocal pneumonia.       SHONNA HREMAN MD       Electronically Signed and Approved By: SHONNA HERMAN MD on 12/26/2021 at 18:31             CT Chest Pulmonary Embolism    Result Date: 12/28/2021  PROCEDURE: CT CHEST PULMONARY EMBOLISM W CONTRAST  COMPARISON:  [facility], CR, XR CHEST 1 VW, 12/27/2021, 13:35.  [facility], CT, CHEST W/O CONTRAST, 11/08/2018, 14:07. INDICATIONS: PE suspected, high prob  TECHNIQUE: After obtaining the patient's consent, CT images were obtained with non-ionic intravenous contrast material.   PROTOCOL:   Standard imaging protocol performed    RADIATION:   DLP: 386mGy*cm   Automated exposure control was utilized to minimize radiation dose. CONTRAST: 100cc Isovue 370 I.V. LABS:   eGFR: >60ml/min/1.73m2  FINDINGS:  The central tracheobronchial tree is clear.  There are diffuse bilateral airspace consolidations.  There is no pleural effusion.  A left subclavian pacemaker is in place.  The heart size appears normal, with mild  partially calcified atherosclerotic disease in the coronary arteries.  The great vessels are normal in caliber.  There is no evidence of pulmonary embolus.  No abnormally enlarged lymph nodes are identified.  Partial evaluation of the upper abdomen is unremarkable.  No aggressive osseous lesions are identified.  CONCLUSION:  1. Negative for pulmonary embolus. 2. Diffuse bilateral airspace consolidations, suggesting multifocal pneumonia.     JOSE FRANCISCO TONG MD       Electronically Signed and Approved By: JOSE FRANCISCO TONG MD on 12/28/2021 at 1:12             Duplex Venous Lower Extremity - Bilateral CV-READ    Result Date: 12/28/2021  · Normal bilateral lower extremity venous duplex scan.        [x]  EKG/Telemetry   []  Cardiology/Vascular   []  Pathology  [x]  Old records  []  Other:    Assessment/Plan   Assessment / Plan     Assessment/Plan:  Assessment:  COVID-19 pneumonia  Acute hypoxemic respiratory failure secondary COVID-19  SARS-CoV-2 infection  ARDS  Transaminitis due to viral illness  Hyperglycemia due to steroids  History of tobacco smoke  Elevated D-dimer  CHENCHO on CKD3  Essential hypertension  Hypothyroidism  SSS s/p PPM     Plan:  Labs and imaging reviewed  Still has critical oxygen requirements with maximum settings of NIPPV, intermittently requiring nonrebreather  Continue with air Vo/heated high flow nasal with as needed use of nonrebreather  Continues to have declining status from a respiratory standpoint, no significant change, requiring additional nonrebreather more  May need transfer to ICU for closer monitoring  He is a full code  Status post Actemra, watch for opportunistic infections  Consulted pulmonary discussed with Dr. Gong, recommendations appreciated  Restarting therapeutic Lovenox as his D-dimer is trending up, has recently had duplex scan and CT PE which were negative but D-dimer rise remains concerning  Continue Decadron 10 mg daily IV  Continue Afrin, continue Flonase  Continue  supplemental oxygen, upgrade to heated high flow/air Vo nasal cannula oxygen as required  Continue scheduled nebs Brovana Pulmicort twice daily  Continue DuoNebs every 6 hours  Continued GI prophylaxis Protonix 40 mg daily  Monitor abdominal pain symptoms  Watch for signs of bleeding while on therapeutic Lovenox  A.m. labs  Full code  VTE prophylaxis ordered via Lovenox  Continue telemetry monitor  Clinical course to dictate further management  Critically ill with SARS-CoV-2 infection and COVID-19 pneumonia with oxygen requirement going up, concerned that he will rapidly decompensate and require the ICU for close monitoring  Discussed with nurse at bedside.  Prognosis worsening and guarded         DVT prophylaxis:  Medical DVT prophylaxis orders are present.    CODE STATUS:   Medical Intervention Limits: NO intubation (DNI)  Code Status (Patient has no pulse and is not breathing): CPR (Attempt to Resuscitate)  Medical Interventions (Patient has pulse or is breathing): Limited Support        Electronically signed by Diann Cuevas MD, 01/02/22, 3:37 PM EST.

## 2022-01-02 NOTE — PROGRESS NOTES
Pulmonary / Critical Care Progress Note      Patient Name: Elmer Garvin  : 1945  MRN: [id number]  Attending:  Diann Cuevas MD  Date of admission: 2021    Subjective   Subjective     Follow-up for COVID-19 and hypoxia    Patient critically ill with respiratory failure and ARDS secondary to COVID-19  Patient still requiring air Vo layered with 100% nonrebreather  Extremely short of breath and fatigued  Feels like he has no energy  Has frequent desaturations with any movement    Review of Systems  General: Fatigue and weakness, otherwise denied complaints  Cardiovascular:  Denied complaints  Respiratory: Dyspnea and cough, otherwise denied complaints  Gastrointestinal: Denied complaints        Objective   Objective     Vitals:   Temp:  [97.3 °F (36.3 °C)-97.8 °F (36.6 °C)] 97.5 °F (36.4 °C)  Heart Rate:  [60-69] 60  Resp:  [18-20] 20  BP: (126-154)/(57-86) 126/69  Flow (L/min):  [60] 60    Physical Exam   Vital Signs Reviewed  General WDWN, Alert, appears to have conversational dyspnea  HEENT:  PERRL, EOMI.  OP, nares clear, no sinus tenderness  Neck:  Supple, no JVD, no thyromegaly  Lymph: no axillary, cervical, supraclavicular lymphadenopathy noted bilaterally  Chest: Auscultation reveals diffuse crackles heard throughout all lung fields and coarse breath sounds moderate conversational dyspnea appreciated  CV: RRR, no MGR, pulses 2+, equal.  Abd:  Soft, NT, ND, + BS, no HSM  EXT:  no clubbing, no cyanosis, no edema, no joint tenderness  Neuro:  A&Ox3, CN grossly intact, no focal deficits.  Skin: No rashes or lesions noted    Result Review    Result Review:  I have personally reviewed the results from the time of this admission to 2022 08:29 EST and agree with these findings:  [x]  Laboratory  [x]  Microbiology  [x]  Radiology  []  EKG/Telemetry   []  Cardiology/Vascular   []  Pathology  []  Old records  []  Other:  Most notable findings include: Creatinine remains elevated at 1.44,  previously was 1.17.      Assessment/Plan   Assessment / Plan     Active Hospital Problems:  Active Hospital Problems    Diagnosis    • Acute respiratory failure with hypoxia (HCC)        Impression:  Acute hypoxemic respiratory failure requiring air Vo  COVID-19 pneumonia, unvaccinated  ARDS  Therapeutic drug monitoring  Elevated liver enzymes  Stress versus steroid-induced hyperglycemia  Tobacco use of cigarettes in remission  Hyponatremia     Plan:  Remains on air Vo currently at 95% FiO2 X 60 L flow requiring layered 100% nonrebreather  Stressed the importance of the prone position of the modified prone position while setting  We will use BiPAP 12/6  Has had Actemra  Out of window for remdesivir  Continue IV Decadron  Patient's D-dimer has trended up and patient's clinical status has worsened with very tenuous respiratory status requiring large amount of oxygen  Agree with presumptively treating patient for a PE with Lovenox therapeutic dose  Patient very tenuous would make it difficult for him to go down for a CT scan of the chest due to his severe hypoxia  Continue Singulair  Follow daily CMP's given transaminitis  Started by  Start Pulmicort    Patient overall with very tenuous respiratory status with high likelihood of decompensation    DVT prophylaxis:  Medical DVT prophylaxis orders are present.    CODE STATUS:   Medical Intervention Limits: NO intubation (DNI)  Code Status (Patient has no pulse and is not breathing): CPR (Attempt to Resuscitate)  Medical Interventions (Patient has pulse or is breathing): Limited Support    Labs, imaging, microbiology, notes and medications personally reviewed.  Discussed with primary  30 minutes of critical care time spent managing this patient, excluding procedures       Electronically signed by Wilton Gong DO, 01/02/22, 5:16 PM EST.'

## 2022-01-03 ENCOUNTER — APPOINTMENT (OUTPATIENT)
Dept: GENERAL RADIOLOGY | Facility: HOSPITAL | Age: 77
End: 2022-01-03

## 2022-01-03 LAB
ALBUMIN SERPL-MCNC: 3.6 G/DL (ref 3.5–5.2)
ALP SERPL-CCNC: 44 U/L (ref 39–117)
ALT SERPL W P-5'-P-CCNC: 50 U/L (ref 1–41)
ANION GAP SERPL CALCULATED.3IONS-SCNC: 11.6 MMOL/L (ref 5–15)
AST SERPL-CCNC: 21 U/L (ref 1–40)
BASOPHILS # BLD AUTO: 0.01 10*3/MM3 (ref 0–0.2)
BASOPHILS NFR BLD AUTO: 0.2 % (ref 0–1.5)
BILIRUB CONJ SERPL-MCNC: <0.2 MG/DL (ref 0–0.3)
BILIRUB INDIRECT SERPL-MCNC: ABNORMAL MG/DL
BILIRUB SERPL-MCNC: 0.4 MG/DL (ref 0–1.2)
BUN SERPL-MCNC: 30 MG/DL (ref 8–23)
BUN/CREAT SERPL: 25.9 (ref 7–25)
CALCIUM SPEC-SCNC: 9.2 MG/DL (ref 8.6–10.5)
CHLORIDE SERPL-SCNC: 100 MMOL/L (ref 98–107)
CO2 SERPL-SCNC: 24.4 MMOL/L (ref 22–29)
CREAT SERPL-MCNC: 1.16 MG/DL (ref 0.76–1.27)
CRP SERPL-MCNC: <0.3 MG/DL (ref 0–0.5)
D DIMER PPP FEU-MCNC: 1.94 MG/L (FEU) (ref 0–0.59)
DEPRECATED RDW RBC AUTO: 41.5 FL (ref 37–54)
EOSINOPHIL # BLD AUTO: 0.22 10*3/MM3 (ref 0–0.4)
EOSINOPHIL NFR BLD AUTO: 3.6 % (ref 0.3–6.2)
ERYTHROCYTE [DISTWIDTH] IN BLOOD BY AUTOMATED COUNT: 13.7 % (ref 12.3–15.4)
ERYTHROCYTE [SEDIMENTATION RATE] IN BLOOD: 25 MM/HR (ref 0–20)
FERRITIN SERPL-MCNC: 446.3 NG/ML (ref 30–400)
GFR SERPL CREATININE-BSD FRML MDRD: 61 ML/MIN/1.73
GLUCOSE SERPL-MCNC: 112 MG/DL (ref 65–99)
HCT VFR BLD AUTO: 42.6 % (ref 37.5–51)
HGB BLD-MCNC: 14.3 G/DL (ref 13–17.7)
IMM GRANULOCYTES # BLD AUTO: 0.06 10*3/MM3 (ref 0–0.05)
IMM GRANULOCYTES NFR BLD AUTO: 1 % (ref 0–0.5)
LYMPHOCYTES # BLD AUTO: 0.58 10*3/MM3 (ref 0.7–3.1)
LYMPHOCYTES NFR BLD AUTO: 9.4 % (ref 19.6–45.3)
MAGNESIUM SERPL-MCNC: 2.2 MG/DL (ref 1.6–2.4)
MCH RBC QN AUTO: 28.1 PG (ref 26.6–33)
MCHC RBC AUTO-ENTMCNC: 33.6 G/DL (ref 31.5–35.7)
MCV RBC AUTO: 83.9 FL (ref 79–97)
MONOCYTES # BLD AUTO: 0.18 10*3/MM3 (ref 0.1–0.9)
MONOCYTES NFR BLD AUTO: 2.9 % (ref 5–12)
NEUTROPHILS NFR BLD AUTO: 5.13 10*3/MM3 (ref 1.7–7)
NEUTROPHILS NFR BLD AUTO: 82.9 % (ref 42.7–76)
NRBC BLD AUTO-RTO: 0 /100 WBC (ref 0–0.2)
PHOSPHATE SERPL-MCNC: 3.9 MG/DL (ref 2.5–4.5)
PLATELET # BLD AUTO: 241 10*3/MM3 (ref 140–450)
PMV BLD AUTO: 10.2 FL (ref 6–12)
POTASSIUM SERPL-SCNC: 4.5 MMOL/L (ref 3.5–5.2)
PROT SERPL-MCNC: 6.2 G/DL (ref 6–8.5)
RBC # BLD AUTO: 5.08 10*6/MM3 (ref 4.14–5.8)
SODIUM SERPL-SCNC: 136 MMOL/L (ref 136–145)
WBC NRBC COR # BLD: 6.18 10*3/MM3 (ref 3.4–10.8)

## 2022-01-03 PROCEDURE — 86140 C-REACTIVE PROTEIN: CPT | Performed by: FAMILY MEDICINE

## 2022-01-03 PROCEDURE — 85025 COMPLETE CBC W/AUTO DIFF WBC: CPT | Performed by: FAMILY MEDICINE

## 2022-01-03 PROCEDURE — 83735 ASSAY OF MAGNESIUM: CPT | Performed by: FAMILY MEDICINE

## 2022-01-03 PROCEDURE — 99233 SBSQ HOSP IP/OBS HIGH 50: CPT | Performed by: FAMILY MEDICINE

## 2022-01-03 PROCEDURE — 85379 FIBRIN DEGRADATION QUANT: CPT | Performed by: FAMILY MEDICINE

## 2022-01-03 PROCEDURE — 99291 CRITICAL CARE FIRST HOUR: CPT | Performed by: INTERNAL MEDICINE

## 2022-01-03 PROCEDURE — 80076 HEPATIC FUNCTION PANEL: CPT | Performed by: FAMILY MEDICINE

## 2022-01-03 PROCEDURE — 82728 ASSAY OF FERRITIN: CPT | Performed by: FAMILY MEDICINE

## 2022-01-03 PROCEDURE — 94762 N-INVAS EAR/PLS OXIMTRY CONT: CPT

## 2022-01-03 PROCEDURE — 94799 UNLISTED PULMONARY SVC/PX: CPT

## 2022-01-03 PROCEDURE — 25010000002 DEXAMETHASONE PER 1 MG: Performed by: INTERNAL MEDICINE

## 2022-01-03 PROCEDURE — 85652 RBC SED RATE AUTOMATED: CPT | Performed by: FAMILY MEDICINE

## 2022-01-03 PROCEDURE — 84100 ASSAY OF PHOSPHORUS: CPT | Performed by: FAMILY MEDICINE

## 2022-01-03 PROCEDURE — 80048 BASIC METABOLIC PNL TOTAL CA: CPT | Performed by: FAMILY MEDICINE

## 2022-01-03 PROCEDURE — 25010000002 ENOXAPARIN PER 10 MG: Performed by: FAMILY MEDICINE

## 2022-01-03 PROCEDURE — 71045 X-RAY EXAM CHEST 1 VIEW: CPT

## 2022-01-03 PROCEDURE — 94761 N-INVAS EAR/PLS OXIMETRY MLT: CPT

## 2022-01-03 RX ORDER — IPRATROPIUM BROMIDE AND ALBUTEROL SULFATE 2.5; .5 MG/3ML; MG/3ML
3 SOLUTION RESPIRATORY (INHALATION) EVERY 4 HOURS PRN
Status: DISCONTINUED | OUTPATIENT
Start: 2022-01-03 | End: 2022-01-10 | Stop reason: HOSPADM

## 2022-01-03 RX ADMIN — PANTOPRAZOLE SODIUM 40 MG: 40 TABLET, DELAYED RELEASE ORAL at 06:34

## 2022-01-03 RX ADMIN — ENOXAPARIN SODIUM 100 MG: 100 INJECTION SUBCUTANEOUS at 18:22

## 2022-01-03 RX ADMIN — IPRATROPIUM BROMIDE AND ALBUTEROL SULFATE 3 ML: 2.5; .5 SOLUTION RESPIRATORY (INHALATION) at 06:25

## 2022-01-03 RX ADMIN — LEVOTHYROXINE SODIUM 100 MCG: 0.1 TABLET ORAL at 06:34

## 2022-01-03 RX ADMIN — ARFORMOTEROL TARTRATE 15 MCG: 15 SOLUTION RESPIRATORY (INHALATION) at 06:25

## 2022-01-03 RX ADMIN — SODIUM CHLORIDE, PRESERVATIVE FREE 10 ML: 5 INJECTION INTRAVENOUS at 09:53

## 2022-01-03 RX ADMIN — FLUTICASONE PROPIONATE 2 SPRAY: 50 SPRAY, METERED NASAL at 09:54

## 2022-01-03 RX ADMIN — Medication 10 MG: at 19:48

## 2022-01-03 RX ADMIN — ENOXAPARIN SODIUM 100 MG: 100 INJECTION SUBCUTANEOUS at 06:33

## 2022-01-03 RX ADMIN — ARFORMOTEROL TARTRATE 15 MCG: 15 SOLUTION RESPIRATORY (INHALATION) at 18:48

## 2022-01-03 RX ADMIN — TEMAZEPAM 15 MG: 15 CAPSULE ORAL at 19:48

## 2022-01-03 RX ADMIN — DEXAMETHASONE SODIUM PHOSPHATE 10 MG: 10 INJECTION INTRAMUSCULAR; INTRAVENOUS at 09:53

## 2022-01-03 RX ADMIN — SODIUM CHLORIDE, PRESERVATIVE FREE 10 ML: 5 INJECTION INTRAVENOUS at 21:18

## 2022-01-03 RX ADMIN — MONTELUKAST 10 MG: 10 TABLET, FILM COATED ORAL at 09:53

## 2022-01-03 RX ADMIN — BUDESONIDE 0.5 MG: 0.5 SUSPENSION RESPIRATORY (INHALATION) at 18:48

## 2022-01-03 RX ADMIN — IPRATROPIUM BROMIDE AND ALBUTEROL SULFATE 3 ML: 2.5; .5 SOLUTION RESPIRATORY (INHALATION) at 01:08

## 2022-01-03 RX ADMIN — BUDESONIDE 0.5 MG: 0.5 SUSPENSION RESPIRATORY (INHALATION) at 06:25

## 2022-01-03 RX ADMIN — DOCUSATE SODIUM 100 MG: 100 CAPSULE, LIQUID FILLED ORAL at 19:49

## 2022-01-03 NOTE — NURSING NOTE
"Patient is currently on 3nt. Patient lying in bed on side at time of visit. Patient has airvo in place, non-rebreather not in place at time of visit. Patients wife at bedside. Patient reports \"feeling better\" and \"eating better.\" Patients wife states patient \"is on the mend.\" Further discussed code status, cpr versus no cpr. Patients wife and patient state patient \"does not want cpr or intubation\" because it \"will cause more harm.\" Emotional support provided. Encouraged patient to continue side lying (patient states he is unable to tolerate prone), nutrition and up in chair. Palliative care will continue to support and assist as needed.    AIDE Arguello, RN  Palliative Care     "

## 2022-01-03 NOTE — PLAN OF CARE
Goal Outcome Evaluation:  Plan of Care Reviewed With: patient        Progress: no change  Outcome Summary: Melatonin and Restoril given earlier than scheduled by dayshift rn last night, per spouse and patient request. Pt. had bm at beginning of shift. No c/o soa this shift. Vss, and spouse at bedside.

## 2022-01-03 NOTE — SIGNIFICANT NOTE
01/03/22 1208   Coping/Psychosocial   Additional Documentation Spiritual Care (Group)   Spiritual Care   Use of Spiritual Resources spirituality for coping, indicated strong use of   Spiritual Care Source physician referral  (Length of stay)   Spiritual Care Follow-Up will follow closely   Response to Spiritual Care emotion expressed; engaged in conversation; receptive of support   Spiritual Care Interventions supportive conversation provided; theological discussion provided   Spiritual Care Visit Type follow-up   Spiritual Care Request coping/stress of illness support; family support; spiritual/moral support   Receptivity to Spiritual Care visit welcomed   Follow up visit made with pt and his wife at bedside. Introductions made and role explained. At time of visit pt was resting on his side with his eyes closed on Airvo. Pt's wife was sitting at bedside and receptive of visit. Pt is a retired  of an [employer]. Pt woke up during our visit and shared that he was feeling a little better today and found that encouraging. No needs expressed at this time.  to follow for general support.

## 2022-01-03 NOTE — CONSULTS
Nutrition Services    Patient Name:  Elmer Garvin  YOB: 1945  MRN: [id number]  Admit Date:  12/26/2021    LOS x 8 days. Patient is eating 50-75% at meals, receiving oral nutrition supplements to help meet estimated needs. Boost Plus TID provides an additional 1080 kcal and 42 g protein daily. Weight is stable. Continue to encourage meal and supplement intake. No new nutrition recommendations at this time. RD will continue to monitor and follow prn per protocol.    Electronically signed by:  Martín Lloyd RD  01/03/22 08:01 EST

## 2022-01-03 NOTE — PROGRESS NOTES
Pulmonary / Critical Care Progress Note      Patient Name: Elmer Garvin  : 1945  MRN: [id number]  Attending:  Diann Cuevas MD  Date of admission: 2021    Subjective   Subjective     It is critically ill with COVID-19 infection, viral pneumonia, and acute hypoxic respiratory failure, ARDS.    Over last 24 hours,  Patient remained on nebulizers including Brovana, Pulmicort.  Remained on Decadron 10 mg IV daily.  Has been on Lovenox 100 mg subcu twice daily.  Also on Singulair once daily.  Has remained on air Vo with nonrebreather on top.    Overnight, remained on air Vo and nonrebreather on top.    This morning,  Oxygen saturation at 92% on air Vo at 60 L/min, 94% FiO2.  He feels some better.  Has been using nonrebreather on top whenever he moves around.  Still remains weak and fatigued.  Short of breath with minimal movements.  Has frequent desaturations with any movements.  No nausea or vomiting.  No chest pain or chest tightness.    Review of Systems  General:  Fatigue, No Fever  HEENT: No dysphagia, No Visual Changes, no rhinorrhea  Respiratory:  + cough,+Dyspnea, no phlegm, No Pleuritic Pain, no wheezing  Cardiovascular: Denies chest pain, denies palpitations,+MONROE, Chest Pressure  Gastrointestinal:  No Abdominal Pain, No Nausea, No Vomiting, No Diarrhea  Genitourinary:  No Dysuria, No Frequency, No Hesitancy  Musculoskeletal: No muscle pain or swelling  Endocrine:  No Heat Intolerance, No Cold Intolerance, Fatigue  Hematologic:  No Bleeding, No Bruising  Psychiatric:  No Anxiety, No Depression  Neurologic:  No Confusion, no Dysarthria, No Headaches  Skin:  No Rash, No Open Wounds      Objective   Objective     Vitals:   Temp:  [97.6 °F (36.4 °C)-98.6 °F (37 °C)] 98.4 °F (36.9 °C)  Heart Rate:  [60-69] 62  Resp:  [18-22] 22  BP: (115-152)/(50-70) 119/70  Flow (L/min):  [60] 60    Physical Exam   Vital Signs Reviewed  General WDWN, Alert, appears to have conversational dyspnea, on  airVo  HEENT:  PERRL, EOMI.  OP, nares clear, no sinus tenderness  Neck:  Supple, no JVD, no thyromegaly  Lymph: no axillary, cervical, supraclavicular lymphadenopathy noted bilaterally  Chest: Diffuse crackles heard throughout all lung fields and coarse breath sounds moderate conversational dyspnea appreciated  CV: RRR, no MGR, pulses 2+, equal  Abd:  Soft, NT, ND, + BS, no HSM  EXT:  no clubbing, no cyanosis, no edema, no joint tenderness  Neuro:  A&Ox3, CN grossly intact, no focal deficits.  Skin: No rashes or lesions noted    Result Review    Result Review:  I have personally reviewed the results from the time of this admission to 1/3/2022 08:54 EST and agree with these findings:  [x]  Laboratory  [x]  Microbiology  [x]  Radiology  []  EKG/Telemetry   []  Cardiology/Vascular   []  Pathology  []  Old records  []  Other:  Most notable findings include: Creatinine remains elevated at 1.44, previously was 1.17.  Serum creatinine 1.16.  D-dimer 1.94.    Assessment/Plan   Assessment / Plan     Active Hospital Problems:  Active Hospital Problems    Diagnosis    • Acute respiratory failure with hypoxia (HCC)        Impression:  Acute hypoxemic respiratory failure requiring air Vo  COVID-19 pneumonia, unvaccinated  ARDS  Therapeutic drug monitoring  Elevated liver enzymes  Stress versus steroid-induced hyperglycemia  Tobacco use of cigarettes in remission  Hyponatremia     Plan:  Remains on air Vo currently at 95% FiO2 and 60 L flow requiring intermittent layered 100% nonrebreather.  Stressed the importance of the prone position of the modified prone position while setting  BiPAP 12/6 with sleep and naps.  Received a dose of Tocilizumab this admission.  Out of window for remdesivir.  Continue IV Decadron.  Patient's D-dimer has trended up and patient's clinical status has worsened with very tenuous respiratory status requiring large amount of oxygen.  Agree with presumptively treating patient for a PE with Lovenox  therapeutic dose.  Patient very tenuous would make it difficult for him to go down for a CT scan of the chest due to his severe hypoxia.  Continue Singulair.  Follow daily CMP's given transaminitis.  Continue Brovana and Pulmicort.  Patient overall with very tenuous respiratory status with high likelihood of decompensation.    DVT prophylaxis:  Medical DVT prophylaxis orders are present.    CODE STATUS:   Medical Intervention Limits: NO intubation (DNI)  Code Status (Patient has no pulse and is not breathing): CPR (Attempt to Resuscitate)  Medical Interventions (Patient has pulse or is breathing): Limited Support    Labs, imaging, microbiology, notes and medications personally reviewed.  Discussed with primary service and bedside nurse.  31 minutes of critical care time spent managing this patient, excluding procedures       Electronically signed by Eduard Cervantes MD, 01/03/22, 8:54 AM EST.

## 2022-01-03 NOTE — PROGRESS NOTES
[facility]   Hospitalist Progress Note  Date: 1/3/2022  Patient Name: Elmer Garvin  : 1945  MRN: [id number]  Date of admission: 2021      Subjective   Subjective     Chief Complaint: SOA                           Summary: 76-year-old male with hypertension, hypothyroidism, CKD and sick sinus syndrome presented with 2 weeks of worsening shortness of breath and cough with fatigue.  He has not been vaccinated against COVID-19.  He went to an urgent care where he was satting 79% on room air and was sent to the ED where he was admitted.  He has tested positive for COVID-19.  He was out of the window for remdesivir.  He was started on steroids, supplemental oxygen, scheduled nebs.  Pulmonary consulted with increasing oxygen requirement.  Concern for ARDS.  Status post Actemra.  Worsening oxygenation status, transitioned to air Vo/heated high flow nasal cannula     Interval Followup: Patient seen and examined this morning, no acute distress, no acute major overnight events, remains on air Vo/heated high flow nasal cannula oxygen/NIPPV on 60 L/min, 95% FiO2, near maximum settings, still has cough and significant dyspnea on exertion as well as with rest.  Denies fevers, chills, sweats, dizziness.  Has generalized fatigue, body aches.  Telemetry reviewed, no acute saved arrhythmic events, baseline sinus rhythm in the 60s paced.  Appetite remains poor.  Creatinine 1.16, potassium 4.5, sodium 136.  D-dimer 1.94, hemoglobin 14.3 chest x-ray from today continues to show patchy infiltrates bilaterally, official read pending.  Notes nasal congestion is clearing.    Review of systems:  All systems reviewed and negative except for cough, shortness of breath, increased nasal congestion, generalized fatigue, body aches.     Objective   Objective     Vitals:   Temp:  [97.7 °F (36.5 °C)-98.4 °F (36.9 °C)] 98.1 °F (36.7 °C)  Heart Rate:  [60-72] 65  Resp:  [18-22] 21  BP: (115-136)/(50-70) 136/65  Flow (L/min):  [60]  60  Physical Exam    Constitutional: Awake, alert, no acute distress breathing heated high flow nasal cannula, fatigued appearing              Eyes: Pupils equal, sclerae anicteric, no conjunctival injection              HENT: NCAT, mucous membranes moist              Neck: Supple, full range              Respiratory: Diminished breath sounds bilaterally laying in bed wearing heated high flow nasal cannula, conversational dyspnea                          Cardiovascular: RRR, no JVD              Gastrointestinal: Positive bowel sounds, soft, nontender, nondistended              Musculoskeletal: No bilateral ankle edema, no clubbing or cyanosis to extremities              Psychiatric: Appropriate affect, cooperative              Neurologic: Oriented x 3, speech clear              Skin: No rashes       Result Review    Result Review:  I have personally reviewed the results from the time of this admission to 1/3/2022 15:44 EST and agree with these findings:  [x]  Laboratory   CBC    CBC 1/1/22 1/2/22 1/3/22   WBC 7.72 6.77 6.18   RBC 5.19 5.21 5.08   Hemoglobin 14.6 14.5 14.3   Hematocrit 43.9 43.3 42.6   MCV 84.6 83.1 83.9   MCH 28.1 27.8 28.1   MCHC 33.3 33.5 33.6   RDW 13.9 13.7 13.7   Platelets 278 298 241           BMP    BMP 1/1/22 1/2/22 1/3/22   BUN 42 (A) 34 (A) 30 (A)   Creatinine 1.44 (A) 1.16 1.16   Sodium 134 (A) 134 (A) 136   Potassium 5.0 4.3 4.5   Chloride 94 (A) 97 (A) 100   CO2 28.2 23.2 24.4   Calcium 9.7 9.2 9.2   (A) Abnormal value            LIVER FUNCTION TESTS:      Lab 01/03/22  0601 01/02/22  0647 01/01/22  0526 12/31/21  0548 12/30/21  0556 12/29/21  0610 12/28/21  0626   TOTAL PROTEIN 6.2 6.8 7.2 6.9 7.3   < > 7.9   ALBUMIN 3.60 3.80 4.10 3.80 4.10   < > 4.00   GLOBULIN  --   --   --   --   --   --  3.9   ALT (SGPT) 50* 66* 79* 70* 54*   < > 44*   AST (SGOT) 21 28 40 42* 47*   < > 44*   BILIRUBIN 0.4 0.6 0.6 0.5 0.4   < > 0.4   BILIRUBIN DIRECT <0.2 <0.2 <0.2 <0.2 <0.2   < >  --    ALK PHOS  44 46 48 47 51   < > 49    < > = values in this interval not displayed.       [x]  Microbiology   Blood Culture   Date Value Ref Range Status   12/27/2021 No growth at 5 days  Final   12/27/2021 No growth at 5 days  Final     No results found for: BCIDPCR, CXREFLEX, CSFCX, CULTURETIS  No results found for: CULTURES, HSVCX, URCX  No results found for: EYECULTURE, GCCX, HSVCULTURE, LABHSV  No results found for: LEGIONELLA, MRSACX, MUMPSCX, MYCOPLASCX  No results found for: NOCARDIACX, STOOLCX  No results found for: THROATCX, UNSTIMCULT, URINECX, CULTURE, VZVCULTUR  No results found for: VIRALCULTU, WOUNDCX    [x]  Radiology XR Chest 1 View    Result Date: 12/30/2021  PROCEDURE: XR CHEST 1 VW  COMPARISON: [facility], CT, CT CHEST PULMONARY EMBOLISM, 12/27/2021, 18:57.  [facility], CR, XR CHEST 1 VW, 12/27/2021, 13:35.  INDICATIONS: COVID, HYPOXIA  FINDINGS:  A left subclavian pacemaker is in place.  There are patchy bilateral airspace opacities.  The heart and mediastinal contours appear normal.  The osseous structures appear intact.  CONCLUSION: Patchy bilateral airspace opacities, suggesting ongoing pneumonia.       JOSE FRANCISCO TONG MD       Electronically Signed and Approved By: JOSE FRANCISCO TONG MD on 12/30/2021 at 5:51             XR Chest 1 View    Result Date: 12/27/2021  PROCEDURE: XR CHEST 1 VW  COMPARISON: [facility], CR, XR CHEST 1 VW, 12/26/2021, 17:59.  INDICATIONS: WORSENING HYPOXIA  FINDINGS:  There is stable patchy bilateral airspace disease.  No pneumothorax, pleural effusion or lobar consolidation.  The heart size is normal.  Stable left-sided 2 lead pacemaker.  CONCLUSION: Stable patchy bilateral airspace disease.       GALE CONNELL MD       Electronically Signed and Approved By: GALE CONNELL MD on 12/27/2021 at 14:22             XR Chest 1 View    Result Date: 12/26/2021  PROCEDURE: XR CHEST 1 VW  COMPARISON: [facility], , CHEST  PA/AP & LAT 2V, 9/27/2017, 15:22.  INDICATIONS: SOA Triage Protocol  FINDINGS:  Left-sided AICD noted.  Heart size normal.  There is patchy bilateral midlung and bibasilar airspace disease most concerning for pneumonia.  No pneumothorax.  No large effusion.  Osseous structures grossly intact.  CONCLUSION:  Patchy bilateral airspace disease most pronounced at the lung bases concerning for multifocal pneumonia.       SHONNA HERMAN MD       Electronically Signed and Approved By: SHONNA HERMAN MD on 12/26/2021 at 18:31             CT Chest Pulmonary Embolism    Result Date: 12/28/2021  PROCEDURE: CT CHEST PULMONARY EMBOLISM W CONTRAST  COMPARISON:  [facility], CR, XR CHEST 1 VW, 12/27/2021, 13:35.  [facility], CT, CHEST W/O CONTRAST, 11/08/2018, 14:07. INDICATIONS: PE suspected, high prob  TECHNIQUE: After obtaining the patient's consent, CT images were obtained with non-ionic intravenous contrast material.   PROTOCOL:   Standard imaging protocol performed    RADIATION:   DLP: 386mGy*cm   Automated exposure control was utilized to minimize radiation dose. CONTRAST: 100cc Isovue 370 I.V. LABS:   eGFR: >60ml/min/1.73m2  FINDINGS:  The central tracheobronchial tree is clear.  There are diffuse bilateral airspace consolidations.  There is no pleural effusion.  A left subclavian pacemaker is in place.  The heart size appears normal, with mild partially calcified atherosclerotic disease in the coronary arteries.  The great vessels are normal in caliber.  There is no evidence of pulmonary embolus.  No abnormally enlarged lymph nodes are identified.  Partial evaluation of the upper abdomen is unremarkable.  No aggressive osseous lesions are identified.  CONCLUSION:  1. Negative for pulmonary embolus. 2. Diffuse bilateral airspace consolidations, suggesting multifocal pneumonia.     JOSE FRANCISCO TONG MD       Electronically Signed and Approved By: JOSE FRANCISCO TONG MD on 12/28/2021 at 1:12              Duplex Venous Lower Extremity - Bilateral CV-READ    Result Date: 12/28/2021  · Normal bilateral lower extremity venous duplex scan.        [x]  EKG/Telemetry   []  Cardiology/Vascular   []  Pathology  [x]  Old records  []  Other:    Assessment/Plan   Assessment / Plan     Assessment/Plan:  Assessment:  COVID-19 pneumonia  Acute hypoxemic respiratory failure secondary COVID-19  SARS-CoV-2 infection  ARDS  Transaminitis due to viral illness  Hyperglycemia due to steroids  History of tobacco smoke  Elevated D-dimer  CHENCHO on CKD3  Essential hypertension  Hypothyroidism  SSS s/p PPM     Plan:  Labs and imaging reviewed  Patient changed his CODE STATUS to DNR  Palliative care consult appreciated  Still has critical oxygen requirements with maximum settings of NIPPV, intermittently requiring nonrebreather to keep his sats above  May need transfer to ICU for closer monitoring  Status post Actemra, watch for opportunistic infections  Consulted pulmonary discussed with Dr. Cervantes, recommendations appreciated  Continue therapeutic Lovenox dosing empirically as his D-dimer is elevated  Continue Decadron 10 mg daily IV  Continue Afrin, continue Flonase  Continue scheduled nebs Brovana Pulmicort twice daily  Continue DuoNebs every 6 hours  Continued GI prophylaxis Protonix 40 mg daily  Monitor abdominal pain symptoms  Watch for signs of bleeding while on therapeutic Lovenox  A.m. labs  DNR  VTE prophylaxis ordered via Lovenox  Continue telemetry monitor  Clinical course to dictate further management  Critically ill with SARS-CoV-2 infection and COVID-19 pneumonia with oxygen requirement going up, concerned that he will rapidly decompensate and require the ICU for close monitoring  Discussed with nurse at bedside.  Prognosis remains guarded       DVT prophylaxis:  Medical DVT prophylaxis orders are present.    CODE STATUS:   Level Of Support Discussed With: Patient  Code Status (Patient has no pulse and is not breathing): No  CPR (Do Not Attempt to Resuscitate)  Medical Interventions (Patient has pulse or is breathing): Full Support        Electronically signed by Diann Cuevas MD, 01/03/22, 3:44 PM EST.

## 2022-01-04 LAB
ALBUMIN SERPL-MCNC: 3.5 G/DL (ref 3.5–5.2)
ALP SERPL-CCNC: 46 U/L (ref 39–117)
ALT SERPL W P-5'-P-CCNC: 38 U/L (ref 1–41)
ANION GAP SERPL CALCULATED.3IONS-SCNC: 10.7 MMOL/L (ref 5–15)
AST SERPL-CCNC: 20 U/L (ref 1–40)
BASOPHILS # BLD AUTO: 0.01 10*3/MM3 (ref 0–0.2)
BASOPHILS NFR BLD AUTO: 0.2 % (ref 0–1.5)
BILIRUB CONJ SERPL-MCNC: <0.2 MG/DL (ref 0–0.3)
BILIRUB INDIRECT SERPL-MCNC: NORMAL MG/DL
BILIRUB SERPL-MCNC: 0.5 MG/DL (ref 0–1.2)
BUN SERPL-MCNC: 32 MG/DL (ref 8–23)
BUN/CREAT SERPL: 28.3 (ref 7–25)
CALCIUM SPEC-SCNC: 9 MG/DL (ref 8.6–10.5)
CHLORIDE SERPL-SCNC: 101 MMOL/L (ref 98–107)
CO2 SERPL-SCNC: 23.3 MMOL/L (ref 22–29)
CREAT SERPL-MCNC: 1.13 MG/DL (ref 0.76–1.27)
CRP SERPL-MCNC: <0.3 MG/DL (ref 0–0.5)
D DIMER PPP FEU-MCNC: 1.41 MG/L (FEU) (ref 0–0.59)
DEPRECATED RDW RBC AUTO: 40.7 FL (ref 37–54)
EOSINOPHIL # BLD AUTO: 0.22 10*3/MM3 (ref 0–0.4)
EOSINOPHIL NFR BLD AUTO: 3.7 % (ref 0.3–6.2)
ERYTHROCYTE [DISTWIDTH] IN BLOOD BY AUTOMATED COUNT: 13.6 % (ref 12.3–15.4)
ERYTHROCYTE [SEDIMENTATION RATE] IN BLOOD: 21 MM/HR (ref 0–20)
FERRITIN SERPL-MCNC: 452.5 NG/ML (ref 30–400)
GFR SERPL CREATININE-BSD FRML MDRD: 63 ML/MIN/1.73
GLUCOSE SERPL-MCNC: 99 MG/DL (ref 65–99)
HCT VFR BLD AUTO: 41.2 % (ref 37.5–51)
HGB BLD-MCNC: 14.1 G/DL (ref 13–17.7)
IMM GRANULOCYTES # BLD AUTO: 0.05 10*3/MM3 (ref 0–0.05)
IMM GRANULOCYTES NFR BLD AUTO: 0.8 % (ref 0–0.5)
LYMPHOCYTES # BLD AUTO: 0.65 10*3/MM3 (ref 0.7–3.1)
LYMPHOCYTES NFR BLD AUTO: 11 % (ref 19.6–45.3)
MAGNESIUM SERPL-MCNC: 2.3 MG/DL (ref 1.6–2.4)
MCH RBC QN AUTO: 28.4 PG (ref 26.6–33)
MCHC RBC AUTO-ENTMCNC: 34.2 G/DL (ref 31.5–35.7)
MCV RBC AUTO: 83.1 FL (ref 79–97)
MONOCYTES # BLD AUTO: 0.21 10*3/MM3 (ref 0.1–0.9)
MONOCYTES NFR BLD AUTO: 3.6 % (ref 5–12)
NEUTROPHILS NFR BLD AUTO: 4.76 10*3/MM3 (ref 1.7–7)
NEUTROPHILS NFR BLD AUTO: 80.7 % (ref 42.7–76)
NRBC BLD AUTO-RTO: 0 /100 WBC (ref 0–0.2)
PHOSPHATE SERPL-MCNC: 3.9 MG/DL (ref 2.5–4.5)
PLATELET # BLD AUTO: 238 10*3/MM3 (ref 140–450)
PMV BLD AUTO: 10 FL (ref 6–12)
POTASSIUM SERPL-SCNC: 4.5 MMOL/L (ref 3.5–5.2)
PROCALCITONIN SERPL-MCNC: 0.05 NG/ML (ref 0–0.25)
PROT SERPL-MCNC: 6.2 G/DL (ref 6–8.5)
QT INTERVAL: 385 MS
RBC # BLD AUTO: 4.96 10*6/MM3 (ref 4.14–5.8)
SODIUM SERPL-SCNC: 135 MMOL/L (ref 136–145)
WBC NRBC COR # BLD: 5.9 10*3/MM3 (ref 3.4–10.8)

## 2022-01-04 PROCEDURE — 99233 SBSQ HOSP IP/OBS HIGH 50: CPT | Performed by: FAMILY MEDICINE

## 2022-01-04 PROCEDURE — 80076 HEPATIC FUNCTION PANEL: CPT | Performed by: FAMILY MEDICINE

## 2022-01-04 PROCEDURE — 85652 RBC SED RATE AUTOMATED: CPT | Performed by: FAMILY MEDICINE

## 2022-01-04 PROCEDURE — 94799 UNLISTED PULMONARY SVC/PX: CPT

## 2022-01-04 PROCEDURE — 85025 COMPLETE CBC W/AUTO DIFF WBC: CPT | Performed by: FAMILY MEDICINE

## 2022-01-04 PROCEDURE — 25010000002 DEXAMETHASONE PER 1 MG: Performed by: INTERNAL MEDICINE

## 2022-01-04 PROCEDURE — 84145 PROCALCITONIN (PCT): CPT | Performed by: INTERNAL MEDICINE

## 2022-01-04 PROCEDURE — 25010000002 ENOXAPARIN PER 10 MG: Performed by: FAMILY MEDICINE

## 2022-01-04 PROCEDURE — 85379 FIBRIN DEGRADATION QUANT: CPT | Performed by: FAMILY MEDICINE

## 2022-01-04 PROCEDURE — 94761 N-INVAS EAR/PLS OXIMETRY MLT: CPT

## 2022-01-04 PROCEDURE — 83735 ASSAY OF MAGNESIUM: CPT | Performed by: FAMILY MEDICINE

## 2022-01-04 PROCEDURE — 82728 ASSAY OF FERRITIN: CPT | Performed by: FAMILY MEDICINE

## 2022-01-04 PROCEDURE — 80048 BASIC METABOLIC PNL TOTAL CA: CPT | Performed by: INTERNAL MEDICINE

## 2022-01-04 PROCEDURE — 99291 CRITICAL CARE FIRST HOUR: CPT | Performed by: INTERNAL MEDICINE

## 2022-01-04 PROCEDURE — 84100 ASSAY OF PHOSPHORUS: CPT | Performed by: INTERNAL MEDICINE

## 2022-01-04 PROCEDURE — 86140 C-REACTIVE PROTEIN: CPT | Performed by: FAMILY MEDICINE

## 2022-01-04 RX ADMIN — DEXAMETHASONE SODIUM PHOSPHATE 10 MG: 10 INJECTION INTRAMUSCULAR; INTRAVENOUS at 10:15

## 2022-01-04 RX ADMIN — ARFORMOTEROL TARTRATE 15 MCG: 15 SOLUTION RESPIRATORY (INHALATION) at 18:57

## 2022-01-04 RX ADMIN — ENOXAPARIN SODIUM 100 MG: 100 INJECTION SUBCUTANEOUS at 06:47

## 2022-01-04 RX ADMIN — Medication 10 MG: at 20:39

## 2022-01-04 RX ADMIN — FLUTICASONE PROPIONATE 2 SPRAY: 50 SPRAY, METERED NASAL at 10:16

## 2022-01-04 RX ADMIN — ARFORMOTEROL TARTRATE 15 MCG: 15 SOLUTION RESPIRATORY (INHALATION) at 08:34

## 2022-01-04 RX ADMIN — PANTOPRAZOLE SODIUM 40 MG: 40 TABLET, DELAYED RELEASE ORAL at 06:47

## 2022-01-04 RX ADMIN — SODIUM CHLORIDE, PRESERVATIVE FREE 10 ML: 5 INJECTION INTRAVENOUS at 10:15

## 2022-01-04 RX ADMIN — ENOXAPARIN SODIUM 100 MG: 100 INJECTION SUBCUTANEOUS at 17:31

## 2022-01-04 RX ADMIN — TEMAZEPAM 15 MG: 15 CAPSULE ORAL at 20:44

## 2022-01-04 RX ADMIN — LEVOTHYROXINE SODIUM 100 MCG: 0.1 TABLET ORAL at 06:47

## 2022-01-04 RX ADMIN — BUDESONIDE 0.5 MG: 0.5 SUSPENSION RESPIRATORY (INHALATION) at 18:57

## 2022-01-04 RX ADMIN — BUDESONIDE 0.5 MG: 0.5 SUSPENSION RESPIRATORY (INHALATION) at 08:34

## 2022-01-04 RX ADMIN — MONTELUKAST 10 MG: 10 TABLET, FILM COATED ORAL at 10:15

## 2022-01-04 RX ADMIN — SODIUM CHLORIDE, PRESERVATIVE FREE 10 ML: 5 INJECTION INTRAVENOUS at 20:38

## 2022-01-04 NOTE — PROGRESS NOTES
[facility]   Hospitalist Progress Note  Date: 2022  Patient Name: Elmer Garvin  : 1945  MRN: [id number]  Date of admission: 2021      Subjective   Subjective     Chief Complaint: SOA                           Summary: 76-year-old male with hypertension, hypothyroidism, CKD and sick sinus syndrome presented with 2 weeks of worsening shortness of breath and cough with fatigue.  He has not been vaccinated against COVID-19.  He went to an urgent care where he was satting 79% on room air and was sent to the ED where he was admitted.  He has tested positive for COVID-19.  He was out of the window for remdesivir.  He was started on steroids, supplemental oxygen, scheduled nebs.  Pulmonary consulted with increasing oxygen requirement.  Concern for ARDS.  Status post Actemra.  Worsening oxygenation status, transitioned to air Vo/heated high flow nasal cannula     Interval Followup: Patient seen and examined no acute distress, no acute major overnight events, remains on heated high flow nasal cannula oxygen, 40 L/min flow rate, FiO2 down to 60%, he notes his nasal congestion and cough have improved some, he still has significant mucus production and is clearing this accordingly, he desaturates with very minimal exertion, takes a while to respond and improve but he does rebound, he denies any chest pain or palpitations, he does not have any lower extremity edema or swelling.  Over the past 24 hours he appears to have more energy.  But overall he still has significant weakness in all of his extremities.  Net negative fluid balance 600 mL, sodium 135, creatinine 1.13, potassium 4.5, hemoglobin 14.1, white blood cell count 5000.  I reviewed his telemetry monitor for the past 24 hours, no acute saved arrhythmic events, sinus rhythm in the 60s.    Review of systems:  All systems reviewed and negative except for cough, shortness of breath, dyspnea on exertion, generalized fatigue.  Nasal  congestion.    Objective   Objective     Vitals:   Temp:  [97.7 °F (36.5 °C)-98.3 °F (36.8 °C)] 97.7 °F (36.5 °C)  Heart Rate:  [59-72] 72  Resp:  [20] 20  BP: (115-133)/(47-73) 123/53  Flow (L/min):  [40-60] 40  Physical Exam    Constitutional: Awake, alert, no acute distress breathing heated high flow nasal cannula, fatigued              Eyes: Pupils equal, sclerae anicteric, no conjunctival injection              HENT: NCAT, mucous membranes moist              Neck: Supple, full range              Respiratory: Diminished breath sounds bilaterally laying in bed wearing heated high flow nasal cannula, coarse breath sounds throughout              Cardiovascular: RRR, no JVD              Gastrointestinal: Positive bowel sounds, soft, nontender, nondistended              Musculoskeletal: No bilateral ankle edema, no clubbing or cyanosis to extremities              Psychiatric: Appropriate affect, cooperative              Neurologic: Oriented x 3, speech clear              Skin: No rashes          Result Review    Result Review:  I have personally reviewed the results from the time of this admission to 1/4/2022 16:28 EST and agree with these findings:  [x]  Laboratory   CBC    CBC 1/2/22 1/3/22 1/4/22   WBC 6.77 6.18 5.90   RBC 5.21 5.08 4.96   Hemoglobin 14.5 14.3 14.1   Hematocrit 43.3 42.6 41.2   MCV 83.1 83.9 83.1   MCH 27.8 28.1 28.4   MCHC 33.5 33.6 34.2   RDW 13.7 13.7 13.6   Platelets 298 241 238           BMP    BMP 1/2/22 1/3/22 1/4/22   BUN 34 (A) 30 (A) 32 (A)   Creatinine 1.16 1.16 1.13   Sodium 134 (A) 136 135 (A)   Potassium 4.3 4.5 4.5   Chloride 97 (A) 100 101   CO2 23.2 24.4 23.3   Calcium 9.2 9.2 9.0   (A) Abnormal value            LIVER FUNCTION TESTS:      Lab 01/04/22  0756 01/03/22  0601 01/02/22  0647 01/01/22  0526 12/31/21  0548   TOTAL PROTEIN 6.2 6.2 6.8 7.2 6.9   ALBUMIN 3.50 3.60 3.80 4.10 3.80   ALT (SGPT) 38 50* 66* 79* 70*   AST (SGOT) 20 21 28 40 42*   BILIRUBIN 0.5 0.4 0.6 0.6 0.5    BILIRUBIN DIRECT <0.2 <0.2 <0.2 <0.2 <0.2   ALK PHOS 46 44 46 48 47       [x]  Microbiology No results found for: ACANTHNAEG, AFBCX, BPERTUSSISCX, BLOODCX  No results found for: BCIDPCR, CXREFLEX, CSFCX, CULTURETIS  No results found for: CULTURES, HSVCX, URCX  No results found for: EYECULTURE, GCCX, HSVCULTURE, LABHSV  No results found for: LEGIONELLA, MRSACX, MUMPSCX, MYCOPLASCX  No results found for: NOCARDIACX, STOOLCX  No results found for: THROATCX, UNSTIMCULT, URINECX, CULTURE, VZVCULTUR  No results found for: VIRALCULTU, WOUNDCX    [x]  Radiology XR Chest 1 View    Result Date: 1/3/2022  PROCEDURE: XR CHEST 1 VW  COMPARISON: [facility], , XR CHEST 1 VW, 12/30/2021, 5:08.  INDICATIONS: pneumonia, resp failure  FINDINGS:  There is a left subclavian pacemaker device with leads over the right atrium right ventricle.  The heart is enlarged.  There are diffuse bilateral alveolar airspace opacities consistent with multifocal pneumonia.  Aeration has slightly worsened in the interval.  CONCLUSION: Slight worsening of multifocal infiltrates in the interval       JAZZY SEGURA MD       Electronically Signed and Approved By: JAZZY SEGURA MD on 1/03/2022 at 15:47             XR Chest 1 View    Result Date: 12/30/2021  PROCEDURE: XR CHEST 1 VW  COMPARISON: [facility], CT, CT CHEST PULMONARY EMBOLISM, 12/27/2021, 18:57.  [facility], CR, XR CHEST 1 VW, 12/27/2021, 13:35.  INDICATIONS: COVID, HYPOXIA  FINDINGS:  A left subclavian pacemaker is in place.  There are patchy bilateral airspace opacities.  The heart and mediastinal contours appear normal.  The osseous structures appear intact.  CONCLUSION: Patchy bilateral airspace opacities, suggesting ongoing pneumonia.       JOSE FRANCISCO TONG MD       Electronically Signed and Approved By: JOSE FRANCISCO TONG MD on 12/30/2021 at 5:51             XR Chest 1 View    Result Date: 12/27/2021  PROCEDURE: XR CHEST 1 VW  COMPARISON: [place]  [facility], CR, XR CHEST 1 VW, 12/26/2021, 17:59.  INDICATIONS: WORSENING HYPOXIA  FINDINGS:  There is stable patchy bilateral airspace disease.  No pneumothorax, pleural effusion or lobar consolidation.  The heart size is normal.  Stable left-sided 2 lead pacemaker.  CONCLUSION: Stable patchy bilateral airspace disease.       GALE CONNELL MD       Electronically Signed and Approved By: GALE CONNELL MD on 12/27/2021 at 14:22             XR Chest 1 View    Result Date: 12/26/2021  PROCEDURE: XR CHEST 1 VW  COMPARISON: [facility], CR, CHEST PA/AP & LAT 2V, 9/27/2017, 15:22.  INDICATIONS: SOA Triage Protocol  FINDINGS:  Left-sided AICD noted.  Heart size normal.  There is patchy bilateral midlung and bibasilar airspace disease most concerning for pneumonia.  No pneumothorax.  No large effusion.  Osseous structures grossly intact.  CONCLUSION:  Patchy bilateral airspace disease most pronounced at the lung bases concerning for multifocal pneumonia.       SHONNA HERMAN MD       Electronically Signed and Approved By: SHONNA HERMAN MD on 12/26/2021 at 18:31             CT Chest Pulmonary Embolism    Result Date: 12/28/2021  PROCEDURE: CT CHEST PULMONARY EMBOLISM W CONTRAST  COMPARISON:  [facility], CR, XR CHEST 1 VW, 12/27/2021, 13:35.  [facility], CT, CHEST W/O CONTRAST, 11/08/2018, 14:07. INDICATIONS: PE suspected, high prob  TECHNIQUE: After obtaining the patient's consent, CT images were obtained with non-ionic intravenous contrast material.   PROTOCOL:   Standard imaging protocol performed    RADIATION:   DLP: 386mGy*cm   Automated exposure control was utilized to minimize radiation dose. CONTRAST: 100cc Isovue 370 I.V. LABS:   eGFR: >60ml/min/1.73m2  FINDINGS:  The central tracheobronchial tree is clear.  There are diffuse bilateral airspace consolidations.  There is no pleural effusion.  A left subclavian pacemaker is in place.  The heart size appears normal,  with mild partially calcified atherosclerotic disease in the coronary arteries.  The great vessels are normal in caliber.  There is no evidence of pulmonary embolus.  No abnormally enlarged lymph nodes are identified.  Partial evaluation of the upper abdomen is unremarkable.  No aggressive osseous lesions are identified.  CONCLUSION:  1. Negative for pulmonary embolus. 2. Diffuse bilateral airspace consolidations, suggesting multifocal pneumonia.     JOSE FRANCISCO TONG MD       Electronically Signed and Approved By: JOSE FRANCISCO TONG MD on 12/28/2021 at 1:12             Duplex Venous Lower Extremity - Bilateral CV-READ    Result Date: 12/28/2021  · Normal bilateral lower extremity venous duplex scan.        [x]  EKG/Telemetry   []  Cardiology/Vascular   []  Pathology  [x]  Old records  []  Other:    Assessment/Plan   Assessment / Plan     Assessment/Plan:  Assessment:  COVID-19 pneumonia  Acute hypoxemic respiratory failure secondary COVID-19  SARS-CoV-2 infection  ARDS  Transaminitis due to viral illness  Hyperglycemia due to steroids  History of tobacco smoke  Elevated D-dimer  CHENCHO on CKD3  Essential hypertension  Hypothyroidism  SSS s/p PPM     Plan:  Labs and imaging reviewed  Continue to make efforts to wean his NIPPV/air Vo/heated high flow nasal cannula oxygen  Status post Actemra, watch for opportunistic infections  Consulted pulmonary discussed with Dr. Cervantes, recommendations appreciated  Continue therapeutic Lovenox dosing empirically for now  Continue Decadron 10 mg daily IV  Continue Afrin, continue Flonase  Continue scheduled nebs Brovana Pulmicort twice daily  Continue DuoNebs every 6 hours  Continued GI prophylaxis Protonix 40 mg daily  Monitor abdominal pain symptoms  Watch for signs of bleeding while on therapeutic Lovenox  A.m. labs  DNR  VTE prophylaxis ordered via Lovenox  Continue telemetry monitor  Clinical course to dictate further management  Critically ill with SARS-CoV-2 infection and COVID-19  pneumonia with oxygen requirement gone up, now slowly starting to come down, concerned that he will rapidly decompensate and require the ICU for close monitoring  Discussed with nurse at bedside.  Prognosis remains guarded      DVT prophylaxis:  Medical DVT prophylaxis orders are present.    CODE STATUS:   Level Of Support Discussed With: Patient  Code Status (Patient has no pulse and is not breathing): No CPR (Do Not Attempt to Resuscitate)  Medical Interventions (Patient has pulse or is breathing): Full Support        Electronically signed by Diann Cuevas MD, 01/04/22, 4:28 PM EST.

## 2022-01-04 NOTE — PROGRESS NOTES
Pulmonary / Critical Care Progress Note      Patient Name: Elmer Garvin  : 1945  MRN: [id number]  Attending:  Diann Cuevas MD  Date of admission: 2021    Subjective   Subjective     It is critically ill with COVID-19 infection, viral pneumonia, and acute hypoxic respiratory failure, ARDS.    Over last 24 hours,  Patient remained on nebulizers including Brovana, Pulmicort.  Remained on Decadron 10 mg IV daily.  Has been on Lovenox 100 mg subcu twice daily.  Also on Singulair once daily.  Remained on airVo, weaned oxygen down some.     Overnight, remained on air Vo. No acute events.     This morning,  Oxygen saturation at 92% on air Vo at 50 L/min, 70% FiO2.  He feels some better.  Still remains weak and fatigued.  Short of breath with minimal movements.  Has frequent desaturations with any movements.  No nausea or vomiting.  No chest pain or chest tightness.  Wife at bedside.     Review of Systems  General:  Fatigue, No Fever  HEENT: No dysphagia, No Visual Changes, no rhinorrhea  Respiratory:  + cough,+Dyspnea, no phlegm, No Pleuritic Pain, no wheezing  Cardiovascular: Denies chest pain, denies palpitations,+MONROE, Chest Pressure  Gastrointestinal:  No Abdominal Pain, No Nausea, No Vomiting, No Diarrhea  Genitourinary:  No Dysuria, No Frequency, No Hesitancy  Musculoskeletal: No muscle pain or swelling  Endocrine:  No Heat Intolerance, No Cold Intolerance, Fatigue  Hematologic:  No Bleeding, No Bruising  Psychiatric:  No Anxiety, No Depression  Neurologic:  No Confusion, No Headaches  Skin:  No Rash, No Open Wounds      Objective   Objective     Vitals:   Temp:  [98 °F (36.7 °C)-98.3 °F (36.8 °C)] 98.2 °F (36.8 °C)  Heart Rate:  [59-72] 61  Resp:  [20-22] 20  BP: (115-136)/(56-73) 115/63  Flow (L/min):  [50-60] 50    Physical Exam   Vital Signs Reviewed  General WDWN, Alert, appears to have conversational dyspnea, on airVo  HEENT:  PERRL, EOMI.  OP, nares clear, no sinus tenderness  Neck:   Supple, no JVD, no thyromegaly  Lymph: no axillary, cervical, supraclavicular lymphadenopathy noted bilaterally  Chest: Diffuse crackles heard throughout all lung fields and coarse breath sounds moderate conversational dyspnea appreciated  CV: RRR, no MGR, pulses 2+, equal  Abd:  Soft, NT, ND, + BS, no HSM  EXT:  no clubbing, no cyanosis, no edema, no joint tenderness  Neuro:  A&Ox3, CN grossly intact, no focal deficits  Skin: No rashes or lesions noted    Result Review    Result Review:  I have personally reviewed the results from the time of this admission to 1/4/2022 09:39 EST and agree with these findings:  [x]  Laboratory  [x]  Microbiology  [x]  Radiology  []  EKG/Telemetry   []  Cardiology/Vascular   []  Pathology  []  Old records  []  Other:  Most notable findings include: Creatinine remains elevated at 1.44, previously was 1.17.  Serum creatinine 1.16.  D-dimer 1.94.    Assessment/Plan   Assessment / Plan     Active Hospital Problems:  Active Hospital Problems    Diagnosis    • Acute respiratory failure with hypoxia (HCC)        Impression:  Acute hypoxemic respiratory failure requiring air Vo  COVID-19 pneumonia, unvaccinated  ARDS  Therapeutic drug monitoring  Elevated liver enzymes  Stress versus steroid-induced hyperglycemia  Tobacco use of cigarettes in remission  Hyponatremia     Plan:  Remains on air Vo currently at 70% FiO2 and 50 L flow.  Stressed the importance of the prone position of the modified prone position while setting  BiPAP 12/6 with sleep and naps.  Received a dose of Tocilizumab this admission.  Out of window for remdesivir.   Continue IV Decadron.  Patient's D-dimer has trended up and patient's clinical status has worsened with very tenuous respiratory status requiring large amount of oxygen.  Agree with presumptively treating patient for a PE with Lovenox therapeutic dose.  Patient very tenuous would make it difficult for him to go down for a CT scan of the chest due to his severe  hypoxia.  Continue Singulair.  Follow daily CMP's given transaminitis.  Continue Brovana and Pulmicort.  Patient overall with very tenuous respiratory status with high likelihood of decompensation.  Slowly improving some.     DVT prophylaxis:  Medical DVT prophylaxis orders are present.    CODE STATUS:   Level Of Support Discussed With: Patient  Code Status (Patient has no pulse and is not breathing): No CPR (Do Not Attempt to Resuscitate)  Medical Interventions (Patient has pulse or is breathing): Full Support    Labs, imaging, microbiology, notes and medications personally reviewed.  Discussed with primary service and bedside nurse.  32 minutes of critical care time spent managing this patient, excluding procedures     Electronically signed by Eduard Cervantes MD, 01/03/22, 8:54 AM EST.

## 2022-01-04 NOTE — PLAN OF CARE
Goal Outcome Evaluation:           Progress: improving     Patient decreased to 60 liters 88% Fi02 this shift. Patient did sit up in chair throughout shift. Patient did exhibit redness on bottom and was encouraged to turn every two hours to prevent skin breakdown. Patient used incentive spirometer multiple times this shift.   Problem: Adult Inpatient Plan of Care  Goal: Plan of Care Review  Outcome: Ongoing, Progressing  Flowsheets (Taken 1/3/2022 2005)  Progress: improving  Goal: Patient-Specific Goal (Individualized)  Outcome: Ongoing, Progressing  Goal: Absence of Hospital-Acquired Illness or Injury  Outcome: Ongoing, Progressing  Intervention: Identify and Manage Fall Risk  Description: Perform standard risk assessment with a validated tool or comprehensive approach appropriate to the patient on admission; reassess fall risk frequently, with change in status or transfer to another level of care.  Communicate fall injury risk to interprofessional healthcare team.  Determine need for increased observation, equipment and environmental modification, such as low bed and signage, as well as supportive, nonskid footwear.  Adjust safety measures to individual developmental age, stage and identified risk factors.  Reinforce the importance of safety and physical activity with patient and family.  Perform regular intentional rounding to assess need for position change, pain assessment, personal needs, including assistance with toileting.  Recent Flowsheet Documentation  Taken 1/3/2022 0953 by Celestina Drake, RN  Safety Promotion/Fall Prevention:   safety round/check completed   room organization consistent   nonskid shoes/slippers when out of bed   clutter free environment maintained   assistive device/personal items within reach  Taken 1/3/2022 0952 by Celestina Drake, RN  Safety Promotion/Fall Prevention:   safety round/check completed   room organization consistent   nonskid shoes/slippers when out of bed   clutter free  environment maintained   assistive device/personal items within reach   lighting adjusted  Intervention: Prevent Skin Injury  Description: Assess skin risk on admission and at regular intervals throughout hospital stay.  Keep all areas of skin (especially folds) clean and dry.  Maintain adequate skin hydration.  Relieve and redistribute pressure and protect bony prominences; implement measures based on patient-specific risk factors.  Match turning and repositioning schedule to clinical condition.  Encourage weight shift frequently; assist with reposition if unable to complete independently.  Float heels off bed. Avoid pressure on the Achilles tendon.  Keep skin free from extended contact with medical devices.  Use aids (e.g., slide boards, mechanical lift) during transfer.  Recent Flowsheet Documentation  Taken 1/3/2022 0953 by Celestina Drake RN  Body Position: position changed independently  Skin Protection:   adhesive use limited   tubing/devices free from skin contact   transparent dressing maintained  Intervention: Prevent and Manage VTE (venous thromboembolism) Risk  Description: Assess for VTE risk.  Encourage/assist with early ambulation.  Initiate and maintain compression or other therapy, as indicated based on identified risk in accordance with organizational protocol/provider order.  Encourage both active and passive leg exercises while in bed, if unable to ambulate.  Recent Flowsheet Documentation  Taken 1/3/2022 0953 by Celestina Drake RN  VTE Prevention/Management: (LOVENOX INJECTIONS -[facility], RN) other (see comments)  Intervention: Prevent Infection  Description: Maintain skin and mucous membrane integrity; promote hand, oral and pulmonary hygiene.  Optimize fluid balance, nutrition, sleep and glycemic control to maximize infection resistance.  Identify potential sources of infection early to prevent or mitigate progression of infection (e.g., wound, lines, devices).  Evaluate ongoing need for invasive  devices; remove promptly when no longer indicated.  Recent Flowsheet Documentation  Taken 1/3/2022 0952 by Celestina rDake RN  Infection Prevention:   visitors restricted/screened   single patient room provided   rest/sleep promoted   personal protective equipment utilized   hand hygiene promoted  Goal: Optimal Comfort and Wellbeing  Outcome: Ongoing, Progressing  Intervention: Provide Person-Centered Care  Description: Use a family-focused approach to care.  Develop trust and rapport by proactively providing information, encouraging questions, addressing concerns and offering reassurance.  Acknowledge emotional response to hospitalization.  Recognize and utilize personal coping strategies.  Honor spiritual and cultural preferences.  Recent Flowsheet Documentation  Taken 1/3/2022 0953 by Celestina Drake RN  Trust Relationship/Rapport:   care explained   choices provided   emotional support provided   empathic listening provided   questions answered   reassurance provided   questions encouraged   thoughts/feelings acknowledged  Goal: Readiness for Transition of Care  Outcome: Ongoing, Progressing     Problem: Hypertension Comorbidity  Goal: Blood Pressure in Desired Range  Outcome: Ongoing, Progressing  Intervention: Maintain Hypertension-Management Strategies  Description: Evaluate adherence to home antihypertensive regimen (e.g., exercise and activity, diet modification, medication).  Provide scheduled antihypertensive medication; consider administration time and effects (e.g., avoid giving diuretic prior to bedtime).  Monitor response to medication therapy (e.g., blood pressure, electrolyte level, medication effects).  Minimize risk of orthostatic hypotension; encourage caution with position changes, particularly if elderly  Recent Flowsheet Documentation  Taken 1/3/2022 0953 by Celestina Drake RN  Medication Review/Management: medications reviewed  Taken 1/3/2022 0952 by Celestina Drake RN  Medication Review/Management:  medications reviewed     Problem: Hypertension Acute  Goal: Blood Pressure Within Desired Range  Outcome: Ongoing, Progressing  Intervention: Normalize Blood Pressure  Description: Administer medications to reduce blood pressure (nitrate, calcium channel blocker, beta-blocker, ACE inhibitor, peripheral dopamine receptor agonist).  Promote anxiety-reducing practices (e.g., quiet, calm environment; relaxation techniques; normothermic environment).  Match activity level to patient ability and tolerance.  Recent Flowsheet Documentation  Taken 1/3/2022 0953 by Celestina Drake RN  Medication Review/Management: medications reviewed  Taken 1/3/2022 0952 by Celestina Drake RN  Medication Review/Management: medications reviewed     Problem: Fluid Imbalance (Pneumonia)  Goal: Fluid Balance  Outcome: Ongoing, Progressing     Problem: Infection (Pneumonia)  Goal: Resolution of Infection Signs and Symptoms  Outcome: Ongoing, Progressing  Intervention: Prevent Infection Progression  Description: Implement transmission-based precautions and isolation, as indicated, to prevent spread of infection.  Obtain cultures prior to initiating antimicrobial therapy. Do not delay treatment for laboratory results in the presence of high suspicion.  Administer ordered antimicrobial therapy promptly; reassess need regularly.  Identify and manage signs of early sepsis.  Provide fever-reduction and comfort measures.  Recent Flowsheet Documentation  Taken 1/3/2022 0952 by Celestina Drake RN  Isolation Precautions: airborne precautions maintained     Problem: Respiratory Compromise (Pneumonia)  Goal: Effective Oxygenation and Ventilation  Outcome: Ongoing, Progressing  Intervention: Promote Airway Secretion Clearance  Description: Assess the effectiveness of pulmonary hygiene and ability to perform airway clearance techniques.  Promote early mobility/ambulation; match to ability and tolerance.  Encourage deep breathing and lung expansion therapy to prevent  atelectasis (e.g., incentive spirometry, positive airway pressure).  Anticipate the need to splint chest or abdominal wall with cough to minimize discomfort; assist if needed.  Initiate cough-enhancement and airway-clearance techniques with instruction (e.g., active cycle breathing, positive expiratory pressure, suction); consider mechanical insufflation-exsufflation in the presence of neuromuscular weakness.  Consider pharmacologic therapy (e.g., systemic corticosteroid, beta-2 agonist, mucolytic, antimicrobial) to improve mucus clearance, inflammation, cough response and air flow.  Initiate inspiratory muscle training to decrease the risk of pulmonary complications.  Recent Flowsheet Documentation  Taken 1/3/2022 0953 by Celestina Drake RN  Cough And Deep Breathing: done independently per patient     Problem: Palliative Care  Goal: Enhanced Quality of Life  Outcome: Ongoing, Progressing  Intervention: Optimize Function  Description: Assess and monitor for fatigue at regular intervals; promote energy-conservation strategies to maximize ability to participate in prioritized activities.  Provide calm, consistent environment and schedule to prevent or minimize confusion and delirium.  Identify functional deficit; provide therapeutic interventions and assistive technology to facilitate mobility and safety (e.g., adaptive activities of daily living equipment, ambulation device).  Recent Flowsheet Documentation  Taken 1/3/2022 0953 by Celestina Drake RN  Fatigue Management: activity assistance provided  Intervention: Optimize Psychosocial Wellbeing  Description: Encourage and validate emotions; respond with empathy, sensitivity and compassion.  Identify and assist patient to prioritize activities which bring meaning (e.g., participation in roles and relationships, spiritual activities); encourage reminiscence and life review.  Identify and maximize patient and family strengths and coping strategies.  Acknowledge and assist  with life transitions, grief and loss (e.g., roles, cognitive changes, dependence).  Monitor for spiritual concerns and distress; provide support.  Identify perceptions regarding caregiving needs, abilities and stress; encourage use of social support.  Assess and monitor patient and caregiver for signs/symptoms of behavioral health concerns (e.g., depression, anxiety).  Connect with community resources for ongoing support.  Recent Flowsheet Documentation  Taken 1/3/2022 0953 by Celestina Drake RN  Supportive Measures: active listening utilized     Problem: Skin Injury Risk Increased  Goal: Skin Health and Integrity  Outcome: Ongoing, Progressing  Intervention: Optimize Skin Protection  Description: Reassess skin injury risk and inspect skin frequently (e.g., scheduled interval, with turning, with change in condition) to provide optimal prevention.  Maintain adequate tissue perfusion (e.g., encourage fluid balance, avoid crossing legs, constrictive clothing or devices) to promote tissue oxygenation.  Maintain head of bed at lowest degree of elevation tolerated, considering medical condition and other restrictions. Limit amount of time head of bed is elevated greater than 30 degrees to prevent friction/shear injury.  Avoid positioning onto an area that remains reddened.  Minimize incontinence and moisture (e.g., toileting schedule; moisture-wicking pad, diaper or incontinence collection device, skin moisture barrier).  Cleanse skin promptly and gently when soiled utilizing a pH-balanced cleanser.  Relieve and redistribute pressure (e.g., schedule position changes; utilize higher specification foam mattress, chair cushion, constant low-pressure or alternating-pressure support surface; medical device repositioning; protective dressing applicatio  Support increased progressive functional activity (e.g., therapeutic exercise) to decrease risk associated with immobility. Balance rest with activity.  Recent Flowsheet  Documentation  Taken 1/3/2022 0953 by Celestina Drake, RN  Pressure Reduction Techniques:   frequent weight shift encouraged   weight shift assistance provided  Skin Protection:   adhesive use limited   tubing/devices free from skin contact   transparent dressing maintained

## 2022-01-04 NOTE — PLAN OF CARE
Goal Outcome Evaluation:  Plan of Care Reviewed With: patient, spouse        Progress: no change  Outcome Summary: PT. SLEPT WELL THIS SHIFT. AIRVO DECREASED TWICE PER RT. TO 60L/88% AND LAST TO 55L/78%. PT. TOLERATING WELL AND O2 SATS MAINTAINING IN THE 90'S. VSS.

## 2022-01-05 LAB
ALBUMIN SERPL-MCNC: 3.6 G/DL (ref 3.5–5.2)
ALP SERPL-CCNC: 46 U/L (ref 39–117)
ALT SERPL W P-5'-P-CCNC: 35 U/L (ref 1–41)
ANION GAP SERPL CALCULATED.3IONS-SCNC: 10.9 MMOL/L (ref 5–15)
AST SERPL-CCNC: 16 U/L (ref 1–40)
BASOPHILS # BLD AUTO: 0.01 10*3/MM3 (ref 0–0.2)
BASOPHILS NFR BLD AUTO: 0.1 % (ref 0–1.5)
BILIRUB CONJ SERPL-MCNC: <0.2 MG/DL (ref 0–0.3)
BILIRUB INDIRECT SERPL-MCNC: NORMAL MG/DL
BILIRUB SERPL-MCNC: 0.4 MG/DL (ref 0–1.2)
BUN SERPL-MCNC: 33 MG/DL (ref 8–23)
BUN/CREAT SERPL: 32.7 (ref 7–25)
CALCIUM SPEC-SCNC: 9.2 MG/DL (ref 8.6–10.5)
CHLORIDE SERPL-SCNC: 102 MMOL/L (ref 98–107)
CO2 SERPL-SCNC: 23.1 MMOL/L (ref 22–29)
CREAT SERPL-MCNC: 1.01 MG/DL (ref 0.76–1.27)
CRP SERPL-MCNC: <0.3 MG/DL (ref 0–0.5)
D DIMER PPP FEU-MCNC: 1.18 MG/L (FEU) (ref 0–0.59)
DEPRECATED RDW RBC AUTO: 41.1 FL (ref 37–54)
EOSINOPHIL # BLD AUTO: 0.06 10*3/MM3 (ref 0–0.4)
EOSINOPHIL NFR BLD AUTO: 0.9 % (ref 0.3–6.2)
ERYTHROCYTE [DISTWIDTH] IN BLOOD BY AUTOMATED COUNT: 13.6 % (ref 12.3–15.4)
ERYTHROCYTE [SEDIMENTATION RATE] IN BLOOD: 18 MM/HR (ref 0–20)
FERRITIN SERPL-MCNC: 434.1 NG/ML (ref 30–400)
GFR SERPL CREATININE-BSD FRML MDRD: 72 ML/MIN/1.73
GLUCOSE SERPL-MCNC: 122 MG/DL (ref 65–99)
HCT VFR BLD AUTO: 40.6 % (ref 37.5–51)
HGB BLD-MCNC: 13.8 G/DL (ref 13–17.7)
IMM GRANULOCYTES # BLD AUTO: 0.07 10*3/MM3 (ref 0–0.05)
IMM GRANULOCYTES NFR BLD AUTO: 1 % (ref 0–0.5)
LYMPHOCYTES # BLD AUTO: 0.57 10*3/MM3 (ref 0.7–3.1)
LYMPHOCYTES NFR BLD AUTO: 8.2 % (ref 19.6–45.3)
MAGNESIUM SERPL-MCNC: 2.3 MG/DL (ref 1.6–2.4)
MCH RBC QN AUTO: 28.3 PG (ref 26.6–33)
MCHC RBC AUTO-ENTMCNC: 34 G/DL (ref 31.5–35.7)
MCV RBC AUTO: 83.2 FL (ref 79–97)
MONOCYTES # BLD AUTO: 0.3 10*3/MM3 (ref 0.1–0.9)
MONOCYTES NFR BLD AUTO: 4.3 % (ref 5–12)
NEUTROPHILS NFR BLD AUTO: 5.97 10*3/MM3 (ref 1.7–7)
NEUTROPHILS NFR BLD AUTO: 85.5 % (ref 42.7–76)
NRBC BLD AUTO-RTO: 0 /100 WBC (ref 0–0.2)
PHOSPHATE SERPL-MCNC: 4.1 MG/DL (ref 2.5–4.5)
PLATELET # BLD AUTO: 244 10*3/MM3 (ref 140–450)
PMV BLD AUTO: 10.2 FL (ref 6–12)
POTASSIUM SERPL-SCNC: 4.6 MMOL/L (ref 3.5–5.2)
PROT SERPL-MCNC: 6.2 G/DL (ref 6–8.5)
RBC # BLD AUTO: 4.88 10*6/MM3 (ref 4.14–5.8)
SODIUM SERPL-SCNC: 136 MMOL/L (ref 136–145)
WBC NRBC COR # BLD: 6.98 10*3/MM3 (ref 3.4–10.8)

## 2022-01-05 PROCEDURE — 82728 ASSAY OF FERRITIN: CPT | Performed by: FAMILY MEDICINE

## 2022-01-05 PROCEDURE — 80048 BASIC METABOLIC PNL TOTAL CA: CPT | Performed by: FAMILY MEDICINE

## 2022-01-05 PROCEDURE — 85025 COMPLETE CBC W/AUTO DIFF WBC: CPT | Performed by: FAMILY MEDICINE

## 2022-01-05 PROCEDURE — 99233 SBSQ HOSP IP/OBS HIGH 50: CPT | Performed by: FAMILY MEDICINE

## 2022-01-05 PROCEDURE — 85379 FIBRIN DEGRADATION QUANT: CPT | Performed by: FAMILY MEDICINE

## 2022-01-05 PROCEDURE — 84100 ASSAY OF PHOSPHORUS: CPT | Performed by: FAMILY MEDICINE

## 2022-01-05 PROCEDURE — 25010000002 DEXAMETHASONE PER 1 MG: Performed by: INTERNAL MEDICINE

## 2022-01-05 PROCEDURE — 80076 HEPATIC FUNCTION PANEL: CPT | Performed by: FAMILY MEDICINE

## 2022-01-05 PROCEDURE — 94799 UNLISTED PULMONARY SVC/PX: CPT

## 2022-01-05 PROCEDURE — 86140 C-REACTIVE PROTEIN: CPT | Performed by: FAMILY MEDICINE

## 2022-01-05 PROCEDURE — 25010000002 ENOXAPARIN PER 10 MG: Performed by: FAMILY MEDICINE

## 2022-01-05 PROCEDURE — 83735 ASSAY OF MAGNESIUM: CPT | Performed by: FAMILY MEDICINE

## 2022-01-05 PROCEDURE — 99291 CRITICAL CARE FIRST HOUR: CPT | Performed by: INTERNAL MEDICINE

## 2022-01-05 PROCEDURE — 85652 RBC SED RATE AUTOMATED: CPT | Performed by: FAMILY MEDICINE

## 2022-01-05 PROCEDURE — 94761 N-INVAS EAR/PLS OXIMETRY MLT: CPT

## 2022-01-05 RX ADMIN — ARFORMOTEROL TARTRATE 15 MCG: 15 SOLUTION RESPIRATORY (INHALATION) at 18:32

## 2022-01-05 RX ADMIN — DEXAMETHASONE SODIUM PHOSPHATE 10 MG: 10 INJECTION INTRAMUSCULAR; INTRAVENOUS at 10:03

## 2022-01-05 RX ADMIN — ARFORMOTEROL TARTRATE 15 MCG: 15 SOLUTION RESPIRATORY (INHALATION) at 08:04

## 2022-01-05 RX ADMIN — ENOXAPARIN SODIUM 100 MG: 100 INJECTION SUBCUTANEOUS at 06:18

## 2022-01-05 RX ADMIN — DOCUSATE SODIUM 100 MG: 100 CAPSULE, LIQUID FILLED ORAL at 14:30

## 2022-01-05 RX ADMIN — SODIUM CHLORIDE, PRESERVATIVE FREE 10 ML: 5 INJECTION INTRAVENOUS at 10:04

## 2022-01-05 RX ADMIN — FLUTICASONE PROPIONATE 2 SPRAY: 50 SPRAY, METERED NASAL at 10:04

## 2022-01-05 RX ADMIN — PANTOPRAZOLE SODIUM 40 MG: 40 TABLET, DELAYED RELEASE ORAL at 06:18

## 2022-01-05 RX ADMIN — BUDESONIDE 0.5 MG: 0.5 SUSPENSION RESPIRATORY (INHALATION) at 08:04

## 2022-01-05 RX ADMIN — MONTELUKAST 10 MG: 10 TABLET, FILM COATED ORAL at 10:03

## 2022-01-05 RX ADMIN — BUDESONIDE 0.5 MG: 0.5 SUSPENSION RESPIRATORY (INHALATION) at 18:32

## 2022-01-05 RX ADMIN — SODIUM CHLORIDE, PRESERVATIVE FREE 10 ML: 5 INJECTION INTRAVENOUS at 20:29

## 2022-01-05 RX ADMIN — TEMAZEPAM 15 MG: 15 CAPSULE ORAL at 20:33

## 2022-01-05 RX ADMIN — Medication 10 MG: at 20:29

## 2022-01-05 RX ADMIN — LEVOTHYROXINE SODIUM 100 MCG: 0.1 TABLET ORAL at 06:17

## 2022-01-05 RX ADMIN — ENOXAPARIN SODIUM 100 MG: 100 INJECTION SUBCUTANEOUS at 18:34

## 2022-01-05 NOTE — NURSING NOTE
Patient is currently on 4nt. Patient sitting up in chair with airvo in place, settings 40L/53% according to primary rn, saturations remained above 94% during visit. Patients wife at bedside. Patients wife reports patient went to the bathroom and saturations did not go below 90%. Allowed space for patient and wife to reminisce about past memories. Encouraged patient to have good nutrition, side lye in bed, and stay moving to regain strength. Discussed self-care with patients wife. Emotional support provided. Palliative care will continue to follow to support and assist as needed.    AIDE Arguello, RN  Palliative Care

## 2022-01-05 NOTE — PROGRESS NOTES
Pulmonary / Critical Care Progress Note      Patient Name: Elmer Garvin  : 1945  MRN: [id number]  Attending:  Diann Cuevas MD  Date of admission: 2021    Subjective   Subjective     It is critically ill with COVID-19 infection, viral pneumonia, and acute hypoxic respiratory failure, ARDS.    Over last 24 hours,  Patient remained on nebulizers including Brovana, Pulmicort.  Remained on Decadron 10 mg IV daily.  Has been on Lovenox 100 mg subcu twice daily.  Also on Singulair once daily.  Remained on airVo, weaned oxygen down some.     Overnight, remained on air Vo. No acute events.     This morning,  Oxygen saturation at 91% on air Vo at 45 L/min, 60% FiO2.  He feels some better.  Still remains weak and fatigued. Has been bed ridden for most part.   Short of breath with minimal movements.  Has frequent desaturations with any movements.  No nausea or vomiting.  No chest pain or chest tightness.  Wife at bedside.     Review of Systems  General:  Fatigue, No Fever  HEENT: No dysphagia, No Visual Changes, no rhinorrhea  Respiratory:  + cough,+Dyspnea, no phlegm, No Pleuritic Pain, no wheezing  Cardiovascular: Denies chest pain, denies palpitations,+MONROE, Chest Pressure  Gastrointestinal:  No Abdominal Pain, No Nausea, No Vomiting, No Diarrhea  Genitourinary:  No Dysuria, No Hesitancy  Musculoskeletal: No muscle pain or swelling  Endocrine:  No Heat Intolerance, No Cold Intolerance, Fatigue  Hematologic:  No Bleeding, No Bruising  Psychiatric:  No Anxiety, No Depression  Neurologic:  No Confusion, No Headaches  Skin:  No Rash, No Open Wounds      Objective   Objective     Vitals:   Temp:  [97.4 °F (36.3 °C)-98 °F (36.7 °C)] 97.9 °F (36.6 °C)  Heart Rate:  [60-72] 62  Resp:  [18-20] 20  BP: (110-132)/(53-66) 110/57  Flow (L/min):  [40] 40    Physical Exam   Vital Signs Reviewed  General WDWN, Alert, appears to have conversational dyspnea, on airVo  HEENT:  PERRL, EOMI.  OP, nares clear, no sinus  tenderness  Neck:  Supple, no JVD, no thyromegaly  Lymph: no axillary, cervical, supraclavicular lymphadenopathy noted bilaterally  Chest: Diffuse crackles heard throughout all lung fields and coarse breath sounds moderate conversational dyspnea appreciated  CV: RRR, no MGR, pulses 2+, equal  Abd:  Soft, NT, ND, + BS, no HSM  EXT:  no clubbing, no cyanosis, no edema, no joint tenderness  Neuro:  A&Ox3, CN grossly intact, no focal deficits  Skin: No rashes or lesions noted    Result Review    Result Review:  I have personally reviewed the results from the time of this admission to 1/5/2022 12:22 EST and agree with these findings:  [x]  Laboratory  [x]  Microbiology  [x]  Radiology  []  EKG/Telemetry   []  Cardiology/Vascular   []  Pathology  []  Old records  []  Other:  Most notable findings include: Creatinine remains elevated at 1.44, previously was 1.17.  Serum creatinine 1.16.  D-dimer 1.94.    Assessment/Plan   Assessment / Plan     Active Hospital Problems:  Active Hospital Problems    Diagnosis    • Acute respiratory failure with hypoxia (HCC)        Impression:  Acute hypoxemic respiratory failure requiring air Vo  COVID-19 pneumonia, unvaccinated  ARDS  Therapeutic drug monitoring  Elevated liver enzymes  Stress versus steroid-induced hyperglycemia  Tobacco use of cigarettes in remission  Hyponatremia     Plan:  Remains on air Vo currently at 60% FiO2 and 45 L flow.  Stressed the importance of the prone position of the modified prone position while setting  BiPAP 12/6 with sleep and naps.  Received a dose of Tocilizumab this admission.  Out of window for remdesivir.   Continue IV Decadron.  Patient's D-dimer has trended up and patient's clinical status has worsened with very tenuous respiratory status requiring large amount of oxygen.  Agree with presumptively treating patient for a PE with Lovenox therapeutic dose.  Patient very tenuous would make it difficult for him to go down for a CT scan of the chest  due to his severe hypoxia.  Continue Singulair.  Follow daily CMP's given transaminitis.  Continue Brovana and Pulmicort.  Patient overall with very tenuous respiratory status with high likelihood of decompensation.  Slowly improving some.     DVT prophylaxis:  Medical DVT prophylaxis orders are present.    CODE STATUS:   Level Of Support Discussed With: Patient  Code Status (Patient has no pulse and is not breathing): No CPR (Do Not Attempt to Resuscitate)  Medical Interventions (Patient has pulse or is breathing): Full Support    Labs, imaging, microbiology, notes and medications personally reviewed.  Discussed with primary service and bedside nurse.    32 minutes of critical care time spent managing this patient, excluding procedures     Electronically signed by Eduard Cervantes MD, 01/03/22, 8:54 AM EST.

## 2022-01-05 NOTE — PROGRESS NOTES
[facility]   Hospitalist Progress Note  Date: 2022  Patient Name: Elemr Garvin  : 1945  MRN: [id number]  Date of admission: 2021      Subjective   Subjective     Chief Complaint: SOA                           Summary: 76-year-old male with hypertension, hypothyroidism, CKD and sick sinus syndrome presented with 2 weeks of worsening shortness of breath and cough with fatigue.  He has not been vaccinated against COVID-19.  He went to an urgent care where he was satting 79% on room air and was sent to the ED where he was admitted.  He has tested positive for COVID-19.  He was out of the window for remdesivir.  He was started on steroids, supplemental oxygen, scheduled nebs.  Pulmonary consulted with increasing oxygen requirement.  Concern for ARDS.  Status post Actemra.  Worsening oxygenation status, transitioned to air Vo/heated high flow nasal cannula     Interval Followup: Patient seen and examined this morning, no acute distress, no acute major overnight events, remains on air Vo/heated high flow nasal cannula oxygen, NIPPV, notes clearing sputum further but still has significant oxygen requirement, increased work of breathing with minimal exertion, on 40 L/min, FiO2 at 60%, with room to wean down to 50%, he remains afebrile, his blood pressures have been stable, he denies chest pain or palpitations.  He remains physically weak and debilitated.  Appetite remains poor and slow to improve.  Ferritin 434, D-dimer down to 1.18, hemoglobin 13.8, creatinine 1.01, potassium 4.6.    Review of systems:  All systems reviewed negative except for shortness of breath, cough, dyspnea exertion, fatigue, nasal congestion, mucus production    Objective   Objective     Vitals:   Temp:  [97.4 °F (36.3 °C)-98 °F (36.7 °C)] 97.9 °F (36.6 °C)  Heart Rate:  [60-68] 62  Resp:  [18-20] 20  BP: (110-132)/(57-66) 110/57  Flow (L/min):  [40] 40  Physical Exam     Constitutional: Awake, alert, no acute distress  breathing heated high flow nasal cannula, fatigued appearing, no conversational dyspnea              Eyes: Pupils equal, sclerae anicteric, no conjunctival injection              HENT: NCAT, mucous membranes moist              Neck: Supple, full range              Respiratory: Diminished breath sounds bilaterally laying in bed wearing heated high flow nasal cannula, coarse breath sounds throughout, scattered rhonchi              Cardiovascular: RRR, no JVD              Gastrointestinal: Positive bowel sounds, soft, nontender, nondistended              Musculoskeletal: No bilateral ankle edema, no clubbing or cyanosis to extremities              Psychiatric: Appropriate affect, cooperative              Neurologic: Oriented x 3, speech clear              Skin: No rashes      Result Review    Result Review:  I have personally reviewed the results from the time of this admission to 1/5/2022 15:39 EST and agree with these findings:  [x]  Laboratory   CBC    CBC 1/3/22 1/4/22 1/5/22   WBC 6.18 5.90 6.98   RBC 5.08 4.96 4.88   Hemoglobin 14.3 14.1 13.8   Hematocrit 42.6 41.2 40.6   MCV 83.9 83.1 83.2   MCH 28.1 28.4 28.3   MCHC 33.6 34.2 34.0   RDW 13.7 13.6 13.6   Platelets 241 238 244           BMP    BMP 1/3/22 1/4/22 1/5/22   BUN 30 (A) 32 (A) 33 (A)   Creatinine 1.16 1.13 1.01   Sodium 136 135 (A) 136   Potassium 4.5 4.5 4.6   Chloride 100 101 102   CO2 24.4 23.3 23.1   Calcium 9.2 9.0 9.2   (A) Abnormal value            LIVER FUNCTION TESTS:      Lab 01/05/22  0612 01/04/22  0756 01/03/22  0601 01/02/22  0647 01/01/22  0526   TOTAL PROTEIN 6.2 6.2 6.2 6.8 7.2   ALBUMIN 3.60 3.50 3.60 3.80 4.10   ALT (SGPT) 35 38 50* 66* 79*   AST (SGOT) 16 20 21 28 40   BILIRUBIN 0.4 0.5 0.4 0.6 0.6   BILIRUBIN DIRECT <0.2 <0.2 <0.2 <0.2 <0.2   ALK PHOS 46 46 44 46 48       [x]  Microbiology No results found for: ACANTHNAEG, AFBCX, BPERTUSSISCX, BLOODCX  No results found for: BCIDPCR, CXREFLEX, CSFCX, CULTURETIS  No results found  for: CULTURES, HSVCX, URCX  No results found for: EYECULTURE, GCCX, HSVCULTURE, LABHSV  No results found for: LEGIONELLA, MRSACX, MUMPSCX, MYCOPLASCX  No results found for: NOCARDIACX, STOOLCX  No results found for: THROATCX, UNSTIMCULT, URINECX, CULTURE, VZVCULTUR  No results found for: VIRALCULTU, WOUNDCX    [x]  Radiology XR Chest 1 View    Result Date: 1/3/2022  PROCEDURE: XR CHEST 1 VW  COMPARISON: [facility], CR, XR CHEST 1 VW, 12/30/2021, 5:08.  INDICATIONS: pneumonia, resp failure  FINDINGS:  There is a left subclavian pacemaker device with leads over the right atrium right ventricle.  The heart is enlarged.  There are diffuse bilateral alveolar airspace opacities consistent with multifocal pneumonia.  Aeration has slightly worsened in the interval.  CONCLUSION: Slight worsening of multifocal infiltrates in the interval       JAZZY SEGURA MD       Electronically Signed and Approved By: JAZZY SEGURA MD on 1/03/2022 at 15:47             XR Chest 1 View    Result Date: 12/30/2021  PROCEDURE: XR CHEST 1 VW  COMPARISON: [facility], CT, CT CHEST PULMONARY EMBOLISM, 12/27/2021, 18:57.  [facility], CR, XR CHEST 1 VW, 12/27/2021, 13:35.  INDICATIONS: COVID, HYPOXIA  FINDINGS:  A left subclavian pacemaker is in place.  There are patchy bilateral airspace opacities.  The heart and mediastinal contours appear normal.  The osseous structures appear intact.  CONCLUSION: Patchy bilateral airspace opacities, suggesting ongoing pneumonia.       JOSE FRANCISCO TONG MD       Electronically Signed and Approved By: JOSE FRANCISCO TONG MD on 12/30/2021 at 5:51             XR Chest 1 View    Result Date: 12/27/2021  PROCEDURE: XR CHEST 1 VW  COMPARISON: [facility], CR, XR CHEST 1 VW, 12/26/2021, 17:59.  INDICATIONS: WORSENING HYPOXIA  FINDINGS:  There is stable patchy bilateral airspace disease.  No pneumothorax, pleural effusion or lobar consolidation.  The heart size is normal.   Stable left-sided 2 lead pacemaker.  CONCLUSION: Stable patchy bilateral airspace disease.       GALE CONNELL MD       Electronically Signed and Approved By: GALE CONNELL MD on 12/27/2021 at 14:22             XR Chest 1 View    Result Date: 12/26/2021  PROCEDURE: XR CHEST 1 VW  COMPARISON: [facility], CR, CHEST PA/AP & LAT 2V, 9/27/2017, 15:22.  INDICATIONS: SOA Triage Protocol  FINDINGS:  Left-sided AICD noted.  Heart size normal.  There is patchy bilateral midlung and bibasilar airspace disease most concerning for pneumonia.  No pneumothorax.  No large effusion.  Osseous structures grossly intact.  CONCLUSION:  Patchy bilateral airspace disease most pronounced at the lung bases concerning for multifocal pneumonia.       SHONNA HERMAN MD       Electronically Signed and Approved By: SHONNA HERMAN MD on 12/26/2021 at 18:31             CT Chest Pulmonary Embolism    Result Date: 12/28/2021  PROCEDURE: CT CHEST PULMONARY EMBOLISM W CONTRAST  COMPARISON:  [facility], CR, XR CHEST 1 VW, 12/27/2021, 13:35.  [facility], CT, CHEST W/O CONTRAST, 11/08/2018, 14:07. INDICATIONS: PE suspected, high prob  TECHNIQUE: After obtaining the patient's consent, CT images were obtained with non-ionic intravenous contrast material.   PROTOCOL:   Standard imaging protocol performed    RADIATION:   DLP: 386mGy*cm   Automated exposure control was utilized to minimize radiation dose. CONTRAST: 100cc Isovue 370 I.V. LABS:   eGFR: >60ml/min/1.73m2  FINDINGS:  The central tracheobronchial tree is clear.  There are diffuse bilateral airspace consolidations.  There is no pleural effusion.  A left subclavian pacemaker is in place.  The heart size appears normal, with mild partially calcified atherosclerotic disease in the coronary arteries.  The great vessels are normal in caliber.  There is no evidence of pulmonary embolus.  No abnormally enlarged lymph nodes are identified.  Partial evaluation  of the upper abdomen is unremarkable.  No aggressive osseous lesions are identified.  CONCLUSION:  1. Negative for pulmonary embolus. 2. Diffuse bilateral airspace consolidations, suggesting multifocal pneumonia.     JOSE FRANCISCO TONG MD       Electronically Signed and Approved By: JOSE FRANCISCO TONG MD on 12/28/2021 at 1:12             Duplex Venous Lower Extremity - Bilateral CV-READ    Result Date: 12/28/2021  · Normal bilateral lower extremity venous duplex scan.        [x]  EKG/Telemetry   []  Cardiology/Vascular   []  Pathology  [x]  Old records  []  Other:    Assessment/Plan   Assessment / Plan     Assessment/Plan:  Assessment:  COVID-19 pneumonia  Acute hypoxemic respiratory failure secondary COVID-19  SARS-CoV-2 infection  ARDS  Transaminitis due to viral illness  Hyperglycemia due to steroids  History of tobacco smoke  Elevated D-dimer  CHENCHO on CKD3  Essential hypertension  Hypothyroidism  SSS s/p PPM     Plan:  Labs and imaging reviewed  Start PT/OT  Out of bed to chair  Self proning  Continue to make efforts to wean his NIPPV/air Vo/heated high flow nasal cannula oxygen  Status post Actemra, watch for opportunistic infections  Consulted pulmonary discussed with Dr. Cervantes, recommendations appreciated  Continue therapeutic Lovenox dosing empirically for now  Continue Decadron 10 mg daily IV  Continue Afrin, continue Flonase  Continue scheduled nebs Brovana Pulmicort twice daily  Continue DuoNebs every 6 hours  Continued GI prophylaxis Protonix 40 mg daily  Monitor abdominal pain symptoms  Watch for signs of bleeding while on therapeutic Lovenox  A.m. labs  DNR  VTE prophylaxis ordered via Lovenox  Continue telemetry monitor  Clinical course to dictate further management  Critically ill with SARS-CoV-2 infection and COVID-19 pneumonia with oxygen requirement gone up, now slowly starting to come down, concerned that he will rapidly decompensate and require the ICU for close monitoring  Discussed with nurse at  bedside.  Prognosis remains guarded    DVT prophylaxis:  Medical DVT prophylaxis orders are present.    CODE STATUS:   Level Of Support Discussed With: Patient  Code Status (Patient has no pulse and is not breathing): No CPR (Do Not Attempt to Resuscitate)  Medical Interventions (Patient has pulse or is breathing): Full Support        Electronically signed by Diann Cuevas MD, 01/05/22, 3:39 PM EST.

## 2022-01-05 NOTE — PLAN OF CARE
Goal Outcome Evaluation:  Plan of Care Reviewed With: patient        Progress: improving     PATIENT DECREASED TO 40 LITERS 52% Fi02 THIS SHIFT. PATIENT DID GET UP IN CHAIR TODAY. PATIENT HAS BEEN USING INCENTIVE SPIROMETER AND FLUTTER VALVE THROUGHOUT SHIFT. PATIENT WAS GIVEN COLACE AND DID HAVE BM TODAY.   Problem: Adult Inpatient Plan of Care  Goal: Plan of Care Review  Outcome: Ongoing, Progressing  Flowsheets (Taken 1/5/2022 1846)  Progress: improving  Plan of Care Reviewed With: patient  Goal: Patient-Specific Goal (Individualized)  Outcome: Ongoing, Progressing  Goal: Absence of Hospital-Acquired Illness or Injury  Outcome: Ongoing, Progressing  Goal: Optimal Comfort and Wellbeing  Outcome: Ongoing, Progressing  Intervention: Provide Person-Centered Care  Description: Use a family-focused approach to care.  Develop trust and rapport by proactively providing information, encouraging questions, addressing concerns and offering reassurance.  Acknowledge emotional response to hospitalization.  Recognize and utilize personal coping strategies.  Honor spiritual and cultural preferences.  Recent Flowsheet Documentation  Taken 1/5/2022 0845 by Celestina Drake RN  Trust Relationship/Rapport:   care explained   choices provided   emotional support provided   empathic listening provided   questions answered   questions encouraged   reassurance provided   thoughts/feelings acknowledged  Goal: Readiness for Transition of Care  Outcome: Ongoing, Progressing     Problem: Hypertension Comorbidity  Goal: Blood Pressure in Desired Range  Outcome: Ongoing, Progressing     Problem: Hypertension Acute  Goal: Blood Pressure Within Desired Range  Outcome: Ongoing, Progressing     Problem: Fluid Imbalance (Pneumonia)  Goal: Fluid Balance  Outcome: Ongoing, Progressing     Problem: Infection (Pneumonia)  Goal: Resolution of Infection Signs and Symptoms  Outcome: Ongoing, Progressing     Problem: Respiratory Compromise  (Pneumonia)  Goal: Effective Oxygenation and Ventilation  Outcome: Ongoing, Progressing  Intervention: Promote Airway Secretion Clearance  Description: Assess the effectiveness of pulmonary hygiene and ability to perform airway clearance techniques.  Promote early mobility/ambulation; match to ability and tolerance.  Encourage deep breathing and lung expansion therapy to prevent atelectasis (e.g., incentive spirometry, positive airway pressure).  Anticipate the need to splint chest or abdominal wall with cough to minimize discomfort; assist if needed.  Initiate cough-enhancement and airway-clearance techniques with instruction (e.g., active cycle breathing, positive expiratory pressure, suction); consider mechanical insufflation-exsufflation in the presence of neuromuscular weakness.  Consider pharmacologic therapy (e.g., systemic corticosteroid, beta-2 agonist, mucolytic, antimicrobial) to improve mucus clearance, inflammation, cough response and air flow.  Initiate inspiratory muscle training to decrease the risk of pulmonary complications.  Recent Flowsheet Documentation  Taken 1/5/2022 0845 by Celestina Drake, RN  Cough And Deep Breathing: done independently per patient     Problem: Palliative Care  Goal: Enhanced Quality of Life  Outcome: Ongoing, Progressing  Intervention: Optimize Function  Description: Assess and monitor for fatigue at regular intervals; promote energy-conservation strategies to maximize ability to participate in prioritized activities.  Provide calm, consistent environment and schedule to prevent or minimize confusion and delirium.  Identify functional deficit; provide therapeutic interventions and assistive technology to facilitate mobility and safety (e.g., adaptive activities of daily living equipment, ambulation device).  Recent Flowsheet Documentation  Taken 1/5/2022 0845 by Celestina Drake RN  Sleep/Rest Enhancement:   awakenings minimized   therapeutic touch utilized  Intervention:  Optimize Psychosocial Wellbeing  Description: Encourage and validate emotions; respond with empathy, sensitivity and compassion.  Identify and assist patient to prioritize activities which bring meaning (e.g., participation in roles and relationships, spiritual activities); encourage reminiscence and life review.  Identify and maximize patient and family strengths and coping strategies.  Acknowledge and assist with life transitions, grief and loss (e.g., roles, cognitive changes, dependence).  Monitor for spiritual concerns and distress; provide support.  Identify perceptions regarding caregiving needs, abilities and stress; encourage use of social support.  Assess and monitor patient and caregiver for signs/symptoms of behavioral health concerns (e.g., depression, anxiety).  Connect with community resources for ongoing support.  Recent Flowsheet Documentation  Taken 1/5/2022 8431 by Celestina Drake RN  Supportive Measures: active listening utilized  Family/Support System Care: support provided     Problem: Skin Injury Risk Increased  Goal: Skin Health and Integrity  Outcome: Ongoing, Progressing

## 2022-01-05 NOTE — PLAN OF CARE
Goal Outcome Evaluation:  Plan of Care Reviewed With: patient, spouse        Progress: no change Pt has been resting throughout the entired nite.

## 2022-01-06 LAB
ALBUMIN SERPL-MCNC: 3.9 G/DL (ref 3.5–5.2)
ALP SERPL-CCNC: 52 U/L (ref 39–117)
ALT SERPL W P-5'-P-CCNC: 46 U/L (ref 1–41)
ANION GAP SERPL CALCULATED.3IONS-SCNC: 11.8 MMOL/L (ref 5–15)
AST SERPL-CCNC: 21 U/L (ref 1–40)
BASOPHILS # BLD AUTO: 0.02 10*3/MM3 (ref 0–0.2)
BASOPHILS NFR BLD AUTO: 0.2 % (ref 0–1.5)
BILIRUB CONJ SERPL-MCNC: <0.2 MG/DL (ref 0–0.3)
BILIRUB INDIRECT SERPL-MCNC: ABNORMAL MG/DL
BILIRUB SERPL-MCNC: 0.4 MG/DL (ref 0–1.2)
BUN SERPL-MCNC: 32 MG/DL (ref 8–23)
BUN/CREAT SERPL: 27.8 (ref 7–25)
CALCIUM SPEC-SCNC: 9.6 MG/DL (ref 8.6–10.5)
CHLORIDE SERPL-SCNC: 100 MMOL/L (ref 98–107)
CO2 SERPL-SCNC: 22.2 MMOL/L (ref 22–29)
CREAT SERPL-MCNC: 1.15 MG/DL (ref 0.76–1.27)
CRP SERPL-MCNC: <0.3 MG/DL (ref 0–0.5)
D DIMER PPP FEU-MCNC: 0.93 MG/L (FEU) (ref 0–0.59)
DEPRECATED RDW RBC AUTO: 39.8 FL (ref 37–54)
EOSINOPHIL # BLD AUTO: 0.05 10*3/MM3 (ref 0–0.4)
EOSINOPHIL NFR BLD AUTO: 0.6 % (ref 0.3–6.2)
ERYTHROCYTE [DISTWIDTH] IN BLOOD BY AUTOMATED COUNT: 13.6 % (ref 12.3–15.4)
ERYTHROCYTE [SEDIMENTATION RATE] IN BLOOD: 18 MM/HR (ref 0–20)
FERRITIN SERPL-MCNC: 390.1 NG/ML (ref 30–400)
GFR SERPL CREATININE-BSD FRML MDRD: 62 ML/MIN/1.73
GLUCOSE SERPL-MCNC: 123 MG/DL (ref 65–99)
HCT VFR BLD AUTO: 42.9 % (ref 37.5–51)
HGB BLD-MCNC: 14.5 G/DL (ref 13–17.7)
IMM GRANULOCYTES # BLD AUTO: 0.13 10*3/MM3 (ref 0–0.05)
IMM GRANULOCYTES NFR BLD AUTO: 1.5 % (ref 0–0.5)
LYMPHOCYTES # BLD AUTO: 0.75 10*3/MM3 (ref 0.7–3.1)
LYMPHOCYTES NFR BLD AUTO: 8.6 % (ref 19.6–45.3)
MAGNESIUM SERPL-MCNC: 2.4 MG/DL (ref 1.6–2.4)
MCH RBC QN AUTO: 27.7 PG (ref 26.6–33)
MCHC RBC AUTO-ENTMCNC: 33.8 G/DL (ref 31.5–35.7)
MCV RBC AUTO: 82 FL (ref 79–97)
MONOCYTES # BLD AUTO: 0.46 10*3/MM3 (ref 0.1–0.9)
MONOCYTES NFR BLD AUTO: 5.3 % (ref 5–12)
NEUTROPHILS NFR BLD AUTO: 7.32 10*3/MM3 (ref 1.7–7)
NEUTROPHILS NFR BLD AUTO: 83.8 % (ref 42.7–76)
NRBC BLD AUTO-RTO: 0 /100 WBC (ref 0–0.2)
PHOSPHATE SERPL-MCNC: 4.1 MG/DL (ref 2.5–4.5)
PLATELET # BLD AUTO: 250 10*3/MM3 (ref 140–450)
PMV BLD AUTO: 11.1 FL (ref 6–12)
POTASSIUM SERPL-SCNC: 4.8 MMOL/L (ref 3.5–5.2)
PROCALCITONIN SERPL-MCNC: 0.05 NG/ML (ref 0–0.25)
PROT SERPL-MCNC: 6.5 G/DL (ref 6–8.5)
RBC # BLD AUTO: 5.23 10*6/MM3 (ref 4.14–5.8)
SODIUM SERPL-SCNC: 134 MMOL/L (ref 136–145)
WBC NRBC COR # BLD: 8.73 10*3/MM3 (ref 3.4–10.8)

## 2022-01-06 PROCEDURE — 25010000002 ENOXAPARIN PER 10 MG: Performed by: INTERNAL MEDICINE

## 2022-01-06 PROCEDURE — 97161 PT EVAL LOW COMPLEX 20 MIN: CPT

## 2022-01-06 PROCEDURE — 84145 PROCALCITONIN (PCT): CPT | Performed by: INTERNAL MEDICINE

## 2022-01-06 PROCEDURE — 99233 SBSQ HOSP IP/OBS HIGH 50: CPT | Performed by: INTERNAL MEDICINE

## 2022-01-06 PROCEDURE — 84100 ASSAY OF PHOSPHORUS: CPT | Performed by: INTERNAL MEDICINE

## 2022-01-06 PROCEDURE — 82728 ASSAY OF FERRITIN: CPT | Performed by: FAMILY MEDICINE

## 2022-01-06 PROCEDURE — 85025 COMPLETE CBC W/AUTO DIFF WBC: CPT | Performed by: FAMILY MEDICINE

## 2022-01-06 PROCEDURE — 25010000002 DEXAMETHASONE PER 1 MG: Performed by: INTERNAL MEDICINE

## 2022-01-06 PROCEDURE — 94799 UNLISTED PULMONARY SVC/PX: CPT

## 2022-01-06 PROCEDURE — 99233 SBSQ HOSP IP/OBS HIGH 50: CPT | Performed by: FAMILY MEDICINE

## 2022-01-06 PROCEDURE — 86140 C-REACTIVE PROTEIN: CPT | Performed by: FAMILY MEDICINE

## 2022-01-06 PROCEDURE — 85379 FIBRIN DEGRADATION QUANT: CPT | Performed by: FAMILY MEDICINE

## 2022-01-06 PROCEDURE — 80076 HEPATIC FUNCTION PANEL: CPT | Performed by: FAMILY MEDICINE

## 2022-01-06 PROCEDURE — 85652 RBC SED RATE AUTOMATED: CPT | Performed by: FAMILY MEDICINE

## 2022-01-06 PROCEDURE — 25010000002 ENOXAPARIN PER 10 MG: Performed by: FAMILY MEDICINE

## 2022-01-06 PROCEDURE — 80048 BASIC METABOLIC PNL TOTAL CA: CPT | Performed by: INTERNAL MEDICINE

## 2022-01-06 PROCEDURE — 83735 ASSAY OF MAGNESIUM: CPT | Performed by: FAMILY MEDICINE

## 2022-01-06 RX ADMIN — SODIUM CHLORIDE, PRESERVATIVE FREE 10 ML: 5 INJECTION INTRAVENOUS at 21:06

## 2022-01-06 RX ADMIN — DEXAMETHASONE SODIUM PHOSPHATE 10 MG: 10 INJECTION INTRAMUSCULAR; INTRAVENOUS at 09:25

## 2022-01-06 RX ADMIN — ARFORMOTEROL TARTRATE 15 MCG: 15 SOLUTION RESPIRATORY (INHALATION) at 09:00

## 2022-01-06 RX ADMIN — MONTELUKAST 10 MG: 10 TABLET, FILM COATED ORAL at 09:25

## 2022-01-06 RX ADMIN — ARFORMOTEROL TARTRATE 15 MCG: 15 SOLUTION RESPIRATORY (INHALATION) at 20:11

## 2022-01-06 RX ADMIN — LEVOTHYROXINE SODIUM 100 MCG: 0.1 TABLET ORAL at 06:02

## 2022-01-06 RX ADMIN — ENOXAPARIN SODIUM 40 MG: 40 INJECTION SUBCUTANEOUS at 14:55

## 2022-01-06 RX ADMIN — PANTOPRAZOLE SODIUM 40 MG: 40 TABLET, DELAYED RELEASE ORAL at 06:01

## 2022-01-06 RX ADMIN — BUDESONIDE 0.5 MG: 0.5 SUSPENSION RESPIRATORY (INHALATION) at 09:00

## 2022-01-06 RX ADMIN — DOCUSATE SODIUM 100 MG: 100 CAPSULE, LIQUID FILLED ORAL at 11:15

## 2022-01-06 RX ADMIN — ENOXAPARIN SODIUM 100 MG: 100 INJECTION SUBCUTANEOUS at 06:02

## 2022-01-06 RX ADMIN — TEMAZEPAM 15 MG: 15 CAPSULE ORAL at 21:06

## 2022-01-06 RX ADMIN — Medication 10 MG: at 21:06

## 2022-01-06 RX ADMIN — BUDESONIDE 0.5 MG: 0.5 SUSPENSION RESPIRATORY (INHALATION) at 20:11

## 2022-01-06 RX ADMIN — SODIUM CHLORIDE, PRESERVATIVE FREE 10 ML: 5 INJECTION INTRAVENOUS at 09:28

## 2022-01-06 RX ADMIN — FLUTICASONE PROPIONATE 2 SPRAY: 50 SPRAY, METERED NASAL at 09:25

## 2022-01-06 NOTE — THERAPY EVALUATION
Acute Care - Physical Therapy Initial Evaluation   [place]     Patient Name: Elmer Garvin  : 1945  MRN: [id number]  Today's Date: 2022      Visit Dx:     ICD-10-CM ICD-9-CM   1. Multifocal pneumonia  J18.9 486   2. Difficulty walking  R26.2 719.7     Patient Active Problem List   Diagnosis   • Angina at rest ([facility])   • Hypertension   • SSS (sick sinus syndrome) ([facility])   • Mild intermittent asthma without complication   • Gastroesophageal reflux disease without esophagitis   • Taylor's esophagus without dysplasia   • Acquired hypothyroidism   • MRSA (methicillin resistant Staphylococcus aureus)   • Pre-diabetes   • Acute respiratory failure with hypoxia ([facility])     Past Medical History:   Diagnosis Date   • Hypertension    • Lyme disease    • Shortness of breath    • SSS (sick sinus syndrome) ([facility])      Past Surgical History:   Procedure Laterality Date   • CARDIAC CATHETERIZATION N/A 2019    Procedure: Coronary angiography;  Surgeon: Wilton Brown MD;  Location: [facility] INVASIVE LOCATION;  Service: Cardiovascular   • CARDIAC CATHETERIZATION N/A 2019    Procedure: Left heart cath;  Surgeon: Wilton Brown MD;  Location: [facility] INVASIVE LOCATION;  Service: Cardiovascular   • CARDIAC CATHETERIZATION N/A 2019    Procedure: Left ventriculography;  Surgeon: Wilton Brown MD;  Location: [facility] INVASIVE LOCATION;  Service: Cardiovascular   • CARDIAC CATHETERIZATION N/A 2019    Procedure: Right heart cath;  Surgeon: Wilton Brown MD;  Location: [facility] INVASIVE LOCATION;  Service: Cardiovascular   • CATARACT EXTRACTION     • INSERT / REPLACE / REMOVE PACEMAKER  2014   • PACEMAKER IMPLANTATION       PT Assessment (last 12 hours)     PT Evaluation and Treatment     Row Name 22 1100          Physical Therapy Time and Intention    Subjective Information complains of; fatigue  -CS     Document Type evaluation  -CS     Mode of Treatment individual therapy;  physical therapy  -CS     Patient Effort adequate  -CS     Symptoms Noted During/After Treatment shortness of breath  -CS     Row Name 01/06/22 1100          General Information    Patient Profile Reviewed yes  -CS     Patient Observations alert; cooperative  -CS     Prior Level of Function independent:; all household mobility; community mobility; gait; transfer; bed mobility; ADL's  -CS     Equipment Currently Used at Home cane, straight; rollator  -CS     Existing Precautions/Restrictions oxygen therapy device and L/min; other (see comments)  10L supplemental oxygen  -CS     Barriers to Rehab none identified  -CS     Row Name 01/06/22 1100          Living Environment    Current Living Arrangements home/apartment/condo  -CS     Lives With spouse; child(angela), adult; grandchild(angela)  -CS     Row Name 01/06/22 1100          Cognition    Orientation Status (Cognition) oriented x 3  -CS     Row Name 01/06/22 1100          Range of Motion Comprehensive    General Range of Motion bilateral lower extremity ROM WFL  -CS     Row Name 01/06/22 1100          Strength Comprehensive (MMT)    General Manual Muscle Testing (MMT) Assessment lower extremity strength deficits identified  -CS     Comment, General Manual Muscle Testing (MMT) Assessment BLEs assessed at 4/5  -CS     Row Name 01/06/22 1100          Bed Mobility    Bed Mobility bed mobility (all) activities  -CS     All Activities, Kleberg (Bed Mobility) independent  -CS     Row Name 01/06/22 1100          Transfers    Transfers sit-stand transfer; stand-sit transfer  -CS     Sit-Stand Kleberg (Transfers) modified independence  -CS     Stand-Sit Kleberg (Transfers) modified independence  -CS     Row Name 01/06/22 1100          Gait/Stairs (Locomotion)    Comment (Gait/Stairs) Patient observed ambulating short distances in his room by the bed and to/from commode with supervision due to lines/tubes. Patient has increased shortness of breath and decreased O2  sats with minimal activity.  -CS     Row Name 01/06/22 1100          Plan of Care Review    Plan of Care Reviewed With patient  -CS     Progress improving  -CS     Outcome Summary Patient presents with no physical limitations that impede his ability to return home with assist as needed.  Patient encouraged to continue ambulating short distances in his room every couple hours.  Patient will be discharged from skilled PT services.  -CS     Row Name 01/06/22 1100          Therapy Assessment/Plan (PT)    Criteria for Skilled Interventions Met (PT) no problems identified which require skilled intervention  -CS     Row Name 01/06/22 1100          PT Evaluation Complexity    History, PT Evaluation Complexity no personal factors and/or comorbidities  -CS     Examination of Body Systems (PT Eval Complexity) total of 4 or more elements  -CS     Clinical Presentation (PT Evaluation Complexity) stable  -CS     Clinical Decision Making (PT Evaluation Complexity) low complexity  -CS     Overall Complexity (PT Evaluation Complexity) low complexity  -     Row Name 01/06/22 1100          Therapy Plan Review/Discharge Plan (PT)    Therapy Plan Review (PT) patient  -CS           User Key  (r) = Recorded By, (t) = Taken By, (c) = Cosigned By    Initials Name Provider Type    CS Maddie Raza, PT Physical Therapist                Physical Therapy Education                 Title: PT OT SLP Therapies (In Progress)     Topic: Physical Therapy (In Progress)     Point: Mobility training (Done)     Learning Progress Summary           Patient Acceptance, E, VU,NR by CS at 1/6/2022 1145                   Point: Home exercise program (Not Started)     Learner Progress:  Not documented in this visit.          Point: Body mechanics (Not Started)     Learner Progress:  Not documented in this visit.          Point: Precautions (Done)     Learning Progress Summary           Patient Acceptance, E, VU,NR by CS at 1/6/2022 1145                                User Key     Initials Effective Dates Name Provider Type Discipline     04/25/21 -  Maddie Raza, ROSS Physical Therapist PT              PT Recommendation and Plan  Anticipated Discharge Disposition (PT): home with assist, home with home health  Therapy Frequency (PT): evaluation only  Plan of Care Reviewed With: patient  Progress: improving  Outcome Summary: Patient presents with no physical limitations that impede his ability to return home with assist as needed.  Patient encouraged to continue ambulating short distances in his room every couple hours.  Patient will be discharged from skilled PT services.   Outcome Measures     Row Name 01/06/22 1100             How much help from another person do you currently need...    Turning from your back to your side while in flat bed without using bedrails? 4  -CS      Moving from lying on back to sitting on the side of a flat bed without bedrails? 4  -CS      Moving to and from a bed to a chair (including a wheelchair)? 4  -CS      Standing up from a chair using your arms (e.g., wheelchair, bedside chair)? 4  -CS      Climbing 3-5 steps with a railing? 3  -CS      To walk in hospital room? 3  -CS      AM-PAC 6 Clicks Score (PT) 22  -CS              Functional Assessment    Outcome Measure Options AM-PAC 6 Clicks Basic Mobility (PT)  -CS            User Key  (r) = Recorded By, (t) = Taken By, (c) = Cosigned By    Initials Name Provider Type    Maddie Pendleton PT Physical Therapist                 Time Calculation:    PT Charges     Row Name 01/06/22 1146             Time Calculation    PT Received On 01/06/22  -CS              Untimed Charges    PT Eval/Re-eval Minutes 20  -CS              Total Minutes    Untimed Charges Total Minutes 20  -CS       Total Minutes 20  -CS            User Key  (r) = Recorded By, (t) = Taken By, (c) = Cosigned By    Initials Name Provider Type    Maddie Pendleton PT Physical Therapist              Therapy Charges for  Today     Code Description Service Date Service Provider Modifiers Qty    [id number] HC PT EVAL LOW COMPLEXITY 2 1/6/2022 Maddie Raza, PT GP 1          PT G-Codes  Outcome Measure Options: AM-PAC 6 Clicks Basic Mobility (PT)  AM-PAC 6 Clicks Score (PT): 22    Maddie Raza, PT  1/6/2022

## 2022-01-06 NOTE — PROGRESS NOTES
[facility]   Hospitalist Progress Note  Date: 2022  Patient Name: Elmer Garvin  : 1945  MRN: [id number]  Date of admission: 2021      Subjective   Subjective     Chief Complaint: SOA                           Summary: 76-year-old male with hypertension, hypothyroidism, CKD and sick sinus syndrome presented with 2 weeks of worsening shortness of breath and cough with fatigue.  He has not been vaccinated against COVID-19.  He went to an urgent care where he was satting 79% on room air and was sent to the ED where he was admitted.  He has tested positive for COVID-19.  He was out of the window for remdesivir.  He was started on steroids, supplemental oxygen, scheduled nebs.  Pulmonary consulted with increasing oxygen requirement.  Concern for ARDS.  Status post Actemra.  Worsening oxygenation status, transitioned to air Vo/heated high flow nasal cannula, wean down to high flow when his sats are stable     Interval Followup: Patient seen and examined this morning, no acute distress, no acute major overnight events, having some improvement in his oxygenation status, weaned off of air Vo/heated high flow nasal cannula oxygen to high flow nasal cannula oxygen at 10 L, sats are maintaining in the low 90s, his heart rate and blood pressure is off also been stable.  He denies chest pain or palpitations.  His D-dimer is trending down to 0.93, his creatinine is 1.15, sodium is 134, potassium is 4.8, hemoglobin 14.5.  He continues to have dyspnea on exertion as well as with rest.  Continues to clear sputum.  Telemetry reviewed, no acute major arrhythmic events, sinus rhythm in the 60s paced.  Discussed starting physical therapy and mobilizing more.  Remains very weak and debilitated.    Review of systems:  All systems reviewed negative except for shortness of breath with exertion, cough, fatigue, nasal congestion, mucus production thick    Objective   Objective     Vitals:   Temp:  [97.7 °F (36.5  °C)-98.8 °F (37.1 °C)] 97.9 °F (36.6 °C)  Heart Rate:  [60-88] 63  Resp:  [18-20] 20  BP: (126-139)/(53-72) 126/64  Flow (L/min):  [9-40] 9  Physical Exam    Constitutional: Awake, alert, no acute distress breathing at 10 L high flow nasal cannula, fatigued appearing              Eyes: Pupils equal, sclerae anicteric, no conjunctival injection              HENT: NCAT, mucous membranes moist              Neck: Supple, full range              Respiratory: Diminished breath sounds bilaterally laying in bed wearing high flow nasal cannula, coarse breath sounds throughout, mild wheeze              Cardiovascular: RRR, no JVD              Gastrointestinal: Positive bowel sounds, soft, nontender, nondistended              Musculoskeletal: No bilateral ankle edema, no clubbing or cyanosis to extremities              Psychiatric: Appropriate affect, cooperative              Neurologic: Oriented x 3, speech clear              Skin: No rashes     Result Review    Result Review:  I have personally reviewed the results from the time of this admission to 1/6/2022 14:42 EST and agree with these findings:  [x]  Laboratory   CBC    CBC 1/4/22 1/5/22 1/6/22   WBC 5.90 6.98 8.73   RBC 4.96 4.88 5.23   Hemoglobin 14.1 13.8 14.5   Hematocrit 41.2 40.6 42.9   MCV 83.1 83.2 82.0   MCH 28.4 28.3 27.7   MCHC 34.2 34.0 33.8   RDW 13.6 13.6 13.6   Platelets 238 244 250           BMP    BMP 1/4/22 1/5/22 1/6/22   BUN 32 (A) 33 (A) 32 (A)   Creatinine 1.13 1.01 1.15   Sodium 135 (A) 136 134 (A)   Potassium 4.5 4.6 4.8   Chloride 101 102 100   CO2 23.3 23.1 22.2   Calcium 9.0 9.2 9.6   (A) Abnormal value            LIVER FUNCTION TESTS:      Lab 01/06/22  0555 01/05/22  0612 01/04/22  0756 01/03/22  0601 01/02/22  0647   TOTAL PROTEIN 6.5 6.2 6.2 6.2 6.8   ALBUMIN 3.90 3.60 3.50 3.60 3.80   ALT (SGPT) 46* 35 38 50* 66*   AST (SGOT) 21 16 20 21 28   BILIRUBIN 0.4 0.4 0.5 0.4 0.6   BILIRUBIN DIRECT <0.2 <0.2 <0.2 <0.2 <0.2   ALK PHOS 52 46 46 44  46       [x]  Microbiology No results found for: ACANTHNAEG, AFBCX, BPERTUSSISCX, BLOODCX  No results found for: BCIDPCR, CXREFLEX, CSFCX, CULTURETIS  No results found for: CULTURES, HSVCX, URCX  No results found for: EYECULTURE, GCCX, HSVCULTURE, LABHSV  No results found for: LEGIONELLA, MRSACX, MUMPSCX, MYCOPLASCX  No results found for: NOCARDIACX, STOOLCX  No results found for: THROATCX, UNSTIMCULT, URINECX, CULTURE, VZVCULTUR  No results found for: VIRALCULTU, WOUNDCX    [x]  Radiology XR Chest 1 View    Result Date: 1/3/2022  PROCEDURE: XR CHEST 1 VW  COMPARISON: [facility], CR, XR CHEST 1 VW, 12/30/2021, 5:08.  INDICATIONS: pneumonia, resp failure  FINDINGS:  There is a left subclavian pacemaker device with leads over the right atrium right ventricle.  The heart is enlarged.  There are diffuse bilateral alveolar airspace opacities consistent with multifocal pneumonia.  Aeration has slightly worsened in the interval.  CONCLUSION: Slight worsening of multifocal infiltrates in the interval       JAZZY SEGURA MD       Electronically Signed and Approved By: JAZZY SEGURA MD on 1/03/2022 at 15:47             XR Chest 1 View    Result Date: 12/30/2021  PROCEDURE: XR CHEST 1 VW  COMPARISON: [facility], CT, CT CHEST PULMONARY EMBOLISM, 12/27/2021, 18:57.  [facility], CR, XR CHEST 1 VW, 12/27/2021, 13:35.  INDICATIONS: COVID, HYPOXIA  FINDINGS:  A left subclavian pacemaker is in place.  There are patchy bilateral airspace opacities.  The heart and mediastinal contours appear normal.  The osseous structures appear intact.  CONCLUSION: Patchy bilateral airspace opacities, suggesting ongoing pneumonia.       JOSE FRANCISCO TONG MD       Electronically Signed and Approved By: JOSE FRANCISCO TONG MD on 12/30/2021 at 5:51             XR Chest 1 View    Result Date: 12/27/2021  PROCEDURE: XR CHEST 1 VW  COMPARISON: [facility], CR, XR CHEST 1 VW, 12/26/2021, 17:59.   INDICATIONS: WORSENING HYPOXIA  FINDINGS:  There is stable patchy bilateral airspace disease.  No pneumothorax, pleural effusion or lobar consolidation.  The heart size is normal.  Stable left-sided 2 lead pacemaker.  CONCLUSION: Stable patchy bilateral airspace disease.       GALE CONNELL MD       Electronically Signed and Approved By: GALE CONNELL MD on 12/27/2021 at 14:22             XR Chest 1 View    Result Date: 12/26/2021  PROCEDURE: XR CHEST 1 VW  COMPARISON: [facility], CR, CHEST PA/AP & LAT 2V, 9/27/2017, 15:22.  INDICATIONS: SOA Triage Protocol  FINDINGS:  Left-sided AICD noted.  Heart size normal.  There is patchy bilateral midlung and bibasilar airspace disease most concerning for pneumonia.  No pneumothorax.  No large effusion.  Osseous structures grossly intact.  CONCLUSION:  Patchy bilateral airspace disease most pronounced at the lung bases concerning for multifocal pneumonia.       SHONNA HERMAN MD       Electronically Signed and Approved By: SHONNA HERMAN MD on 12/26/2021 at 18:31             CT Chest Pulmonary Embolism    Result Date: 12/28/2021  PROCEDURE: CT CHEST PULMONARY EMBOLISM W CONTRAST  COMPARISON:  [facility], CR, XR CHEST 1 VW, 12/27/2021, 13:35.  [facility], CT, CHEST W/O CONTRAST, 11/08/2018, 14:07. INDICATIONS: PE suspected, high prob  TECHNIQUE: After obtaining the patient's consent, CT images were obtained with non-ionic intravenous contrast material.   PROTOCOL:   Standard imaging protocol performed    RADIATION:   DLP: 386mGy*cm   Automated exposure control was utilized to minimize radiation dose. CONTRAST: 100cc Isovue 370 I.V. LABS:   eGFR: >60ml/min/1.73m2  FINDINGS:  The central tracheobronchial tree is clear.  There are diffuse bilateral airspace consolidations.  There is no pleural effusion.  A left subclavian pacemaker is in place.  The heart size appears normal, with mild partially calcified atherosclerotic disease in  the coronary arteries.  The great vessels are normal in caliber.  There is no evidence of pulmonary embolus.  No abnormally enlarged lymph nodes are identified.  Partial evaluation of the upper abdomen is unremarkable.  No aggressive osseous lesions are identified.  CONCLUSION:  1. Negative for pulmonary embolus. 2. Diffuse bilateral airspace consolidations, suggesting multifocal pneumonia.     JOSE FRANCISCO TONG MD       Electronically Signed and Approved By: JOSE FRANCISCO TONG MD on 12/28/2021 at 1:12             Duplex Venous Lower Extremity - Bilateral CV-READ    Result Date: 12/28/2021  · Normal bilateral lower extremity venous duplex scan.        [x]  EKG/Telemetry   []  Cardiology/Vascular   []  Pathology  [x]  Old records  []  Other:    Assessment/Plan   Assessment / Plan     Assessment/Plan:  Assessment:  COVID-19 pneumonia  Acute hypoxemic respiratory failure secondary COVID-19  SARS-CoV-2 infection  ARDS  Transaminitis due to viral illness  Hyperglycemia due to steroids  History of tobacco smoke  Elevated D-dimer  CHENCHO on CKD3  Essential hypertension  Hypothyroidism  SSS s/p PPM     Plan:  Labs and imaging reviewed  Continue efforts to wean oxygen from high flow nasal cannula to nasal cannula  Continue with PT/OT  Out of bed to chair  Self proning  Status post Actemra, watch for opportunistic infections  Consulted pulmonary discussed with Dr. Cervantes, recommendations appreciated  Continue therapeutic Lovenox dosing empirically for now  Continue Decadron 10 mg daily IV  Continue Afrin, continue Flonase  Continue scheduled nebs Brovana Pulmicort twice daily  Continue DuoNebs every 6 hours  Continued GI prophylaxis Protonix 40 mg daily  Monitor abdominal pain symptoms  Watch for signs of bleeding while on therapeutic Lovenox  A.m. labs  DNR  VTE prophylaxis ordered via Lovenox  Continue telemetry monitor  Clinical course to dictate further management  Critically ill with SARS-CoV-2 infection and COVID-19 pneumonia  with oxygen requirement gone up, slowly coming down, concerned that he will rapidly decompensate and require the ICU for close monitoring  Discussed with nurse at bedside.  Slight improvement, optimism remains that he will improve though due to the unpredictability of SARS-CoV-2 infections in the elderly population, prognosis remains indeterminate    DVT prophylaxis:  Medical DVT prophylaxis orders are present.    CODE STATUS:   Level Of Support Discussed With: Patient  Code Status (Patient has no pulse and is not breathing): No CPR (Do Not Attempt to Resuscitate)  Medical Interventions (Patient has pulse or is breathing): Full Support        Electronically signed by Diann Cuevas MD, 01/06/22, 2:42 PM EST.

## 2022-01-06 NOTE — PROGRESS NOTES
Pulmonary / Critical Care Progress Note      Patient Name: Elmer Garvin  : 1945  MRN: [id number]  Attending:  Diann Cuevas MD  Date of admission: 2021    Subjective   Subjective     It is critically ill with COVID-19 infection, viral pneumonia, and acute hypoxic respiratory failure, ARDS.    Over last 24 hours,  Patient remained on nebulizers including Brovana, Pulmicort.  Remained on Decadron 10 mg IV daily.  Has been on Lovenox 100 mg subcu twice daily.  Also on Singulair once daily.  Remained on airVo, weaned oxygen down some.     Overnight, went to high flow nasal oxygen.    This morning,  Oxygen saturation at 92% on high flow nasal oxygen.  He feels some better.  Still remains weak and fatigued. Has been bed ridden for most part.   Short of breath with minimal movements.  Has frequent desaturations with any movements.  No nausea or vomiting.  No chest pain or chest tightness.  Wife at bedside.     Review of Systems  General:  Fatigue, No Fever  HEENT: No dysphagia, No Visual Changes, no rhinorrhea  Respiratory:  + cough,+Dyspnea, no phlegm, No Pleuritic Pain, no wheezing  Cardiovascular: Denies chest pain, denies palpitations,+MONROE, Chest Pressure  Gastrointestinal:  No Abdominal Pain, No Nausea, No Vomiting, No Diarrhea  Genitourinary:  No Dysuria, No Hesitancy  Musculoskeletal: No muscle pain or swelling  Endocrine:  No Heat Intolerance, Fatigue  Hematologic:  No Bleeding, No Bruising  Psychiatric:  No Anxiety, No Depression  Neurologic:  No Confusion, No Headaches  Skin:  No Rash, No Open Wounds      Objective   Objective     Vitals:   Temp:  [97.7 °F (36.5 °C)-98.8 °F (37.1 °C)] 97.9 °F (36.6 °C)  Heart Rate:  [60-88] 70  Resp:  [18-20] 20  BP: (110-139)/(53-72) 126/64  Flow (L/min):  [10-40] 10    Physical Exam   Vital Signs Reviewed  General WDWN, Alert, appears to have conversational dyspnea, on airVo  HEENT:  PERRL, EOMI.  OP, nares clear, no sinus tenderness  Neck:  Supple, no  JVD, no thyromegaly  Lymph: no axillary, cervical, supraclavicular lymphadenopathy noted bilaterally  Chest: Diffuse crackles heard throughout all lung fields and coarse breath sounds moderate conversational dyspnea appreciated  CV: RRR, no MGR, pulses 2+, equal  Abd:  Soft, NT, ND, + BS, no HSM  EXT:  no clubbing, no cyanosis, no edema, no joint tenderness  Neuro:  A&Ox3, CN grossly intact, no focal deficits  Skin: No rashes or lesions noted    Result Review    Result Review:  I have personally reviewed the results from the time of this admission to 1/6/2022 10:11 EST and agree with these findings:  [x]  Laboratory  [x]  Microbiology  [x]  Radiology  []  EKG/Telemetry   []  Cardiology/Vascular   []  Pathology  []  Old records  []  Other:  Most notable findings include: Creatinine remains elevated at 1.44, previously was 1.17.  Serum creatinine 1.16.  D-dimer 1.94.    Assessment/Plan   Assessment / Plan     Active Hospital Problems:  Active Hospital Problems    Diagnosis    • Acute respiratory failure with hypoxia (HCC)        Impression:  Acute hypoxemic respiratory failure requiring air Vo  COVID-19 pneumonia, unvaccinated  ARDS  Therapeutic drug monitoring  Elevated liver enzymes  Stress versus steroid-induced hyperglycemia  Tobacco use of cigarettes in remission  Hyponatremia     Plan:  Transitioned to high flow nasal oxygen.  Wean as tolerated.  Stressed the importance of the prone position of the modified prone position while setting  Received a dose of Tocilizumab this admission.  Out of window for remdesivir.   Continue IV Decadron.  Patient's D-dimer has trended up and patient's clinical status has worsened with very tenuous respiratory status requiring large amount of oxygen.  DC therapeutic anticoagulation. CTPE negative, Lower ext US negative for clots.   Continue Singulair.  Follow daily CMP's given transaminitis.  Continue Brovana and Pulmicort.  Patient overall with very tenuous respiratory status  with high likelihood of decompensation.  Slowly improving some.     DVT prophylaxis:  Medical DVT prophylaxis orders are present.    CODE STATUS:   Level Of Support Discussed With: Patient  Code Status (Patient has no pulse and is not breathing): No CPR (Do Not Attempt to Resuscitate)  Medical Interventions (Patient has pulse or is breathing): Full Support    Labs, imaging, microbiology, notes and medications personally reviewed.  Discussed with primary service and bedside nurse.    Electronically signed by Eduard Cervantes MD, 01/06/22, 10:11 AM EST.

## 2022-01-06 NOTE — PLAN OF CARE
Goal Outcome Evaluation:  Plan of Care Reviewed With: patient        Progress: improving  Outcome Summary: Patient presents with no physical limitations that impede his ability to return home with assist as needed.  Patient encouraged to continue ambulating short distances in his room every couple hours.  Patient will be discharged from skilled PT services.

## 2022-01-06 NOTE — PLAN OF CARE
Goal Outcome Evaluation:           Progress: improving  Outcome Summary: Patient rested well this shift. Airvo decreased to 40 liters at 49% . Patient tolerating well. Wife at bedside. No distress noted at this time. Will continue to monitor.

## 2022-01-07 LAB
ALBUMIN SERPL-MCNC: 3.5 G/DL (ref 3.5–5.2)
ALP SERPL-CCNC: 38 U/L (ref 39–117)
ALT SERPL W P-5'-P-CCNC: 68 U/L (ref 1–41)
ANION GAP SERPL CALCULATED.3IONS-SCNC: 7.2 MMOL/L (ref 5–15)
AST SERPL-CCNC: 24 U/L (ref 1–40)
BASOPHILS # BLD AUTO: 0.01 10*3/MM3 (ref 0–0.2)
BASOPHILS NFR BLD AUTO: 0.1 % (ref 0–1.5)
BILIRUB CONJ SERPL-MCNC: <0.2 MG/DL (ref 0–0.3)
BILIRUB INDIRECT SERPL-MCNC: ABNORMAL MG/DL
BILIRUB SERPL-MCNC: 0.3 MG/DL (ref 0–1.2)
BUN SERPL-MCNC: 34 MG/DL (ref 8–23)
BUN/CREAT SERPL: 28.3 (ref 7–25)
CALCIUM SPEC-SCNC: 9.4 MG/DL (ref 8.6–10.5)
CHLORIDE SERPL-SCNC: 106 MMOL/L (ref 98–107)
CO2 SERPL-SCNC: 24.8 MMOL/L (ref 22–29)
CREAT SERPL-MCNC: 1.2 MG/DL (ref 0.76–1.27)
CRP SERPL-MCNC: <0.3 MG/DL (ref 0–0.5)
D DIMER PPP FEU-MCNC: 0.58 MG/L (FEU) (ref 0–0.59)
DEPRECATED RDW RBC AUTO: 40.6 FL (ref 37–54)
EOSINOPHIL # BLD AUTO: 0.03 10*3/MM3 (ref 0–0.4)
EOSINOPHIL NFR BLD AUTO: 0.4 % (ref 0.3–6.2)
ERYTHROCYTE [DISTWIDTH] IN BLOOD BY AUTOMATED COUNT: 13.7 % (ref 12.3–15.4)
ERYTHROCYTE [SEDIMENTATION RATE] IN BLOOD: 10 MM/HR (ref 0–20)
FERRITIN SERPL-MCNC: 324.5 NG/ML (ref 30–400)
GFR SERPL CREATININE-BSD FRML MDRD: 59 ML/MIN/1.73
GLUCOSE SERPL-MCNC: 134 MG/DL (ref 65–99)
HCT VFR BLD AUTO: 39.6 % (ref 37.5–51)
HGB BLD-MCNC: 13.4 G/DL (ref 13–17.7)
IMM GRANULOCYTES # BLD AUTO: 0.09 10*3/MM3 (ref 0–0.05)
IMM GRANULOCYTES NFR BLD AUTO: 1.3 % (ref 0–0.5)
LYMPHOCYTES # BLD AUTO: 0.66 10*3/MM3 (ref 0.7–3.1)
LYMPHOCYTES NFR BLD AUTO: 9.5 % (ref 19.6–45.3)
MAGNESIUM SERPL-MCNC: 2.4 MG/DL (ref 1.6–2.4)
MCH RBC QN AUTO: 27.9 PG (ref 26.6–33)
MCHC RBC AUTO-ENTMCNC: 33.8 G/DL (ref 31.5–35.7)
MCV RBC AUTO: 82.3 FL (ref 79–97)
MONOCYTES # BLD AUTO: 0.44 10*3/MM3 (ref 0.1–0.9)
MONOCYTES NFR BLD AUTO: 6.4 % (ref 5–12)
NEUTROPHILS NFR BLD AUTO: 5.69 10*3/MM3 (ref 1.7–7)
NEUTROPHILS NFR BLD AUTO: 82.3 % (ref 42.7–76)
NRBC BLD AUTO-RTO: 0 /100 WBC (ref 0–0.2)
PHOSPHATE SERPL-MCNC: 4.6 MG/DL (ref 2.5–4.5)
PLATELET # BLD AUTO: 181 10*3/MM3 (ref 140–450)
PMV BLD AUTO: 10.3 FL (ref 6–12)
POTASSIUM SERPL-SCNC: 4.6 MMOL/L (ref 3.5–5.2)
PROT SERPL-MCNC: 5.9 G/DL (ref 6–8.5)
RBC # BLD AUTO: 4.81 10*6/MM3 (ref 4.14–5.8)
SODIUM SERPL-SCNC: 138 MMOL/L (ref 136–145)
WBC NRBC COR # BLD: 6.92 10*3/MM3 (ref 3.4–10.8)

## 2022-01-07 PROCEDURE — 94799 UNLISTED PULMONARY SVC/PX: CPT

## 2022-01-07 PROCEDURE — 94761 N-INVAS EAR/PLS OXIMETRY MLT: CPT

## 2022-01-07 PROCEDURE — 85379 FIBRIN DEGRADATION QUANT: CPT | Performed by: FAMILY MEDICINE

## 2022-01-07 PROCEDURE — 85025 COMPLETE CBC W/AUTO DIFF WBC: CPT | Performed by: FAMILY MEDICINE

## 2022-01-07 PROCEDURE — 80076 HEPATIC FUNCTION PANEL: CPT | Performed by: FAMILY MEDICINE

## 2022-01-07 PROCEDURE — 85652 RBC SED RATE AUTOMATED: CPT | Performed by: FAMILY MEDICINE

## 2022-01-07 PROCEDURE — 84100 ASSAY OF PHOSPHORUS: CPT | Performed by: FAMILY MEDICINE

## 2022-01-07 PROCEDURE — 99233 SBSQ HOSP IP/OBS HIGH 50: CPT | Performed by: INTERNAL MEDICINE

## 2022-01-07 PROCEDURE — 25010000002 DEXAMETHASONE PER 1 MG: Performed by: INTERNAL MEDICINE

## 2022-01-07 PROCEDURE — 25010000002 ENOXAPARIN PER 10 MG: Performed by: INTERNAL MEDICINE

## 2022-01-07 PROCEDURE — 86140 C-REACTIVE PROTEIN: CPT | Performed by: FAMILY MEDICINE

## 2022-01-07 PROCEDURE — 83735 ASSAY OF MAGNESIUM: CPT | Performed by: FAMILY MEDICINE

## 2022-01-07 PROCEDURE — 80048 BASIC METABOLIC PNL TOTAL CA: CPT | Performed by: FAMILY MEDICINE

## 2022-01-07 PROCEDURE — 99233 SBSQ HOSP IP/OBS HIGH 50: CPT | Performed by: FAMILY MEDICINE

## 2022-01-07 PROCEDURE — 82728 ASSAY OF FERRITIN: CPT | Performed by: FAMILY MEDICINE

## 2022-01-07 RX ADMIN — ENOXAPARIN SODIUM 40 MG: 40 INJECTION SUBCUTANEOUS at 15:56

## 2022-01-07 RX ADMIN — SODIUM CHLORIDE, PRESERVATIVE FREE 10 ML: 5 INJECTION INTRAVENOUS at 21:21

## 2022-01-07 RX ADMIN — ARFORMOTEROL TARTRATE 15 MCG: 15 SOLUTION RESPIRATORY (INHALATION) at 21:42

## 2022-01-07 RX ADMIN — BUDESONIDE 0.5 MG: 0.5 SUSPENSION RESPIRATORY (INHALATION) at 10:05

## 2022-01-07 RX ADMIN — TEMAZEPAM 15 MG: 15 CAPSULE ORAL at 21:20

## 2022-01-07 RX ADMIN — MONTELUKAST 10 MG: 10 TABLET, FILM COATED ORAL at 10:36

## 2022-01-07 RX ADMIN — Medication 10 MG: at 21:21

## 2022-01-07 RX ADMIN — BUDESONIDE 0.5 MG: 0.5 SUSPENSION RESPIRATORY (INHALATION) at 21:42

## 2022-01-07 RX ADMIN — SODIUM CHLORIDE, PRESERVATIVE FREE 10 ML: 5 INJECTION INTRAVENOUS at 10:36

## 2022-01-07 RX ADMIN — DOCUSATE SODIUM 100 MG: 100 CAPSULE, LIQUID FILLED ORAL at 11:04

## 2022-01-07 RX ADMIN — FLUTICASONE PROPIONATE 2 SPRAY: 50 SPRAY, METERED NASAL at 10:36

## 2022-01-07 RX ADMIN — DEXAMETHASONE SODIUM PHOSPHATE 10 MG: 10 INJECTION INTRAMUSCULAR; INTRAVENOUS at 10:36

## 2022-01-07 RX ADMIN — LEVOTHYROXINE SODIUM 100 MCG: 0.1 TABLET ORAL at 06:53

## 2022-01-07 RX ADMIN — ARFORMOTEROL TARTRATE 15 MCG: 15 SOLUTION RESPIRATORY (INHALATION) at 10:05

## 2022-01-07 RX ADMIN — PANTOPRAZOLE SODIUM 40 MG: 40 TABLET, DELAYED RELEASE ORAL at 06:53

## 2022-01-07 NOTE — PLAN OF CARE
Goal Outcome Evaluation:  Plan of Care Reviewed With: patient, spouse        Progress: improving  Outcome Summary: vss. wife at bedside. pt down to 7l hi flow nc. slept well. no c/o.

## 2022-01-07 NOTE — PLAN OF CARE
Goal Outcome Evaluation:  Plan of Care Reviewed With: patient, spouse        Progress: improving  Outcome Summary: Titrated from 7 L high flow to 3 L reg NC by resp therap. VSS. No c/o of pain or nausea. Pt resting in chair with wife at bedside.

## 2022-01-07 NOTE — NURSING NOTE
Palliative care collaborated with LARA Wolfe and discussed patient's discharge planning.  Palliative care will sign off at this time and allow SW / CM follow for discharge plans.      JAREK GillisN, RN, CHPN

## 2022-01-07 NOTE — PROGRESS NOTES
[facility]   Hospitalist Progress Note  Date: 2022  Patient Name: Elmer Garvin  : 1945  MRN: [id number]  Date of admission: 2021      Subjective   Subjective     Chief Complaint: SOA                           Summary: 76-year-old male with hypertension, hypothyroidism, CKD and sick sinus syndrome presented with 2 weeks of worsening shortness of breath and cough with fatigue.  He has not been vaccinated against COVID-19.  He went to an urgent care where he was satting 79% on room air and was sent to the ED where he was admitted.  He has tested positive for COVID-19.  He was out of the window for remdesivir.  He was started on steroids, supplemental oxygen, scheduled nebs.  Pulmonary consulted with increasing oxygen requirement.  Concern for ARDS.  Status post Actemra.  Worsening oxygenation status, transitioned to air Vo/heated high flow nasal cannula, wean down to high flow when his sats are stable     Interval Followup: Patient seen and examined this morning, no acute distress, no acute major overnight events, patient continues to have improving respiratory symptoms, on 6 to 7 L high flow nasal cannula today, sats have been in the 90s, his exertional capacity is improving, he remains dyspneic on exertion still has a significant cough.  He denies chest pain or palpitations.  His inflammatory markers are trending down, his LFTs in particular ALT is up slightly.  His creatinine is at 1.2, his potassium is at 4.6, sodium is at 138.  White blood cell count 6000.  I reviewed his telemetry monitor for the past 24 hours, no acute saved arrhythmic events, sinus rhythm in the 60s.  He denies any chest pain or palpitations.    Review of systems:  All systems reviewed and negative except for cough, shortness of breath, dyspnea on exertion, mucus production, nasal congestion    Objective   Objective     Vitals:   Temp:  [97.4 °F (36.3 °C)-98.1 °F (36.7 °C)] 97.8 °F (36.6 °C)  Heart Rate:  [60-64]  61  Resp:  [18-20] 20  BP: (113-132)/(48-62) 115/54  Flow (L/min):  [3-7] 3  Physical Exam    Constitutional: Awake, alert, no acute distress breathing at 7L high flow nasal cannula, fatigued              Eyes: Pupils equal, sclerae anicteric, no conjunctival injection              HENT: NCAT, mucous membranes moist              Neck: Supple, full range              Respiratory: Diminished breath sounds bilaterally laying in bed wearing high flow nasal cannula, coarse breath sounds throughout, mild wheeze              Cardiovascular: RRR, no JVD              Gastrointestinal: Positive bowel sounds, soft, nontender, nondistended              Musculoskeletal: No bilateral ankle edema, no clubbing or cyanosis to extremities              Psychiatric: Appropriate affect, cooperative              Neurologic: Oriented x 3, speech clear              Skin: No rashes .      Result Review    Result Review:  I have personally reviewed the results from the time of this admission to 1/7/2022 17:34 EST and agree with these findings:  [x]  Laboratory   CBC    CBC 1/5/22 1/6/22 1/7/22   WBC 6.98 8.73 6.92   RBC 4.88 5.23 4.81   Hemoglobin 13.8 14.5 13.4   Hematocrit 40.6 42.9 39.6   MCV 83.2 82.0 82.3   MCH 28.3 27.7 27.9   MCHC 34.0 33.8 33.8   RDW 13.6 13.6 13.7   Platelets 244 250 181           BMP    BMP 1/5/22 1/6/22 1/7/22   BUN 33 (A) 32 (A) 34 (A)   Creatinine 1.01 1.15 1.20   Sodium 136 134 (A) 138   Potassium 4.6 4.8 4.6   Chloride 102 100 106   CO2 23.1 22.2 24.8   Calcium 9.2 9.6 9.4   (A) Abnormal value            LIVER FUNCTION TESTS:      Lab 01/07/22  0618 01/06/22  0555 01/05/22  0612 01/04/22  0756 01/03/22  0601   TOTAL PROTEIN 5.9* 6.5 6.2 6.2 6.2   ALBUMIN 3.50 3.90 3.60 3.50 3.60   ALT (SGPT) 68* 46* 35 38 50*   AST (SGOT) 24 21 16 20 21   BILIRUBIN 0.3 0.4 0.4 0.5 0.4   BILIRUBIN DIRECT <0.2 <0.2 <0.2 <0.2 <0.2   ALK PHOS 38* 52 46 46 44       [x]  Microbiology No results found for: ACANTHNAEG, AFBCX,  BPERTUSSISCX, BLOODCX  No results found for: BCIDPCR, CXREFLEX, CSFCX, CULTURETIS  No results found for: CULTURES, HSVCX, URCX  No results found for: EYECULTURE, GCCX, HSVCULTURE, LABHSV  No results found for: LEGIONELLA, MRSACX, MUMPSCX, MYCOPLASCX  No results found for: NOCARDIACX, STOOLCX  No results found for: THROATCX, UNSTIMCULT, URINECX, CULTURE, VZVCULTUR  No results found for: VIRALCULTU, WOUNDCX    [x]  Radiology XR Chest 1 View    Result Date: 1/3/2022  PROCEDURE: XR CHEST 1 VW  COMPARISON: [facility], CR, XR CHEST 1 VW, 12/30/2021, 5:08.  INDICATIONS: pneumonia, resp failure  FINDINGS:  There is a left subclavian pacemaker device with leads over the right atrium right ventricle.  The heart is enlarged.  There are diffuse bilateral alveolar airspace opacities consistent with multifocal pneumonia.  Aeration has slightly worsened in the interval.  CONCLUSION: Slight worsening of multifocal infiltrates in the interval       JAZZY SEGURA MD       Electronically Signed and Approved By: JAZZY SEGURA MD on 1/03/2022 at 15:47             XR Chest 1 View    Result Date: 12/30/2021  PROCEDURE: XR CHEST 1 VW  COMPARISON: [facility], CT, CT CHEST PULMONARY EMBOLISM, 12/27/2021, 18:57.  [facility], CR, XR CHEST 1 VW, 12/27/2021, 13:35.  INDICATIONS: COVID, HYPOXIA  FINDINGS:  A left subclavian pacemaker is in place.  There are patchy bilateral airspace opacities.  The heart and mediastinal contours appear normal.  The osseous structures appear intact.  CONCLUSION: Patchy bilateral airspace opacities, suggesting ongoing pneumonia.       JOSE FRANCISCO TONG MD       Electronically Signed and Approved By: JOSE FRANCISCO TONG MD on 12/30/2021 at 5:51             XR Chest 1 View    Result Date: 12/27/2021  PROCEDURE: XR CHEST 1 VW  COMPARISON: [facility], CR, XR CHEST 1 VW, 12/26/2021, 17:59.  INDICATIONS: WORSENING HYPOXIA  FINDINGS:  There is stable patchy bilateral  airspace disease.  No pneumothorax, pleural effusion or lobar consolidation.  The heart size is normal.  Stable left-sided 2 lead pacemaker.  CONCLUSION: Stable patchy bilateral airspace disease.       GALE CONNELL MD       Electronically Signed and Approved By: GALE CONNELL MD on 12/27/2021 at 14:22             XR Chest 1 View    Result Date: 12/26/2021  PROCEDURE: XR CHEST 1 VW  COMPARISON: [facility], CR, CHEST PA/AP & LAT 2V, 9/27/2017, 15:22.  INDICATIONS: SOA Triage Protocol  FINDINGS:  Left-sided AICD noted.  Heart size normal.  There is patchy bilateral midlung and bibasilar airspace disease most concerning for pneumonia.  No pneumothorax.  No large effusion.  Osseous structures grossly intact.  CONCLUSION:  Patchy bilateral airspace disease most pronounced at the lung bases concerning for multifocal pneumonia.       SHONNA HERMAN MD       Electronically Signed and Approved By: SHONNA HERMAN MD on 12/26/2021 at 18:31             CT Chest Pulmonary Embolism    Result Date: 12/28/2021  PROCEDURE: CT CHEST PULMONARY EMBOLISM W CONTRAST  COMPARISON:  [facility], CR, XR CHEST 1 VW, 12/27/2021, 13:35.  [facility], CT, CHEST W/O CONTRAST, 11/08/2018, 14:07. INDICATIONS: PE suspected, high prob  TECHNIQUE: After obtaining the patient's consent, CT images were obtained with non-ionic intravenous contrast material.   PROTOCOL:   Standard imaging protocol performed    RADIATION:   DLP: 386mGy*cm   Automated exposure control was utilized to minimize radiation dose. CONTRAST: 100cc Isovue 370 I.V. LABS:   eGFR: >60ml/min/1.73m2  FINDINGS:  The central tracheobronchial tree is clear.  There are diffuse bilateral airspace consolidations.  There is no pleural effusion.  A left subclavian pacemaker is in place.  The heart size appears normal, with mild partially calcified atherosclerotic disease in the coronary arteries.  The great vessels are normal in caliber.  There is  no evidence of pulmonary embolus.  No abnormally enlarged lymph nodes are identified.  Partial evaluation of the upper abdomen is unremarkable.  No aggressive osseous lesions are identified.  CONCLUSION:  1. Negative for pulmonary embolus. 2. Diffuse bilateral airspace consolidations, suggesting multifocal pneumonia.     JOSE FRANCISCO TONG MD       Electronically Signed and Approved By: JOSE FRANCISCO TONG MD on 12/28/2021 at 1:12             Duplex Venous Lower Extremity - Bilateral CV-READ    Result Date: 12/28/2021  · Normal bilateral lower extremity venous duplex scan.        [x]  EKG/Telemetry   []  Cardiology/Vascular   []  Pathology  [x]  Old records  []  Other:    Assessment/Plan   Assessment / Plan     Assessment/Plan:  Assessment:  COVID-19 pneumonia  Acute hypoxemic respiratory failure secondary COVID-19  SARS-CoV-2 infection  ARDS  Transaminitis due to viral illness  Hyperglycemia due to steroids  History of tobacco smoke  Elevated D-dimer  CHENCHO on CKD3  Essential hypertension  Hypothyroidism  SSS s/p PPM     Plan:  Labs and imaging reviewed  Continue efforts to wean oxygen from high flow nasal cannula to nasal cannula, moving in the right direction  Continue with PT/OT  Out of bed to chair  Self proning encouraged  Status post Actemra, watch for opportunistic infections  Consulted pulmonary discussed with Dr. Cervantes, recommendations appreciated  Continue therapeutic Lovenox dosing empirically for now  Continue Decadron 10 mg daily IV  Continue Afrin, continue Flonase  Continue scheduled nebs Brovana Pulmicort twice daily  Continue DuoNebs every 6 hours  Continued GI prophylaxis Protonix 40 mg daily  Monitor abdominal pain symptoms  Watch for signs of bleeding while on therapeutic Lovenox  A.m. labs  DNR  VTE prophylaxis ordered via Lovenox  Continue telemetry monitor  Clinical course to dictate further management  Critically ill with SARS-CoV-2 infection and COVID-19 pneumonia with oxygen requirement gone up,  slowly coming down, concerned that he will rapidly decompensate and require the ICU for close monitoring  Discussed with nurse at bedside.  Further improvement, optimism remains that he will improve though due to the unpredictability of SARS-CoV-2 infections in the elderly population, prognosis remains indeterminateDVT prophylaxis:  Medical DVT prophylaxis orders are present.    CODE STATUS:   Level Of Support Discussed With: Patient  Code Status (Patient has no pulse and is not breathing): No CPR (Do Not Attempt to Resuscitate)  Medical Interventions (Patient has pulse or is breathing): Full Support        Electronically signed by Diann Cuevas MD, 01/07/22, 5:34 PM EST.

## 2022-01-07 NOTE — PLAN OF CARE
Goal Outcome Evaluation:  Plan of Care Reviewed With: patient, spouse        Progress: improving       Patient decreased to 7 liters high flow nasal cannula this shift. Patient remained up ad sarwat and did get up into chair this shift. Patient did use incentive spirometer. Patient did have BM today.  Problem: Adult Inpatient Plan of Care  Goal: Plan of Care Review  Outcome: Ongoing, Progressing  Flowsheets (Taken 1/6/2022 1932)  Progress: improving  Plan of Care Reviewed With:   patient   spouse  Goal: Patient-Specific Goal (Individualized)  Outcome: Ongoing, Progressing  Goal: Absence of Hospital-Acquired Illness or Injury  Outcome: Ongoing, Progressing  Intervention: Identify and Manage Fall Risk  Description: Perform standard risk assessment with a validated tool or comprehensive approach appropriate to the patient on admission; reassess fall risk frequently, with change in status or transfer to another level of care.  Communicate fall injury risk to interprofessional healthcare team.  Determine need for increased observation, equipment and environmental modification, such as low bed and signage, as well as supportive, nonskid footwear.  Adjust safety measures to individual developmental age, stage and identified risk factors.  Reinforce the importance of safety and physical activity with patient and family.  Perform regular intentional rounding to assess need for position change, pain assessment, personal needs, including assistance with toileting.  Recent Flowsheet Documentation  Taken 1/6/2022 1454 by Celestina Drake, RN  Safety Promotion/Fall Prevention:   safety round/check completed   room organization consistent   nonskid shoes/slippers when out of bed   clutter free environment maintained   assistive device/personal items within reach  Taken 1/6/2022 0923 by Celestina Drake, RN  Safety Promotion/Fall Prevention:   safety round/check completed   room organization consistent   nonskid shoes/slippers when out of  bed   clutter free environment maintained   assistive device/personal items within reach  Intervention: Prevent Skin Injury  Description: Assess skin risk on admission and at regular intervals throughout hospital stay.  Keep all areas of skin (especially folds) clean and dry.  Maintain adequate skin hydration.  Relieve and redistribute pressure and protect bony prominences; implement measures based on patient-specific risk factors.  Match turning and repositioning schedule to clinical condition.  Encourage weight shift frequently; assist with reposition if unable to complete independently.  Float heels off bed. Avoid pressure on the Achilles tendon.  Keep skin free from extended contact with medical devices.  Use aids (e.g., slide boards, mechanical lift) during transfer.  Recent Flowsheet Documentation  Taken 1/6/2022 0901 by Celestina Drake RN  Skin Protection:   tubing/devices free from skin contact   transparent dressing maintained   incontinence pads utilized  Intervention: Prevent Infection  Description: Maintain skin and mucous membrane integrity; promote hand, oral and pulmonary hygiene.  Optimize fluid balance, nutrition, sleep and glycemic control to maximize infection resistance.  Identify potential sources of infection early to prevent or mitigate progression of infection (e.g., wound, lines, devices).  Evaluate ongoing need for invasive devices; remove promptly when no longer indicated.  Recent Flowsheet Documentation  Taken 1/6/2022 1454 by Celestina Drake RN  Infection Prevention:   visitors restricted/screened   single patient room provided   rest/sleep promoted   personal protective equipment utilized   hand hygiene promoted  Taken 1/6/2022 0923 by Celestina Drake RN  Infection Prevention:   visitors restricted/screened   single patient room provided   rest/sleep promoted   personal protective equipment utilized   hand hygiene promoted  Goal: Optimal Comfort and Wellbeing  Outcome: Ongoing,  Progressing  Intervention: Provide Person-Centered Care  Description: Use a family-focused approach to care.  Develop trust and rapport by proactively providing information, encouraging questions, addressing concerns and offering reassurance.  Acknowledge emotional response to hospitalization.  Recognize and utilize personal coping strategies.  Honor spiritual and cultural preferences.  Recent Flowsheet Documentation  Taken 1/6/2022 0925 by Celestina Drake RN  Trust Relationship/Rapport:   care explained   choices provided   emotional support provided   empathic listening provided   questions answered   questions encouraged   reassurance provided   thoughts/feelings acknowledged  Goal: Readiness for Transition of Care  Outcome: Ongoing, Progressing     Problem: Hypertension Comorbidity  Goal: Blood Pressure in Desired Range  Outcome: Ongoing, Progressing  Intervention: Maintain Hypertension-Management Strategies  Description: Evaluate adherence to home antihypertensive regimen (e.g., exercise and activity, diet modification, medication).  Provide scheduled antihypertensive medication; consider administration time and effects (e.g., avoid giving diuretic prior to bedtime).  Monitor response to medication therapy (e.g., blood pressure, electrolyte level, medication effects).  Minimize risk of orthostatic hypotension; encourage caution with position changes, particularly if elderly  Recent Flowsheet Documentation  Taken 1/6/2022 1454 by Celestina Drake RN  Medication Review/Management: medications reviewed  Taken 1/6/2022 0925 by Celestina Drake RN  Medication Review/Management: medications reviewed  Taken 1/6/2022 0923 by Celestina Drake RN  Medication Review/Management: medications reviewed     Problem: Hypertension Acute  Goal: Blood Pressure Within Desired Range  Outcome: Ongoing, Progressing  Intervention: Normalize Blood Pressure  Description: Administer medications to reduce blood pressure (nitrate, calcium channel  blocker, beta-blocker, ACE inhibitor, peripheral dopamine receptor agonist).  Promote anxiety-reducing practices (e.g., quiet, calm environment; relaxation techniques; normothermic environment).  Match activity level to patient ability and tolerance.  Recent Flowsheet Documentation  Taken 1/6/2022 1454 by Celestina Drake RN  Medication Review/Management: medications reviewed  Taken 1/6/2022 0925 by Celestina Drake RN  Medication Review/Management: medications reviewed  Taken 1/6/2022 0923 by Celestina Drake RN  Medication Review/Management: medications reviewed     Problem: Fluid Imbalance (Pneumonia)  Goal: Fluid Balance  Outcome: Ongoing, Progressing     Problem: Infection (Pneumonia)  Goal: Resolution of Infection Signs and Symptoms  Outcome: Ongoing, Progressing  Intervention: Prevent Infection Progression  Description: Implement transmission-based precautions and isolation, as indicated, to prevent spread of infection.  Obtain cultures prior to initiating antimicrobial therapy. Do not delay treatment for laboratory results in the presence of high suspicion.  Administer ordered antimicrobial therapy promptly; reassess need regularly.  Identify and manage signs of early sepsis.  Provide fever-reduction and comfort measures.  Recent Flowsheet Documentation  Taken 1/6/2022 1454 by Celestina Drake RN  Isolation Precautions: airborne precautions maintained  Taken 1/6/2022 0923 by Celestina Drake RN  Isolation Precautions: airborne precautions maintained     Problem: Respiratory Compromise (Pneumonia)  Goal: Effective Oxygenation and Ventilation  Outcome: Ongoing, Progressing     Problem: Palliative Care  Goal: Enhanced Quality of Life  Outcome: Ongoing, Progressing  Intervention: Maximize Comfort  Description: Assess pain and acceptable level of comfort to individualize pain management plan.  Offer preferred nonpharmacologic and pharmacologic techniques to maximize comfort and pain relief.  Prevent or manage oral and  gastrointestinal symptoms.  Offer food and fluids based on patient’s preference and tolerance.  Promote complementary therapy (e.g., music, pet therapy, massage, meditation, aromatherapy).  Evaluate for breathlessness; provide supportive relief (e.g., positioning, breathing exercises, secretion clearance, medication).  Promote strategies for sleep and rest.  Recent Flowsheet Documentation  Taken 1/6/2022 0925 by Celestina Drake RN  Nutrition Interventions: food preferences provided  Intervention: Optimize Function  Description: Assess and monitor for fatigue at regular intervals; promote energy-conservation strategies to maximize ability to participate in prioritized activities.  Provide calm, consistent environment and schedule to prevent or minimize confusion and delirium.  Identify functional deficit; provide therapeutic interventions and assistive technology to facilitate mobility and safety (e.g., adaptive activities of daily living equipment, ambulation device).  Recent Flowsheet Documentation  Taken 1/6/2022 0925 by Celestina Drake RN  Sleep/Rest Enhancement: awakenings minimized  Intervention: Optimize Psychosocial Wellbeing  Description: Encourage and validate emotions; respond with empathy, sensitivity and compassion.  Identify and assist patient to prioritize activities which bring meaning (e.g., participation in roles and relationships, spiritual activities); encourage reminiscence and life review.  Identify and maximize patient and family strengths and coping strategies.  Acknowledge and assist with life transitions, grief and loss (e.g., roles, cognitive changes, dependence).  Monitor for spiritual concerns and distress; provide support.  Identify perceptions regarding caregiving needs, abilities and stress; encourage use of social support.  Assess and monitor patient and caregiver for signs/symptoms of behavioral health concerns (e.g., depression, anxiety).  Connect with community resources for ongoing  support.  Recent Flowsheet Documentation  Taken 1/6/2022 0925 by Celestina Drake, RN  Family/Support System Care:   support provided   self-care encouraged     Problem: Skin Injury Risk Increased  Goal: Skin Health and Integrity  Outcome: Ongoing, Progressing  Intervention: Optimize Skin Protection  Description: Reassess skin injury risk and inspect skin frequently (e.g., scheduled interval, with turning, with change in condition) to provide optimal prevention.  Maintain adequate tissue perfusion (e.g., encourage fluid balance, avoid crossing legs, constrictive clothing or devices) to promote tissue oxygenation.  Maintain head of bed at lowest degree of elevation tolerated, considering medical condition and other restrictions. Limit amount of time head of bed is elevated greater than 30 degrees to prevent friction/shear injury.  Avoid positioning onto an area that remains reddened.  Minimize incontinence and moisture (e.g., toileting schedule; moisture-wicking pad, diaper or incontinence collection device, skin moisture barrier).  Cleanse skin promptly and gently when soiled utilizing a pH-balanced cleanser.  Relieve and redistribute pressure (e.g., schedule position changes; utilize higher specification foam mattress, chair cushion, constant low-pressure or alternating-pressure support surface; medical device repositioning; protective dressing applicatio  Support increased progressive functional activity (e.g., therapeutic exercise) to decrease risk associated with immobility. Balance rest with activity.  Recent Flowsheet Documentation  Taken 1/6/2022 0925 by Celestina Drake, RN  Pressure Reduction Techniques:   frequent weight shift encouraged   weight shift assistance provided  Pressure Reduction Devices: pressure-redistributing mattress utilized  Skin Protection:   tubing/devices free from skin contact   transparent dressing maintained   incontinence pads utilized

## 2022-01-07 NOTE — PROGRESS NOTES
Pulmonary / Critical Care Progress Note      Patient Name: Elmer Garvin  : 1945  MRN: [id number]  Attending:  Diann Cuevas MD  Date of admission: 2021    Subjective   Subjective     It is critically ill with COVID-19 infection, viral pneumonia, and acute hypoxic respiratory failure, ARDS.    Over last 24 hours,  Patient remained on nebulizers including Brovana, Pulmicort.  Remained on Decadron 10 mg IV daily.  Has been on Lovenox 100 mg subcu twice daily.  Also on Singulair once daily.  Weaned down on oxygen.  Switch Lovenox to prophylactic dose.    Overnight, no acute events.    This morning,  Oxygen saturation at 92% on high flow nasal oxygen.  He feels some better.  Moving around in the room some.  Short of breath with minimal movements.  Has frequent desaturations with any movements.  No nausea or vomiting.  No chest pain or chest tightness.  Wife at bedside.   FiO2 down to 5 L/min.    Review of Systems  General:  Fatigue, No Fever  HEENT: No dysphagia, No Visual Changes, no rhinorrhea  Respiratory:  + cough,+Dyspnea, no phlegm, No Pleuritic Pain, no wheezing  Cardiovascular: Denies chest pain, denies palpitations,+MONROE, Chest Pressure  Gastrointestinal:  No Abdominal Pain, No Nausea, No Vomiting, No Diarrhea  Genitourinary:  No Dysuria, No Hesitancy  Musculoskeletal: No muscle pain or swelling  Endocrine:  No Heat Intolerance, Fatigue  Hematologic:  No Bleeding, No Bruising  Neurologic:  No Confusion, No Headaches  Skin:  No Rash, No Open Wounds      Objective   Objective     Vitals:   Temp:  [97.4 °F (36.3 °C)-98.1 °F (36.7 °C)] 97.4 °F (36.3 °C)  Heart Rate:  [60-67] 61  Resp:  [18] 18  BP: (113-132)/(48-65) 130/48  Flow (L/min):  [5-9] 5    Physical Exam   Vital Signs Reviewed  General WDWN, Alert, appears to have conversational dyspnea, on airVo  HEENT:  PERRL, EOMI.  OP, nares clear, no sinus tenderness  Neck:  Supple, no JVD, no thyromegaly  Lymph: no axillary, cervical,  supraclavicular lymphadenopathy noted bilaterally  Chest: Diffuse crackles heard throughout all lung fields and coarse breath sounds moderate conversational dyspnea appreciated  CV: RRR, no MGR, pulses 2+, equal  Abd:  Soft, NT, ND, + BS, no HSM  EXT:  no clubbing, no cyanosis, no edema, no joint tenderness  Neuro:  A&Ox3, CN grossly intact, no focal deficits  Skin: No rashes or lesions noted    Result Review    Result Review:  I have personally reviewed the results from the time of this admission to 1/7/2022 13:30 EST and agree with these findings:  [x]  Laboratory  [x]  Microbiology  [x]  Radiology  []  EKG/Telemetry   []  Cardiology/Vascular   []  Pathology  []  Old records  []  Other:  Most notable findings include: Creatinine remains elevated at 1.44, previously was 1.17.  Serum creatinine 1.16.  D-dimer 1.94.    Assessment/Plan   Assessment / Plan     Active Hospital Problems:  Active Hospital Problems    Diagnosis    • Acute respiratory failure with hypoxia (HCC)        Impression:  Acute hypoxemic respiratory failure requiring air Vo  COVID-19 pneumonia, unvaccinated  ARDS  Therapeutic drug monitoring  Elevated liver enzymes  Stress versus steroid-induced hyperglycemia  Tobacco use of cigarettes in remission  Hyponatremia     Plan:  Currently on oxygen at high flow nasal cannula at 5 L/min.  Wean as tolerated.  Received a dose of Tocilizumab this admission.  Continue IV Decadron.  Switched Lovenox to prophylactic dose.  Continue Singulair.  Follow daily CMP's given transaminitis.  Continue Brovana and Pulmicort.    DVT prophylaxis:  Medical DVT prophylaxis orders are present.    CODE STATUS:   Level Of Support Discussed With: Patient  Code Status (Patient has no pulse and is not breathing): No CPR (Do Not Attempt to Resuscitate)  Medical Interventions (Patient has pulse or is breathing): Full Support    Labs, imaging, microbiology, notes and medications personally reviewed.  Discussed with primary service  and bedside nurse.    Electronically signed by Eduard Cervantes MD, 01/06/22, 10:11 AM EST.

## 2022-01-08 LAB
ALBUMIN SERPL-MCNC: 3.6 G/DL (ref 3.5–5.2)
ALBUMIN/GLOB SERPL: 1.4 G/DL
ALP SERPL-CCNC: 36 U/L (ref 39–117)
ALT SERPL W P-5'-P-CCNC: 65 U/L (ref 1–41)
ANION GAP SERPL CALCULATED.3IONS-SCNC: 10.3 MMOL/L (ref 5–15)
AST SERPL-CCNC: 19 U/L (ref 1–40)
BASOPHILS # BLD AUTO: 0.01 10*3/MM3 (ref 0–0.2)
BASOPHILS NFR BLD AUTO: 0.1 % (ref 0–1.5)
BILIRUB SERPL-MCNC: 0.3 MG/DL (ref 0–1.2)
BUN SERPL-MCNC: 31 MG/DL (ref 8–23)
BUN/CREAT SERPL: 31 (ref 7–25)
CALCIUM SPEC-SCNC: 9.2 MG/DL (ref 8.6–10.5)
CHLORIDE SERPL-SCNC: 102 MMOL/L (ref 98–107)
CO2 SERPL-SCNC: 23.7 MMOL/L (ref 22–29)
CREAT SERPL-MCNC: 1 MG/DL (ref 0.76–1.27)
DEPRECATED RDW RBC AUTO: 42.8 FL (ref 37–54)
EOSINOPHIL # BLD AUTO: 0.03 10*3/MM3 (ref 0–0.4)
EOSINOPHIL NFR BLD AUTO: 0.4 % (ref 0.3–6.2)
ERYTHROCYTE [DISTWIDTH] IN BLOOD BY AUTOMATED COUNT: 14 % (ref 12.3–15.4)
GFR SERPL CREATININE-BSD FRML MDRD: 73 ML/MIN/1.73
GLOBULIN UR ELPH-MCNC: 2.5 GM/DL
GLUCOSE SERPL-MCNC: 118 MG/DL (ref 65–99)
HCT VFR BLD AUTO: 40.5 % (ref 37.5–51)
HGB BLD-MCNC: 13.4 G/DL (ref 13–17.7)
IMM GRANULOCYTES # BLD AUTO: 0.07 10*3/MM3 (ref 0–0.05)
IMM GRANULOCYTES NFR BLD AUTO: 1 % (ref 0–0.5)
LYMPHOCYTES # BLD AUTO: 0.68 10*3/MM3 (ref 0.7–3.1)
LYMPHOCYTES NFR BLD AUTO: 9.9 % (ref 19.6–45.3)
MCH RBC QN AUTO: 28 PG (ref 26.6–33)
MCHC RBC AUTO-ENTMCNC: 33.1 G/DL (ref 31.5–35.7)
MCV RBC AUTO: 84.6 FL (ref 79–97)
MONOCYTES # BLD AUTO: 0.5 10*3/MM3 (ref 0.1–0.9)
MONOCYTES NFR BLD AUTO: 7.3 % (ref 5–12)
NEUTROPHILS NFR BLD AUTO: 5.6 10*3/MM3 (ref 1.7–7)
NEUTROPHILS NFR BLD AUTO: 81.3 % (ref 42.7–76)
NRBC BLD AUTO-RTO: 0 /100 WBC (ref 0–0.2)
PLATELET # BLD AUTO: 176 10*3/MM3 (ref 140–450)
PMV BLD AUTO: 10 FL (ref 6–12)
POTASSIUM SERPL-SCNC: 4.7 MMOL/L (ref 3.5–5.2)
PROT SERPL-MCNC: 6.1 G/DL (ref 6–8.5)
RBC # BLD AUTO: 4.79 10*6/MM3 (ref 4.14–5.8)
SODIUM SERPL-SCNC: 136 MMOL/L (ref 136–145)
WBC NRBC COR # BLD: 6.89 10*3/MM3 (ref 3.4–10.8)

## 2022-01-08 PROCEDURE — 99232 SBSQ HOSP IP/OBS MODERATE 35: CPT | Performed by: INTERNAL MEDICINE

## 2022-01-08 PROCEDURE — 25010000002 ENOXAPARIN PER 10 MG: Performed by: INTERNAL MEDICINE

## 2022-01-08 PROCEDURE — 94761 N-INVAS EAR/PLS OXIMETRY MLT: CPT

## 2022-01-08 PROCEDURE — 80053 COMPREHEN METABOLIC PANEL: CPT | Performed by: FAMILY MEDICINE

## 2022-01-08 PROCEDURE — 94799 UNLISTED PULMONARY SVC/PX: CPT

## 2022-01-08 PROCEDURE — 85025 COMPLETE CBC W/AUTO DIFF WBC: CPT | Performed by: FAMILY MEDICINE

## 2022-01-08 PROCEDURE — 25010000002 DEXAMETHASONE PER 1 MG: Performed by: INTERNAL MEDICINE

## 2022-01-08 PROCEDURE — 99233 SBSQ HOSP IP/OBS HIGH 50: CPT | Performed by: FAMILY MEDICINE

## 2022-01-08 RX ADMIN — ARFORMOTEROL TARTRATE 15 MCG: 15 SOLUTION RESPIRATORY (INHALATION) at 20:58

## 2022-01-08 RX ADMIN — PANTOPRAZOLE SODIUM 40 MG: 40 TABLET, DELAYED RELEASE ORAL at 06:13

## 2022-01-08 RX ADMIN — Medication 10 MG: at 21:27

## 2022-01-08 RX ADMIN — SODIUM CHLORIDE, PRESERVATIVE FREE 10 ML: 5 INJECTION INTRAVENOUS at 21:27

## 2022-01-08 RX ADMIN — BUDESONIDE 0.5 MG: 0.5 SUSPENSION RESPIRATORY (INHALATION) at 07:57

## 2022-01-08 RX ADMIN — TEMAZEPAM 15 MG: 15 CAPSULE ORAL at 21:27

## 2022-01-08 RX ADMIN — SODIUM CHLORIDE, PRESERVATIVE FREE 10 ML: 5 INJECTION INTRAVENOUS at 10:21

## 2022-01-08 RX ADMIN — DEXAMETHASONE SODIUM PHOSPHATE 10 MG: 10 INJECTION INTRAMUSCULAR; INTRAVENOUS at 10:21

## 2022-01-08 RX ADMIN — DOCUSATE SODIUM 100 MG: 100 CAPSULE, LIQUID FILLED ORAL at 21:31

## 2022-01-08 RX ADMIN — SODIUM CHLORIDE, PRESERVATIVE FREE 10 ML: 5 INJECTION INTRAVENOUS at 10:26

## 2022-01-08 RX ADMIN — BUDESONIDE 0.5 MG: 0.5 SUSPENSION RESPIRATORY (INHALATION) at 20:58

## 2022-01-08 RX ADMIN — ENOXAPARIN SODIUM 40 MG: 40 INJECTION SUBCUTANEOUS at 17:43

## 2022-01-08 RX ADMIN — FLUTICASONE PROPIONATE 2 SPRAY: 50 SPRAY, METERED NASAL at 10:22

## 2022-01-08 RX ADMIN — ARFORMOTEROL TARTRATE 15 MCG: 15 SOLUTION RESPIRATORY (INHALATION) at 07:57

## 2022-01-08 RX ADMIN — MONTELUKAST 10 MG: 10 TABLET, FILM COATED ORAL at 10:21

## 2022-01-08 RX ADMIN — LEVOTHYROXINE SODIUM 100 MCG: 0.1 TABLET ORAL at 06:13

## 2022-01-08 NOTE — PROGRESS NOTES
[facility]   Hospitalist Progress Note  Date: 2022  Patient Name: Elmer Garvin  : 1945  MRN: [id number]  Date of admission: 2021      Subjective   Subjective     Chief Complaint: SOA                           Summary: 76-year-old male with hypertension, hypothyroidism, CKD and sick sinus syndrome presented with 2 weeks of worsening shortness of breath and cough with fatigue.  He has not been vaccinated against COVID-19.  He went to an urgent care where he was satting 79% on room air and was sent to the ED where he was admitted.  He has tested positive for COVID-19.  He was out of the window for remdesivir.  He was started on steroids, supplemental oxygen, scheduled nebs.  Pulmonary consulted with increasing oxygen requirement.  Concern for ARDS.  Status post Actemra.  Worsening oxygenation status, transitioned to air Vo/heated high flow nasal cannula, wean down to high flow when his sats are stable     Interval Followup: Patient seen and examined this morning, no acute distress, no acute major overnight events, reviewed telemetry monitor for the past 24 hours, no acute major arrhythmic events, sinus rhythm.  Continues to have improvement in his breathing, denies fever, chills, sweats.  Still has dyspnea on exertion, normal at rest, on 3.5 L nasal cannula oxygen his sats are in the 90s.  He is improving exertional tolerance.  Still becomes easily winded.  Creatinine down to 1.0, ALT remains elevated in the 60s range.  With blood cell count at 6.0.  Denies chest pain or palpitations.    Review of systems:  All systems reviewed and negative except for cough, shortness of breath, dyspnea on exertion     Objective   Objective     Vitals:   Temp:  [97.4 °F (36.3 °C)-98.7 °F (37.1 °C)] 97.5 °F (36.4 °C)  Heart Rate:  [59-61] 61  Resp:  [18-20] 20  BP: (104-124)/(46-66) 124/62  Flow (L/min):  [3-3.5] 3.5  Physical Exam    Constitutional: Awake, alert, no acute distress breathing at 3.5L nasal  cannula              Eyes: Pupils equal, sclerae anicteric, no conjunctival injection              HENT: NCAT, mucous membranes moist              Neck: Supple, full range              Respiratory: Diminished breath sounds bilaterally laying in bed wearing nasal cannula, mild wheeze              Cardiovascular: RRR, no JVD              Gastrointestinal: Positive bowel sounds, soft, nontender, nondistended              Musculoskeletal: No bilateral ankle edema, no clubbing or cyanosis to extremities              Psychiatric: Appropriate affect, cooperative              Neurologic: Oriented x 3, speech clear              Skin: No rashes     Result Review    Result Review:  I have personally reviewed the results from the time of this admission to 1/8/2022 15:22 EST and agree with these findings:  [x]  Laboratory   CBC    CBC 1/6/22 1/7/22 1/8/22   WBC 8.73 6.92 6.89   RBC 5.23 4.81 4.79   Hemoglobin 14.5 13.4 13.4   Hematocrit 42.9 39.6 40.5   MCV 82.0 82.3 84.6   MCH 27.7 27.9 28.0   MCHC 33.8 33.8 33.1   RDW 13.6 13.7 14.0   Platelets 250 181 176           BMP    BMP 1/6/22 1/7/22 1/8/22   BUN 32 (A) 34 (A) 31 (A)   Creatinine 1.15 1.20 1.00   Sodium 134 (A) 138 136   Potassium 4.8 4.6 4.7   Chloride 100 106 102   CO2 22.2 24.8 23.7   Calcium 9.6 9.4 9.2   (A) Abnormal value            LIVER FUNCTION TESTS:      Lab 01/08/22  0830 01/07/22  0618 01/06/22  0555 01/05/22  0612 01/04/22  0756 01/03/22  0601 01/03/22  0601   TOTAL PROTEIN 6.1 5.9* 6.5 6.2 6.2   < > 6.2   ALBUMIN 3.60 3.50 3.90 3.60 3.50   < > 3.60   GLOBULIN 2.5  --   --   --   --   --   --    ALT (SGPT) 65* 68* 46* 35 38   < > 50*   AST (SGOT) 19 24 21 16 20   < > 21   BILIRUBIN 0.3 0.3 0.4 0.4 0.5   < > 0.4   BILIRUBIN DIRECT  --  <0.2 <0.2 <0.2 <0.2  --  <0.2   ALK PHOS 36* 38* 52 46 46   < > 44    < > = values in this interval not displayed.       [x]  Microbiology No results found for: ACANTHNAEG, AFBCX, BPERTUSSISCX, BLOODCX  No results found for:  BCIDPCR, CXREFLEX, CSFCX, CULTURETIS  No results found for: CULTURES, HSVCX, URCX  No results found for: EYECULTURE, GCCX, HSVCULTURE, LABHSV  No results found for: LEGIONELLA, MRSACX, MUMPSCX, MYCOPLASCX  No results found for: NOCARDIACX, STOOLCX  No results found for: THROATCX, UNSTIMCULT, URINECX, CULTURE, VZVCULTUR  No results found for: VIRALCULTU, WOUNDCX    [x]  Radiology XR Chest 1 View    Result Date: 1/3/2022  PROCEDURE: XR CHEST 1 VW  COMPARISON: [facility], CR, XR CHEST 1 VW, 12/30/2021, 5:08.  INDICATIONS: pneumonia, resp failure  FINDINGS:  There is a left subclavian pacemaker device with leads over the right atrium right ventricle.  The heart is enlarged.  There are diffuse bilateral alveolar airspace opacities consistent with multifocal pneumonia.  Aeration has slightly worsened in the interval.  CONCLUSION: Slight worsening of multifocal infiltrates in the interval       JAZZY SEGURA MD       Electronically Signed and Approved By: JAZZY SEGURA MD on 1/03/2022 at 15:47             XR Chest 1 View    Result Date: 12/30/2021  PROCEDURE: XR CHEST 1 VW  COMPARISON: [facility], CT, CT CHEST PULMONARY EMBOLISM, 12/27/2021, 18:57.  [facility], CR, XR CHEST 1 VW, 12/27/2021, 13:35.  INDICATIONS: COVID, HYPOXIA  FINDINGS:  A left subclavian pacemaker is in place.  There are patchy bilateral airspace opacities.  The heart and mediastinal contours appear normal.  The osseous structures appear intact.  CONCLUSION: Patchy bilateral airspace opacities, suggesting ongoing pneumonia.       JOSE FRANCISCO TONG MD       Electronically Signed and Approved By: JOSE FRANCISCO TONG MD on 12/30/2021 at 5:51             XR Chest 1 View    Result Date: 12/27/2021  PROCEDURE: XR CHEST 1 VW  COMPARISON: [facility], CR, XR CHEST 1 VW, 12/26/2021, 17:59.  INDICATIONS: WORSENING HYPOXIA  FINDINGS:  There is stable patchy bilateral airspace disease.  No pneumothorax, pleural  effusion or lobar consolidation.  The heart size is normal.  Stable left-sided 2 lead pacemaker.  CONCLUSION: Stable patchy bilateral airspace disease.       GALE CONNELL MD       Electronically Signed and Approved By: GALE CONNELL MD on 12/27/2021 at 14:22             XR Chest 1 View    Result Date: 12/26/2021  PROCEDURE: XR CHEST 1 VW  COMPARISON: [facility], CR, CHEST PA/AP & LAT 2V, 9/27/2017, 15:22.  INDICATIONS: SOA Triage Protocol  FINDINGS:  Left-sided AICD noted.  Heart size normal.  There is patchy bilateral midlung and bibasilar airspace disease most concerning for pneumonia.  No pneumothorax.  No large effusion.  Osseous structures grossly intact.  CONCLUSION:  Patchy bilateral airspace disease most pronounced at the lung bases concerning for multifocal pneumonia.       SHONNA HERMAN MD       Electronically Signed and Approved By: SHONNA HERMAN MD on 12/26/2021 at 18:31             CT Chest Pulmonary Embolism    Result Date: 12/28/2021  PROCEDURE: CT CHEST PULMONARY EMBOLISM W CONTRAST  COMPARISON:  [facility], CR, XR CHEST 1 VW, 12/27/2021, 13:35.  [facility], CT, CHEST W/O CONTRAST, 11/08/2018, 14:07. INDICATIONS: PE suspected, high prob  TECHNIQUE: After obtaining the patient's consent, CT images were obtained with non-ionic intravenous contrast material.   PROTOCOL:   Standard imaging protocol performed    RADIATION:   DLP: 386mGy*cm   Automated exposure control was utilized to minimize radiation dose. CONTRAST: 100cc Isovue 370 I.V. LABS:   eGFR: >60ml/min/1.73m2  FINDINGS:  The central tracheobronchial tree is clear.  There are diffuse bilateral airspace consolidations.  There is no pleural effusion.  A left subclavian pacemaker is in place.  The heart size appears normal, with mild partially calcified atherosclerotic disease in the coronary arteries.  The great vessels are normal in caliber.  There is no evidence of pulmonary embolus.  No  abnormally enlarged lymph nodes are identified.  Partial evaluation of the upper abdomen is unremarkable.  No aggressive osseous lesions are identified.  CONCLUSION:  1. Negative for pulmonary embolus. 2. Diffuse bilateral airspace consolidations, suggesting multifocal pneumonia.     JOSE FRANCISCO TONG MD       Electronically Signed and Approved By: JOSE FRANCISCO TONG MD on 12/28/2021 at 1:12             Duplex Venous Lower Extremity - Bilateral CV-READ    Result Date: 12/28/2021  · Normal bilateral lower extremity venous duplex scan.        [x]  EKG/Telemetry   []  Cardiology/Vascular   []  Pathology  [x]  Old records  []  Other:    Assessment/Plan   Assessment / Plan     Assessment/Plan:  Assessment:  COVID-19 pneumonia  Acute hypoxemic respiratory failure secondary COVID-19  SARS-CoV-2 infection  ARDS  Transaminitis due to viral illness  Hyperglycemia due to steroids  History of tobacco smoke  Elevated D-dimer  CHENCHO on CKD3  Essential hypertension  Hypothyroidism  SSS s/p PPM     Plan:  Labs and imaging reviewed  Continue to wean oxygen  Discontinue telemetry monitoring  Continue with PT/OT  Status post Actemra, watch for opportunistic infections  Consulted pulmonary discussed with Dr. Palafox, recommendations appreciated  Lovenox reduced to prophylactic dosing  Stopping Decadron  Continue Afrin, continue Flonase  Continue scheduled nebs Brovana Pulmicort twice daily  Continue DuoNebs every 6 hours  Continued GI prophylaxis Protonix 40 mg daily  Monitor abdominal pain symptoms  Watch for signs of bleeding while on therapeutic Lovenox  A.m. labs  DNR  VTE prophylaxis ordered via Lovenox  Continue telemetry monitor  Clinical course to dictate further management  Critically ill with SARS-CoV-2 infection and COVID-19 pneumonia with oxygen requirement gone up, slowly coming down, concerned that he will rapidly decompensate and require the ICU for close monitoring  Discussed with nurse at bedside.  Improving.    DVT  prophylaxis:  Medical DVT prophylaxis orders are present.    CODE STATUS:   Level Of Support Discussed With: Patient  Code Status (Patient has no pulse and is not breathing): No CPR (Do Not Attempt to Resuscitate)  Medical Interventions (Patient has pulse or is breathing): Full Support        Electronically signed by Diann Cuevas MD, 01/08/22, 3:22 PM EST.

## 2022-01-08 NOTE — PROGRESS NOTES
Pulmonary / Critical Care Progress Note      Patient Name: Elmer Garvin  : 1945  MRN: [id number]  Attending:  Diann Cuevas MD  Date of admission: 2021    Subjective   Subjective     Follow-up for COVID-19 infection, viral pneumonia, and acute hypoxic respiratory failure, ARDS.    Doing well 3-1/2 L of oxygen  He feels some better.  Moving around in the room some.  Less short of breath with activity.  Does not desaturate as much  Weak and fatigued  No nausea or vomiting.  No chest pain or chest tightness.  Wife at bedside.     Review of Systems  General:  Fatigue, No Fever  Respiratory:  +Dyspnea, no phlegm, No Pleuritic Pain, no wheezing  Cardiovascular: Denies chest pain, denies palpitations,+MONROE, Chest Pressure  Gastrointestinal:  No Abdominal Pain, No Nausea, No Vomiting, No Diarrhea      Objective   Objective     Vitals:   Temp:  [97.4 °F (36.3 °C)-98.7 °F (37.1 °C)] 97.5 °F (36.4 °C)  Heart Rate:  [59-61] 61  Resp:  [18-20] 20  BP: (104-124)/(46-66) 124/62  Flow (L/min):  [3-3.5] 3.5    Physical Exam   Vital Signs Reviewed  General WDWN, Alert, NAD  HEENT:  PERRL, EOMI.  OP, nares clear, no sinus tenderness  Neck:  Supple, no JVD, no thyromegaly  Chest: Good aeration, decreased bilateral rhonchi, tympanic to percussion bilaterally, no work of breathing noted  CV: RRR, no MGR, pulses 2+, equal  Abd:  Soft, NT, ND, + BS, no HSM  EXT:  no clubbing, no cyanosis, no christopher  Neuro:  A&Ox3, CN grossly intact, no focal deficits  Skin: No rashes or lesions noted    Result Review    Result Review:  I have personally reviewed the results from the time of this admission to 2022 14:21 EST and agree with these findings:  [x]  Laboratory  [x]  Microbiology  [x]  Radiology  []  EKG/Telemetry   []  Cardiology/Vascular   []  Pathology  []  Old records  []  Other:        Assessment/Plan   Assessment / Plan     Active Hospital Problems:  Active Hospital Problems    Diagnosis    • Acute respiratory failure  with hypoxia (HCC)        Impression:  Acute hypoxemic respiratory failure requiring air Vo  COVID-19 pneumonia, unvaccinated  ARDS  Therapeutic drug monitoring  Elevated liver enzymes  Stress versus steroid-induced hyperglycemia  Tobacco use of cigarettes in remission  Hyponatremia     Plan:  Wean O2 to keep SPO2 greater than 90%.  Can be discharged home on this oxygen level at 3-1/2 L/min  Given Actemra.  DC steroids today  Switched Lovenox to prophylactic dose.  Continue Singulair.  Follow daily CMP's given transaminitis.  Continue nebulizers    DVT prophylaxis:  Medical DVT prophylaxis orders are present.    CODE STATUS:   Level Of Support Discussed With: Patient  Code Status (Patient has no pulse and is not breathing): No CPR (Do Not Attempt to Resuscitate)  Medical Interventions (Patient has pulse or is breathing): Full Support    Stable for discharge from a pulmonary perspective.  Will need walk test for home O2.  Follow-up with us as outpatient in 1 to 2 weeks      Labs, imaging, microbiology, notes and medications personally reviewed.  Discussed with primary service and bedside nurse.    I will sign off.  Please call with questions.    Electronically signed by Oneil Palafox MD, 01/08/22, 2:22 PM EST.

## 2022-01-08 NOTE — PLAN OF CARE
Goal Outcome Evaluation:              Outcome Summary: VSS, no complaints this evening. Wife at bedside.

## 2022-01-09 LAB
ALBUMIN SERPL-MCNC: 3.9 G/DL (ref 3.5–5.2)
ALP SERPL-CCNC: 37 U/L (ref 39–117)
ALT SERPL W P-5'-P-CCNC: 61 U/L (ref 1–41)
ANION GAP SERPL CALCULATED.3IONS-SCNC: 12.9 MMOL/L (ref 5–15)
AST SERPL-CCNC: 19 U/L (ref 1–40)
BASOPHILS # BLD AUTO: 0.01 10*3/MM3 (ref 0–0.2)
BASOPHILS NFR BLD AUTO: 0.1 % (ref 0–1.5)
BILIRUB CONJ SERPL-MCNC: <0.2 MG/DL (ref 0–0.3)
BILIRUB INDIRECT SERPL-MCNC: ABNORMAL MG/DL
BILIRUB SERPL-MCNC: 0.4 MG/DL (ref 0–1.2)
BUN SERPL-MCNC: 28 MG/DL (ref 8–23)
BUN/CREAT SERPL: 26.2 (ref 7–25)
CALCIUM SPEC-SCNC: 9.2 MG/DL (ref 8.6–10.5)
CHLORIDE SERPL-SCNC: 101 MMOL/L (ref 98–107)
CO2 SERPL-SCNC: 23.1 MMOL/L (ref 22–29)
CREAT SERPL-MCNC: 1.07 MG/DL (ref 0.76–1.27)
CRP SERPL-MCNC: <0.3 MG/DL (ref 0–0.5)
D DIMER PPP FEU-MCNC: 0.54 MG/L (FEU) (ref 0–0.59)
DEPRECATED RDW RBC AUTO: 42.1 FL (ref 37–54)
EOSINOPHIL # BLD AUTO: 0.01 10*3/MM3 (ref 0–0.4)
EOSINOPHIL NFR BLD AUTO: 0.1 % (ref 0.3–6.2)
ERYTHROCYTE [DISTWIDTH] IN BLOOD BY AUTOMATED COUNT: 14.1 % (ref 12.3–15.4)
ERYTHROCYTE [SEDIMENTATION RATE] IN BLOOD: 11 MM/HR (ref 0–20)
FERRITIN SERPL-MCNC: 286.8 NG/ML (ref 30–400)
GFR SERPL CREATININE-BSD FRML MDRD: 67 ML/MIN/1.73
GLUCOSE SERPL-MCNC: 116 MG/DL (ref 65–99)
HCT VFR BLD AUTO: 41 % (ref 37.5–51)
HGB BLD-MCNC: 13.8 G/DL (ref 13–17.7)
IMM GRANULOCYTES # BLD AUTO: 0.1 10*3/MM3 (ref 0–0.05)
IMM GRANULOCYTES NFR BLD AUTO: 1.3 % (ref 0–0.5)
LYMPHOCYTES # BLD AUTO: 0.68 10*3/MM3 (ref 0.7–3.1)
LYMPHOCYTES NFR BLD AUTO: 9.1 % (ref 19.6–45.3)
MAGNESIUM SERPL-MCNC: 2.4 MG/DL (ref 1.6–2.4)
MCH RBC QN AUTO: 28.2 PG (ref 26.6–33)
MCHC RBC AUTO-ENTMCNC: 33.7 G/DL (ref 31.5–35.7)
MCV RBC AUTO: 83.7 FL (ref 79–97)
MONOCYTES # BLD AUTO: 0.58 10*3/MM3 (ref 0.1–0.9)
MONOCYTES NFR BLD AUTO: 7.8 % (ref 5–12)
NEUTROPHILS NFR BLD AUTO: 6.07 10*3/MM3 (ref 1.7–7)
NEUTROPHILS NFR BLD AUTO: 81.6 % (ref 42.7–76)
NRBC BLD AUTO-RTO: 0 /100 WBC (ref 0–0.2)
PHOSPHATE SERPL-MCNC: 4 MG/DL (ref 2.5–4.5)
PLATELET # BLD AUTO: 186 10*3/MM3 (ref 140–450)
PMV BLD AUTO: 10.9 FL (ref 6–12)
POTASSIUM SERPL-SCNC: 4.5 MMOL/L (ref 3.5–5.2)
PROT SERPL-MCNC: 6.2 G/DL (ref 6–8.5)
RBC # BLD AUTO: 4.9 10*6/MM3 (ref 4.14–5.8)
SODIUM SERPL-SCNC: 137 MMOL/L (ref 136–145)
WBC NRBC COR # BLD: 7.45 10*3/MM3 (ref 3.4–10.8)

## 2022-01-09 PROCEDURE — 94799 UNLISTED PULMONARY SVC/PX: CPT

## 2022-01-09 PROCEDURE — 25010000002 ENOXAPARIN PER 10 MG: Performed by: INTERNAL MEDICINE

## 2022-01-09 PROCEDURE — 82728 ASSAY OF FERRITIN: CPT | Performed by: FAMILY MEDICINE

## 2022-01-09 PROCEDURE — 85025 COMPLETE CBC W/AUTO DIFF WBC: CPT | Performed by: FAMILY MEDICINE

## 2022-01-09 PROCEDURE — 84100 ASSAY OF PHOSPHORUS: CPT | Performed by: FAMILY MEDICINE

## 2022-01-09 PROCEDURE — 80076 HEPATIC FUNCTION PANEL: CPT | Performed by: FAMILY MEDICINE

## 2022-01-09 PROCEDURE — 97165 OT EVAL LOW COMPLEX 30 MIN: CPT

## 2022-01-09 PROCEDURE — 85652 RBC SED RATE AUTOMATED: CPT | Performed by: FAMILY MEDICINE

## 2022-01-09 PROCEDURE — 99232 SBSQ HOSP IP/OBS MODERATE 35: CPT | Performed by: FAMILY MEDICINE

## 2022-01-09 PROCEDURE — 85379 FIBRIN DEGRADATION QUANT: CPT | Performed by: FAMILY MEDICINE

## 2022-01-09 PROCEDURE — 83735 ASSAY OF MAGNESIUM: CPT | Performed by: FAMILY MEDICINE

## 2022-01-09 PROCEDURE — 80048 BASIC METABOLIC PNL TOTAL CA: CPT | Performed by: FAMILY MEDICINE

## 2022-01-09 PROCEDURE — 86140 C-REACTIVE PROTEIN: CPT | Performed by: FAMILY MEDICINE

## 2022-01-09 RX ADMIN — ENOXAPARIN SODIUM 40 MG: 40 INJECTION SUBCUTANEOUS at 13:30

## 2022-01-09 RX ADMIN — FLUTICASONE PROPIONATE 2 SPRAY: 50 SPRAY, METERED NASAL at 10:46

## 2022-01-09 RX ADMIN — BUDESONIDE 0.5 MG: 0.5 SUSPENSION RESPIRATORY (INHALATION) at 20:57

## 2022-01-09 RX ADMIN — DOCUSATE SODIUM 100 MG: 100 CAPSULE, LIQUID FILLED ORAL at 10:47

## 2022-01-09 RX ADMIN — Medication 10 MG: at 20:25

## 2022-01-09 RX ADMIN — BUDESONIDE 0.5 MG: 0.5 SUSPENSION RESPIRATORY (INHALATION) at 09:55

## 2022-01-09 RX ADMIN — SODIUM CHLORIDE, PRESERVATIVE FREE 10 ML: 5 INJECTION INTRAVENOUS at 10:46

## 2022-01-09 RX ADMIN — ARFORMOTEROL TARTRATE 15 MCG: 15 SOLUTION RESPIRATORY (INHALATION) at 20:57

## 2022-01-09 RX ADMIN — LEVOTHYROXINE SODIUM 100 MCG: 0.1 TABLET ORAL at 06:41

## 2022-01-09 RX ADMIN — PANTOPRAZOLE SODIUM 40 MG: 40 TABLET, DELAYED RELEASE ORAL at 06:41

## 2022-01-09 RX ADMIN — TEMAZEPAM 15 MG: 15 CAPSULE ORAL at 20:24

## 2022-01-09 RX ADMIN — ARFORMOTEROL TARTRATE 15 MCG: 15 SOLUTION RESPIRATORY (INHALATION) at 09:55

## 2022-01-09 RX ADMIN — SODIUM CHLORIDE, PRESERVATIVE FREE 10 ML: 5 INJECTION INTRAVENOUS at 20:24

## 2022-01-09 RX ADMIN — MONTELUKAST 10 MG: 10 TABLET, FILM COATED ORAL at 10:46

## 2022-01-09 RX ADMIN — SODIUM CHLORIDE, PRESERVATIVE FREE 10 ML: 5 INJECTION INTRAVENOUS at 10:47

## 2022-01-09 NOTE — THERAPY EVALUATION
Patient Name: Elmer Garvin  : 1945    MRN: [id number]                              Today's Date: 2022       Admit Date: 2021    Visit Dx:     ICD-10-CM ICD-9-CM   1. Multifocal pneumonia  J18.9 486   2. Difficulty walking  R26.2 719.7   3. Decreased activities of daily living (ADL)  Z78.9 V49.89     Patient Active Problem List   Diagnosis   • Angina at rest ([facility])   • Hypertension   • SSS (sick sinus syndrome) ([facility])   • Mild intermittent asthma without complication   • Gastroesophageal reflux disease without esophagitis   • Taylor's esophagus without dysplasia   • Acquired hypothyroidism   • MRSA (methicillin resistant Staphylococcus aureus)   • Pre-diabetes   • Acute respiratory failure with hypoxia ([facility])     Past Medical History:   Diagnosis Date   • Hypertension    • Lyme disease    • Shortness of breath    • SSS (sick sinus syndrome) ([facility])      Past Surgical History:   Procedure Laterality Date   • CARDIAC CATHETERIZATION N/A 2019    Procedure: Coronary angiography;  Surgeon: Wilton Brown MD;  Location: [facility] CATH INVASIVE LOCATION;  Service: Cardiovascular   • CARDIAC CATHETERIZATION N/A 2019    Procedure: Left heart cath;  Surgeon: Wilton Brown MD;  Location: [facility] CATH INVASIVE LOCATION;  Service: Cardiovascular   • CARDIAC CATHETERIZATION N/A 2019    Procedure: Left ventriculography;  Surgeon: Wilton Brown MD;  Location: [facility] CATH INVASIVE LOCATION;  Service: Cardiovascular   • CARDIAC CATHETERIZATION N/A 2019    Procedure: Right heart cath;  Surgeon: Wilton Brown MD;  Location: [facility] CATH INVASIVE LOCATION;  Service: Cardiovascular   • CATARACT EXTRACTION     • INSERT / REPLACE / REMOVE PACEMAKER  2014   • PACEMAKER IMPLANTATION        General Information     Row Name 22 0832          OT Time and Intention    Document Type evaluation  -AC     Mode of Treatment individual therapy; occupational therapy  -AC     Row Name 22 0832           General Information    Patient Profile Reviewed yes  -AC     Prior Level of Function independent:  pt. reports (I) with adls and fx'l mobiltiy without AD. no dme in the home. driving.  -     Existing Precautions/Restrictions oxygen therapy device and L/min  -     Barriers to Rehab none identified  -     Row Name 01/09/22 0832          Occupational Profile    Reason for Services/Referral (Occupational Profile) Pt. is a 76 year old male admitted for the above diagnosis. Pt. referred to OT services to assess independence with ADLs and adl transfers/fx'l mobility. No previous OT services for current condition.  -     Row Name 01/09/22 0832          Living Environment    Lives With spouse  -     Row Name 01/09/22 0832          Stairs Within Home, Primary    Stairs Comment, Within Home, Primary has ramp  -     Row Name 01/09/22 0832          Cognition    Orientation Status (Cognition) oriented x 3  -     Row Name 01/09/22 0832          Safety Issues, Functional Mobility    Safety Issues Affecting Function (Mobility) --  none identified  -     Impairments Affecting Function (Mobility) endurance/activity tolerance; shortness of breath  -           User Key  (r) = Recorded By, (t) = Taken By, (c) = Cosigned By    Initials Name Provider Type     Irma Drake, OT Occupational Therapist                 Mobility/ADL's     Row Name 01/09/22 0834          Bed Mobility    Bed Mobility bed mobility (all) activities  -     All Activities, [place] (Bed Mobility) independent  -     Row Name 01/09/22 0834          Transfers    Sit-Stand [place] (Transfers) independent  -     Row Name 01/09/22 0834          Functional Mobility    Functional Mobility- Ind. Level standby assist  -     Functional Mobility- Comment pt. was SBA for multiple steps from/to bed without AD and without loss of balance. patient is ad sarwat in room. patient O2 did drop to high 70s with activity but quickly returned to 90s with  pursed lip breathing.  -[facility] Name 01/09/22 0834          Activities of Daily Living    BADL Assessment/Intervention --  SBA with all standing adls due to O2.  -           User Key  (r) = Recorded By, (t) = Taken By, (c) = Cosigned By    Initials Name Provider Type    Irma Torres OT Occupational Therapist               Obj/Interventions     [facility] Name 01/09/22 0835          Sensory Assessment (Somatosensory)    Sensory Assessment (Somatosensory) sensation intact  -AC     Row Name 01/09/22 0835          Vision Assessment/Intervention    Visual Impairment/Limitations WNL  -[facility] Name 01/09/22 0835          Range of Motion Comprehensive    General Range of Motion bilateral upper extremity ROM WNL  -AC     Row Name 01/09/22 0835          Strength Comprehensive (MMT)    General Manual Muscle Testing (MMT) Assessment no strength deficits identified  -AC     Row Name 01/09/22 0835          Motor Skills    Motor Skills coordination; functional endurance  -     Coordination WNL  -     Functional Endurance fair plus  -AC     Row Name 01/09/22 0835          Balance    Balance Assessment standing dynamic balance  -     Dynamic Standing Balance WFL  -           User Key  (r) = Recorded By, (t) = Taken By, (c) = Cosigned By    Initials Name Provider Type    Irma Torres OT Occupational Therapist               Goals/Plan    No documentation.                Clinical Impression     Row Name 01/09/22 0836          Pain Assessment    Additional Documentation Pain Scale: FACES Pre/Post-Treatment (Group)  -AC     Row Name 01/09/22 0836          Pain Scale: FACES Pre/Post-Treatment    Pain: FACES Scale, Pretreatment 0-->no hurt  -     Posttreatment Pain Rating 0-->no hurt  -AC     Row Name 01/09/22 0836          Plan of Care Review    Plan of Care Reviewed With patient; spouse  -     Progress no change  -     Outcome Summary Patient not appropriate for skilled OT services at this time as patient is ad  sarwat in the room and discharging home tomorrow.  patient safe to d/c to previous setting when medically appropriate. Patient understands the importance of rest breaks and pursed lip breathing. d/c occupational therapy services.  -     Row Name 01/09/22 0836          Therapy Assessment/Plan (OT)    Patient/Family Therapy Goal Statement (OT) NA  -     Rehab Potential (OT) --  na  -     Criteria for Skilled Therapeutic Interventions Met (OT) no; no problems identified which require skilled intervention  -     Therapy Frequency (OT) evaluation only  -     Row Name 01/09/22 0836          Therapy Plan Review/Discharge Plan (OT)    Anticipated Discharge Disposition (OT) home with assist  -     Row Name 01/09/22 0836          Positioning and Restraints    Pre-Treatment Position in bed  -     Post Treatment Position bed  -AC     In Bed encouraged to call for assist; call light within reach; fowlers  -           User Key  (r) = Recorded By, (t) = Taken By, (c) = Cosigned By    Initials Name Provider Type     Irma Drake, OT Occupational Therapist               Outcome Measures     Row Name 01/09/22 0839          How much help from another is currently needed...    Putting on and taking off regular lower body clothing? 3  -AC     Bathing (including washing, rinsing, and drying) 3  -AC     Toileting (which includes using toilet bed pan or urinal) 3  -AC     Putting on and taking off regular upper body clothing 4  -AC     Taking care of personal grooming (such as brushing teeth) 4  -AC     Eating meals 4  -AC     AM-PAC 6 Clicks Score (OT) 21  -     Row Name 01/08/22 2131          How much help from another person do you currently need...    Turning from your back to your side while in flat bed without using bedrails? 4  -KR     Moving from lying on back to sitting on the side of a flat bed without bedrails? 4  -KR     Moving to and from a bed to a chair (including a wheelchair)? 4  -KR     Standing up from  a chair using your arms (e.g., wheelchair, bedside chair)? 4  -KR     Climbing 3-5 steps with a railing? 3  -KR     To walk in hospital room? 3  -KR     AM-PAC 6 Clicks Score (PT) 22  -KR     Row Name 01/09/22 0839          Functional Assessment    Outcome Measure Options AM-PAC 6 Clicks Daily Activity (OT); Optimal Instrument  -AC     Row Name 01/09/22 0839          Optimal Instrument    Optimal Instrument Optimal - 3  -AC     Bending/Stooping 1  -AC     Standing 1  -AC     Reaching 1  -AC     From the list, choose the 3 activities you would most like to be able to do without any difficulty Bending/stooping; Reaching; Standing  -AC     Total Score Optimal - 3 3  -AC           User Key  (r) = Recorded By, (t) = Taken By, (c) = Cosigned By    Initials Name Provider Type    Amalia Mayer, RN Registered Nurse    Irma Torres OT Occupational Therapist                Occupational Therapy Education                 Title: PT OT SLP Therapies (Done)     Topic: Occupational Therapy (Done)     Point: ADL training (Done)     Description:   Instruct learner(s) on proper safety adaptation and remediation techniques during self care or transfers.   Instruct in proper use of assistive devices.              Learning Progress Summary           Patient Acceptance, E, VU,DU by JOSHUA at 1/9/2022 0840   Family Acceptance, E, VU,DU by JOSHUA at 1/9/2022 0840                   Point: Home exercise program (Done)     Description:   Instruct learner(s) on appropriate technique for monitoring, assisting and/or progressing therapeutic exercises/activities.              Learning Progress Summary           Patient Acceptance, E, VU,DU by JOSHUA at 1/9/2022 0840   Family Acceptance, E, VU,DU by JOSHUA at 1/9/2022 0840                   Point: Precautions (Done)     Description:   Instruct learner(s) on prescribed precautions during self-care and functional transfers.              Learning Progress Summary           Patient Acceptance, E, VU,DU by JOSHUA at  1/9/2022 0840   Family Acceptance, E, VU,DU by  at 1/9/2022 0840                   Point: Body mechanics (Done)     Description:   Instruct learner(s) on proper positioning and spine alignment during self-care, functional mobility activities and/or exercises.              Learning Progress Summary           Patient Acceptance, E, VU,DU by  at 1/9/2022 0840   Family Acceptance, E, VU,DU by  at 1/9/2022 0840                               User Key     Initials Effective Dates Name Provider Type Discipline     06/16/21 -  Irma Drake OT Occupational Therapist OT              OT Recommendation and Plan  Therapy Frequency (OT): evaluation only  Plan of Care Review  Plan of Care Reviewed With: patient, spouse  Progress: no change  Outcome Summary: Patient not appropriate for skilled OT services at this time as patient is ad sarwat in the room and discharging home tomorrow.  patient safe to d/c to previous setting when medically appropriate. Patient understands the importance of rest breaks and pursed lip breathing. d/c occupational therapy services.     Time Calculation:    Time Calculation- OT     Row Name 01/09/22 0840             Time Calculation- OT    OT Received On 01/09/22  -AC              Untimed Charges    OT Eval/Re-eval Minutes 26  -AC              Total Minutes    Untimed Charges Total Minutes 26  -AC       Total Minutes 26  -AC            User Key  (r) = Recorded By, (t) = Taken By, (c) = Cosigned By    Initials Name Provider Type     Irma Drake OT Occupational Therapist              Therapy Charges for Today     Code Description Service Date Service Provider Modifiers Qty    96340026190 HC OT EVAL LOW COMPLEXITY 2 1/9/2022 Irma Drake OT GO 1               Irma Drake OT  1/9/2022

## 2022-01-09 NOTE — PLAN OF CARE
Problem: Adult Inpatient Plan of Care  Goal: Plan of Care Review  Outcome: Ongoing, Progressing  Flowsheets (Taken 1/9/2022 1823)  Progress: improving  Plan of Care Reviewed With:   patient   spouse  Outcome Summary: Pt continues to improve, however still requiring between 3-3.5 LPM cont per NC with exertion.  Recovers quickly.  Walk test completed for home oxygen.  Pt pending discharge for tomorrow and requesting to follow-up with Dr. Gong for pulmonolgy for aftercare.  Goal: Patient-Specific Goal (Individualized)  Outcome: Ongoing, Progressing  Goal: Absence of Hospital-Acquired Illness or Injury  Outcome: Ongoing, Progressing  Intervention: Identify and Manage Fall Risk  Recent Flowsheet Documentation  Taken 1/9/2022 0815 by Jennifer Delgado RN  Safety Promotion/Fall Prevention:   safety round/check completed   room organization consistent   lighting adjusted   clutter free environment maintained   assistive device/personal items within reach   nonskid shoes/slippers when out of bed  Intervention: Prevent Skin Injury  Recent Flowsheet Documentation  Taken 1/9/2022 0815 by Jennifer Delgado RN  Body Position:   position maintained   position changed independently   sitting up in bed  Skin Protection:   adhesive use limited   tubing/devices free from skin contact   transparent dressing maintained   skin-to-skin areas padded   skin-to-device areas padded   skin sealant/moisture barrier applied  Intervention: Prevent Infection  Recent Flowsheet Documentation  Taken 1/9/2022 0815 by Jennifer Delgado RN  Infection Prevention:   visitors restricted/screened   single patient room provided   rest/sleep promoted   personal protective equipment utilized   hand hygiene promoted   equipment surfaces disinfected   cohorting utilized  Goal: Optimal Comfort and Wellbeing  Outcome: Ongoing, Progressing  Intervention: Provide Person-Centered Care  Recent Flowsheet Documentation  Taken 1/9/2022 0815 by Jennifer Delgado  RN  Trust Relationship/Rapport:   care explained   choices provided   emotional support provided   empathic listening provided   questions answered   questions encouraged   reassurance provided   thoughts/feelings acknowledged  Goal: Readiness for Transition of Care  Outcome: Ongoing, Progressing   Goal Outcome Evaluation:  Plan of Care Reviewed With: patient, spouse        Progress: improving  Outcome Summary: Pt continues to improve, however still requiring between 3-3.5 LPM cont per NC with exertion.  Recovers quickly.  Walk test completed for home oxygen.  Pt pending discharge for tomorrow and requesting to follow-up with Dr. Gong for pulmonolgy for aftercare.

## 2022-01-09 NOTE — NURSING NOTE
Exercise Oximetry    Patient Name:Elmer Garvin   MRN: [id number]   Date: 01/09/22 @ 1615             ROOM AIR BASELINE   SpO2% 91   Heart Rate 60   Blood Pressure      EXERCISE ON ROOM AIR SpO2% EXERCISE ON O2 @ 3.5LPM SpO2%  94   1 MINUTE   90 1 MINUTE    2 MINUTES   91 2 MINUTES    3 MINUTES   90 3 MINUTES    4 MINUTES   90  4 MINUTES    5 MINUTES   90  5 MINUTES    6 MINUTES   90 6 MINUTES               Distance Walked   Distance Walked   Dyspnea ([place]ago Scale)   Dyspnea (Mago Scale)   Fatigue (Mago Scale)   Fatigue (Mago Scale)   SpO2% Post Exercise   SpO2% Post Exercise   HR Post Exercise   HR Post Exercise   Time to Recovery   Time to Recovery     Comments: Patient ambulated around room due to isolation precautions

## 2022-01-09 NOTE — PLAN OF CARE
Goal Outcome Evaluation:  Plan of Care Reviewed With: patient, spouse        Progress: no change  Outcome Summary: Patient not appropriate for skilled OT services at this time as patient is ad sarwat in the room and discharging home tomorrow.  patient safe to d/c to previous setting when medically appropriate. Patient understands the importance of rest breaks and pursed lip breathing. d/c occupational therapy services.

## 2022-01-09 NOTE — PROGRESS NOTES
[facility]   Hospitalist Progress Note  Date: 2022  Patient Name: Elmer Garvin  : 1945  MRN: [id number]  Date of admission: 2021      Subjective   Subjective     Chief Complaint: SOA                           Summary: 76-year-old male with hypertension, hypothyroidism, CKD and sick sinus syndrome presented with 2 weeks of worsening shortness of breath and cough with fatigue.  He has not been vaccinated against COVID-19.  He went to an urgent care where he was satting 79% on room air and was sent to the ED where he was admitted.  He has tested positive for COVID-19.  He was out of the window for remdesivir.  He was started on steroids, supplemental oxygen, scheduled nebs.  Pulmonary consulted with increasing oxygen requirement.  Concern for ARDS.  Status post Actemra.  Worsening oxygenation status, transitioned to air Vo/heated high flow nasal cannula, weaned to high flow, several days on high flow, then weaned down to nasal cannula less than 4 L.     Interval Followup: Patient seen and examined this morning, no acute distress, no acute major overnight events, patient continues to have improvement in his breathing, down to 3.5 L nasal cannula oxygen, sats in the 90s, continues to have cough and shortness of breath symptoms, dyspnea on exertion but overall less than before.  Denies chest pain or palpitations.  Creatinine stable at 1.07, ALT slightly improved, white blood cell count 7000.    Review of systems all  All systems reviewed and negative except for cough, shortness of breath, generalized fatigue, exertional dyspnea.      Objective   Objective     Vitals:   Temp:  [97.3 °F (36.3 °C)-98.8 °F (37.1 °C)] 97.7 °F (36.5 °C)  Heart Rate:  [59-69] 60  Resp:  [18-22] 20  BP: (114-142)/(54-80) 139/70  Flow (L/min):  [2.5-33.5] 3.5  Physical Exam       Constitutional: Awake, alert, no acute distress breathing at 3.5L nasal cannula, laying in bed              Eyes: Pupils equal, sclerae  anicteric, no conjunctival injection              HENT: NCAT, mucous membranes moist              Neck: Supple, full range              Respiratory: Diminished breath sounds bilaterally laying in bed wearing nasal cannula, no significant rhonchi or wheezing              Cardiovascular: RRR, no JVD              Gastrointestinal: Positive bowel sounds, soft, nontender, nondistended              Musculoskeletal: No bilateral ankle edema, no clubbing or cyanosis to extremities              Psychiatric: Appropriate affect, cooperative              Neurologic: Oriented x 3, speech clear              Skin: No rashes     Result Review    Result Review:  I have personally reviewed the results from the time of this admission to 1/9/2022 13:51 EST and agree with these findings:  [x]  Laboratory   CBC    CBC 1/7/22 1/8/22 1/9/22   WBC 6.92 6.89 7.45   RBC 4.81 4.79 4.90   Hemoglobin 13.4 13.4 13.8   Hematocrit 39.6 40.5 41.0   MCV 82.3 84.6 83.7   MCH 27.9 28.0 28.2   MCHC 33.8 33.1 33.7   RDW 13.7 14.0 14.1   Platelets 181 176 186           BMP    BMP 1/7/22 1/8/22 1/9/22   BUN 34 (A) 31 (A) 28 (A)   Creatinine 1.20 1.00 1.07   Sodium 138 136 137   Potassium 4.6 4.7 4.5   Chloride 106 102 101   CO2 24.8 23.7 23.1   Calcium 9.4 9.2 9.2   (A) Abnormal value       Comments are available for some flowsheets but are not being displayed.           LIVER FUNCTION TESTS:      Lab 01/09/22  0827 01/08/22  0830 01/07/22  0618 01/06/22  0555 01/05/22  0612 01/04/22  0756 01/04/22  0756   TOTAL PROTEIN 6.2 6.1 5.9* 6.5 6.2   < > 6.2   ALBUMIN 3.90 3.60 3.50 3.90 3.60   < > 3.50   GLOBULIN  --  2.5  --   --   --   --   --    ALT (SGPT) 61* 65* 68* 46* 35   < > 38   AST (SGOT) 19 19 24 21 16   < > 20   BILIRUBIN 0.4 0.3 0.3 0.4 0.4   < > 0.5   BILIRUBIN DIRECT <0.2  --  <0.2 <0.2 <0.2  --  <0.2   ALK PHOS 37* 36* 38* 52 46   < > 46    < > = values in this interval not displayed.       [x]  Microbiology No results found for: MARGOG,  AFBCX, BPERTUSSISCX, BLOODCX  No results found for: BCIDPCR, CXREFLEX, CSFCX, CULTURETIS  No results found for: CULTURES, HSVCX, URCX  No results found for: EYECULTURE, GCCX, HSVCULTURE, LABHSV  No results found for: LEGIONELLA, MRSACX, MUMPSCX, MYCOPLASCX  No results found for: NOCARDIACX, STOOLCX  No results found for: THROATCX, UNSTIMCULT, URINECX, CULTURE, VZVCULTUR  No results found for: VIRALCULTU, WOUNDCX    [x]  Radiology XR Chest 1 View    Result Date: 1/3/2022  PROCEDURE: XR CHEST 1 VW  COMPARISON: [facility], CR, XR CHEST 1 VW, 12/30/2021, 5:08.  INDICATIONS: pneumonia, resp failure  FINDINGS:  There is a left subclavian pacemaker device with leads over the right atrium right ventricle.  The heart is enlarged.  There are diffuse bilateral alveolar airspace opacities consistent with multifocal pneumonia.  Aeration has slightly worsened in the interval.  CONCLUSION: Slight worsening of multifocal infiltrates in the interval       JAZZY SEGURA MD       Electronically Signed and Approved By: JAZZY SEGURA MD on 1/03/2022 at 15:47             XR Chest 1 View    Result Date: 12/30/2021  PROCEDURE: XR CHEST 1 VW  COMPARISON: [facility], CT, CT CHEST PULMONARY EMBOLISM, 12/27/2021, 18:57.  [facility], CR, XR CHEST 1 VW, 12/27/2021, 13:35.  INDICATIONS: COVID, HYPOXIA  FINDINGS:  A left subclavian pacemaker is in place.  There are patchy bilateral airspace opacities.  The heart and mediastinal contours appear normal.  The osseous structures appear intact.  CONCLUSION: Patchy bilateral airspace opacities, suggesting ongoing pneumonia.       JOSE FRANCISCO TONG MD       Electronically Signed and Approved By: JOSE FRANCISCO TONG MD on 12/30/2021 at 5:51             XR Chest 1 View    Result Date: 12/27/2021  PROCEDURE: XR CHEST 1 VW  COMPARISON: [facility], CR, XR CHEST 1 VW, 12/26/2021, 17:59.  INDICATIONS: WORSENING HYPOXIA  FINDINGS:  There is stable patchy  bilateral airspace disease.  No pneumothorax, pleural effusion or lobar consolidation.  The heart size is normal.  Stable left-sided 2 lead pacemaker.  CONCLUSION: Stable patchy bilateral airspace disease.       GALE CONNELL MD       Electronically Signed and Approved By: GALE CONNELL MD on 12/27/2021 at 14:22             XR Chest 1 View    Result Date: 12/26/2021  PROCEDURE: XR CHEST 1 VW  COMPARISON: [facility], CR, CHEST PA/AP & LAT 2V, 9/27/2017, 15:22.  INDICATIONS: SOA Triage Protocol  FINDINGS:  Left-sided AICD noted.  Heart size normal.  There is patchy bilateral midlung and bibasilar airspace disease most concerning for pneumonia.  No pneumothorax.  No large effusion.  Osseous structures grossly intact.  CONCLUSION:  Patchy bilateral airspace disease most pronounced at the lung bases concerning for multifocal pneumonia.       SHONNA HERMAN MD       Electronically Signed and Approved By: SHONNA HERMAN MD on 12/26/2021 at 18:31             CT Chest Pulmonary Embolism    Result Date: 12/28/2021  PROCEDURE: CT CHEST PULMONARY EMBOLISM W CONTRAST  COMPARISON:  [facility], CR, XR CHEST 1 VW, 12/27/2021, 13:35.  [facility], CT, CHEST W/O CONTRAST, 11/08/2018, 14:07. INDICATIONS: PE suspected, high prob  TECHNIQUE: After obtaining the patient's consent, CT images were obtained with non-ionic intravenous contrast material.   PROTOCOL:   Standard imaging protocol performed    RADIATION:   DLP: 386mGy*cm   Automated exposure control was utilized to minimize radiation dose. CONTRAST: 100cc Isovue 370 I.V. LABS:   eGFR: >60ml/min/1.73m2  FINDINGS:  The central tracheobronchial tree is clear.  There are diffuse bilateral airspace consolidations.  There is no pleural effusion.  A left subclavian pacemaker is in place.  The heart size appears normal, with mild partially calcified atherosclerotic disease in the coronary arteries.  The great vessels are normal in caliber.   There is no evidence of pulmonary embolus.  No abnormally enlarged lymph nodes are identified.  Partial evaluation of the upper abdomen is unremarkable.  No aggressive osseous lesions are identified.  CONCLUSION:  1. Negative for pulmonary embolus. 2. Diffuse bilateral airspace consolidations, suggesting multifocal pneumonia.     JOSE FRANCISCO TONG MD       Electronically Signed and Approved By: JOSE FRANCISCO TONG MD on 12/28/2021 at 1:12             Duplex Venous Lower Extremity - Bilateral CV-READ    Result Date: 12/28/2021  · Normal bilateral lower extremity venous duplex scan.        []  EKG/Telemetry   []  Cardiology/Vascular   []  Pathology  [x]  Old records  []  Other:    Assessment/Plan   Assessment / Plan     Assessment/Plan:    Assessment:  COVID-19 pneumonia  Acute hypoxemic respiratory failure secondary COVID-19  SARS-CoV-2 infection  ARDS  Transaminitis due to viral illness  Hyperglycemia due to steroids  History of tobacco smoke  Elevated D-dimer  CHENCHO on CKD3  Essential hypertension  Hypothyroidism  SSS s/p PPM     Plan:  Labs and imaging reviewed  Continue to wean oxygen per his tolerance  Walking oximetry test today  Continue with PT/OT  Status post Actemra, watch for opportunistic infections  Consulted pulmonary discussed with Dr. Palafox, recommendations appreciated  Lovenox prophylactic dosing  Continue Afrin, continue Flonase  Continue scheduled nebs Brovana Pulmicort twice daily  Continue DuoNebs every 6 hours  Continued GI prophylaxis Protonix 40 mg daily  Monitor abdominal pain symptoms  Watch for signs of bleeding while on therapeutic Lovenox  A.m. labs  DNR  VTE prophylaxis ordered via Lovenox  Continue telemetry monitor  Clinical course to dictate further management  Critically ill with SARS-CoV-2 infection and COVID-19 pneumonia with oxygen requirement gone up, slowly coming down, concerned that he will rapidly decompensate and require the ICU for close monitoring  Discussed with nurse at  bedside.  Improving, home soon.  DVT prophylaxis:  Medical DVT prophylaxis orders are present.    CODE STATUS:   Level Of Support Discussed With: Patient  Code Status (Patient has no pulse and is not breathing): No CPR (Do Not Attempt to Resuscitate)  Medical Interventions (Patient has pulse or is breathing): Full Support        Electronically signed by Diann Cuevas MD, 01/09/22, 1:51 PM EST.

## 2022-01-09 NOTE — PLAN OF CARE
Goal Outcome Evaluation:              Outcome Summary: VSS on 3.5L, no complaints this evening. Wife at bedside.

## 2022-01-09 NOTE — PLAN OF CARE
Problem: Adult Inpatient Plan of Care  Goal: Plan of Care Review  Outcome: Ongoing, Progressing  Flowsheets (Taken 1/8/2022 1730)  Progress: improving  Plan of Care Reviewed With:   patient   spouse  Outcome Summary:   VSS   patient remained on 3.5LPM cont per NC today with oxygen saturation staying at 90%.  Flex monitor discontinued   patient up ambulating in room and in shower today.  No c/o pain or discomfort.  Goal: Patient-Specific Goal (Individualized)  Outcome: Ongoing, Progressing  Goal: Absence of Hospital-Acquired Illness or Injury  Outcome: Ongoing, Progressing  Intervention: Prevent Skin Injury  Recent Flowsheet Documentation  Taken 1/8/2022 0900 by Jennifer Delgado RN  Body Position:   position changed independently   position maintained   turned  Skin Protection:   adhesive use limited   tubing/devices free from skin contact   transparent dressing maintained   skin-to-skin areas padded   skin-to-device areas padded  Intervention: Prevent Infection  Recent Flowsheet Documentation  Taken 1/8/2022 0900 by Jennifer Delgado RN  Infection Prevention:   cohorting utilized   visitors restricted/screened   single patient room provided   rest/sleep promoted   personal protective equipment utilized   hand hygiene promoted  Goal: Optimal Comfort and Wellbeing  Outcome: Ongoing, Progressing  Intervention: Provide Person-Centered Care  Recent Flowsheet Documentation  Taken 1/8/2022 0900 by Jennifer Delgado RN  Trust Relationship/Rapport:   care explained   choices provided   emotional support provided   empathic listening provided   questions answered   questions encouraged   reassurance provided   thoughts/feelings acknowledged  Goal: Readiness for Transition of Care  Outcome: Ongoing, Progressing   Goal Outcome Evaluation:  Plan of Care Reviewed With: patient, spouse        Progress: improving  Outcome Summary: VSS; patient remained on 3.5LPM cont per NC today with oxygen saturation staying at 90%.  Flex  monitor discontinued; patient up ambulating in room and in shower today.  No c/o pain or discomfort.

## 2022-01-10 ENCOUNTER — READMISSION MANAGEMENT (OUTPATIENT)
Dept: CALL CENTER | Facility: HOSPITAL | Age: 77
End: 2022-01-10

## 2022-01-10 ENCOUNTER — TELEPHONE (OUTPATIENT)
Dept: FAMILY MEDICINE CLINIC | Facility: CLINIC | Age: 77
End: 2022-01-10

## 2022-01-10 VITALS
HEART RATE: 62 BPM | DIASTOLIC BLOOD PRESSURE: 62 MMHG | HEIGHT: 74 IN | SYSTOLIC BLOOD PRESSURE: 133 MMHG | RESPIRATION RATE: 18 BRPM | WEIGHT: 202.82 LBS | BODY MASS INDEX: 26.03 KG/M2 | TEMPERATURE: 97.9 F | OXYGEN SATURATION: 95 %

## 2022-01-10 LAB
ALBUMIN SERPL-MCNC: 3.5 G/DL (ref 3.5–5.2)
ALP SERPL-CCNC: 39 U/L (ref 39–117)
ALT SERPL W P-5'-P-CCNC: 57 U/L (ref 1–41)
ANION GAP SERPL CALCULATED.3IONS-SCNC: 8.1 MMOL/L (ref 5–15)
AST SERPL-CCNC: 19 U/L (ref 1–40)
BASOPHILS # BLD AUTO: 0.02 10*3/MM3 (ref 0–0.2)
BASOPHILS NFR BLD AUTO: 0.5 % (ref 0–1.5)
BILIRUB CONJ SERPL-MCNC: <0.2 MG/DL (ref 0–0.3)
BILIRUB INDIRECT SERPL-MCNC: ABNORMAL MG/DL
BILIRUB SERPL-MCNC: 0.4 MG/DL (ref 0–1.2)
BUN SERPL-MCNC: 28 MG/DL (ref 8–23)
BUN/CREAT SERPL: 24.6 (ref 7–25)
CALCIUM SPEC-SCNC: 8.8 MG/DL (ref 8.6–10.5)
CHLORIDE SERPL-SCNC: 102 MMOL/L (ref 98–107)
CO2 SERPL-SCNC: 26.9 MMOL/L (ref 22–29)
CREAT SERPL-MCNC: 1.14 MG/DL (ref 0.76–1.27)
CRP SERPL-MCNC: <0.3 MG/DL (ref 0–0.5)
D DIMER PPP FEU-MCNC: 0.67 MG/L (FEU) (ref 0–0.59)
DEPRECATED RDW RBC AUTO: 42.9 FL (ref 37–54)
EOSINOPHIL # BLD AUTO: 0.15 10*3/MM3 (ref 0–0.4)
EOSINOPHIL NFR BLD AUTO: 4 % (ref 0.3–6.2)
ERYTHROCYTE [DISTWIDTH] IN BLOOD BY AUTOMATED COUNT: 14.3 % (ref 12.3–15.4)
ERYTHROCYTE [SEDIMENTATION RATE] IN BLOOD: 6 MM/HR (ref 0–20)
FERRITIN SERPL-MCNC: 285.5 NG/ML (ref 30–400)
GFR SERPL CREATININE-BSD FRML MDRD: 63 ML/MIN/1.73
GLUCOSE SERPL-MCNC: 98 MG/DL (ref 65–99)
HCT VFR BLD AUTO: 41.3 % (ref 37.5–51)
HGB BLD-MCNC: 13.8 G/DL (ref 13–17.7)
IMM GRANULOCYTES # BLD AUTO: 0.05 10*3/MM3 (ref 0–0.05)
IMM GRANULOCYTES NFR BLD AUTO: 1.3 % (ref 0–0.5)
LYMPHOCYTES # BLD AUTO: 0.77 10*3/MM3 (ref 0.7–3.1)
LYMPHOCYTES NFR BLD AUTO: 20.4 % (ref 19.6–45.3)
MAGNESIUM SERPL-MCNC: 2.3 MG/DL (ref 1.6–2.4)
MCH RBC QN AUTO: 28.3 PG (ref 26.6–33)
MCHC RBC AUTO-ENTMCNC: 33.4 G/DL (ref 31.5–35.7)
MCV RBC AUTO: 84.6 FL (ref 79–97)
MONOCYTES # BLD AUTO: 0.34 10*3/MM3 (ref 0.1–0.9)
MONOCYTES NFR BLD AUTO: 9 % (ref 5–12)
NEUTROPHILS NFR BLD AUTO: 2.45 10*3/MM3 (ref 1.7–7)
NEUTROPHILS NFR BLD AUTO: 64.8 % (ref 42.7–76)
NRBC BLD AUTO-RTO: 0 /100 WBC (ref 0–0.2)
PHOSPHATE SERPL-MCNC: 4.1 MG/DL (ref 2.5–4.5)
PLATELET # BLD AUTO: 152 10*3/MM3 (ref 140–450)
PMV BLD AUTO: 11.1 FL (ref 6–12)
POTASSIUM SERPL-SCNC: 4.5 MMOL/L (ref 3.5–5.2)
PROT SERPL-MCNC: 5.8 G/DL (ref 6–8.5)
RBC # BLD AUTO: 4.88 10*6/MM3 (ref 4.14–5.8)
SODIUM SERPL-SCNC: 137 MMOL/L (ref 136–145)
WBC NRBC COR # BLD: 3.78 10*3/MM3 (ref 3.4–10.8)

## 2022-01-10 PROCEDURE — 82728 ASSAY OF FERRITIN: CPT | Performed by: FAMILY MEDICINE

## 2022-01-10 PROCEDURE — 86140 C-REACTIVE PROTEIN: CPT | Performed by: FAMILY MEDICINE

## 2022-01-10 PROCEDURE — 94799 UNLISTED PULMONARY SVC/PX: CPT

## 2022-01-10 PROCEDURE — 80076 HEPATIC FUNCTION PANEL: CPT | Performed by: FAMILY MEDICINE

## 2022-01-10 PROCEDURE — 25010000002 ENOXAPARIN PER 10 MG: Performed by: INTERNAL MEDICINE

## 2022-01-10 PROCEDURE — 85379 FIBRIN DEGRADATION QUANT: CPT | Performed by: FAMILY MEDICINE

## 2022-01-10 PROCEDURE — 99239 HOSP IP/OBS DSCHRG MGMT >30: CPT | Performed by: FAMILY MEDICINE

## 2022-01-10 PROCEDURE — 84100 ASSAY OF PHOSPHORUS: CPT | Performed by: FAMILY MEDICINE

## 2022-01-10 PROCEDURE — 85652 RBC SED RATE AUTOMATED: CPT | Performed by: FAMILY MEDICINE

## 2022-01-10 PROCEDURE — 83735 ASSAY OF MAGNESIUM: CPT | Performed by: FAMILY MEDICINE

## 2022-01-10 PROCEDURE — 80048 BASIC METABOLIC PNL TOTAL CA: CPT | Performed by: FAMILY MEDICINE

## 2022-01-10 PROCEDURE — 94761 N-INVAS EAR/PLS OXIMETRY MLT: CPT

## 2022-01-10 PROCEDURE — 85025 COMPLETE CBC W/AUTO DIFF WBC: CPT | Performed by: FAMILY MEDICINE

## 2022-01-10 RX ORDER — FLUTICASONE PROPIONATE 50 MCG
2 SPRAY, SUSPENSION (ML) NASAL DAILY
Qty: 18.2 ML | Refills: 0 | Status: SHIPPED | OUTPATIENT
Start: 2022-01-10 | End: 2022-10-12

## 2022-01-10 RX ORDER — TEMAZEPAM 15 MG/1
15 CAPSULE ORAL NIGHTLY PRN
Qty: 3 CAPSULE | Refills: 0 | Status: SHIPPED | OUTPATIENT
Start: 2022-01-10 | End: 2022-01-13

## 2022-01-10 RX ORDER — DEXAMETHASONE 4 MG/1
4 TABLET ORAL 2 TIMES DAILY WITH MEALS
Qty: 14 TABLET | Refills: 0 | Status: SHIPPED | OUTPATIENT
Start: 2022-01-10 | End: 2022-01-17

## 2022-01-10 RX ORDER — PANTOPRAZOLE SODIUM 40 MG/1
40 TABLET, DELAYED RELEASE ORAL EVERY MORNING
Qty: 30 TABLET | Refills: 0 | Status: SHIPPED | OUTPATIENT
Start: 2022-01-11 | End: 2022-02-07 | Stop reason: SDUPTHER

## 2022-01-10 RX ORDER — ALBUTEROL SULFATE 90 UG/1
2 AEROSOL, METERED RESPIRATORY (INHALATION) EVERY 4 HOURS PRN
Qty: 18 G | Refills: 0 | Status: SHIPPED | OUTPATIENT
Start: 2022-01-10 | End: 2022-01-24

## 2022-01-10 RX ORDER — CHOLECALCIFEROL (VITAMIN D3) 125 MCG
10 CAPSULE ORAL NIGHTLY
Qty: 60 TABLET | Refills: 0 | Status: SHIPPED | OUTPATIENT
Start: 2022-01-10 | End: 2022-02-09

## 2022-01-10 RX ORDER — LEVOTHYROXINE SODIUM 0.1 MG/1
100 TABLET ORAL DAILY
Qty: 30 TABLET | Refills: 0 | Status: SHIPPED | OUTPATIENT
Start: 2022-01-10 | End: 2022-02-02

## 2022-01-10 RX ORDER — MONTELUKAST SODIUM 10 MG/1
10 TABLET ORAL DAILY
Qty: 30 TABLET | Refills: 0 | Status: SHIPPED | OUTPATIENT
Start: 2022-01-11 | End: 2022-01-24 | Stop reason: SDUPTHER

## 2022-01-10 RX ADMIN — BUDESONIDE 0.5 MG: 0.5 SUSPENSION RESPIRATORY (INHALATION) at 10:10

## 2022-01-10 RX ADMIN — MONTELUKAST 10 MG: 10 TABLET, FILM COATED ORAL at 09:37

## 2022-01-10 RX ADMIN — PANTOPRAZOLE SODIUM 40 MG: 40 TABLET, DELAYED RELEASE ORAL at 06:33

## 2022-01-10 RX ADMIN — ARFORMOTEROL TARTRATE 15 MCG: 15 SOLUTION RESPIRATORY (INHALATION) at 10:10

## 2022-01-10 RX ADMIN — LEVOTHYROXINE SODIUM 100 MCG: 0.1 TABLET ORAL at 06:33

## 2022-01-10 RX ADMIN — FLUTICASONE PROPIONATE 2 SPRAY: 50 SPRAY, METERED NASAL at 09:38

## 2022-01-10 RX ADMIN — ENOXAPARIN SODIUM 40 MG: 40 INJECTION SUBCUTANEOUS at 13:51

## 2022-01-10 RX ADMIN — SODIUM CHLORIDE, PRESERVATIVE FREE 10 ML: 5 INJECTION INTRAVENOUS at 09:39

## 2022-01-10 NOTE — DISCHARGE SUMMARY
[facility]         HOSPITALIST  DISCHARGE SUMMARY    Patient Name: Elmer Garvin  : 1945  MRN: [id number]    Date of Admission: 2021  Date of Discharge:  1/10/2022    Primary Care Physician: Erik Macias,     Consults     Date and Time Order Name Status Description    2021  1:42 PM Inpatient Pulmonology Consult Completed     2021  8:47 PM Hospitalist (on-call MD unless specified)            Active and Resolved Hospital Problems:  COVID-19 pneumonia  Acute hypoxemic respiratory failure secondary COVID-19  SARS-CoV-2 infection  ARDS  Transaminitis due to viral illness  Hyperglycemia due to steroids  History of tobacco smoke  Elevated D-dimer  CHENCHO on CKD3  Essential hypertension  Hypothyroidism  SSS s/p PPM  Active Hospital Problems    Diagnosis POA   • Acute respiratory failure with hypoxia (HCC) [J96.01] Yes      Resolved Hospital Problems   No resolved problems to display.       Hospital Course     Hospital Course:  76-year-old male with hypertension, hypothyroidism, CKD and sick sinus syndrome presented with 2 weeks of worsening shortness of breath and cough with fatigue.  He has not been vaccinated against COVID-19.  He went to an urgent care where he was satting 79% on room air and was sent to the ED where he was admitted.  He has tested positive for COVID-19.  He was out of the window for remdesivir.  He was started on steroids, supplemental oxygen, scheduled nebs.  Pulmonary consulted with increasing oxygen requirement.  Concern for ARDS.  Status post Actemra.  Worsening oxygenation status, transitioned to air Vo/heated high flow nasal cannula, weaned to high flow, several days on high flow, then weaned down to nasal cannula less than 4 L, he did well less than 4 L nasal cannula, labs improved, exertional tolerance improved, arrangements were made for home oxygen and home health to check on his oxygen levels, he is to see pulmonary in 2 weeks after  discharge and as well his primary care doctor within 1 week.  He was discharged in hemodynamically stable condition on 1/10/2022 to complete a short course of Decadron, as needed use of albuterol, slowly wean his oxygen down and to follow-up with care providers as scheduled.    Day of Discharge     Vital Signs:  Temp:  [97.7 °F (36.5 °C)-98.6 °F (37 °C)] 97.9 °F (36.6 °C)  Heart Rate:  [60-68] 62  Resp:  [18-20] 18  BP: (110-134)/(58-72) 133/62  Flow (L/min):  [3-3.5] 3  Review of systems :  All systems reviewed and negative except for cough, shortness of breath, generalized fatigue, exertional dyspnea    Physical exam:  Constitutional: Awake, alert, no acute distress breathing at 3.5L nasal cannula, laying in bed              Eyes: Pupils equal, sclerae anicteric, no conjunctival injection              HENT: NCAT, mucous membranes moist              Neck: Supple, full range              Respiratory: Diminished breath sounds bilaterally laying in bed wearing nasal cannula, no significant rhonchi or wheezing              Cardiovascular: RRR, no JVD              Gastrointestinal: Positive bowel sounds, soft, nontender, nondistended              Musculoskeletal: No bilateral ankle edema, no clubbing or cyanosis to extremities              Psychiatric: Appropriate affect, cooperative              Neurologic: Oriented x 3, speech clear              Skin: No rashes     Discharge Details        Discharge Medications      New Medications      Instructions Start Date   albuterol sulfate  (90 Base) MCG/ACT inhaler  Commonly known as: PROVENTIL HFA;VENTOLIN HFA;PROAIR HFA   2 puffs, Inhalation, Every 4 Hours PRN      dexamethasone 4 MG tablet  Commonly known as: DECADRON   4 mg, Oral, 2 Times Daily With Meals      pantoprazole 40 MG EC tablet  Commonly known as: PROTONIX  Replaces: omeprazole 20 MG capsule   40 mg, Oral, Every Morning   Start Date: January 11, 2022     temazepam 15 MG capsule  Commonly known as:  RESTORIL   15 mg, Oral, Nightly PRN         Changes to Medications      Instructions Start Date   fluticasone 50 MCG/ACT nasal spray  Commonly known as: FLONASE  What changed:   how much to take  how to take this  when to take this   2 sprays, Nasal, Daily      melatonin 5 MG tablet tablet  What changed: when to take this   10 mg, Oral, Nightly      montelukast 10 MG tablet  Commonly known as: SINGULAIR  What changed: See the new instructions.   10 mg, Oral, Daily   Start Date: January 11, 2022        Continue These Medications      Instructions Start Date   levothyroxine 100 MCG tablet  Commonly known as: SYNTHROID, LEVOTHROID   100 mcg, Oral, Daily      losartan 100 MG tablet  Commonly known as: COZAAR   100 mg, Oral, Daily         Stop These Medications    omeprazole 20 MG capsule  Commonly known as: priLOSEC  Replaced by: pantoprazole 40 MG EC tablet            Allergies   Allergen Reactions   • Latex Other (See Comments)     Sinus congestion       Discharge Disposition:  Home-Health Care [facility]    Diet:  Hospital:  Diet Order   Procedures   • Diet Regular       Discharge Activity: as tolerates      CODE STATUS:  Code Status and Medical Interventions:   Ordered at: 01/03/22 1539     Level Of Support Discussed With:    Patient     Code Status (Patient has no pulse and is not breathing):    No CPR (Do Not Attempt to Resuscitate)     Medical Interventions (Patient has pulse or is breathing):    Full Support         Future Appointments   Date Time Provider Department Center   1/17/2022 10:45 AM Erik Macias DO [facility]   1/24/2022  1:30 PM Celia Hampton APRN [facility]   3/28/2022 11:30 AM Erik Macias DO [facility]   4/12/2022  1:00 PM KUSUM IN OFFICE, [facility]   4/12/2022  1:40 PM Cyrus Mccray MD [facility]       Additional Instructions for the Follow-ups that You Need to Schedule     Discharge Follow-up with PCP   As directed       Currently  Documented PCP:    Erik Macias DO    PCP Phone Number:    [phone]     Follow Up Details: 3 to 7 days         Discharge Follow-[facility] with Specified Provider[facility]; 2 Weeks   As directed      [facility] weeks    Follow Up: 2 Weeks    Follow Up Details: patient specifically asks for dr paez               Pertinent  and/or Most Recent Results     PROCEDURES:   XR Chest 1 View    Result Date: 1/3/2022  PROCEDURE: XR CHEST 1 VW  COMPARISON: [facility], CR, XR CHEST 1 VW, 12/30/2021, 5:08.  INDICATIONS: pneumonia, resp failure  FINDINGS:  There is a left subclavian pacemaker device with leads over the right atrium right ventricle.  The heart is enlarged.  There are diffuse bilateral alveolar airspace opacities consistent with multifocal pneumonia.  Aeration has slightly worsened in the interval.  CONCLUSION: Slight worsening of multifocal infiltrates in the interval       JAZZY SEGURA MD       Electronically Signed and Approved By: JAZZY SEGURA MD on 1/03/2022 at 15:47             XR Chest 1 View    Result Date: 12/30/2021  PROCEDURE: XR CHEST 1 VW  COMPARISON: [facility], CT, CT CHEST PULMONARY EMBOLISM, 12/27/2021, 18:57.  [facility], CR, XR CHEST 1 VW, 12/27/2021, 13:35.  INDICATIONS: COVID, HYPOXIA  FINDINGS:  A left subclavian pacemaker is in place.  There are patchy bilateral airspace opacities.  The heart and mediastinal contours appear normal.  The osseous structures appear intact.  CONCLUSION: Patchy bilateral airspace opacities, suggesting ongoing pneumonia.       JOSE FRANCISCO TONG MD       Electronically Signed and Approved By: JOSE FRANCISCO TONG MD on 12/30/2021 at 5:51             XR Chest 1 View    Result Date: 12/27/2021  PROCEDURE: XR CHEST 1 VW  COMPARISON: [facility], CR, XR CHEST 1 VW, 12/26/2021, 17:59.  INDICATIONS: WORSENING HYPOXIA  FINDINGS:  There is stable patchy bilateral airspace  disease.  No pneumothorax, pleural effusion or lobar consolidation.  The heart size is normal.  Stable left-sided 2 lead pacemaker.  CONCLUSION: Stable patchy bilateral airspace disease.       GALE CONNELL MD       Electronically Signed and Approved By: GALE CONNELL MD on 12/27/2021 at 14:22             XR Chest 1 View    Result Date: 12/26/2021  PROCEDURE: XR CHEST 1 VW  COMPARISON: [facility], CR, CHEST PA/AP & LAT 2V, 9/27/2017, 15:22.  INDICATIONS: SOA Triage Protocol  FINDINGS:  Left-sided AICD noted.  Heart size normal.  There is patchy bilateral midlung and bibasilar airspace disease most concerning for pneumonia.  No pneumothorax.  No large effusion.  Osseous structures grossly intact.  CONCLUSION:  Patchy bilateral airspace disease most pronounced at the lung bases concerning for multifocal pneumonia.       SHONNA HERMAN MD       Electronically Signed and Approved By: SHONNA HERMAN MD on 12/26/2021 at 18:31             CT Chest Pulmonary Embolism    Result Date: 12/28/2021  PROCEDURE: CT CHEST PULMONARY EMBOLISM W CONTRAST  COMPARISON:  [facility], CR, XR CHEST 1 VW, 12/27/2021, 13:35.  [facility], CT, CHEST W/O CONTRAST, 11/08/2018, 14:07. INDICATIONS: PE suspected, high prob  TECHNIQUE: After obtaining the patient's consent, CT images were obtained with non-ionic intravenous contrast material.   PROTOCOL:   Standard imaging protocol performed    RADIATION:   DLP: 386mGy*cm   Automated exposure control was utilized to minimize radiation dose. CONTRAST: 100cc Isovue 370 I.V. LABS:   eGFR: >60ml/min/1.73m2  FINDINGS:  The central tracheobronchial tree is clear.  There are diffuse bilateral airspace consolidations.  There is no pleural effusion.  A left subclavian pacemaker is in place.  The heart size appears normal, with mild partially calcified atherosclerotic disease in the coronary arteries.  The great vessels are normal in caliber.  There is no  evidence of pulmonary embolus.  No abnormally enlarged lymph nodes are identified.  Partial evaluation of the upper abdomen is unremarkable.  No aggressive osseous lesions are identified.  CONCLUSION:  1. Negative for pulmonary embolus. 2. Diffuse bilateral airspace consolidations, suggesting multifocal pneumonia.     JOSE FRANCISCO TONG MD       Electronically Signed and Approved By: JOSE FRANCISCO TONG MD on 12/28/2021 at 1:12             Duplex Venous Lower Extremity - Bilateral CV-READ    Result Date: 12/28/2021  · Normal bilateral lower extremity venous duplex scan.        LAB RESULTS:      Lab 01/10/22  0627 01/09/22  0827 01/08/22  0830 01/07/22  0618 01/06/22  0555 01/05/22  0612 01/05/22  0612 01/04/22  0756 01/04/22  0756   WBC 3.78 7.45 6.89 6.92 8.73   < > 6.98   < > 5.90   HEMOGLOBIN 13.8 13.8 13.4 13.4 14.5   < > 13.8   < > 14.1   HEMATOCRIT 41.3 41.0 40.5 39.6 42.9   < > 40.6   < > 41.2   PLATELETS 152 186 176 181 250   < > 244   < > 238   NEUTROS ABS 2.45 6.07 5.60 5.69 7.32*   < > 5.97   < > 4.76   IMMATURE GRANS (ABS) 0.05 0.10* 0.07* 0.09* 0.13*   < > 0.07*   < > 0.05   LYMPHS ABS 0.77 0.68* 0.68* 0.66* 0.75   < > 0.57*   < > 0.65*   MONOS ABS 0.34 0.58 0.50 0.44 0.46   < > 0.30   < > 0.21   EOS ABS 0.15 0.01 0.03 0.03 0.05   < > 0.06   < > 0.22   MCV 84.6 83.7 84.6 82.3 82.0   < > 83.2   < > 83.1   SED RATE 6 11  --  10 18  --  18   < > 21*   CRP <0.30 <0.30  --  <0.30 <0.30  --  <0.30   < > <0.30   PROCALCITONIN  --   --   --   --  0.05  --   --   --  0.05    < > = values in this interval not displayed.         Lab 01/10/22  0627 01/09/22  0827 01/08/22  0830 01/07/22  0618 01/06/22  0555 01/05/22  0612 01/05/22  0612   SODIUM 137 137 136 138 134*   < > 136   POTASSIUM 4.5 4.5 4.7 4.6 4.8   < > 4.6   CHLORIDE 102 101 102 106 100   < > 102   CO2 26.9 23.1 23.7 24.8 22.2   < > 23.1   ANION GAP 8.1 12.9 10.3 7.2 11.8   < > 10.9   BUN 28* 28* 31* 34* 32*   < > 33*   CREATININE 1.14 1.07 1.00 1.20 1.15   <  > 1.01   GLUCOSE 98 116* 118* 134* 123*   < > 122*   CALCIUM 8.8 9.2 9.2 9.4 9.6   < > 9.2   MAGNESIUM 2.3 2.4  --  2.4 2.4  --  2.3   PHOSPHORUS 4.1 4.0  --  4.6* 4.1  --  4.1    < > = values in this interval not displayed.         Lab 01/10/22  0627 01/09/22  0827 01/08/22  0830 01/07/22  0618 01/06/22  0555 01/05/22  0612 01/05/22  0612   TOTAL PROTEIN 5.8* 6.2 6.1 5.9* 6.5   < > 6.2   ALBUMIN 3.50 3.90 3.60 3.50 3.90   < > 3.60   GLOBULIN  --   --  2.5  --   --   --   --    ALT (SGPT) 57* 61* 65* 68* 46*   < > 35   AST (SGOT) 19 19 19 24 21   < > 16   BILIRUBIN 0.4 0.4 0.3 0.3 0.4   < > 0.4   BILIRUBIN DIRECT <0.2 <0.2  --  <0.2 <0.2  --  <0.2   ALK PHOS 39 37* 36* 38* 52   < > 46    < > = values in this interval not displayed.                 Lab 01/10/22  0627   FERRITIN 285.50         Brief Urine Lab Results     None        Microbiology Results (last 10 days)     ** No results found for the last 240 hours. **               Results for orders placed during the hospital encounter of 12/26/21    Duplex Venous Lower Extremity - Bilateral CV-READ    Interpretation Summary  · Normal bilateral lower extremity venous duplex scan.      Results for orders placed during the hospital encounter of 12/26/21    Duplex Venous Lower Extremity - Bilateral CV-READ    Interpretation Summary  · Normal bilateral lower extremity venous duplex scan.      Results for orders placed during the hospital encounter of 03/19/19    Cardiopulmonary Stress Test (CPX)    Interpretation Summary  · Abnormal cardiopulmonary stress test. Maximal O2 consumption at anaerobic threshold only 36% of predicted maximal exercise only 70% predicted.  · Maximal O2 consumption 16.6 mL/kg/min which is 70% of predicted correlates with good prognosis.  · Heart rate too low to rule out ischemia by ECG.      Labs Pending at Discharge: None        Time spent on Discharge including face to face service:  42 minutes    Electronically signed by Diann Cuevas MD,  01/10/22, 4:55 PM EST.

## 2022-01-10 NOTE — NURSING NOTE
Exercise Oximetry    Patient Name:Elmer Garvin   MRN: [id number]   Date: 01/10/22             ROOM AIR BASELINE   SpO2% 91   Heart Rate 61   Blood Pressure      EXERCISE ON ROOM AIR SpO2% EXERCISE ON O2 @ 3 LPM SpO2%   1 MINUTE 90 1 MINUTE    2 MINUTES 88 2 MINUTES    3 MINUTES  3 MINUTES 89   4 MINUTES  4 MINUTES 90   5 MINUTES  5 MINUTES 91   6 MINUTES  6 MINUTES 92              Distance Walked  15 Distance Walked   25   Dyspnea (Mago Scale)   Dyspnea (Mago Scale)   Fatigue (Mago Scale)  Fatigue (Mago Scale)   SpO2% Post Exercise   SpO2% Post Exercise    92   HR Post Exercise   HR Post Exercise   Time to Recovery  Time to Recovery     Comments:

## 2022-01-10 NOTE — PLAN OF CARE
Goal Outcome Evaluation:  LOS x 15 days. Patient continues to eat 100% all meals. Receiving chocolate Boost Plus TID, which provides an additional 1080 kcal and 42 g protein daily. Potential DC home today per nursing notes. No new nutrition recommendations at this time. RD will remain available and continue to follow this admission.

## 2022-01-10 NOTE — PLAN OF CARE
Goal Outcome Evaluation:  Plan of Care Reviewed With: patient, spouse        Progress: improving  Outcome Summary: o2 remains at 3-3.5L nc. vss. slept well. wife remains at bedside. pt hopes to dc home later today.

## 2022-01-10 NOTE — SIGNIFICANT NOTE
01/10/22 1054   Plan   Plan Comments Per MD patient will discharge home today. Pt will need home oxygen and home health. SW sent referral to [facility] per wife's request. No preference on home health jus requested [facility]. LARA sent referral to [facility].   Final Discharge Disposition Code 06 - home with home health care

## 2022-01-11 ENCOUNTER — TRANSITIONAL CARE MANAGEMENT TELEPHONE ENCOUNTER (OUTPATIENT)
Dept: CALL CENTER | Facility: HOSPITAL | Age: 77
End: 2022-01-11

## 2022-01-11 ENCOUNTER — TELEPHONE (OUTPATIENT)
Dept: FAMILY MEDICINE CLINIC | Facility: CLINIC | Age: 77
End: 2022-01-11

## 2022-01-11 NOTE — OUTREACH NOTE
Call Center TCM Note      Responses   [facility] patient discharged from? [facility]   Does the patient have one of the following disease processes/diagnoses(primary or secondary)? COVID-19   COVID-19 underlying condition? None   TCM attempt successful? Yes   Call start time 1548   Call end time 1559   Discharge diagnosis Acute hypoxic resp failure, COVID-19 PNA, SARD-CoV-2, ARDS, CHENCHO on CKD,    Meds reviewed with patient/caregiver? Yes   Is the patient having any side effects they believe may be caused by any medication additions or changes? No   Does the patient have all medications ordered at discharge? Yes   Is the patient taking all medications as directed (includes completed medication regime)? Yes   Medication comments Taking steroids as ordered    Comments regarding appointments Pulmonlogy on 1/24/22   Does the patient have a primary care provider?  Yes   Comments regarding PCP Hospital PCP FOLLOW UP APPOINTMENT IS 1/17/22@1045am   Does the patient have an appointment with their PCP or specialist within 7 days of discharge? Yes   Has the patient kept scheduled appointments due by today? N/A   What is the Home health agency?  [facility]    Has home health visited the patient within 72 hours of discharge? Yes   What DME was ordered? O2 from [facility], pulse ox provided   Has all DME been delivered? Yes   DME comments O2 at 2 lpm per nc    Psychosocial issues? No   Did the patient receive a copy of their discharge instructions? Yes   Did the patient receive a copy of COVID-19 specific instructions? Yes   Nursing interventions Reviewed instructions with patient   What is the patient's perception of their health status since discharge? Improving  [Pt is doing well, pt SOA has improved and he has been active, wife reports he has ambulated frequently--discussed balance rest w/activity. ]   Does the patient have any of the following symptoms? Cough   Nursing Interventions Nurse provided patient education  [Dis'd  s/s of worsening condition which require MD notification such as increased SOA, change in amount/color of mucus, chest pain, fever, increased cough-v/u.. Pt using both [facility]/ Spirometer, deepbreathing. ]   Pulse Ox monitoring Intermittent   Pulse Ox device source Hospital   O2 Sat comments 91%-95%  on 2 lpm per nc at rest, 97% with activity with O2, 91% with activity w/o activity ----Heart rate staying in 60's with occasional increase to 70.   O2 Sat: education provided Sat levels,  Monitoring frequency,  When to seek care   O2 Sat education comments Advised of normal sat readings above 88-90%--urgent concerns would be a sudden decrease in sats below 90% along with increased effort of breathing,    Is the patient/caregiver able to teach back steps to recovery at home? Set small, achievable goals for return to baseline health,  Rest and rebuild strength, gradually increase activity,  Eat a well-balance diet  [Wife is doing great journaling all his sats on, off O2---aware to monitor to keep sats above 88-90%]   Is the patient/caregiver able to teach back the hierarchy of who to call/visit for symptoms/problems? PCP, Specialist, Home health nurse, Urgent Care, ED, 911 Yes   TCM call completed? Yes          Debby Feliciano RN    1/11/2022, 15:59 EST

## 2022-01-11 NOTE — OUTREACH NOTE
Prep Survey      Responses   [facility] patient discharged from? [facility]   Is LACE score < 7 ? No   Emergency Room discharge w/ pulse ox? No   Eligibility TCM   Hospital [facility]   Date of Admission 12/26/21   Date of Discharge 01/10/22   Discharge Disposition Home-Health Care Sv   Discharge diagnosis Acute hypoxic resp failure, COVID-19 PNA, SARD-CoV-2, ARDS, CHENCHO on CKD,    Does the patient have one of the following disease processes/diagnoses(primary or secondary)? COVID-19   Does the patient have Home health ordered? Yes   What is the Home health agency?  [facility]   Is there a DME ordered? Yes   What DME was ordered? O2 from [facility], pulse ox provided   Prep survey completed? Yes          Raine Kay RN

## 2022-01-12 ENCOUNTER — READMISSION MANAGEMENT (OUTPATIENT)
Dept: CALL CENTER | Facility: HOSPITAL | Age: 77
End: 2022-01-12

## 2022-01-12 NOTE — OUTREACH NOTE
COVID-19 Week 1 Survey      Responses   [facility] patient discharged from? [facility]   Does the patient have one of the following disease processes/diagnoses(primary or secondary)? COVID-19   COVID-19 underlying condition? None   Call Number Call 2   Week 1 Call successful? Yes   Call start time 1323   Call end time 1335   Discharge diagnosis Acute hypoxic resp failure, COVID-19 PNA, SARD-CoV-2, ARDS, CHENCHO on CKD,    Meds reviewed with patient/caregiver? Yes   Is the patient having any side effects they believe may be caused by any medication additions or changes? No   Is the patient taking all medications as directed (includes completed medication regime)? Yes   Comments regarding appointments Pulmonlogy on 1/24/22   Does the patient have a primary care provider?  Yes   Comments regarding PCP Hospital PCP FOLLOW UP APPOINTMENT IS 1/17/22@1045am   Does the patient have an appointment with their PCP or specialist within 7 days of discharge? Yes   Has the patient kept scheduled appointments due by today? N/A   What is the Home health agency?  [facility]   What is the patient's perception of their health status since discharge? Improving   Does the patient have any of the following symptoms? Cough,  Shortness of breath  [Dry cough improving,  slight soa with exertion]   Nursing Interventions Nurse provided patient education  [Patient also has IS. Endouraged to use every one hour while awake,  5-10 reps as tolerated to aid in opening airways and break up any secretions may have.]   Pulse Ox monitoring Intermittent   Pulse Ox device source Hospital   O2 Sat comments 92% on O2 at 2LPM per NC,  wife has decresed gradually from 4LPM to 2LPM per HH   O2 Sat: education provided Monitoring frequency,  When to seek care,  Sat levels   Is the patient/caregiver able to teach back steps to recovery at home? Set small, achievable goals for return to baseline health,  Rest and rebuild strength, gradually increase activity,  Eat  a well-balance diet  [Encouraged incresed fluids to keep secretions thin.]   If the patient is a current smoker, are they able to teach back resources for cessation? Not a smoker   COVID-19 call completed? Yes   Wrap up additional comments Improving          Lois Hernandez RN

## 2022-01-13 ENCOUNTER — READMISSION MANAGEMENT (OUTPATIENT)
Dept: CALL CENTER | Facility: HOSPITAL | Age: 77
End: 2022-01-13

## 2022-01-13 NOTE — OUTREACH NOTE
COVID-19 Week 1 Survey      Responses   [facility] patient discharged from? [facility]   Does the patient have one of the following disease processes/diagnoses(primary or secondary)? COVID-19   COVID-19 underlying condition? None   Call Number Call 3   Week 1 Call successful? No   Discharge diagnosis Acute hypoxic resp failure, COVID-19 PNA, SARD-CoV-2, ARDS, CHENCHO on CKD,           Britt Ochoa RN

## 2022-01-17 PROBLEM — Z22.322 METHICILLIN RESISTANT STAPHYLOCOCCUS AUREUS CARRIER/SUSPECTED CARRIER: Status: ACTIVE | Noted: 2022-01-17

## 2022-01-17 PROBLEM — N40.0 BENIGN PROSTATIC HYPERPLASIA: Status: ACTIVE | Noted: 2022-01-17

## 2022-01-17 PROBLEM — R09.81 NASAL CONGESTION: Status: ACTIVE | Noted: 2022-01-17

## 2022-01-17 PROBLEM — Z80.42 FAMILY HISTORY OF PROSTATE CANCER IN FATHER: Status: ACTIVE | Noted: 2019-02-06

## 2022-01-17 PROBLEM — J45.909 ASTHMA: Status: ACTIVE | Noted: 2018-01-19

## 2022-01-17 PROBLEM — H26.9 CATARACT: Status: ACTIVE | Noted: 2022-01-17

## 2022-01-17 PROBLEM — J34.2 DEVIATED NASAL SEPTUM: Status: ACTIVE | Noted: 2022-01-17

## 2022-01-17 PROBLEM — M19.90 ARTHRITIS: Status: ACTIVE | Noted: 2022-01-17

## 2022-01-17 PROBLEM — J34.89 NASAL OBSTRUCTION: Status: ACTIVE | Noted: 2022-01-17

## 2022-01-17 PROBLEM — R73.02 IMPAIRED GLUCOSE TOLERANCE: Status: ACTIVE | Noted: 2020-04-02

## 2022-01-17 PROBLEM — Z95.0 PACEMAKER: Status: ACTIVE | Noted: 2022-01-17

## 2022-01-17 PROBLEM — J34.3 HYPERTROPHY OF BOTH INFERIOR NASAL TURBINATES: Status: ACTIVE | Noted: 2022-01-17

## 2022-01-18 ENCOUNTER — READMISSION MANAGEMENT (OUTPATIENT)
Dept: CALL CENTER | Facility: HOSPITAL | Age: 77
End: 2022-01-18

## 2022-01-18 NOTE — OUTREACH NOTE
COVID-19 Week 2 Survey      Responses   [facility] patient discharged from? [facility]   Does the patient have one of the following disease processes/diagnoses(primary or secondary)? COVID-19   COVID-19 underlying condition? None   Call Number Call 1   COVID-19 Week 2: Call 1 attempt successful? Yes   Call start time 1345   Call end time 1349   Discharge diagnosis Acute hypoxic resp failure, COVID-19 PNA, SARD-CoV-2, ARDS, CHENCHO on CKD,    Is the patient taking all medications as directed (includes completed medication regime)? Yes   Medication comments completed steroids   Does the patient have a primary care provider?  Yes   Has the patient kept scheduled appointments due by today? N/A   Comments Has to cancel PCP appt due to weather and PCP told him just to keep follow up in March.  Will See pulmonary dr Monday.     What is the Home health agency?  [facility]    Has home health visited the patient within 72 hours of discharge? Yes   Psychosocial issues? No   What is the patient's perception of their health status since discharge? Improving   Does the patient have any of the following symptoms? None   Pulse Ox monitoring Intermittent   Pulse Ox device source Hospital   O2 Sat comments Using 1L PRN during the day and using at bedtime.  Sats have been 97%.   O2 Sat: education provided When to seek care,  Sat levels   Is the patient/caregiver able to teach back steps to recovery at home? Set small, achievable goals for return to baseline health,  Rest and rebuild strength, gradually increase activity   If the patient is a current smoker, are they able to teach back resources for cessation? Not a smoker   Is the patient/caregiver able to teach back the hierarchy of who to call/visit for symptoms/problems? PCP, Specialist, Home health nurse, Urgent Care, ED, 911 Yes   COVID-19 call completed? Yes   Wrap up additional comments Staes he is doing good and home health is still coming in.  Denies questions or needs at this  time.          Celia Parra LPN

## 2022-01-24 ENCOUNTER — HOSPITAL ENCOUNTER (OUTPATIENT)
Dept: GENERAL RADIOLOGY | Facility: HOSPITAL | Age: 77
Discharge: HOME | End: 2022-01-24

## 2022-01-24 ENCOUNTER — LAB (OUTPATIENT)
Dept: LAB | Facility: HOSPITAL | Age: 77
End: 2022-01-24

## 2022-01-24 ENCOUNTER — OFFICE VISIT (OUTPATIENT)
Dept: PULMONOLOGY | Facility: CLINIC | Age: 77
End: 2022-01-24

## 2022-01-24 ENCOUNTER — READMISSION MANAGEMENT (OUTPATIENT)
Dept: CALL CENTER | Facility: HOSPITAL | Age: 77
End: 2022-01-24

## 2022-01-24 VITALS
DIASTOLIC BLOOD PRESSURE: 31 MMHG | RESPIRATION RATE: 22 BRPM | HEIGHT: 74 IN | OXYGEN SATURATION: 95 % | HEART RATE: 68 BPM | SYSTOLIC BLOOD PRESSURE: 101 MMHG | WEIGHT: 202 LBS | BODY MASS INDEX: 25.93 KG/M2 | TEMPERATURE: 98 F

## 2022-01-24 DIAGNOSIS — U07.1 PNEUMONIA DUE TO COVID-19 VIRUS: ICD-10-CM

## 2022-01-24 DIAGNOSIS — R06.02 SHORTNESS OF BREATH: ICD-10-CM

## 2022-01-24 DIAGNOSIS — J12.82 PNEUMONIA DUE TO COVID-19 VIRUS: ICD-10-CM

## 2022-01-24 DIAGNOSIS — U07.1 PNEUMONIA DUE TO COVID-19 VIRUS: Primary | ICD-10-CM

## 2022-01-24 DIAGNOSIS — J12.82 PNEUMONIA DUE TO COVID-19 VIRUS: Primary | ICD-10-CM

## 2022-01-24 LAB
ALBUMIN SERPL-MCNC: 3.8 G/DL (ref 3.5–5.2)
ALBUMIN/GLOB SERPL: 1.6 G/DL
ALP SERPL-CCNC: 41 U/L (ref 39–117)
ALT SERPL W P-5'-P-CCNC: 26 U/L (ref 1–41)
ANION GAP SERPL CALCULATED.3IONS-SCNC: 8.1 MMOL/L (ref 5–15)
AST SERPL-CCNC: 13 U/L (ref 1–40)
BASOPHILS # BLD AUTO: 0.03 10*3/MM3 (ref 0–0.2)
BASOPHILS NFR BLD AUTO: 0.6 % (ref 0–1.5)
BILIRUB SERPL-MCNC: 0.5 MG/DL (ref 0–1.2)
BUN SERPL-MCNC: 19 MG/DL (ref 8–23)
BUN/CREAT SERPL: 14.7 (ref 7–25)
CALCIUM SPEC-SCNC: 8.9 MG/DL (ref 8.6–10.5)
CHLORIDE SERPL-SCNC: 106 MMOL/L (ref 98–107)
CO2 SERPL-SCNC: 25.9 MMOL/L (ref 22–29)
CREAT SERPL-MCNC: 1.29 MG/DL (ref 0.76–1.27)
D-DIMER HIGH CURVE: 6.17 MG/L (FEU) (ref 0–0.59)
DEPRECATED RDW RBC AUTO: 47.8 FL (ref 37–54)
EOSINOPHIL # BLD AUTO: 0.13 10*3/MM3 (ref 0–0.4)
EOSINOPHIL NFR BLD AUTO: 2.5 % (ref 0.3–6.2)
ERYTHROCYTE [DISTWIDTH] IN BLOOD BY AUTOMATED COUNT: 14.9 % (ref 12.3–15.4)
GFR SERPL CREATININE-BSD FRML MDRD: 54 ML/MIN/1.73
GLOBULIN UR ELPH-MCNC: 2.4 GM/DL
GLUCOSE SERPL-MCNC: 114 MG/DL (ref 65–99)
HCT VFR BLD AUTO: 44.3 % (ref 37.5–51)
HGB BLD-MCNC: 14.4 G/DL (ref 13–17.7)
IMM GRANULOCYTES # BLD AUTO: 0.03 10*3/MM3 (ref 0–0.05)
IMM GRANULOCYTES NFR BLD AUTO: 0.6 % (ref 0–0.5)
LYMPHOCYTES # BLD AUTO: 1 10*3/MM3 (ref 0.7–3.1)
LYMPHOCYTES NFR BLD AUTO: 18.9 % (ref 19.6–45.3)
MCH RBC QN AUTO: 28.6 PG (ref 26.6–33)
MCHC RBC AUTO-ENTMCNC: 32.5 G/DL (ref 31.5–35.7)
MCV RBC AUTO: 87.9 FL (ref 79–97)
MONOCYTES # BLD AUTO: 0.65 10*3/MM3 (ref 0.1–0.9)
MONOCYTES NFR BLD AUTO: 12.3 % (ref 5–12)
NEUTROPHILS NFR BLD AUTO: 3.45 10*3/MM3 (ref 1.7–7)
NEUTROPHILS NFR BLD AUTO: 65.1 % (ref 42.7–76)
NRBC BLD AUTO-RTO: 0 /100 WBC (ref 0–0.2)
PLATELET # BLD AUTO: 129 10*3/MM3 (ref 140–450)
PMV BLD AUTO: 11.6 FL (ref 6–12)
POTASSIUM SERPL-SCNC: 4.2 MMOL/L (ref 3.5–5.2)
PROT SERPL-MCNC: 6.2 G/DL (ref 6–8.5)
RBC # BLD AUTO: 5.04 10*6/MM3 (ref 4.14–5.8)
SODIUM SERPL-SCNC: 140 MMOL/L (ref 136–145)
WBC NRBC COR # BLD: 5.29 10*3/MM3 (ref 3.4–10.8)

## 2022-01-24 PROCEDURE — 85379 FIBRIN DEGRADATION QUANT: CPT

## 2022-01-24 PROCEDURE — 71046 X-RAY EXAM CHEST 2 VIEWS: CPT

## 2022-01-24 PROCEDURE — 80053 COMPREHEN METABOLIC PANEL: CPT

## 2022-01-24 PROCEDURE — 99213 OFFICE O/P EST LOW 20 MIN: CPT | Performed by: NURSE PRACTITIONER

## 2022-01-24 PROCEDURE — 85025 COMPLETE CBC W/AUTO DIFF WBC: CPT

## 2022-01-24 PROCEDURE — 36415 COLL VENOUS BLD VENIPUNCTURE: CPT

## 2022-01-24 RX ORDER — DOXYCYCLINE HYCLATE 100 MG/1
100 CAPSULE ORAL 2 TIMES DAILY
Qty: 20 CAPSULE | Refills: 0 | Status: SHIPPED | OUTPATIENT
Start: 2022-01-24 | End: 2022-01-27 | Stop reason: HOSPADM

## 2022-01-24 RX ORDER — CETIRIZINE HYDROCHLORIDE 10 MG/1
1 TABLET ORAL DAILY
COMMUNITY
End: 2022-01-24 | Stop reason: SDUPTHER

## 2022-01-24 RX ORDER — MONTELUKAST SODIUM 10 MG/1
10 TABLET ORAL DAILY
Qty: 90 TABLET | Refills: 3 | Status: SHIPPED | OUTPATIENT
Start: 2022-01-24

## 2022-01-24 RX ORDER — PREDNISONE 10 MG/1
TABLET ORAL
Qty: 42 TABLET | Refills: 0 | Status: SHIPPED | OUTPATIENT
Start: 2022-01-24 | End: 2022-02-01

## 2022-01-24 RX ORDER — CETIRIZINE HYDROCHLORIDE 10 MG/1
10 TABLET ORAL DAILY
Qty: 90 TABLET | Refills: 3 | Status: SHIPPED | OUTPATIENT
Start: 2022-01-24 | End: 2023-02-03 | Stop reason: SDUPTHER

## 2022-01-24 RX ORDER — ALBUTEROL SULFATE 90 UG/1
2 AEROSOL, METERED RESPIRATORY (INHALATION) EVERY 4 HOURS PRN
Qty: 1 G | Refills: 6 | Status: SHIPPED | OUTPATIENT
Start: 2022-01-24

## 2022-01-24 RX ORDER — IPRATROPIUM BROMIDE AND ALBUTEROL SULFATE 2.5; .5 MG/3ML; MG/3ML
3 SOLUTION RESPIRATORY (INHALATION) 4 TIMES DAILY PRN
Qty: 360 ML | Refills: 3 | Status: SHIPPED | OUTPATIENT
Start: 2022-01-24 | End: 2022-10-12

## 2022-01-24 NOTE — PROGRESS NOTES
Primary Care Provider  Erik Macias DO   Referring Provider  No ref. provider found    Patient Complaint  Hospital Follow Up Visit, Cough, Wheezing, Shortness of Breath, and Pain (lower back pain bottom of ribs)      SUBJECTIVE    History of Presenting Illness  Elmer Garvin is a 76 y.o. male who presents to [facility] for hospital follow-up for Covid respiratory failure.  Patient and patient's spouse here today.  Patient was in [facility] 12/26 through 1/10/2022 the diagnosis of acute respiratory failure with hypoxia and pneumonitis Covid 19 pneumonia.  Patient was on air Vo at the time was treated with steroids nebulizer treatments and oxygen therapy.  Patient has been receiving home health as well as therapy services at home.  States he was doing very well but here in the last couple days has felt weaker short of air with exertion.  Patient was not sent home on any antibiotics and completed the steroid that he was sent home on.  Continues to be on Zyrtec and Singulair.  He does not have any nebulizer treatments nor albuterol at this time.  Patient is using a walker with ambulation currently his oxygen is on 1 L with his oxygen sats 95%.  He does have coughing at times which started last week with thick yellow sputum and he did cough up a small amount of blood pencil eraser sized and was having some pain in the left lower anterior chest to his back.  Patient states he has been using his incentive spirometry and flutter valve.    Denies wheezing, headaches, chest pain, weight loss or hemoptysis. Denies fevers, chills and night sweats. Elmer Garvin is able to perform ADLs without difficulties and denies any swollen glands/lymph nodes in the head or neck.    I have personally reviewed the review of systems, past family, social, medical and surgical histories; and agree with their findings.    Review of Systems  Constitutional symptoms:   Denied complaints   Ear, nose, throat: Denied complaints  Cardiovascular:   Chest pain  Respiratory: Short of air with exertion cough with thick yellow sputum  Gastrointestinal: Denied complaints  Musculoskeletal: Denied complaints    Family History   Problem Relation Age of Onset   • Cancer Mother    • Heart disease Father    • Cancer Father    • Heart disease Sister    • Sudden death Sister    • Heart disease Brother    • Cancer Paternal Grandfather         Social History     Socioeconomic History   • Marital status:    Tobacco Use   • Smoking status: Former Smoker     Years: 12.00     Types: Pipe   • Smokeless tobacco: Never Used   • Tobacco comment: CAFFEINE USE 3 CUPS COFFEE DAILY   Vaping Use   • Vaping Use: Never used   Substance and Sexual Activity   • Alcohol use: No   • Drug use: Never   • Sexual activity: Defer        Past Medical History:   Diagnosis Date   • Hypertension    • Lyme disease    • Shortness of breath    • SSS (sick sinus syndrome) (HCC)         Immunization History   Administered Date(s) Administered   • OPV 04/09/1996   • Td 04/09/1996   • Yellow Fever 04/09/1996       Allergies   Allergen Reactions   • Latex Other (See Comments)     Sinus congestion          Current Outpatient Medications:   •  cetirizine (zyrTEC) 10 MG tablet, Take 1 tablet by mouth Daily., Disp: 90 tablet, Rfl: 3  •  fluticasone (FLONASE) 50 MCG/ACT nasal spray, 2 sprays into the nostril(s) as directed by provider Daily for 30 days., Disp: 18.2 mL, Rfl: 0  •  latanoprost (XALATAN) 0.005 % ophthalmic solution, , Disp: , Rfl:   •  levothyroxine (SYNTHROID, LEVOTHROID) 100 MCG tablet, Take 1 tablet by mouth Daily for 30 days. (Patient taking differently: Take 100 mcg by mouth Daily. Pt can only take name brand (Sythroid)), Disp: 30 tablet, Rfl: 0  •  melatonin 5 MG tablet tablet, Take 2 tablets by mouth Every Night for 30 days., Disp: 60 tablet, Rfl: 0  •  montelukast (SINGULAIR) 10 MG tablet, Take 1 tablet by mouth  "Daily for 90 days., Disp: 90 tablet, Rfl: 3  •  pantoprazole (PROTONIX) 40 MG EC tablet, Take 1 tablet by mouth Every Morning for 30 days., Disp: 30 tablet, Rfl: 0  •  albuterol sulfate  (90 Base) MCG/ACT inhaler, Inhale 2 puffs Every 4 (Four) Hours As Needed for Wheezing., Disp: 1 g, Rfl: 6  •  ipratropium-albuterol (DUO-NEB) 0.5-2.5 mg/3 ml nebulizer, Take 3 mL by nebulization 4 (Four) Times a Day As Needed for Wheezing or Shortness of Air., Disp: 360 mL, Rfl: 3  •  losartan (COZAAR) 100 MG tablet, Take 1 tablet by mouth Daily for 90 days., Disp: 90 tablet, Rfl: 3       OBJECTIVE    Vital Signs   BP (!) 101/31 (BP Location: Right leg, Patient Position: Sitting, Cuff Size: Adult)   Pulse 68   Temp 98 °F (36.7 °C)   Resp 22   Ht 188 cm (74\")   Wt 91.6 kg (202 lb)   SpO2 95% Comment: 1L of O2  BMI 25.94 kg/m²     Physical Exam  Vital Signs Reviewed   WDWN, Alert, NAD.    HEENT:  PERRL, EOMI.  OP, nares clear  Neck:  Supple, no JVD, no thyromegaly  Chest:  good aeration, diminished breath sounds bilateral, No work of breathing noted  CV: RRR, no MGR, pulses 2+, equal.  Abd:  Soft, NT, ND, + BS, no HSM  EXT:  no clubbing, no cyanosis, no edema  Neuro:  A&Ox3, CN grossly intact, no focal deficits.  Skin: No rashes or lesions noted    Results Review  I have personally reviewed the hospital records, diagnostic imaging with a chest x-ray 1/3/2022 showed bilateral pneumonia.      ASSESSMENT & PLAN    Patient Active Problem List   Diagnosis   • Angina at rest ([facility])   • Hypertension   • SSS (sick sinus syndrome) ([facility])   • Mild intermittent asthma without complication   • Gastroesophageal reflux disease without esophagitis   • Taylor's esophagus without dysplasia   • Acquired hypothyroidism   • MRSA (methicillin resistant Staphylococcus aureus)   • Pre-diabetes   • Acute respiratory failure with hypoxia ([facility])   • Arthritis   • Asthma   • Benign prostatic hyperplasia   • Cataract   • Deviated nasal septum   • " Family history of prostate cancer in father   • Hypertrophy of both inferior nasal turbinates   • Impaired glucose tolerance   • 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 Pneumonia due to COVID-19 virus (Primary)  -     XR Chest PA & Lateral; Future  -     CBC & Differential; Future  -     Comprehensive Metabolic Panel; Future  -     D-dimer, Quantitative; Future  -     Ambulatory Referral to Pulmonary Rehab  -     Home Nebulizer Accessories  -     ipratropium-albuterol (DUO-NEB) 0.5-2.5 mg/3 ml nebulizer; Take 3 mL by nebulization 4 (Four) Times a Day As Needed for Wheezing or Shortness of Air.  Dispense: 360 mL; Refill: 3  -     cetirizine (zyrTEC) 10 MG tablet; Take 1 tablet by mouth Daily.  Dispense: 90 tablet; Refill: 3  -     montelukast (SINGULAIR) 10 MG tablet; Take 1 tablet by mouth Daily for 90 days.  Dispense: 90 tablet; Refill: 3  -     albuterol sulfate  (90 Base) MCG/ACT inhaler; Inhale 2 puffs Every 4 (Four) Hours As Needed for Wheezing.  Dispense: 1 g; Refill: 6    2. Shortness of breath  -     XR Chest PA & Lateral; Future  -     CBC & Differential; Future  -     Comprehensive Metabolic Panel; Future  -     D-dimer, Quantitative; Future  -     Ambulatory Referral to Pulmonary Rehab  -     Home Nebulizer Accessories  -     ipratropium-albuterol (DUO-NEB) 0.5-2.5 mg/3 ml nebulizer; Take 3 mL by nebulization 4 (Four) Times a Day As Needed for Wheezing or Shortness of Air.  Dispense: 360 mL; Refill: 3  -     cetirizine (zyrTEC) 10 MG tablet; Take 1 tablet by mouth Daily.  Dispense: 90 tablet; Refill: 3  -     montelukast (SINGULAIR) 10 MG tablet; Take 1 tablet by mouth Daily for 90 days.  Dispense: 90 tablet; Refill: 3  -     albuterol sulfate  (90 Base) MCG/ACT inhaler; Inhale 2 puffs Every 4 (Four) Hours As Needed for Wheezing.  Dispense: 1 g; Refill:  6           Plan:  Get a send patient over to get a chest x-ray as well as a CBC CMP and a D-dimer today.  In addition we will order pulmonology rehab to attend in 3 weeks as he is currently getting some home health services.  Also can be prescribing a nebulizer with DuoNeb, as well as albuterol inhaler, refill Singulair and Zyrtec today.  Pending results of chest x-ray and blood work will determine if patient needs antibiotic and steroids today.  Patient instructed continue using his incentive spirometer as well as the Acapella flutter valve at least twice a day for both of them.     Smoking status: Former quit 40 years ago  Vaccination status: Declines vaccinations at this time  Medications personally reviewed    Follow Up  Return in about 4 weeks (around 2/21/2022) for Next scheduled follow up.  Plan to follow-up in 1 month or sooner if needed  Patient was given instructions and counseling regarding his condition or for health maintenance advice. Please see specific information pulled into the AVS if appropriate.

## 2022-01-24 NOTE — OUTREACH NOTE
COVID-19 Week 3 Survey      Responses   [facility] patient discharged from? [facility]   Does the patient have one of the following disease processes/diagnoses(primary or secondary)? COVID-19   COVID-19 underlying condition? None   Call Number Call 1   COVID-19 Week 3: Call 1 attempt successful? No   Discharge diagnosis Acute hypoxic resp failure, COVID-19 PNA, SARD-CoV-2, ARDS, CHENCHO on CKD,           Sara Felton RN

## 2022-01-25 ENCOUNTER — HOSPITAL ENCOUNTER (OUTPATIENT)
Dept: CT IMAGING | Facility: HOSPITAL | Age: 77
Discharge: HOME | End: 2022-01-25
Admitting: NURSE PRACTITIONER

## 2022-01-25 ENCOUNTER — HOSPITAL ENCOUNTER (INPATIENT)
Facility: HOSPITAL | Age: 77
LOS: 2 days | Discharge: HOME | DRG: 163 | End: 2022-01-27
Attending: EMERGENCY MEDICINE | Admitting: INTERNAL MEDICINE
Payer: MEDICARE

## 2022-01-25 ENCOUNTER — TELEPHONE (OUTPATIENT)
Dept: PULMONOLOGY | Facility: CLINIC | Age: 77
End: 2022-01-25

## 2022-01-25 ENCOUNTER — OFFICE VISIT (OUTPATIENT)
Dept: PULMONOLOGY | Facility: CLINIC | Age: 77
End: 2022-01-25

## 2022-01-25 VITALS
TEMPERATURE: 97.7 F | WEIGHT: 202 LBS | HEIGHT: 74 IN | RESPIRATION RATE: 20 BRPM | BODY MASS INDEX: 25.93 KG/M2 | OXYGEN SATURATION: 98 %

## 2022-01-25 DIAGNOSIS — J96.21 ACUTE ON CHRONIC RESPIRATORY FAILURE WITH HYPOXIA: Primary | ICD-10-CM

## 2022-01-25 DIAGNOSIS — R06.02 SHORTNESS OF BREATH: ICD-10-CM

## 2022-01-25 DIAGNOSIS — U07.1 PNEUMONIA DUE TO COVID-19 VIRUS: ICD-10-CM

## 2022-01-25 DIAGNOSIS — J12.82 PNEUMONIA DUE TO COVID-19 VIRUS: ICD-10-CM

## 2022-01-25 DIAGNOSIS — I26.09 ACUTE PULMONARY EMBOLISM WITH ACUTE COR PULMONALE, UNSPECIFIED PULMONARY EMBOLISM TYPE: Primary | ICD-10-CM

## 2022-01-25 DIAGNOSIS — R06.02 SHORTNESS OF BREATH: Primary | ICD-10-CM

## 2022-01-25 DIAGNOSIS — I26.99 ACUTE PULMONARY EMBOLISM, UNSPECIFIED PULMONARY EMBOLISM TYPE, UNSPECIFIED WHETHER ACUTE COR PULMONALE PRESENT: ICD-10-CM

## 2022-01-25 DIAGNOSIS — M19.90 ARTHRITIS: ICD-10-CM

## 2022-01-25 LAB
ALBUMIN SERPL-MCNC: 4 G/DL (ref 3.5–5.2)
ALBUMIN/GLOB SERPL: 1.4 G/DL
ALP SERPL-CCNC: 49 U/L (ref 39–117)
ALT SERPL W P-5'-P-CCNC: 25 U/L (ref 1–41)
ANION GAP SERPL CALCULATED.3IONS-SCNC: 11.2 MMOL/L (ref 5–15)
APTT PPP: 21.1 SECONDS (ref 22.2–34.2)
APTT PPP: 49.8 SECONDS (ref 22.2–34.2)
AST SERPL-CCNC: 16 U/L (ref 1–40)
BASOPHILS # BLD AUTO: 0.01 10*3/MM3 (ref 0–0.2)
BASOPHILS # BLD AUTO: 0.02 10*3/MM3 (ref 0–0.2)
BASOPHILS NFR BLD AUTO: 0.2 % (ref 0–1.5)
BASOPHILS NFR BLD AUTO: 0.4 % (ref 0–1.5)
BILIRUB SERPL-MCNC: 0.6 MG/DL (ref 0–1.2)
BUN SERPL-MCNC: 16 MG/DL (ref 8–23)
BUN/CREAT SERPL: 14.4 (ref 7–25)
CALCIUM SPEC-SCNC: 9.3 MG/DL (ref 8.6–10.5)
CHLORIDE SERPL-SCNC: 104 MMOL/L (ref 98–107)
CO2 SERPL-SCNC: 24.8 MMOL/L (ref 22–29)
CREAT SERPL-MCNC: 1.11 MG/DL (ref 0.76–1.27)
D-LACTATE SERPL-SCNC: 2.1 MMOL/L (ref 0.5–2)
DEPRECATED RDW RBC AUTO: 48.9 FL (ref 37–54)
DEPRECATED RDW RBC AUTO: 48.9 FL (ref 37–54)
EOSINOPHIL # BLD AUTO: 0.02 10*3/MM3 (ref 0–0.4)
EOSINOPHIL # BLD AUTO: 0.06 10*3/MM3 (ref 0–0.4)
EOSINOPHIL NFR BLD AUTO: 0.4 % (ref 0.3–6.2)
EOSINOPHIL NFR BLD AUTO: 1.1 % (ref 0.3–6.2)
ERYTHROCYTE [DISTWIDTH] IN BLOOD BY AUTOMATED COUNT: 15.7 % (ref 12.3–15.4)
ERYTHROCYTE [DISTWIDTH] IN BLOOD BY AUTOMATED COUNT: 15.8 % (ref 12.3–15.4)
GFR SERPL CREATININE-BSD FRML MDRD: 65 ML/MIN/1.73
GLOBULIN UR ELPH-MCNC: 2.8 GM/DL
GLUCOSE SERPL-MCNC: 174 MG/DL (ref 65–99)
HCT VFR BLD AUTO: 39.8 % (ref 37.5–51)
HCT VFR BLD AUTO: 42.7 % (ref 37.5–51)
HGB BLD-MCNC: 13.4 G/DL (ref 13–17.7)
HGB BLD-MCNC: 14.2 G/DL (ref 13–17.7)
HOLD SPECIMEN: NORMAL
HOLD SPECIMEN: NORMAL
IMM GRANULOCYTES # BLD AUTO: 0.02 10*3/MM3 (ref 0–0.05)
IMM GRANULOCYTES # BLD AUTO: 0.02 10*3/MM3 (ref 0–0.05)
IMM GRANULOCYTES NFR BLD AUTO: 0.4 % (ref 0–0.5)
IMM GRANULOCYTES NFR BLD AUTO: 0.4 % (ref 0–0.5)
INR PPP: 0.87 (ref 2–3)
LYMPHOCYTES # BLD AUTO: 0.52 10*3/MM3 (ref 0.7–3.1)
LYMPHOCYTES # BLD AUTO: 0.56 10*3/MM3 (ref 0.7–3.1)
LYMPHOCYTES NFR BLD AUTO: 10.1 % (ref 19.6–45.3)
LYMPHOCYTES NFR BLD AUTO: 9.5 % (ref 19.6–45.3)
MCH RBC QN AUTO: 28.5 PG (ref 26.6–33)
MCH RBC QN AUTO: 28.8 PG (ref 26.6–33)
MCHC RBC AUTO-ENTMCNC: 33.3 G/DL (ref 31.5–35.7)
MCHC RBC AUTO-ENTMCNC: 33.7 G/DL (ref 31.5–35.7)
MCV RBC AUTO: 85.4 FL (ref 79–97)
MCV RBC AUTO: 85.6 FL (ref 79–97)
MONOCYTES # BLD AUTO: 0.22 10*3/MM3 (ref 0.1–0.9)
MONOCYTES # BLD AUTO: 0.52 10*3/MM3 (ref 0.1–0.9)
MONOCYTES NFR BLD AUTO: 4 % (ref 5–12)
MONOCYTES NFR BLD AUTO: 9.3 % (ref 5–12)
NEUTROPHILS NFR BLD AUTO: 4.44 10*3/MM3 (ref 1.7–7)
NEUTROPHILS NFR BLD AUTO: 4.63 10*3/MM3 (ref 1.7–7)
NEUTROPHILS NFR BLD AUTO: 79.6 % (ref 42.7–76)
NEUTROPHILS NFR BLD AUTO: 84.6 % (ref 42.7–76)
NRBC BLD AUTO-RTO: 0 /100 WBC (ref 0–0.2)
NRBC BLD AUTO-RTO: 0 /100 WBC (ref 0–0.2)
NT-PROBNP SERPL-MCNC: 224.9 PG/ML (ref 0–1800)
PLATELET # BLD AUTO: 121 10*3/MM3 (ref 140–450)
PLATELET # BLD AUTO: 123 10*3/MM3 (ref 140–450)
PMV BLD AUTO: 10.7 FL (ref 6–12)
PMV BLD AUTO: 11.4 FL (ref 6–12)
POTASSIUM SERPL-SCNC: 4.6 MMOL/L (ref 3.5–5.2)
PROT SERPL-MCNC: 6.8 G/DL (ref 6–8.5)
PROTHROMBIN TIME: 9.4 SECONDS (ref 9.4–12)
RBC # BLD AUTO: 4.66 10*6/MM3 (ref 4.14–5.8)
RBC # BLD AUTO: 4.99 10*6/MM3 (ref 4.14–5.8)
RBC MORPH BLD: NORMAL
SMALL PLATELETS BLD QL SMEAR: NORMAL
SODIUM SERPL-SCNC: 140 MMOL/L (ref 136–145)
TROPONIN T SERPL-MCNC: <0.01 NG/ML (ref 0–0.03)
TROPONIN T SERPL-MCNC: <0.01 NG/ML (ref 0–0.03)
WBC MORPH BLD: NORMAL
WBC NRBC COR # BLD: 5.47 10*3/MM3 (ref 3.4–10.8)
WBC NRBC COR # BLD: 5.57 10*3/MM3 (ref 3.4–10.8)
WHOLE BLOOD HOLD SPECIMEN: NORMAL
WHOLE BLOOD HOLD SPECIMEN: NORMAL

## 2022-01-25 PROCEDURE — 25010000002 HEPARIN (PORCINE) PER 1000 UNITS: Performed by: EMERGENCY MEDICINE

## 2022-01-25 PROCEDURE — 99223 1ST HOSP IP/OBS HIGH 75: CPT | Performed by: INTERNAL MEDICINE

## 2022-01-25 PROCEDURE — 85730 THROMBOPLASTIN TIME PARTIAL: CPT | Performed by: EMERGENCY MEDICINE

## 2022-01-25 PROCEDURE — 85025 COMPLETE CBC W/AUTO DIFF WBC: CPT | Performed by: EMERGENCY MEDICINE

## 2022-01-25 PROCEDURE — 36415 COLL VENOUS BLD VENIPUNCTURE: CPT | Performed by: EMERGENCY MEDICINE

## 2022-01-25 PROCEDURE — 93005 ELECTROCARDIOGRAM TRACING: CPT | Performed by: EMERGENCY MEDICINE

## 2022-01-25 PROCEDURE — 83880 ASSAY OF NATRIURETIC PEPTIDE: CPT | Performed by: EMERGENCY MEDICINE

## 2022-01-25 PROCEDURE — 84484 ASSAY OF TROPONIN QUANT: CPT | Performed by: EMERGENCY MEDICINE

## 2022-01-25 PROCEDURE — 83605 ASSAY OF LACTIC ACID: CPT | Performed by: INTERNAL MEDICINE

## 2022-01-25 PROCEDURE — 85610 PROTHROMBIN TIME: CPT | Performed by: EMERGENCY MEDICINE

## 2022-01-25 PROCEDURE — 84484 ASSAY OF TROPONIN QUANT: CPT | Performed by: INTERNAL MEDICINE

## 2022-01-25 PROCEDURE — 94799 UNLISTED PULMONARY SVC/PX: CPT

## 2022-01-25 PROCEDURE — 93010 ELECTROCARDIOGRAM REPORT: CPT | Performed by: INTERNAL MEDICINE

## 2022-01-25 PROCEDURE — 99284 EMERGENCY DEPT VISIT MOD MDM: CPT

## 2022-01-25 PROCEDURE — 94640 AIRWAY INHALATION TREATMENT: CPT

## 2022-01-25 PROCEDURE — 0 IOPAMIDOL PER 1 ML: Performed by: NURSE PRACTITIONER

## 2022-01-25 PROCEDURE — 80053 COMPREHEN METABOLIC PANEL: CPT | Performed by: EMERGENCY MEDICINE

## 2022-01-25 PROCEDURE — 71275 CT ANGIOGRAPHY CHEST: CPT

## 2022-01-25 PROCEDURE — 85007 BL SMEAR W/DIFF WBC COUNT: CPT | Performed by: EMERGENCY MEDICINE

## 2022-01-25 RX ORDER — APIXABAN 5 MG (74)
5 KIT ORAL TAKE AS DIRECTED
Qty: 74 TABLET | Refills: 0 | Status: SHIPPED | OUTPATIENT
Start: 2022-01-25 | End: 2022-02-21

## 2022-01-25 RX ORDER — HEPARIN SOD,PORCINE/0.9 % NACL 25000/250
18 INTRAVENOUS SOLUTION INTRAVENOUS
Status: DISCONTINUED | OUTPATIENT
Start: 2022-01-25 | End: 2022-01-27

## 2022-01-25 RX ORDER — ACETAMINOPHEN 325 MG/1
650 TABLET ORAL EVERY 4 HOURS PRN
Status: DISCONTINUED | OUTPATIENT
Start: 2022-01-25 | End: 2022-01-27 | Stop reason: SDUPTHER

## 2022-01-25 RX ORDER — LEVOTHYROXINE SODIUM 0.1 MG/1
100 TABLET ORAL
Status: DISCONTINUED | OUTPATIENT
Start: 2022-01-26 | End: 2022-01-27 | Stop reason: HOSPADM

## 2022-01-25 RX ORDER — SODIUM CHLORIDE 0.9 % (FLUSH) 0.9 %
10 SYRINGE (ML) INJECTION AS NEEDED
Status: DISCONTINUED | OUTPATIENT
Start: 2022-01-25 | End: 2022-01-27 | Stop reason: HOSPADM

## 2022-01-25 RX ORDER — SODIUM CHLORIDE 0.9 % (FLUSH) 0.9 %
10 SYRINGE (ML) INJECTION EVERY 12 HOURS SCHEDULED
Status: DISCONTINUED | OUTPATIENT
Start: 2022-01-25 | End: 2022-01-27 | Stop reason: HOSPADM

## 2022-01-25 RX ORDER — BUDESONIDE AND FORMOTEROL FUMARATE DIHYDRATE 160; 4.5 UG/1; UG/1
2 AEROSOL RESPIRATORY (INHALATION)
Status: DISCONTINUED | OUTPATIENT
Start: 2022-01-25 | End: 2022-01-27 | Stop reason: HOSPADM

## 2022-01-25 RX ORDER — IPRATROPIUM BROMIDE AND ALBUTEROL SULFATE 2.5; .5 MG/3ML; MG/3ML
3 SOLUTION RESPIRATORY (INHALATION)
Status: DISCONTINUED | OUTPATIENT
Start: 2022-01-25 | End: 2022-01-26

## 2022-01-25 RX ORDER — CHOLECALCIFEROL (VITAMIN D3) 125 MCG
10 CAPSULE ORAL NIGHTLY
Status: DISCONTINUED | OUTPATIENT
Start: 2022-01-25 | End: 2022-01-27 | Stop reason: HOSPADM

## 2022-01-25 RX ADMIN — HEPARIN SODIUM 18 UNITS/KG/HR: 5000 INJECTION INTRAVENOUS; SUBCUTANEOUS at 16:01

## 2022-01-25 RX ADMIN — BUDESONIDE AND FORMOTEROL FUMARATE DIHYDRATE 2 PUFF: 160; 4.5 AEROSOL RESPIRATORY (INHALATION) at 23:38

## 2022-01-25 RX ADMIN — IPRATROPIUM BROMIDE AND ALBUTEROL SULFATE 3 ML: 2.5; .5 SOLUTION RESPIRATORY (INHALATION) at 23:38

## 2022-01-25 RX ADMIN — IOPAMIDOL 100 ML: 755 INJECTION, SOLUTION INTRAVENOUS at 12:44

## 2022-01-25 RX ADMIN — SODIUM CHLORIDE, PRESERVATIVE FREE 10 ML: 5 INJECTION INTRAVENOUS at 21:06

## 2022-01-25 NOTE — TELEPHONE ENCOUNTER
CT Chest with PE protocol scheduled for 01/25/22 @ 1230 EST.  Notified patients wife.  She is agreeable.

## 2022-01-25 NOTE — Clinical Note
The physician has confirmed that the patient has been reassessed and is appropriate for moderate sedation

## 2022-01-25 NOTE — PROGRESS NOTES
I called patient this at 8:15 am to notify of elevated D-Dimer of 6.17, patient was scheduled to have a stat CT scan of chest at 12:30 pm. Received a call to inform of PE left and right lung arnold. I spoke with Dr. Paz to get the verbal report.  Patient was sent over to office for assessment.  After assessing the patient, I spoke with Dr. Gong to inform of my assessment. Based on findings and CT patient sent back to hospital ER for evaluation and treatment.      Patient presents to office after getting CT scan of chest. His color is gray, he is hypoxic with sats 86-92%, tachypnea, work of breathing noted, 02 on at 2 L/m via NC. Diminished breath sounds in all lung fields, no rhonchi, no rales, no wheezing noted. No edema of bilateral lower extremities.     Discussed with patient and spouse findings on the CT scan of chest, need for admission at present due to PE and presenting assessment. Patient and spouse verbalize understanding. Patient left via personal vehicle as he refused ambulance at this time to go to the ED at Located within [facility] with IV in place.     CT scan of Chest as follows:    PROCEDURE:  CT CHEST PULMONARY EMBOLISM W CONTRAST     COMPARISON:  [facility], CT, CT CHEST PULMONARY EMBOLISM, 12/27/2021, 18:57.     INDICATIONS:  PE suspected, low/intermediate prob, positive D-dimer,hx. of covid December 2021/sob     TECHNIQUE:    CT images were obtained with non-ionic intravenous contrast material.       PROTOCOL:     Pulmonary embolism imaging protocol performed                 RADIATION:      DLP: 209mGy*cm               Automated exposure control was utilized to minimize radiation dose.   CONTRAST:      100cc Isovue 370 I.V.     FINDINGS:          The pulmonary arteries are well opacified.     Filling defects are seen in left upper lobe and left lower lobe pulmonary arteries, and in numerous   segmental and subsegmental branches.  A few filling defects are seen in segmental pulmonary   arteries in  the right lower lobe.     Right heart strain is evident.     Lung window images reveal diffuse bilateral airspace disease consistent with multifocal pneumonia.    Findings are consistent with COVID-19 pneumonia.  Ground-glass opacity overall is slightly   improved.  Extensive peripheral banding is now evident.     Dual lead AICD is in unchanged position.     Moderate degenerative spurring is seen in the thoracic spine.     IMPRESSION:                 CT scan of the chest with IV contrast demonstrating pulmonary thromboembolic disease.     Right heart strain is evident.     Multifocal pneumonia.     I discussed findings with Dr. Gong.  I subsequently spoke with the patient.  He and his wife   were concerned that his breathing was worsened.  I subsequently relayed instructions for the   patient to go over to the office to be seen.  The patient had an IV protected by Tegaderm.  The   patient and his wife did not wish to remove this in case he was to be admitted.  They assured me   they would get this addressed at the office if he was sent on home.

## 2022-01-25 NOTE — ED PROVIDER NOTES
Subjective     Shortness of Breath  Severity:  Moderate  Onset quality:  Sudden  Duration:  1 week  Timing:  Constant  Progression:  Worsening  Chronicity:  New  Context comment:  Diagnosed with and treated in the hospital with Covid for 2 weeks.  Over the following 2 weeks the patient was improving until recently when he developed worsening shortness of breath and increased oxygen requirement with associated left-sided chest pain.  Relieved by:  Oxygen  Worsened by:  Nothing  Ineffective treatments:  None tried  Associated symptoms: chest pain    Associated symptoms: no cough, no fever, no hemoptysis, no PND, no syncope, no vomiting and no wheezing        Review of Systems   Constitutional: Negative for fever.   Respiratory: Positive for shortness of breath. Negative for cough, hemoptysis and wheezing.    Cardiovascular: Positive for chest pain. Negative for syncope and PND.   Gastrointestinal: Negative for vomiting.   All other systems reviewed and are negative.      Past Medical History:   Diagnosis Date   • Hypertension    • Lyme disease    • Shortness of breath    • SSS (sick sinus syndrome) (HCC)        Allergies   Allergen Reactions   • Latex Other (See Comments)     Sinus congestion       Past Surgical History:   Procedure Laterality Date   • CARDIAC CATHETERIZATION N/A 7/25/2019    Procedure: Coronary angiography;  Surgeon: Wilton Brown MD;  Location: [facility] INVASIVE LOCATION;  Service: Cardiovascular   • CARDIAC CATHETERIZATION N/A 7/25/2019    Procedure: Left heart cath;  Surgeon: Wilton Brown MD;  Location: [facility] INVASIVE LOCATION;  Service: Cardiovascular   • CARDIAC CATHETERIZATION N/A 7/25/2019    Procedure: Left ventriculography;  Surgeon: Wilton Brown MD;  Location: [facility] INVASIVE LOCATION;  Service: Cardiovascular   • CARDIAC CATHETERIZATION N/A 7/25/2019    Procedure: Right heart cath;  Surgeon: Wilton Brown MD;  Location: [facility] INVASIVE LOCATION;  Service:  Cardiovascular   • CATARACT EXTRACTION     • INSERT / REPLACE / REMOVE PACEMAKER  08/2014   • PACEMAKER IMPLANTATION         Family History   Problem Relation Age of Onset   • Cancer Mother    • Heart disease Father    • Cancer Father    • Heart disease Sister    • Sudden death Sister    • Heart disease Brother    • Cancer Paternal Grandfather        Social History     Socioeconomic History   • Marital status:    Tobacco Use   • Smoking status: Former Smoker     Years: 12.00     Types: Pipe   • Smokeless tobacco: Never Used   • Tobacco comment: CAFFEINE USE 3 CUPS COFFEE DAILY   Vaping Use   • Vaping Use: Never used   Substance and Sexual Activity   • Alcohol use: No   • Drug use: Never   • Sexual activity: Defer           Objective   Physical Exam  Vitals and nursing note reviewed.   Constitutional:       Appearance: He is not ill-appearing.   HENT:      Head: Normocephalic and atraumatic.      Right Ear: External ear normal.      Left Ear: External ear normal.      Nose: Nose normal.      Mouth/Throat:      Mouth: Mucous membranes are moist.   Eyes:      General: No scleral icterus.  Cardiovascular:      Rate and Rhythm: Normal rate and regular rhythm.   Pulmonary:      Effort: Pulmonary effort is normal.      Breath sounds: Normal breath sounds.   Abdominal:      General: Abdomen is flat.      Tenderness: There is no abdominal tenderness.   Musculoskeletal:      Right lower leg: No edema.      Left lower leg: No edema.   Skin:     General: Skin is warm and dry.   Neurological:      Mental Status: He is alert and oriented to person, place, and time.   Psychiatric:         Behavior: Behavior normal.         Procedures           ED Course                                                 MDM  Number of Diagnoses or Management Options     Amount and/or Complexity of Data Reviewed  Clinical lab tests: reviewed  Tests in the medicine section of CPT®: reviewed  Decide to obtain previous medical records or to obtain  history from someone other than the patient: yes  Obtain history from someone other than the patient: yes  Review and summarize past medical records: yes  Discuss the patient with other providers: yes        This is a 76-year-old male patient who contracted coronavirus about a month ago and was discharged from the hospital about 2 weeks ago on 1 L nasal cannula oxygen.  He was recently seen and treated by his pulmonologist for a slightly increased oxygen requirement as well as the development of some left-sided chest pain.  He was found on an outpatient basis to have a pulmonary embolism and was referred to the emergency department for treatment of admission.    Critical care time:  30 minutes of critical care provided. This time excludes other billable procedures. Time does include preparation of documents, medical consultations, review of old records, and direct bedside care. Patient was at high risk for life-threatening deterioration due to pulmonary embolism with respiratory system failure requiring IV heparin..       Case discussed with hospitalist.  Case discussed with Dr. Penn who agrees to see the patient in the morning for consultation and consideration for endovascular procedure.  Final diagnoses:   Acute on chronic respiratory failure with hypoxia (HCC)   Acute pulmonary embolism, unspecified pulmonary embolism type, unspecified whether acute cor pulmonale present (HCC)       ED Disposition  ED Disposition     ED Disposition Condition Comment    Decision to Admit  Level of Care: Telemetry [5]   Diagnosis: Acute on chronic respiratory failure with hypoxia (HCC) [[id number]]   Isolate for COVID?: No [0]   Bed Request Comments: Covid positive on 12/26   Certification: I Certify That Inpatient Hospital Services Are Medically Necessary For Greater Than 2 Midnights            No follow-up provider specified.       Medication List      No changes were made to your prescriptions during this visit.          Manuel  DO Nile  01/25/22 2122

## 2022-01-25 NOTE — Clinical Note
A 6 fr sheath was successfully inserted into the right femoral artery. Sheath insertion not delayed.

## 2022-01-25 NOTE — H&P
[facility]  Date: 2022   Patient Name: Elmer Garvin  : 1945  MRN: [id number]  Primary Care Physician:  Erik Macias DO  Date of admission: 2022    Subjective   Subjective     Chief Complaint: Hypoxia    HPI:    Elmer Garvin is a 76 y.o. male patient is resubmitted to hospital for Covid pneumonia, hypothyroidism, sick sinus syndrome with pacemaker in place, and history of tobacco abuse who presents today after having an elevated D-dimer in pulmonary clinic.    Patient was resubmitted to hospital from  to 2022 for Covid pneumonia and hypoxemic respiratory failure and ARDS.  He has done well since that time was seen in pulmonary clinic today.  A D-dimer was obtained and was 6.17.  As result the patient obtained a CTA of the chest.  This showed PE with right heart strain.  Patient was stated    In the emergency department the patient's vital signs were as follows: Temperature is 97.5, pulse of 69, respiratory is 18, blood pressure 140/60, on 1 L nasal cannula.  CBC shows no abnormalities.  CMP shows a mild elevation in his creatinine to 1.29.  D-dimer was 6.17.  CTA of the chest showed PE with right heart strain, multifocal pneumonia.  Patient was started on heparin by the emergency department.  BNP and troponin were both negative I asked the ER to consult Dr. Maninder Penn of cardiology for possible intervention due to right heart strain.  Patient will be admitted to hospital for acute hypoxemic respiratory failure and pulmonary embolism with right heart strain.    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  Taylor's esophagus  MSSA and MRSA  carrier  BPH  Mild intermittent asthma without complication  Prediabetes      Past Surgical History:  Left heart cath  Cataract extraction  Pacemaker removal  Pacemaker implantation    Family History:     Father had cancer, mother had cancer mother  Brother and father had heart disease  History of sudden death    Social History:   Former smoker.  Smoked for 12 years.  Smoked a pipe  No alcohol use    Home Medications:  Apixaban Starter Pack, albuterol sulfate HFA, cetirizine, doxycycline, fluticasone, ipratropium-albuterol, levothyroxine, losartan, melatonin, montelukast, pantoprazole, and predniSONE    Allergies:  Allergies   Allergen Reactions   • Latex Other (See Comments)     Sinus congestion         Objective   Objective     Vitals:   Temp:  [97.5 °F (36.4 °C)-97.7 °F (36.5 °C)] 97.5 °F (36.4 °C)  Heart Rate:  [69] 69  Resp:  [18-20] 18  BP: (140)/(66) 140/66  Flow (L/min):  [1] 1    Physical Exam    Constitutional: Comfortable.  In bed.  No issues.  Nasal cannula in place   Eyes: Pupils equal, sclerae anicteric, no conjunctival injection   HENT: NCAT, mucous membranes moist   Neck: Supple, no thyromegaly, no lymphadenopathy, trachea midline   Respiratory: Crackles bilaterally throughout.   Cardiovascular: RRR, no murmurs, rubs, or gallops, palpable pedal pulses bilaterally   Gastrointestinal: Positive bowel sounds, soft, nontender, nondistended   Musculoskeletal: No bilateral ankle edema, no clubbing or cyanosis to extremities   Psychiatric: Appropriate affect, cooperative   Neurologic: Oriented x 3, strength symmetric in all extremities, Cranial Nerves grossly intact to confrontation, speech clear   Skin: No rashes     Result Review    Result Review:  I have personally reviewed the results from the time of this admission to 1/25/2022 15:43 EST and agree with these finding  Imaging and labs were reviewed  D-dimer greater than 6  CT scan of the chest shows PE    Assessment/Plan   Assessment / Plan      Assessment/Plan:   Pulmonary embolism with right heart strain  --BNP normal troponin was normal  Acute hypoxemic respiratory failure   --Was on 1 L of nasal cannula after discharge from hospital on 1/10 for Covid but has had to use it more frequently and for longer periods of time over the last week  Recent COVID-19 admission  CT still shows multifocal pneumonia  Sick sinus syndrome with pacemaker    Plan:  Admit to telemetry  Continue heparin drip  Consult cardiology/Dr. Maninder Penn  New oxygen saturations less than 90%  DuoNebs and Symbicort  Patient still has multifocal pneumonia crackles on exam but I think this is due to residual lung damage from COVID-19  Send procalcitonin in the morning  Will not start antibiotics at this time  Echocardiogram to rule out severe right heart strain or pulmonary hypertension  Could potentially transition to Eliquis tomorrow  Will not continue steroids or antibiotics were started yesterday      DVT prophylaxis:  Heparin drip    CODE STATUS:     Full code    Admission Status:  I believe this patient meets admission status.    Electronically signed by Houston Minor MD, 01/25/22, 3:43 PM EST.

## 2022-01-25 NOTE — Clinical Note
Allergies reviewed.  H&P note has been confirmed for the patient. Procedural consent has been signed.  Staff has reviewed the patient's labs.

## 2022-01-25 NOTE — Clinical Note
Hemostasis started on the right femoral vein. Manual pressure applied to vessel. Manual pressure was held by XENA Bella RN. Manual pressure was held for 20 min. Hemostasis achieved successfully.

## 2022-01-26 ENCOUNTER — APPOINTMENT (OUTPATIENT)
Dept: CARDIOLOGY | Facility: HOSPITAL | Age: 77
DRG: 163 | End: 2022-01-26
Payer: MEDICARE

## 2022-01-26 PROBLEM — I26.99 ACUTE PULMONARY EMBOLISM: Status: ACTIVE | Noted: 2022-01-25

## 2022-01-26 LAB
ACT BLD: 273 SECONDS (ref 89–137)
ALBUMIN SERPL-MCNC: 3.5 G/DL (ref 3.5–5.2)
ALBUMIN/GLOB SERPL: 1.5 G/DL
ALP SERPL-CCNC: 39 U/L (ref 39–117)
ALT SERPL W P-5'-P-CCNC: 20 U/L (ref 1–41)
ANION GAP SERPL CALCULATED.3IONS-SCNC: 10.4 MMOL/L (ref 5–15)
APTT PPP: 48.6 SECONDS (ref 22.2–34.2)
AST SERPL-CCNC: 12 U/L (ref 1–40)
BASOPHILS # BLD AUTO: 0.02 10*3/MM3 (ref 0–0.2)
BASOPHILS NFR BLD AUTO: 0.4 % (ref 0–1.5)
BH CV ECHO MEAS - AO ROOT DIAM: 3 CM
BH CV ECHO MEAS - EF(MOD-BP): 67 %
BH CV ECHO MEAS - IVSD: 1.1 CM
BH CV ECHO MEAS - LA DIMENSION(2D): 3.8 CM
BH CV ECHO MEAS - LAT PEAK E' VEL: 7.5 CM/SEC
BH CV ECHO MEAS - LVIDD: 4.4 CM
BH CV ECHO MEAS - LVIDS: 3.2 CM
BH CV ECHO MEAS - LVPWD: 0.9 CM
BH CV ECHO MEAS - MED PEAK E' VEL: 4.13 CM/SEC
BH CV ECHO MEAS - MV A MAX VEL: 61.7 CM/SEC
BH CV ECHO MEAS - MV DEC TIME: 202 MSEC
BH CV ECHO MEAS - MV E MAX VEL: 51.8 CM/SEC
BH CV ECHO MEAS - MV E/A: 0.8
BH CV ECHO MEAS - RVDD: 3.1 CM
BH CV ECHO MEAS - RVSP: 47 MMHG
BH CV ECHO MEAS - TR MAX PG: 42 MMHG
BH CV ECHO MEAS - TR MAX VEL: 323 CM/SEC
BH CV ECHO MEASUREMENTS AVERAGE E/E' RATIO: 8.91
BILIRUB SERPL-MCNC: 0.6 MG/DL (ref 0–1.2)
BUN SERPL-MCNC: 14 MG/DL (ref 8–23)
BUN/CREAT SERPL: 13.2 (ref 7–25)
CALCIUM SPEC-SCNC: 8.8 MG/DL (ref 8.6–10.5)
CHLORIDE SERPL-SCNC: 108 MMOL/L (ref 98–107)
CO2 SERPL-SCNC: 23.6 MMOL/L (ref 22–29)
CREAT SERPL-MCNC: 1.06 MG/DL (ref 0.76–1.27)
D-LACTATE SERPL-SCNC: 2.1 MMOL/L (ref 0.5–2)
DEPRECATED RDW RBC AUTO: 48.7 FL (ref 37–54)
EOSINOPHIL # BLD AUTO: 0.05 10*3/MM3 (ref 0–0.4)
EOSINOPHIL NFR BLD AUTO: 1.1 % (ref 0.3–6.2)
ERYTHROCYTE [DISTWIDTH] IN BLOOD BY AUTOMATED COUNT: 15.7 % (ref 12.3–15.4)
GFR SERPL CREATININE-BSD FRML MDRD: 68 ML/MIN/1.73
GLOBULIN UR ELPH-MCNC: 2.4 GM/DL
GLUCOSE SERPL-MCNC: 126 MG/DL (ref 65–99)
HCT VFR BLD AUTO: 38.5 % (ref 37.5–51)
HGB BLD-MCNC: 12.9 G/DL (ref 13–17.7)
IMM GRANULOCYTES # BLD AUTO: 0.02 10*3/MM3 (ref 0–0.05)
IMM GRANULOCYTES NFR BLD AUTO: 0.4 % (ref 0–0.5)
IVRT: 69 MSEC
LEFT ATRIUM VOLUME INDEX: 25.5 ML/M2
LYMPHOCYTES # BLD AUTO: 0.87 10*3/MM3 (ref 0.7–3.1)
LYMPHOCYTES NFR BLD AUTO: 19.3 % (ref 19.6–45.3)
MAGNESIUM SERPL-MCNC: 2.2 MG/DL (ref 1.6–2.4)
MAXIMAL PREDICTED HEART RATE: 145 BPM
MCH RBC QN AUTO: 28.9 PG (ref 26.6–33)
MCHC RBC AUTO-ENTMCNC: 33.5 G/DL (ref 31.5–35.7)
MCV RBC AUTO: 86.1 FL (ref 79–97)
MONOCYTES # BLD AUTO: 0.55 10*3/MM3 (ref 0.1–0.9)
MONOCYTES NFR BLD AUTO: 12.2 % (ref 5–12)
NEUTROPHILS NFR BLD AUTO: 2.99 10*3/MM3 (ref 1.7–7)
NEUTROPHILS NFR BLD AUTO: 66.6 % (ref 42.7–76)
NRBC BLD AUTO-RTO: 0 /100 WBC (ref 0–0.2)
PLATELET # BLD AUTO: 121 10*3/MM3 (ref 140–450)
PMV BLD AUTO: 10.8 FL (ref 6–12)
POTASSIUM SERPL-SCNC: 4.2 MMOL/L (ref 3.5–5.2)
PROCALCITONIN SERPL-MCNC: 0.05 NG/ML (ref 0–0.25)
PROT SERPL-MCNC: 5.9 G/DL (ref 6–8.5)
QT INTERVAL: 440 MS
RBC # BLD AUTO: 4.47 10*6/MM3 (ref 4.14–5.8)
SODIUM SERPL-SCNC: 142 MMOL/L (ref 136–145)
STRESS TARGET HR: 123 BPM
WBC NRBC COR # BLD: 4.5 10*3/MM3 (ref 3.4–10.8)

## 2022-01-26 PROCEDURE — 99152 MOD SED SAME PHYS/QHP 5/>YRS: CPT | Performed by: INTERNAL MEDICINE

## 2022-01-26 PROCEDURE — 83605 ASSAY OF LACTIC ACID: CPT | Performed by: INTERNAL MEDICINE

## 2022-01-26 PROCEDURE — 80053 COMPREHEN METABOLIC PANEL: CPT | Performed by: INTERNAL MEDICINE

## 2022-01-26 PROCEDURE — 93970 EXTREMITY STUDY: CPT | Performed by: SURGERY

## 2022-01-26 PROCEDURE — 25010000002 HEPARIN (PORCINE) PER 1000 UNITS: Performed by: INTERNAL MEDICINE

## 2022-01-26 PROCEDURE — 85347 COAGULATION TIME ACTIVATED: CPT

## 2022-01-26 PROCEDURE — 75741 ARTERY X-RAYS LUNG: CPT | Performed by: INTERNAL MEDICINE

## 2022-01-26 PROCEDURE — C1757 CATH, THROMBECTOMY/EMBOLECT: HCPCS | Performed by: INTERNAL MEDICINE

## 2022-01-26 PROCEDURE — 36015 PLACE CATHETER IN ARTERY: CPT | Performed by: INTERNAL MEDICINE

## 2022-01-26 PROCEDURE — 93306 TTE W/DOPPLER COMPLETE: CPT | Performed by: INTERNAL MEDICINE

## 2022-01-26 PROCEDURE — 94799 UNLISTED PULMONARY SVC/PX: CPT

## 2022-01-26 PROCEDURE — C1894 INTRO/SHEATH, NON-LASER: HCPCS | Performed by: INTERNAL MEDICINE

## 2022-01-26 PROCEDURE — 84145 PROCALCITONIN (PCT): CPT | Performed by: INTERNAL MEDICINE

## 2022-01-26 PROCEDURE — 85730 THROMBOPLASTIN TIME PARTIAL: CPT | Performed by: INTERNAL MEDICINE

## 2022-01-26 PROCEDURE — 25010000002 FENTANYL CITRATE (PF) 50 MCG/ML SOLUTION: Performed by: INTERNAL MEDICINE

## 2022-01-26 PROCEDURE — C1769 GUIDE WIRE: HCPCS | Performed by: INTERNAL MEDICINE

## 2022-01-26 PROCEDURE — 25010000002 MIDAZOLAM PER 1 MG

## 2022-01-26 PROCEDURE — 93970 EXTREMITY STUDY: CPT

## 2022-01-26 PROCEDURE — B31T1ZZ FLUOROSCOPY OF LEFT PULMONARY ARTERY USING LOW OSMOLAR CONTRAST: ICD-10-PCS | Performed by: INTERNAL MEDICINE

## 2022-01-26 PROCEDURE — 99233 SBSQ HOSP IP/OBS HIGH 50: CPT | Performed by: INTERNAL MEDICINE

## 2022-01-26 PROCEDURE — 85025 COMPLETE CBC W/AUTO DIFF WBC: CPT | Performed by: INTERNAL MEDICINE

## 2022-01-26 PROCEDURE — 37184 PRIM ART M-THRMBC 1ST VSL: CPT | Performed by: INTERNAL MEDICINE

## 2022-01-26 PROCEDURE — 0 IOPAMIDOL PER 1 ML: Performed by: INTERNAL MEDICINE

## 2022-01-26 PROCEDURE — 99222 1ST HOSP IP/OBS MODERATE 55: CPT | Performed by: INTERNAL MEDICINE

## 2022-01-26 PROCEDURE — 75825 VEIN X-RAY TRUNK: CPT | Performed by: INTERNAL MEDICINE

## 2022-01-26 PROCEDURE — 83735 ASSAY OF MAGNESIUM: CPT | Performed by: INTERNAL MEDICINE

## 2022-01-26 PROCEDURE — 02CR3ZZ EXTIRPATION OF MATTER FROM LEFT PULMONARY ARTERY, PERCUTANEOUS APPROACH: ICD-10-PCS | Performed by: INTERNAL MEDICINE

## 2022-01-26 PROCEDURE — 25010000002 LORAZEPAM PER 2 MG: Performed by: INTERNAL MEDICINE

## 2022-01-26 PROCEDURE — 93306 TTE W/DOPPLER COMPLETE: CPT

## 2022-01-26 PROCEDURE — 99153 MOD SED SAME PHYS/QHP EA: CPT | Performed by: INTERNAL MEDICINE

## 2022-01-26 RX ORDER — IPRATROPIUM BROMIDE AND ALBUTEROL SULFATE 2.5; .5 MG/3ML; MG/3ML
3 SOLUTION RESPIRATORY (INHALATION)
Status: DISCONTINUED | OUTPATIENT
Start: 2022-01-27 | End: 2022-01-27

## 2022-01-26 RX ORDER — SODIUM CHLORIDE 9 MG/ML
100 INJECTION, SOLUTION INTRAVENOUS CONTINUOUS
Status: ACTIVE | OUTPATIENT
Start: 2022-01-26 | End: 2022-01-26

## 2022-01-26 RX ORDER — FENTANYL CITRATE 50 UG/ML
INJECTION, SOLUTION INTRAMUSCULAR; INTRAVENOUS AS NEEDED
Status: DISCONTINUED | OUTPATIENT
Start: 2022-01-26 | End: 2022-01-26 | Stop reason: HOSPADM

## 2022-01-26 RX ORDER — LIDOCAINE HYDROCHLORIDE 20 MG/ML
INJECTION, SOLUTION INFILTRATION; PERINEURAL AS NEEDED
Status: DISCONTINUED | OUTPATIENT
Start: 2022-01-26 | End: 2022-01-26 | Stop reason: HOSPADM

## 2022-01-26 RX ORDER — MIDAZOLAM HYDROCHLORIDE 2 MG/2ML
INJECTION, SOLUTION INTRAMUSCULAR; INTRAVENOUS AS NEEDED
Status: DISCONTINUED | OUTPATIENT
Start: 2022-01-26 | End: 2022-01-26 | Stop reason: HOSPADM

## 2022-01-26 RX ORDER — HEPARIN SODIUM 1000 [USP'U]/ML
INJECTION, SOLUTION INTRAVENOUS; SUBCUTANEOUS AS NEEDED
Status: DISCONTINUED | OUTPATIENT
Start: 2022-01-26 | End: 2022-01-26 | Stop reason: HOSPADM

## 2022-01-26 RX ORDER — LORAZEPAM 2 MG/ML
INJECTION INTRAMUSCULAR AS NEEDED
Status: DISCONTINUED | OUTPATIENT
Start: 2022-01-26 | End: 2022-01-26 | Stop reason: HOSPADM

## 2022-01-26 RX ADMIN — BUDESONIDE AND FORMOTEROL FUMARATE DIHYDRATE 2 PUFF: 160; 4.5 AEROSOL RESPIRATORY (INHALATION) at 20:24

## 2022-01-26 RX ADMIN — IPRATROPIUM BROMIDE AND ALBUTEROL SULFATE 3 ML: 2.5; .5 SOLUTION RESPIRATORY (INHALATION) at 11:29

## 2022-01-26 RX ADMIN — HEPARIN SODIUM 18 UNITS/KG/HR: 5000 INJECTION INTRAVENOUS; SUBCUTANEOUS at 20:37

## 2022-01-26 RX ADMIN — Medication 10 MG: at 20:38

## 2022-01-26 RX ADMIN — IPRATROPIUM BROMIDE AND ALBUTEROL SULFATE 3 ML: 2.5; .5 SOLUTION RESPIRATORY (INHALATION) at 07:46

## 2022-01-26 RX ADMIN — BUDESONIDE AND FORMOTEROL FUMARATE DIHYDRATE 2 PUFF: 160; 4.5 AEROSOL RESPIRATORY (INHALATION) at 07:46

## 2022-01-26 RX ADMIN — SODIUM CHLORIDE, PRESERVATIVE FREE 10 ML: 5 INJECTION INTRAVENOUS at 20:40

## 2022-01-26 RX ADMIN — HEPARIN SODIUM 18 UNITS/KG/HR: 5000 INJECTION INTRAVENOUS; SUBCUTANEOUS at 03:28

## 2022-01-26 RX ADMIN — IPRATROPIUM BROMIDE AND ALBUTEROL SULFATE 3 ML: 2.5; .5 SOLUTION RESPIRATORY (INHALATION) at 20:24

## 2022-01-26 NOTE — PLAN OF CARE
Goal Outcome Evaluation:  Plan of Care Reviewed With: patient        Progress: no change  Outcome Summary: VSS; pt admitted to floor from ED w/ no s/s respiratory distress; pt on 1L NC and ambulating w/ rolling walker; Pt currently NPO per MD order; Pt NSR on monitor w/ paced beats and currently on heparin gtt at 18. Next ptt order is at 2200 this evening. Will titrate according to heparin protocol and will continue to monitor.

## 2022-01-26 NOTE — CONSULTS
[facility]    CARDIOLOGY CONSULT NOTE    Patient Name: Elmer Garvin  : 1945  MRN: [id number]  Primary Care Physician:  Erik Macias DO  Date of admission: 2022    Subjective   Subjective     Chief Complaint: shortness of breath     HPI:  Elmer Garvin is a 76 y.o. male with recent COVID-19 pneumonia, sick sinus syndrome s/p PPM placement, hypertension, and remote tobacco abuse who was admitted last evening after presenting to the ED with worsening shortness of breath over the past week.  He was found to have a markedly elevated D-dimer of 6.17 when he was evaluated in pulmonary clinic two days ago.  A CTA of the chest was obtained upon arrival yesterday and demonstrated numerous filling defects in the left upper and lower lobar pulmonary arteries, as well has numerous segmental and subsegmental branches, consistent with extensive left-sided pulmonary embolism.  A few filling defects were observed in the segmental branches of the right lower lobe, but the right pulmonary arteries are much less involved.  The radiologist interpreted the study as indicative of right heart strain.  I reviewed the images myself and agree there is evidence of right heart strain with an RV:LV ratio of 1.1-1.2.  Mr. Garvin was also noted to have an elevated lactic acid level.  Accordingly, I was asked to evaluate the patient for consideration of intervention with thrombectomy due to submassive, intermediate-risk pulmonary embolism.  Mr. Garvin remains hemodynamically stable and is on supplemental oxygen, but continues to maintain oxygen saturations in the low-mid 90s.  An echocardiogram and lower extremity venous Doppler ultrasound are pending.    Review of Systems   Constitutional: Positive for fatigue. Negative for chills and fever.   HENT: Negative for hearing loss, sore throat and trouble swallowing.    Eyes: Negative for pain and visual disturbance.   Respiratory: Positive for cough and shortness of  breath. Negative for chest tightness and wheezing.    Cardiovascular: Negative for chest pain, palpitations and leg swelling.   Gastrointestinal: Negative for abdominal distention, abdominal pain, blood in stool and vomiting.   Endocrine: Negative for cold intolerance and heat intolerance.   Genitourinary: Positive for frequency and urgency. Negative for dysuria and hematuria.   Musculoskeletal: Positive for arthralgias. Negative for joint swelling and myalgias.   Skin: Negative for color change, pallor and rash.   Neurological: Negative for syncope, speech difficulty, light-headedness and headaches.   Hematological: Negative for adenopathy. Does not bruise/bleed easily.        Personal History     Past Medical History:   Diagnosis Date   • Hypertension    • Lyme disease    • Shortness of breath    • SSS (sick sinus syndrome) (HCC)        Past Surgical History:   Procedure Laterality Date   • CARDIAC CATHETERIZATION N/A 7/25/2019    Procedure: Coronary angiography;  Surgeon: Wilton Brown MD;  Location: [facility] CATH INVASIVE LOCATION;  Service: Cardiovascular   • CARDIAC CATHETERIZATION N/A 7/25/2019    Procedure: Left heart cath;  Surgeon: Wilton Brown MD;  Location: [facility] CATH INVASIVE LOCATION;  Service: Cardiovascular   • CARDIAC CATHETERIZATION N/A 7/25/2019    Procedure: Left ventriculography;  Surgeon: Wilton Brown MD;  Location: [facility] CATH INVASIVE LOCATION;  Service: Cardiovascular   • CARDIAC CATHETERIZATION N/A 7/25/2019    Procedure: Right heart cath;  Surgeon: Wilton Brown MD;  Location: [facility] CATH INVASIVE LOCATION;  Service: Cardiovascular   • CATARACT EXTRACTION     • INSERT / REPLACE / REMOVE PACEMAKER  08/2014   • PACEMAKER IMPLANTATION         Family History: family history includes Cancer in his father, mother, and paternal grandfather; Heart disease in his brother, father, and sister; Sudden death in his sister. Otherwise pertinent FHx was reviewed and not pertinent to current  issue.    Social History:  reports that he has quit smoking. His smoking use included pipe. He quit after 12.00 years of use. He has never used smokeless tobacco. He reports that he does not drink alcohol and does not use drugs.    Home Medications:  Apixaban Starter Pack, albuterol sulfate HFA, cetirizine, doxycycline, fluticasone, ipratropium-albuterol, levothyroxine, losartan, melatonin, montelukast, pantoprazole, and predniSONE    Allergies:  Allergies   Allergen Reactions   • Latex Other (See Comments)     Sinus congestion       Objective    Objective     Vitals:   Temp:  [97.4 °F (36.3 °C)-97.8 °F (36.6 °C)] 97.8 °F (36.6 °C)  Heart Rate:  [61-79] 77  Resp:  [18-20] 20  BP: (118-141)/(56-74) 131/72  Flow (L/min):  [1] 1    Physical Exam  Constitutional:       General: He is not in acute distress.     Appearance: Normal appearance.   HENT:      Head: Atraumatic.      Mouth/Throat:      Mouth: Mucous membranes are moist.      Pharynx: Oropharynx is clear. No oropharyngeal exudate.   Eyes:      General: No scleral icterus.     Conjunctiva/sclera: Conjunctivae normal.   Neck:      Vascular: No carotid bruit or JVD.   Cardiovascular:      Rate and Rhythm: Normal rate and regular rhythm.      Chest Wall: PMI is not displaced.      Pulses:           Radial pulses are 2+ on the right side and 2+ on the left side.      Heart sounds: S1 normal and S2 normal. No murmur heard.  No friction rub. No gallop.    Pulmonary:      Effort: Pulmonary effort is normal. No tachypnea or respiratory distress.      Breath sounds: Examination of the right-lower field reveals decreased breath sounds. Examination of the left-lower field reveals decreased breath sounds. Decreased breath sounds and rhonchi present. No wheezing or rales.      Comments: Coarse breath sounds bilaterally.  Chest:      Chest wall: No tenderness.   Abdominal:      General: Bowel sounds are normal. There is no distension.      Palpations: Abdomen is soft. There is  no mass.      Tenderness: There is no abdominal tenderness.   Musculoskeletal:         General: No swelling, tenderness or deformity.      Cervical back: Neck supple. No tenderness.      Right lower leg: No edema.      Left lower leg: No edema.   Skin:     General: Skin is warm and dry.      Coloration: Skin is not jaundiced.      Findings: No erythema or rash.      Nails: There is no clubbing.   Neurological:      General: No focal deficit present.      Mental Status: He is alert and oriented to person, place, and time.      Motor: No weakness.   Psychiatric:         Mood and Affect: Mood normal.         Behavior: Behavior normal.       Result Review    Result Review:  I have personally reviewed the results from the time of this admission to 1/26/2022 07:40 EST and agree with these findings:  [x]  Laboratory  []  Microbiology  [x]  Radiology  [x]  EKG/Telemetry   [x]  Cardiology/Vascular   []  Pathology  [x]  Old records  []  Other:  Most notable findings include: D-dimer = 6.17, lactic acid = 2.1, proBNP = 225, Hgb = 12.9, Cr = 1.06      Assessment/Plan   Assessment / Plan     Brief Patient Summary:  Elmer Garvin is a 76 y.o. male with:  -Extensive left-sided submassive pulmonary embolism with CT evidence of right heart strain  -Acute hypoxemic respiratory failure  -Recent COVID-19 pneumonia  -Sick sinus syndrome, s/p PPM placement   -Prediabetes     Active Hospital Problems:  Active Hospital Problems    Diagnosis    • Acute on chronic respiratory failure with hypoxia (HCC)        Plan:   -Continue anticoagulation with a heparin drip.  I discussed multiple options with Mr. Garvin and his wife, including conservative medical therapy with anticoagulation alone vs. aspiration thrombectomy of the left pulmonary arteries.  The risks and benefits of both approaches were reviewed with them in detail.  He understands that his right heart strain appears to be mild, and I do not believe this is a life-threatening PE.   However, he does have fairly extensive embolic burden in the left upper and lower lobar arteries and does have some risk for development of CTEPH and other chronic complications.  After much discussion, he and his wife both strongly wish for me to proceed with mechanical aspiration thrombectomy this afternoon.  The risks (death, pulmonary artery perforation and hemothorax, infection, severe bleeding and potential need for blood transfusion, renal failure and potential need for permanent dialysis, anaphylaxis) and benefits of the procedure were reviewed with both of them in detail.  They expressed understanding and wish to proceed.  Will plan to transition Mr. Ramsey anticoagulation to oral Eliquis or Xarelto 12-24 hours after the procedure is completed.   -Will review venous Doppler when available to assess any potential residual DVT burden  -Further plans depending on procedural results      DVT prophylaxis:  Medical DVT prophylaxis orders are present.    CODE STATUS:    Level Of Support Discussed With: Patient  Code Status (Patient has no pulse and is not breathing): CPR (Attempt to Resuscitate)  Medical Interventions (Patient has pulse or is breathing): Full Support    Electronically signed by Maninder Penn MD, 01/26/22, 7:40 AM EST.

## 2022-01-26 NOTE — PROGRESS NOTES
[facility]   Hospitalist Progress Note  Date: 2022  Patient Name: Elmer Garvin  : 1945  MRN: [id number]  Date of admission: 2022      Subjective   Subjective     Chief Complaint:   Sent from pulmonary clinic with hypoxia and elevated D-dimer    Summary:   Elmer Garvin is a 76 y.o. male patient with past medical history of Covid pneumonia, hypothyroidism, sick sinus syndrome with pacemaker in place, and history of tobacco abuse who presents on 2022 after having an elevated D-dimer in pulmonary clinic.     Patient was recently admitted to hospital from 2021 to 2022 for Covid pneumonia and hypoxemic respiratory failure and ARDS.    Patient has been doing well since that time, seen in pulmonary clinic where a D-dimer was obtained, 6.17.    Follow-up CTA of the chest shows PE with right heart strain therefore patient sent to the emergency department.       In the emergency department the patient's vital signs were as follows: Temperature is 97.5, pulse of 69, respiratory is 18, blood pressure 140/60, on 1 L nasal cannula.  CBC shows no abnormalities.  CMP shows a mild elevation in his creatinine to 1.29.  D-dimer was 6.17.  CTA of the chest showed PE with right heart strain, multifocal pneumonia.  Patient was started on heparin by the emergency department.  BNP and troponin were both negative I asked the ER to consult Dr. Maninder Penn of cardiology for possible intervention due to right heart strain.  Patient will be admitted to hospital for acute hypoxemic respiratory failure and pulmonary embolism with right heart strain.     Interval Followup:   Patient and wife met with interventional cardiologist Dr. Penn at bedside, discussed options for possible thrombectomy.  Patient and family agreeable to have thrombectomy performed today.  Patient will continue on a heparin drip for now.    Review of Systems   All systems were reviewed and negative.  Patient denies any chest pain  palpitations no shortness of breath    Objective   Objective     Vitals:   Temp:  [97.4 °F (36.3 °C)-97.8 °F (36.6 °C)] 97.7 °F (36.5 °C)  Heart Rate:  [60-79] 68  Resp:  [16-20] 18  BP: (118-141)/(53-74) 126/53  Flow (L/min):  [1-2] 2  Physical Exam    Constitutional: Awake, alert, no acute distress   Eyes: Pupils equal, sclerae anicteric, no conjunctival injection   HENT: NCAT, mucous membranes moist   Neck: Supple, no thyromegaly, no lymphadenopathy, trachea midline   Respiratory: Clear to auscultation bilaterally, nonlabored respirations    Cardiovascular: RRR, no murmurs, rubs, or gallops, palpable pedal pulses bilaterally   Gastrointestinal: Positive bowel sounds, soft, nontender, nondistended   Musculoskeletal: No bilateral ankle edema, no clubbing or cyanosis to extremities   Psychiatric: Appropriate affect, cooperative   Neurologic: Oriented x 3, strength symmetric in all extremities, Cranial Nerves grossly intact to confrontation, speech clear   Skin: No rashes     Result Review    Result Review:  I have personally reviewed the results from the time of this admission to 1/26/2022 16:01 EST and agree with these findings:  [x]  Laboratory  [x]  Microbiology  [x]  Radiology  [x]  EKG/Telemetry   [x]  Cardiology/Vascular   []  Pathology  [x]  Old records  []  Other:    Assessment/Plan   Assessment / Plan     Assessment/Plan:  Pulmonary embolism with right heart strain  --BNP normal troponin was normal  Acute hypoxemic respiratory failure   --Was on 1 L of nasal cannula after discharge from hospital on 1/10 for Covid but has had to use it more frequently and for longer periods of time over the last week  Recent COVID-19 admission  CT still shows multifocal pneumonia  Sick sinus syndrome with pacemaker     Plan:  Patient remains admitted to hospital for further care management  Dr. Penn, interventional cardiology consulted, appreciate assistance  Continue heparin drip, continue to closely trend PTTs,  hemoglobins  Patient did go for thrombectomy today, instructions to leave on heparin drip for 12 to 18 hours post procedure  DuoNebs and Symbicort  Procalcitonin low, no antibiotics  Echocardiogram to rule out severe right heart strain or pulmonary hypertension  Patient will transition to Eliquis once off heparin, discussed with social work     Discussed plan with RN, , cardiologist    DVT prophylaxis:  Medical DVT prophylaxis orders are present.    CODE STATUS:   Level Of Support Discussed With: Patient  Code Status (Patient has no pulse and is not breathing): CPR (Attempt to Resuscitate)  Medical Interventions (Patient has pulse or is breathing): Full Support    Electronically signed by Nacho Morris MD, 01/26/22, 4:01 PM EST.    CBC    CBC 1/24/22 1/25/22 1/25/22 1/26/22     1429 2149    WBC 5.29 5.47 5.57 4.50   RBC 5.04 4.99 4.66 4.47   Hemoglobin 14.4 14.2 13.4 12.9 (A)   Hematocrit 44.3 42.7 39.8 38.5   MCV 87.9 85.6 85.4 86.1   MCH 28.6 28.5 28.8 28.9   MCHC 32.5 33.3 33.7 33.5   RDW 14.9 15.8 (A) 15.7 (A) 15.7 (A)   Platelets 129 (A) 121 (A) 123 (A) 121 (A)   (A) Abnormal value              CMP    CMP 1/24/22 1/25/22 1/26/22   Glucose 114 (A) 174 (A) 126 (A)   BUN 19 16 14   Creatinine 1.29 (A) 1.11 1.06   eGFR Non  Am 54 (A) 65 68   Sodium 140 140 142   Potassium 4.2 4.6 4.2   Chloride 106 104 108 (A)   Calcium 8.9 9.3 8.8   Albumin 3.80 4.00 3.50   Total Bilirubin 0.5 0.6 0.6   Alkaline Phosphatase 41 49 39   AST (SGOT) 13 16 12   ALT (SGPT) 26 25 20   (A) Abnormal value

## 2022-01-26 NOTE — PLAN OF CARE
Goal Outcome Evaluation:   Patient tolerated procedure (thrombectomy) well. Continuing post vitals at this time. Patient may get up at 2030. Wife at bedside.

## 2022-01-27 ENCOUNTER — READMISSION MANAGEMENT (OUTPATIENT)
Dept: CALL CENTER | Facility: HOSPITAL | Age: 77
End: 2022-01-27

## 2022-01-27 VITALS
DIASTOLIC BLOOD PRESSURE: 57 MMHG | WEIGHT: 196.21 LBS | HEART RATE: 70 BPM | OXYGEN SATURATION: 96 % | SYSTOLIC BLOOD PRESSURE: 122 MMHG | TEMPERATURE: 97.7 F | BODY MASS INDEX: 25.18 KG/M2 | RESPIRATION RATE: 18 BRPM | HEIGHT: 74 IN

## 2022-01-27 LAB
ANION GAP SERPL CALCULATED.3IONS-SCNC: 10.7 MMOL/L (ref 5–15)
APTT PPP: 27 SECONDS (ref 22.2–34.2)
APTT PPP: 78.8 SECONDS (ref 22.2–34.2)
BH CV LOW VAS LEFT PERONEAL VESSEL: 1
BH CV LOW VAS RIGHT COMMON FEMORAL SPONT: 1
BH CV LOW VAS RIGHT DISTAL FEMORAL SPONT: 1
BH CV LOW VAS RIGHT MID FEMORAL SPONT: 1
BH CV LOW VAS RIGHT PERONEAL VESSEL: 1
BH CV LOW VAS RIGHT POPLITEAL SPONT: 1
BH CV LOW VAS RIGHT POSTERIOR TIBIAL VESSEL: 1
BH CV LOW VAS RIGHT SOLEAL VESSEL: 1
BH CV LOWER VASCULAR LEFT COMMON FEMORAL AUGMENT: NORMAL
BH CV LOWER VASCULAR LEFT COMMON FEMORAL COMPETENT: NORMAL
BH CV LOWER VASCULAR LEFT COMMON FEMORAL COMPRESS: NORMAL
BH CV LOWER VASCULAR LEFT COMMON FEMORAL PHASIC: NORMAL
BH CV LOWER VASCULAR LEFT COMMON FEMORAL SPONT: NORMAL
BH CV LOWER VASCULAR LEFT DISTAL FEMORAL AUGMENT: NORMAL
BH CV LOWER VASCULAR LEFT DISTAL FEMORAL COMPETENT: NORMAL
BH CV LOWER VASCULAR LEFT DISTAL FEMORAL COMPRESS: NORMAL
BH CV LOWER VASCULAR LEFT DISTAL FEMORAL PHASIC: NORMAL
BH CV LOWER VASCULAR LEFT DISTAL FEMORAL SPONT: NORMAL
BH CV LOWER VASCULAR LEFT GASTRONEMIUS COMPRESS: NORMAL
BH CV LOWER VASCULAR LEFT GREATER SAPH AK COMPRESS: NORMAL
BH CV LOWER VASCULAR LEFT MID FEMORAL COMPRESS: NORMAL
BH CV LOWER VASCULAR LEFT MID FEMORAL PHASIC: NORMAL
BH CV LOWER VASCULAR LEFT PERONEAL COMPRESS: NORMAL
BH CV LOWER VASCULAR LEFT POPLITEAL AUGMENT: NORMAL
BH CV LOWER VASCULAR LEFT POPLITEAL COMPETENT: NORMAL
BH CV LOWER VASCULAR LEFT POPLITEAL COMPRESS: NORMAL
BH CV LOWER VASCULAR LEFT POPLITEAL PHASIC: NORMAL
BH CV LOWER VASCULAR LEFT POPLITEAL SPONT: NORMAL
BH CV LOWER VASCULAR LEFT POSTERIOR TIBIAL AUGMENT: NORMAL
BH CV LOWER VASCULAR LEFT POSTERIOR TIBIAL COMPRESS: NORMAL
BH CV LOWER VASCULAR LEFT PROXIMAL FEMORAL COMPRESS: NORMAL
BH CV LOWER VASCULAR RIGHT COMMON FEMORAL AUGMENT: NORMAL
BH CV LOWER VASCULAR RIGHT COMMON FEMORAL COMPRESS: NORMAL
BH CV LOWER VASCULAR RIGHT COMMON FEMORAL PHASIC: NORMAL
BH CV LOWER VASCULAR RIGHT COMMON FEMORAL SPONT: NORMAL
BH CV LOWER VASCULAR RIGHT COMMON FEMORAL THROMBUS: NORMAL
BH CV LOWER VASCULAR RIGHT DISTAL FEMORAL COMPRESS: NORMAL
BH CV LOWER VASCULAR RIGHT DISTAL FEMORAL THROMBUS: NORMAL
BH CV LOWER VASCULAR RIGHT GASTRONEMIUS COMPRESS: NORMAL
BH CV LOWER VASCULAR RIGHT GREATER SAPH AK COMPRESS: NORMAL
BH CV LOWER VASCULAR RIGHT MID FEMORAL COMPRESS: NORMAL
BH CV LOWER VASCULAR RIGHT MID FEMORAL THROMBUS: NORMAL
BH CV LOWER VASCULAR RIGHT PERONEAL COMPRESS: NORMAL
BH CV LOWER VASCULAR RIGHT PERONEAL THROMBUS: NORMAL
BH CV LOWER VASCULAR RIGHT POPLITEAL AUGMENT: NORMAL
BH CV LOWER VASCULAR RIGHT POPLITEAL COMPETENT: NORMAL
BH CV LOWER VASCULAR RIGHT POPLITEAL COMPRESS: NORMAL
BH CV LOWER VASCULAR RIGHT POPLITEAL PHASIC: NORMAL
BH CV LOWER VASCULAR RIGHT POPLITEAL SPONT: NORMAL
BH CV LOWER VASCULAR RIGHT POPLITEAL THROMBUS: NORMAL
BH CV LOWER VASCULAR RIGHT POSTERIOR TIBIAL AUGMENT: NORMAL
BH CV LOWER VASCULAR RIGHT POSTERIOR TIBIAL COMPRESS: NORMAL
BH CV LOWER VASCULAR RIGHT POSTERIOR TIBIAL THROMBUS: NORMAL
BH CV LOWER VASCULAR RIGHT PROXIMAL FEMORAL COMPRESS: NORMAL
BH CV LOWER VASCULAR RIGHT SOLEAL COMPRESS: NORMAL
BH CV LOWER VASCULAR RIGHT SOLEAL THROMBUS: NORMAL
BUN SERPL-MCNC: 15 MG/DL (ref 8–23)
BUN/CREAT SERPL: 12.8 (ref 7–25)
CALCIUM SPEC-SCNC: 8.7 MG/DL (ref 8.6–10.5)
CHLORIDE SERPL-SCNC: 105 MMOL/L (ref 98–107)
CO2 SERPL-SCNC: 23.3 MMOL/L (ref 22–29)
CREAT SERPL-MCNC: 1.17 MG/DL (ref 0.76–1.27)
DEPRECATED RDW RBC AUTO: 47.8 FL (ref 37–54)
ERYTHROCYTE [DISTWIDTH] IN BLOOD BY AUTOMATED COUNT: 15.9 % (ref 12.3–15.4)
GFR SERPL CREATININE-BSD FRML MDRD: 61 ML/MIN/1.73
GLUCOSE BLDC GLUCOMTR-MCNC: 131 MG/DL (ref 70–99)
GLUCOSE SERPL-MCNC: 127 MG/DL (ref 65–99)
HCT VFR BLD AUTO: 32.2 % (ref 37.5–51)
HGB BLD-MCNC: 11.1 G/DL (ref 13–17.7)
MAGNESIUM SERPL-MCNC: 2.2 MG/DL (ref 1.6–2.4)
MAXIMAL PREDICTED HEART RATE: 145 BPM
MCH RBC QN AUTO: 28.9 PG (ref 26.6–33)
MCHC RBC AUTO-ENTMCNC: 34.5 G/DL (ref 31.5–35.7)
MCV RBC AUTO: 83.9 FL (ref 79–97)
PLATELET # BLD AUTO: 122 10*3/MM3 (ref 140–450)
PMV BLD AUTO: 10.7 FL (ref 6–12)
POTASSIUM SERPL-SCNC: 4.3 MMOL/L (ref 3.5–5.2)
RBC # BLD AUTO: 3.84 10*6/MM3 (ref 4.14–5.8)
SODIUM SERPL-SCNC: 139 MMOL/L (ref 136–145)
STRESS TARGET HR: 123 BPM
WBC NRBC COR # BLD: 4.61 10*3/MM3 (ref 3.4–10.8)

## 2022-01-27 PROCEDURE — 85730 THROMBOPLASTIN TIME PARTIAL: CPT | Performed by: INTERNAL MEDICINE

## 2022-01-27 PROCEDURE — 83735 ASSAY OF MAGNESIUM: CPT | Performed by: INTERNAL MEDICINE

## 2022-01-27 PROCEDURE — 99239 HOSP IP/OBS DSCHRG MGMT >30: CPT | Performed by: INTERNAL MEDICINE

## 2022-01-27 PROCEDURE — 85027 COMPLETE CBC AUTOMATED: CPT | Performed by: INTERNAL MEDICINE

## 2022-01-27 PROCEDURE — 80048 BASIC METABOLIC PNL TOTAL CA: CPT | Performed by: INTERNAL MEDICINE

## 2022-01-27 PROCEDURE — 82962 GLUCOSE BLOOD TEST: CPT

## 2022-01-27 PROCEDURE — 94799 UNLISTED PULMONARY SVC/PX: CPT

## 2022-01-27 PROCEDURE — 25010000002 HEPARIN (PORCINE) PER 1000 UNITS: Performed by: INTERNAL MEDICINE

## 2022-01-27 RX ORDER — ACETAMINOPHEN 325 MG/1
650 TABLET ORAL EVERY 4 HOURS PRN
Status: DISCONTINUED | OUTPATIENT
Start: 2022-01-27 | End: 2022-01-27 | Stop reason: HOSPADM

## 2022-01-27 RX ORDER — MORPHINE SULFATE 2 MG/ML
1 INJECTION, SOLUTION INTRAMUSCULAR; INTRAVENOUS EVERY 4 HOURS PRN
Status: DISCONTINUED | OUTPATIENT
Start: 2022-01-27 | End: 2022-01-27 | Stop reason: HOSPADM

## 2022-01-27 RX ORDER — NALOXONE HCL 0.4 MG/ML
0.4 VIAL (ML) INJECTION
Status: DISCONTINUED | OUTPATIENT
Start: 2022-01-27 | End: 2022-01-27 | Stop reason: HOSPADM

## 2022-01-27 RX ORDER — IPRATROPIUM BROMIDE AND ALBUTEROL SULFATE 2.5; .5 MG/3ML; MG/3ML
3 SOLUTION RESPIRATORY (INHALATION) EVERY 4 HOURS PRN
Status: DISCONTINUED | OUTPATIENT
Start: 2022-01-27 | End: 2022-01-27 | Stop reason: HOSPADM

## 2022-01-27 RX ORDER — ONDANSETRON 2 MG/ML
4 INJECTION INTRAMUSCULAR; INTRAVENOUS EVERY 6 HOURS PRN
Status: DISCONTINUED | OUTPATIENT
Start: 2022-01-27 | End: 2022-01-27 | Stop reason: HOSPADM

## 2022-01-27 RX ORDER — ONDANSETRON 4 MG/1
4 TABLET, FILM COATED ORAL EVERY 6 HOURS PRN
Status: DISCONTINUED | OUTPATIENT
Start: 2022-01-27 | End: 2022-01-27 | Stop reason: HOSPADM

## 2022-01-27 RX ADMIN — HEPARIN SODIUM 16 UNITS/KG/HR: 5000 INJECTION INTRAVENOUS; SUBCUTANEOUS at 06:30

## 2022-01-27 RX ADMIN — APIXABAN 10 MG: 5 TABLET, FILM COATED ORAL at 10:56

## 2022-01-27 RX ADMIN — LEVOTHYROXINE SODIUM 100 MCG: 0.1 TABLET ORAL at 06:10

## 2022-01-27 RX ADMIN — BUDESONIDE AND FORMOTEROL FUMARATE DIHYDRATE 2 PUFF: 160; 4.5 AEROSOL RESPIRATORY (INHALATION) at 07:32

## 2022-01-27 RX ADMIN — SODIUM CHLORIDE, PRESERVATIVE FREE 10 ML: 5 INJECTION INTRAVENOUS at 09:00

## 2022-01-27 RX ADMIN — IPRATROPIUM BROMIDE AND ALBUTEROL SULFATE 3 ML: 2.5; .5 SOLUTION RESPIRATORY (INHALATION) at 07:32

## 2022-01-27 NOTE — PLAN OF CARE
Problem: Adult Inpatient Plan of Care  Goal: Plan of Care Review  Outcome: Met  Flowsheets  Taken 1/27/2022 1427 by Sis Oakes, RN  Outcome Summary: Heparin drip was discontinued and eliquis was administered. Patient is going home with PO eliquis. VSS. Thrombectomy site in the right groin is clean and intact, bandage has some dried drainage. No pain or tenderness at the site. Patient is being discharged home.  Taken 1/27/2022 0414 by uLl Turk, RN  Progress: improving  Plan of Care Reviewed With:   patient   spouse   Goal Outcome Evaluation:              Outcome Summary: Heparin drip was discontinued and eliquis was administered. Patient is going home with PO eliquis. VSS. Thrombectomy site in the right groin is clean and intact, bandage has some dried drainage. No pain or tenderness at the site. Patient is being discharged home.

## 2022-01-27 NOTE — SIGNIFICANT NOTE
01/27/22 1316   Plan   Final Discharge Disposition Code 06 - home with home health care   Final Note Pt could possibly discharge home today. SW has notified [facility] of pts discharge. Pt has home oxygen through [employer].

## 2022-01-27 NOTE — SIGNIFICANT NOTE
01/27/22 1053   Coping/Psychosocial   Additional Documentation Spiritual Care (Group)   Spiritual Care   Use of Spiritual Resources spirituality for coping, indicated strong use of   Spiritual Care Source  initiative  (daily rounding)   Spiritual Care Follow-Up follow-up, none required as presently assessed   Response to Spiritual Care thanks expressed; receptive of support; engaged in conversation   Spiritual Care Interventions supportive conversation provided; theological discussion provided   Spiritual Care Visit Type initial   Spiritual Care Request coping/stress of illness support   Receptivity to Spiritual Care visit welcomed   At time of visit pt is on 4MTU with his wife by his side. Introductions made and my role explained.    No needs at this time.

## 2022-01-27 NOTE — DISCHARGE SUMMARY
[facility]         HOSPITALIST  DISCHARGE SUMMARY    Patient Name: Elmer Garvin  : 1945  MRN: [id number]    Date of Admission: 2022  Date of Discharge:  2022  Primary Care Physician: Erik Macias,     Consults     Date and Time Order Name Status Description    2022  6:12 PM Inpatient Cardiology Consult Completed     2022  3:44 PM Cardiology (on-call MD unless specified)      2022  3:32 PM IP General Consult (Use specialty-specific consult if known)      2021  1:42 PM Inpatient Pulmonology Consult Completed           Active and Resolved Hospital Problems:  Pulmonary embolism with right heart strain  Chronic hypoxemic respiratory failure   Recent COVID-19 admission  Sick sinus syndrome with pacemaker    Hospital Course     Hospital Course:  Elmer Garvin is a 76 y.o. male patient with past medical history of Covid pneumonia, hypothyroidism, sick sinus syndrome with pacemaker in place, and history of tobacco abuse who presents on 2022 after having an elevated D-dimer in pulmonary clinic.     Patient was recently admitted to hospital from 2021 to 2022 for Covid pneumonia and hypoxemic respiratory failure and ARDS.    Patient has been doing well since that time, seen in pulmonary clinic where a D-dimer was obtained, 6.17.    Follow-up CTA of the chest shows PE with right heart strain therefore patient sent to the emergency department.  Dr. Penn of cardiology was consulted for possible intervention due to right heart strain.  Patient had mechanical thrombectomy and pulmonary angiogram performed on 2022.  Patient tolerated the procedure well with no complications.  Patient was transitioned to [facility] and discharged home in stable condition.  Patient seen on date of discharge, clinically and hemodynamically stable.  Patient provided concerning signs and symptoms prompting immediate medical attention, patient understanding and  agreeable    DISCHARGE Follow Up Recommendations for labs and diagnostics:   Follow-up with PCP in less than 1 week  Follow-up with pulmonology clinic  Follow-up with cardiology in 4 weeks      Day of Discharge     Vital Signs:  Temp:  [97.2 °F (36.2 °C)-97.7 °F (36.5 °C)] 97.7 °F (36.5 °C)  Heart Rate:  [65-74] 70  Resp:  [18] 18  BP: (104-132)/(52-66) 122/57  Flow (L/min):  [0.5-2] 0.5  Physical Exam:               Constitutional: Awake, alert, no acute distress              Eyes: Pupils equal, sclerae anicteric, no conjunctival injection              HENT: NCAT, mucous membranes moist              Neck: Supple, no thyromegaly, no lymphadenopathy, trachea midline              Respiratory: Clear to auscultation bilaterally, nonlabored respirations               Cardiovascular: RRR, no murmurs, rubs, or gallops, palpable pedal pulses bilaterally              Gastrointestinal: Positive bowel sounds, soft, nontender, nondistended              Musculoskeletal: No bilateral ankle edema, no clubbing or cyanosis to extremities              Psychiatric: Appropriate affect, cooperative              Neurologic: Oriented x 3, strength symmetric in all extremities, Cranial Nerves grossly intact to confrontation, speech clear              Skin: No rashes       Discharge Details        Discharge Medications      Changes to Medications      Instructions Start Date   levothyroxine 100 MCG tablet  Commonly known as: SYNTHROID, LEVOTHROID  What changed: additional instructions   100 mcg, Oral, Daily         Continue These Medications      Instructions Start Date   albuterol sulfate  (90 Base) MCG/ACT inhaler  Commonly known as: PROVENTIL HFA;VENTOLIN HFA;PROAIR HFA   2 puffs, Inhalation, Every 4 Hours PRN      cetirizine 10 MG tablet  Commonly known as: zyrTEC   10 mg, Oral, Daily      Eliquis DVT/PE Starter Pack tablet therapy pack  Generic drug: Apixaban Starter Pack   5 mg, Oral, Take As Directed      fluticasone 50  MCG/ACT nasal spray  Commonly known as: FLONASE   2 sprays, Nasal, Daily      ipratropium-albuterol 0.5-2.5 mg/3 ml nebulizer  Commonly known as: DUO-NEB   3 mL, Nebulization, 4 Times Daily PRN      losartan 100 MG tablet  Commonly known as: COZAAR   100 mg, Oral, Daily      melatonin 5 MG tablet tablet   10 mg, Oral, Nightly      montelukast 10 MG tablet  Commonly known as: SINGULAIR   10 mg, Oral, Daily      pantoprazole 40 MG EC tablet  Commonly known as: PROTONIX   40 mg, Oral, Every Morning      predniSONE 10 MG tablet  Commonly known as: DELTASONE   6 daily x 2 days, 5 daily x 2 days, 4 daily x 2 days, 3 daily x 2 days, 2 daily x 2 days, 1 daily x 2 days         Stop These Medications    doxycycline 100 MG capsule  Commonly known as: VIBRAMYCIN            Allergies   Allergen Reactions   • Latex Other (See Comments)     Sinus congestion       Discharge Disposition:  Home or Self Care    Diet:  Hospital:  Diet Order   Procedures   • Diet Regular; Cardiac       Discharge Activity:   Activity Instructions     Activity as Tolerated            CODE STATUS:  Code Status and Medical Interventions:   Ordered at: 01/27/22 0729     Level Of Support Discussed With:    Patient     Code Status (Patient has no pulse and is not breathing):    CPR (Attempt to Resuscitate)     Medical Interventions (Patient has pulse or is breathing):    Full Support         Future Appointments   Date Time Provider Department Center   3/3/2022  1:15 PM Celia Hampton APRN [facility]   3/28/2022 11:30 AM Erik Macias DO [facility]   4/12/2022  1:00 PM KUSUM IN OFFICE, [facility]   4/12/2022  1:40 PM Cyrus Mccray MD [facility]       Additional Instructions for the Follow-ups that You Need to Schedule     Discharge Follow-up with PCP   As directed       Currently Documented PCP:    Erik Macias DO    PCP Phone Number:    [phone]     Follow Up Details: In less than one week          Discharge Follow-up with Specified Provider: Dr Penn; 1 Month   As directed      To: Dr Penn    Follow Up: 1 Month               Pertinent  and/or Most Recent Results     PROCEDURES:   Percutaneous manual thrombectomy  Pulmonary angiogram    LAB RESULTS:      Lab 01/27/22  1232 01/27/22  0513 01/26/22  0423 01/25/22  2149 01/25/22  1547 01/25/22  1429 01/24/22  1534   WBC  --  4.61 4.50 5.57  --  5.47 5.29   HEMOGLOBIN  --  11.1* 12.9* 13.4  --  14.2 14.4   HEMATOCRIT  --  32.2* 38.5 39.8  --  42.7 44.3   PLATELETS  --  122* 121* 123*  --  121* 129*   NEUTROS ABS  --   --  2.99 4.44  --  4.63 3.45   IMMATURE GRANS (ABS)  --   --  0.02 0.02  --  0.02 0.03   LYMPHS ABS  --   --  0.87 0.56*  --  0.52* 1.00   MONOS ABS  --   --  0.55 0.52  --  0.22 0.65   EOS ABS  --   --  0.05 0.02  --  0.06 0.13   MCV  --  83.9 86.1 85.4  --  85.6 87.9   PROCALCITONIN  --   --  0.05  --   --   --   --    LACTATE  --   --  2.1* 2.1*  --   --   --    PROTIME  --   --   --   --  9.4  --   --    APTT 27.0 78.8* 48.6* 49.8* 21.1*  --   --          Lab 01/27/22  0513 01/26/22  0423 01/25/22  1429 01/24/22  1534   SODIUM 139 142 140 140   POTASSIUM 4.3 4.2 4.6 4.2   CHLORIDE 105 108* 104 106   CO2 23.3 23.6 24.8 25.9   ANION GAP 10.7 10.4 11.2 8.1   BUN 15 14 16 19   CREATININE 1.17 1.06 1.11 1.29*   GLUCOSE 127* 126* 174* 114*   CALCIUM 8.7 8.8 9.3 8.9   MAGNESIUM 2.2 2.2  --   --          Lab 01/26/22  0423 01/25/22  1429 01/24/22  1534   TOTAL PROTEIN 5.9* 6.8 6.2   ALBUMIN 3.50 4.00 3.80   GLOBULIN 2.4 2.8 2.4   ALT (SGPT) 20 25 26   AST (SGOT) 12 16 13   BILIRUBIN 0.6 0.6 0.5   ALK PHOS 39 49 41         Lab 01/25/22  2149 01/25/22  1547 01/25/22  1429   PROBNP  --   --  224.9   TROPONIN T <0.010  --  <0.010   PROTIME  --  9.4  --    INR  --  0.87*  --                  Brief Urine Lab Results     None        Microbiology Results (last 10 days)     ** No results found for the last 240 hours. **          CT Chest Pulmonary  Embolism    Result Date: 1/25/2022  Impression:   CT scan of the chest with IV contrast demonstrating pulmonary thromboembolic disease.  Right heart strain is evident.  Multifocal pneumonia.  I discussed findings with Dr. Gong.  I subsequently spoke with the patient.  He and his wife were concerned that his breathing was worsened.  I subsequently relayed instructions for the patient to go over to the office to be seen.  The patient had an IV protected by Tegaderm.  The patient and his wife did not wish to remove this in case he was to be admitted.  They assured me they would get this addressed at the office if he was sent on home.     EMELIA LAZO MD       Electronically Signed and Approved By: EMELIA LAZO MD on 1/25/2022 at 13:22             XR Chest PA & Lateral    Result Date: 1/24/2022  Impression:  Slight worsening in radiographic appearance of pneumonia.  Developing postinfectious fibrosis not excluded     VALDEMAR SIERRA MD       Electronically Signed and Approved By: VALDEMAR SIERRA MD on 1/24/2022 at 15:37               Results for orders placed during the hospital encounter of 01/25/22    Duplex Venous Lower Extremity - Bilateral CAR    Interpretation Summary  · Acute right lower extremity deep vein thrombosis noted in the common femoral, mid femoral, distal femoral, popliteal, posterior tibial, peroneal and soleal.  · Acute left lower extremity deep vein thrombosis noted in the peroneal.  · All other veins appeared normal bilaterally.      Results for orders placed during the hospital encounter of 01/25/22    Duplex Venous Lower Extremity - Bilateral CAR    Interpretation Summary  · Acute right lower extremity deep vein thrombosis noted in the common femoral, mid femoral, distal femoral, popliteal, posterior tibial, peroneal and soleal.  · Acute left lower extremity deep vein thrombosis noted in the peroneal.  · All other veins appeared normal bilaterally.      Results for orders placed during the  hospital encounter of 01/25/22    Adult Transthoracic Echo Complete W/ Cont if Necessary Per Protocol    Interpretation Summary  Normal left ventricular systolic function with an estimated ejection fraction of 60-65%.  No regional wall motion abnormalities were observed.  Left ventricular diastolic function is consistent with impaired relaxation (grade I diastolic dysfunction) and the patient's age.  Mildly dilated right ventricle.  Normal right ventricular systolic function.  Mild-moderate tricuspid regurgitation, but no hemodynamically significant valvular pathology.  Estimated pulmonary arterial systolic pressure was moderately elevated at 47 mmHg.      Labs Pending at Discharge:        Time spent on Discharge including face to face service:  35 minutes    Electronically signed by Nacho Morris MD, 01/27/22, 1:57 PM EST.

## 2022-01-27 NOTE — SIGNIFICANT NOTE
01/27/22 1318   Plan   Plan Patient unable to safely use a cane or walker .Due to immobility related to diagnosis, a standard wheelchair is needed to assist with daily living activities inside the home. Patient has upper body strength and mental capabilities to propel the wheelchair inside the home.

## 2022-01-27 NOTE — NURSING NOTE
"Summary:    Patient blood pressures post mechanical thrombectomy were mostly 120s/50s-60s. HR was low 60s-70s. Pacer spikes were not evident on monitor but back up pace rate is 60 ([employer] pacer placed in 2015). He is DDDR paced. Per most recent interrogation documented (07/2020) his atria are paced often but ventricles rarely paced; but unable to see \"a-paced\" rhythm on monitor at this time.     Patient's BLE U/S demonstrated thrombi in both extremities. Currently on heparin gtt and it's therapeutic.    Patient did have some epistaxis overnight; predominantly out of R nostril. Attempted humidified O2, then lubricating jelly, ice, and then 2x2 gauze dampened with saline.     1/27 @ 0630: aPTT this a.m. was 78.8. Per Protocol, decreased heparin by 2u/kg/hr. Decreased from 18u/kg/hr to 16u/kg/hr (1400u/hr [14cc/hr]). Ordered repeated aPTT for 1230.   "

## 2022-01-27 NOTE — SIGNIFICANT NOTE
01/27/22 1419   Plan   Final Note Pt discharged home with [facility]. Sw let [facility] know pt is discharging today. Order for walker placed and [facility] asked to follow up with pt regarding any copays. Pt will dc home with Eliquis. Free 30day voucher called into pharamcy. If pt continues med for longer than 30 days, cost will be about $400 until pt meets deductable. Pts wife aware. Follow up with Dr Penn made for 3/1 and pcp 2/1. Pts wife requested new pcp list and will call for new pcp for pt. Pts wife denies any dc needs.

## 2022-01-27 NOTE — CASE MANAGEMENT/SOCIAL WORK
Discharge Planning Assessment   [facility]     Patient Name: Elmer Garvin  MRN: [id number]  Today's Date: 1/27/2022    Admit Date: 1/25/2022     Discharge Needs Assessment     Row Name 01/27/22 1047       Living Environment    Lives With spouse    Current Living Arrangements home/apartment/condo    Primary Care Provided by self    Family Caregiver if Needed spouse    Quality of Family Relationships helpful; involved; supportive    Able to Return to Prior Arrangements yes       Resource/Environmental Concerns    Resource/Environmental Concerns none       Transition Planning    Patient/Family Anticipates Transition to home    Patient/Family Anticipated Services at Transition durable medical equipment  Pts wife asked for a wheelchair.    Transportation Anticipated family or friend will provide       Discharge Needs Assessment    Readmission Within the Last 30 Days other (see comments)  See re-admission comment    Current Outpatient/Agency/Support Group homecare agency; other (see comments)  Pt current with [facility] and [facility] for dme needs.    Equipment Currently Used at Home walker, rolling; nebulizer; commode; oxygen; pulse ox    Concerns to be Addressed discharge planning; medication    Anticipated Changes Related to Illness none    Equipment Needed After Discharge wheelchair, manual    Discharge Facility/Level of Care Needs home with home health               Discharge Plan     Row Name 01/27/22 1050       Plan    Plan Pt has hx of covid from 12/26. Called into pts room and pts wife answered phone. Pt is alert and oriented, independent with walker at home. Pts wife states pt had been doing well after dc on 1/10. Pt started having soa about a week after dc and presented to pulm office for scheduled followup appt. Pt found to have DVT and bilateral PE. Pt stated on Eliquis inpt and will dc home on medication. Pt is current with [facility] and [facility] for dme. Pt has O2, neb and pulse ox given to him at last  admission. Pts wife requested a standard wc. Order placed and [facility] aware. Pts wife asked for it to be delivered to home after dc. Will need to check price of Eliquis at dc. Pts wife states they have deductable to meet for medications. Pts wife denies any other dc needs.              Continued Care and Services - Admitted Since 1/25/2022     Durable Medical Equipment     Service Provider Request Status Selected Services Address Phone Fax Patient Preferred    [facility] [place]  Pending - Request Sent N/A [address] [place] [address] [phone] --       Internal Comment last updated by Jennifer Prasad RN 1/27/2022 1037    Pts wife requested a wheelchair please.                             Demographic Summary     Row Name 01/27/22 1038       General Information    Admission Type inpatient    Arrived From physician office    Reason for Consult discharge planning    Preferred Language English       Contact Information    Permission Granted to Share Info With family/designee  Pts wife at bedside.               Functional Status     Row Name 01/27/22 1038       Functional Status    Usual Activity Tolerance good    Current Activity Tolerance good       Functional Status, IADL    Medications independent    Meal Preparation independent; assistive person    Housekeeping assistive person    Laundry assistive person    Shopping assistive person    IADL Comments Pts wife assists when needed.       Mental Status Summary    Recent Changes in Mental Status/Cognitive Functioning no changes               Psychosocial    No documentation.                Abuse/Neglect    No documentation.                Legal    No documentation.                Substance Abuse    No documentation.                Patient Forms    No documentation.                   Jennifer Prasad, RN

## 2022-01-28 ENCOUNTER — TRANSITIONAL CARE MANAGEMENT TELEPHONE ENCOUNTER (OUTPATIENT)
Dept: CALL CENTER | Facility: HOSPITAL | Age: 77
End: 2022-01-28

## 2022-01-28 NOTE — OUTREACH NOTE
Call Center TCM Note      Responses   [facility] patient discharged from? [facility]   Does the patient have one of the following disease processes/diagnoses(primary or secondary)? Other   TCM attempt successful? Yes   Call start time 1217   Call end time 1243   Discharge diagnosis Pulmonary embolism with right heart strain mechanical thrombectomy and pulmonary angiogram    Person spoke with today (if not patient) and relationship Lily,wife   Medication alerts for this patient Eliquis--Bleeding precautions reviewed   Meds reviewed with patient/caregiver? Yes   Is the patient having any side effects they believe may be caused by any medication additions or changes? No   Does the patient have all medications ordered at discharge? Yes   Is the patient taking all medications as directed (includes completed medication regime)? Yes   Comments regarding appointments See AVS   Does the patient have a primary care provider?  Yes   Does the patient have an appointment with their PCP within 7 days of discharge? Yes   Comments regarding PCP Hospital PCP FOLLOW UP APPOINTMENT IS 2/1/22@1045am   Has the patient kept scheduled appointments due by today? N/A   What is the Home health agency?  [facility]---visit scheduled for today    Has home health visited the patient within 72 hours of discharge? Call prior to 72 hours   What DME was ordered? [facility]- walker---to be delivered   Has all DME been delivered? No   DME comments O2 at 2 lpm per nc    Psychosocial issues? No   Did the patient receive a copy of their discharge instructions? Yes   Nursing interventions Reviewed instructions with patient   What is the patient's perception of their health status since discharge? Same  [Wife monitors sats/pulse closely and is concerned with variations in readings--reports on the ride home yesterday pt pulse in 110's but will normalize to 70's at times.   Pt sats in 90's w/oxygen use. ]   Is the patient/caregiver able to teach back  signs and symptoms related to disease process for when to call PCP? Yes   Is the patient/caregiver able to teach back signs and symptoms related to disease process for when to call 911? Yes   Is the patient/caregiver able to teach back the hierarchy of who to call/visit for symptoms/problems? PCP, Specialist, Home health nurse, Urgent Care, ED, 911 Yes   If the patient is a current smoker, are they able to teach back resources for cessation? Not a smoker   Additional teach back comments Encouraged to monitor sats to keep above 88%--return to hospital if sats below 88 despite use of oxygen.  Patient is not having any s/s with elevated heartrate, patient will have periods w/oxygen decrease but recovers very quickly.  Pt is on steroids which may be a contributing factor r/t heartrate.  Wife to monitor and understands to seek care as needed.  Pt has HH and wife also advised of nurse call center line. Pt s/s thrombectomy--pt had a PE which was causing cardiac strain--anticoagulants started.     TCM call completed? Yes   Wrap up additional comments Will forward wife concerns r/t elevation in heart rate to cardiology.           Debby Feliciano, EMILIANO    1/28/2022, 13:16 EST

## 2022-01-28 NOTE — OUTREACH NOTE
Prep Survey      Responses   [facility] patient discharged from? [facility]   Is LACE score < 7 ? No   Emergency Room discharge w/ pulse ox? No   Eligibility [facility]   Date of Admission 01/25/22   Date of Discharge 01/27/22   Discharge Disposition Home-Health Care Svc   Discharge diagnosis Pulmonary embolism with right heart strainmechanical thrombectomy and pulmonary angiogram    Does the patient have one of the following disease processes/diagnoses(primary or secondary)? Other   Does the patient have Home health ordered? Yes   What is the Home health agency?  [facility]   Is there a DME ordered? Yes   What DME was ordered? [facility]- walker   Prep survey completed? Yes          Ilana Anne RN

## 2022-01-31 ENCOUNTER — TELEPHONE (OUTPATIENT)
Dept: CARDIOLOGY | Facility: CLINIC | Age: 77
End: 2022-01-31

## 2022-01-31 ENCOUNTER — OUTSIDE FACILITY SERVICE (OUTPATIENT)
Dept: FAMILY MEDICINE CLINIC | Facility: CLINIC | Age: 77
End: 2022-01-31

## 2022-01-31 PROCEDURE — G0180 MD CERTIFICATION HHA PATIENT: HCPCS | Performed by: FAMILY MEDICINE

## 2022-01-31 NOTE — TELEPHONE ENCOUNTER
Received message from Debby Feliciano where patient's HR was 100.    S/W patient's wife and she stated that it only elevates with increased activity and then gets better with rest. Advised to monitor and call back with any worsening of symptoms.

## 2022-02-01 ENCOUNTER — OFFICE VISIT (OUTPATIENT)
Dept: FAMILY MEDICINE CLINIC | Facility: CLINIC | Age: 77
End: 2022-02-01

## 2022-02-01 VITALS
DIASTOLIC BLOOD PRESSURE: 66 MMHG | HEIGHT: 74 IN | TEMPERATURE: 96.8 F | OXYGEN SATURATION: 93 % | SYSTOLIC BLOOD PRESSURE: 139 MMHG | WEIGHT: 200 LBS | BODY MASS INDEX: 25.67 KG/M2 | HEART RATE: 78 BPM | RESPIRATION RATE: 20 BRPM

## 2022-02-01 DIAGNOSIS — J12.82 PNEUMONIA DUE TO COVID-19 VIRUS: ICD-10-CM

## 2022-02-01 DIAGNOSIS — U07.1 PNEUMONIA DUE TO COVID-19 VIRUS: ICD-10-CM

## 2022-02-01 DIAGNOSIS — J96.01 ACUTE RESPIRATORY FAILURE WITH HYPOXIA: Primary | ICD-10-CM

## 2022-02-01 DIAGNOSIS — I26.09 OTHER ACUTE PULMONARY EMBOLISM WITH ACUTE COR PULMONALE: ICD-10-CM

## 2022-02-01 PROBLEM — H43.813 BILATERAL VITREOUS DETACHMENT: Status: ACTIVE | Noted: 2017-01-12

## 2022-02-01 PROBLEM — H26.40 AFTER CATARACT: Status: ACTIVE | Noted: 2019-02-28

## 2022-02-01 PROCEDURE — 99495 TRANSJ CARE MGMT MOD F2F 14D: CPT | Performed by: FAMILY MEDICINE

## 2022-02-01 PROCEDURE — 1111F DSCHRG MED/CURRENT MED MERGE: CPT | Performed by: FAMILY MEDICINE

## 2022-02-01 NOTE — ASSESSMENT & PLAN NOTE
We will continue his apixaban on a regular basis.  He will need to be on the Eliquis for 6 months.

## 2022-02-01 NOTE — ASSESSMENT & PLAN NOTE
He does seem to really probably improved from his Covid pneumonia.  Unfortunately he got a secondary pulmonary embolism from his Covid infection.  As result he remains short of breath.

## 2022-02-01 NOTE — PROGRESS NOTES
Chief Complaint   Patient presents with   • Hospital Follow Up Visit        Subjective     Elmer Garvin  has a past medical history of Hypertension, Lyme disease, Shortness of breath, and SSS (sick sinus syndrome) (HCC).    [facility]ospital [facility] is originally admitted for Covid pneumonia December 26, 2021 through January 10, 2022.  He initially got home he was doing well and improving.  He then started to getting more short of breath and more fatigued.  He was unable to initially follow-up due to severe weather but then was able to follow-up with pulmonology.  At that time did get a CT of his chest which showed a pulmonary embolism.  He was subsequently readmitted January 25 through January 27, 2022.  Since he has been discharged first and second time he did get a call from the discharge nurse.  He did not have any additional needs at that time.  Since he has been home he does remain somewhat short of breath.  He is currently on oxygen at 2 L/min.  At rest his oxygen saturations are typically in the 90s.  He does drop down into the 80s with any physical activity.  He is taking his Eliquis on a regular basis.      PHQ-2 Depression Screening  Little interest or pleasure in doing things? 0   Feeling down, depressed, or hopeless? 0   PHQ-2 Total Score 0   PHQ-9 Depression Screening  Little interest or pleasure in doing things? 0   Feeling down, depressed, or hopeless? 0   Trouble falling or staying asleep, or sleeping too much?     Feeling tired or having little energy?     Poor appetite or overeating?     Feeling bad about yourself - or that you are a failure or have let yourself or your family down?     Trouble concentrating on things, such as reading the newspaper or watching television?     Moving or speaking so slowly that other people could have noticed? Or the opposite - being so fidgety or restless that you have been moving around a lot more than usual?     Thoughts that you would be better off dead, or of  hurting yourself in some way?     PHQ-9 Total Score 0   If you checked off any problems, how difficult have these problems made it for you to do your work, take care of things at home, or get along with other people?       Allergies   Allergen Reactions   • Latex Other (See Comments)     Sinus congestion       Prior to Admission medications    Medication Sig Start Date End Date Taking? Authorizing Provider   albuterol sulfate  (90 Base) MCG/ACT inhaler Inhale 2 puffs Every 4 (Four) Hours As Needed for Wheezing. 1/24/22  Yes Celia Hampton APRN   Apixaban Starter Pack (Eliquis DVT/PE Starter Pack) tablet therapy pack Take two 5 mg tablets by mouth every 12 hours for 7 days. Followed by one 5 mg tablet every 12 hours. (Dispense starter pack if available) 1/25/22  Yes Wilton Gong,    cetirizine (zyrTEC) 10 MG tablet Take 1 tablet by mouth Daily. 1/24/22  Yes Celia Hampton APRN   fluticasone (FLONASE) 50 MCG/ACT nasal spray 2 sprays into the nostril(s) as directed by provider Daily for 30 days. 1/10/22 2/9/22 Yes Diann Cuevas MD   ipratropium-albuterol (DUO-NEB) 0.5-2.5 mg/3 ml nebulizer Take 3 mL by nebulization 4 (Four) Times a Day As Needed for Wheezing or Shortness of Air. 1/24/22  Yes Celia Hampton APRN   levothyroxine (SYNTHROID, LEVOTHROID) 100 MCG tablet Take 1 tablet by mouth Daily for 30 days.  Patient taking differently: Take 100 mcg by mouth Daily. Pt can only take name brand (Sythroid) 1/10/22 2/9/22 Yes Diann Cuevas MD   melatonin 5 MG tablet tablet Take 2 tablets by mouth Every Night for 30 days. 1/10/22 2/9/22 Yes Diann Cuevas MD   montelukast (SINGULAIR) 10 MG tablet Take 1 tablet by mouth Daily for 90 days. 1/24/22 4/24/22 Yes Celia Hampton APRN   pantoprazole (PROTONIX) 40 MG EC tablet Take 1 tablet by mouth Every Morning for 30 days. 1/11/22 2/10/22 Yes Diann Cuevas MD   predniSONE (DELTASONE) 10 MG tablet 6 daily x 2 days, 5 daily x 2 days, 4 daily x  2 days, 3 daily x 2 days, 2 daily x 2 days, 1 daily x 2 days 1/24/22  Yes Celia Hampton APRN   losartan (COZAAR) 100 MG tablet Take 1 tablet by mouth Daily for 90 days. 9/27/21 1/25/22  Erik Macias DO        Patient Active Problem List   Diagnosis   • Angina at rest ([facility])   • Hypertension   • SSS (sick sinus syndrome) ([facility])   • Mild intermittent asthma without complication   • Gastroesophageal reflux disease without esophagitis   • Taylor's esophagus without dysplasia   • Acquired hypothyroidism   • MRSA (methicillin resistant Staphylococcus aureus)   • Pre-diabetes   • Acute respiratory failure with hypoxia ([facility])   • Arthritis   • Asthma   • Benign prostatic hyperplasia   • Cataract   • Deviated nasal septum   • Family history of prostate cancer in father   • Hypertrophy of both inferior nasal turbinates   • Impaired glucose tolerance   • 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   • Nasal congestion   • Nasal obstruction   • Pacemaker   • Acute on chronic respiratory failure with hypoxia ([facility])   • Acute pulmonary embolism ([facility])   • Bilateral vitreous detachment   • Epiretinal membrane   • Glaucomatous atrophy of optic disc   • After cataract   • Pneumonia due to COVID-19 virus        Past Surgical History:   Procedure Laterality Date   • CARDIAC CATHETERIZATION N/A 7/25/2019    Procedure: Coronary angiography;  Surgeon: Wilton Brown MD;  Location: [facility] INVASIVE LOCATION;  Service: Cardiovascular   • CARDIAC CATHETERIZATION N/A 7/25/2019    Procedure: Left heart cath;  Surgeon: Wilton Brown MD;  Location: [facility] CATH INVASIVE LOCATION;  Service: Cardiovascular   • CARDIAC CATHETERIZATION N/A 7/25/2019    Procedure: Left ventriculography;  Surgeon: Wilton Brown MD;  Location: [facility] INVASIVE LOCATION;  Service: Cardiovascular   • CARDIAC CATHETERIZATION N/A 7/25/2019    Procedure: Right heart cath;  Surgeon: Wilton Brown MD;  Location: [facility]  "CATH INVASIVE LOCATION;  Service: Cardiovascular   • CATARACT EXTRACTION     • INSERT / REPLACE / REMOVE PACEMAKER  08/2014   • PACEMAKER IMPLANTATION         Social History     Socioeconomic History   • Marital status:    Tobacco Use   • Smoking status: Former Smoker     Years: 12.00     Types: Pipe   • Smokeless tobacco: Never Used   • Tobacco comment: CAFFEINE USE 3 CUPS COFFEE DAILY   Vaping Use   • Vaping Use: Never used   Substance and Sexual Activity   • Alcohol use: No   • Drug use: Never   • Sexual activity: Defer       Family History   Problem Relation Age of Onset   • Cancer Mother    • Heart disease Father    • Cancer Father    • Heart disease Sister    • Sudden death Sister    • Heart disease Brother    • Cancer Paternal Grandfather        Family history, surgical history, past medical history, Allergies and med's reviewed with patient today and updated in [employer] EMR.     ROS:  Review of Systems   Constitutional: Positive for fatigue. Negative for chills and fever.   HENT: Positive for congestion and postnasal drip.    Respiratory: Positive for cough, chest tightness, shortness of breath and wheezing.    Cardiovascular: Negative for palpitations and leg swelling.       OBJECTIVE:  Vitals:    02/01/22 1049   BP: 139/66   Pulse: 78   Resp: 20   Temp: 96.8 °F (36 °C)   TempSrc: Temporal   SpO2: 93%   Weight: 90.7 kg (200 lb)   Height: 188 cm (74\")     No exam data present   Body mass index is 25.68 kg/m².  No LMP for male patient.    Physical Exam  Vitals and nursing note reviewed.   Constitutional:       General: He is not in acute distress.     Appearance: Normal appearance. He is normal weight. He is not ill-appearing.   HENT:      Head: Normocephalic.      Right Ear: Tympanic membrane, ear canal and external ear normal.      Left Ear: Tympanic membrane, ear canal and external ear normal.      Nose: Nose normal.      Mouth/Throat:      Mouth: Mucous membranes are moist.      Pharynx: Oropharynx is " clear.   Eyes:      General: No scleral icterus.     Conjunctiva/sclera: Conjunctivae normal.      Pupils: Pupils are equal, round, and reactive to light.   Cardiovascular:      Rate and Rhythm: Normal rate and regular rhythm.      Pulses: Normal pulses.      Heart sounds: Normal heart sounds. No murmur heard.      Pulmonary:      Effort: Pulmonary effort is normal.      Breath sounds: Normal breath sounds. No wheezing, rhonchi or rales.   Musculoskeletal:      Cervical back: No rigidity or tenderness.   Lymphadenopathy:      Cervical: No cervical adenopathy.   Skin:     General: Skin is warm and dry.      Coloration: Skin is not jaundiced.      Findings: No rash.   Neurological:      General: No focal deficit present.      Mental Status: He is alert and oriented to person, place, and time.   Psychiatric:         Mood and Affect: Mood normal.         Thought Content: Thought content normal.         Judgment: Judgment normal.         Procedures    No results displayed because visit has over 200 results.      Lab on 01/24/2022   Component Date Value Ref Range Status   • Glucose 01/24/2022 114* 65 - 99 mg/dL Final   • BUN 01/24/2022 19  8 - 23 mg/dL Final   • Creatinine 01/24/2022 1.29* 0.76 - 1.27 mg/dL Final   • Sodium 01/24/2022 140  136 - 145 mmol/L Final   • Potassium 01/24/2022 4.2  3.5 - 5.2 mmol/L Final   • Chloride 01/24/2022 106  98 - 107 mmol/L Final   • CO2 01/24/2022 25.9  22.0 - 29.0 mmol/L Final   • Calcium 01/24/2022 8.9  8.6 - 10.5 mg/dL Final   • Total Protein 01/24/2022 6.2  6.0 - 8.5 g/dL Final   • Albumin 01/24/2022 3.80  3.50 - 5.20 g/dL Final   • ALT (SGPT) 01/24/2022 26  1 - 41 U/L Final   • AST (SGOT) 01/24/2022 13  1 - 40 U/L Final   • Alkaline Phosphatase 01/24/2022 41  39 - 117 U/L Final   • Total Bilirubin 01/24/2022 0.5  0.0 - 1.2 mg/dL Final   • eGFR Non African Amer 01/24/2022 54* >60 mL/min/1.73 Final   • Globulin 01/24/2022 2.4  gm/dL Final   • A/G Ratio 01/24/2022 1.6  g/dL Final   •  BUN/Creatinine Ratio 01/24/2022 14.7  7.0 - 25.0 Final   • Anion Gap 01/24/2022 8.1  5.0 - 15.0 mmol/L Final   • D-Dimer, Quantitative 01/24/2022 6.17* 0.00 - 0.59 mg/L (FEU) Final   • WBC 01/24/2022 5.29  3.40 - 10.80 10*3/mm3 Final   • RBC 01/24/2022 5.04  4.14 - 5.80 10*6/mm3 Final   • Hemoglobin 01/24/2022 14.4  13.0 - 17.7 g/dL Final   • Hematocrit 01/24/2022 44.3  37.5 - 51.0 % Final   • MCV 01/24/2022 87.9  79.0 - 97.0 fL Final   • MCH 01/24/2022 28.6  26.6 - 33.0 pg Final   • MCHC 01/24/2022 32.5  31.5 - 35.7 g/dL Final   • RDW 01/24/2022 14.9  12.3 - 15.4 % Final   • RDW-SD 01/24/2022 47.8  37.0 - 54.0 fl Final   • MPV 01/24/2022 11.6  6.0 - 12.0 fL Final   • Platelets 01/24/2022 129* 140 - 450 10*3/mm3 Final   • Neutrophil % 01/24/2022 65.1  42.7 - 76.0 % Final   • Lymphocyte % 01/24/2022 18.9* 19.6 - 45.3 % Final   • Monocyte % 01/24/2022 12.3* 5.0 - 12.0 % Final   • Eosinophil % 01/24/2022 2.5  0.3 - 6.2 % Final   • Basophil % 01/24/2022 0.6  0.0 - 1.5 % Final   • Immature Grans % 01/24/2022 0.6* 0.0 - 0.5 % Final   • Neutrophils, Absolute 01/24/2022 3.45  1.70 - 7.00 10*3/mm3 Final   • Lymphocytes, Absolute 01/24/2022 1.00  0.70 - 3.10 10*3/mm3 Final   • Monocytes, Absolute 01/24/2022 0.65  0.10 - 0.90 10*3/mm3 Final   • Eosinophils, Absolute 01/24/2022 0.13  0.00 - 0.40 10*3/mm3 Final   • Basophils, Absolute 01/24/2022 0.03  0.00 - 0.20 10*3/mm3 Final   • Immature Grans, Absolute 01/24/2022 0.03  0.00 - 0.05 10*3/mm3 Final   • nRBC 01/24/2022 0.0  0.0 - 0.2 /100 WBC Final   No results displayed because visit has over 200 results.          ASSESSMENT/ PLAN:    Diagnoses and all orders for this visit:    1. Acute respiratory failure with hypoxia (HCC) (Primary)  Assessment & Plan:  His shortness of breath is somewhat improved.  Hopefully over the next month his shortness of breath continue to improve and hopefully be able to get off his oxygen.  We will reassess him at that time he does have a follow-up  with pulmonary as well as cardiology the beginning of March as well.      2. Other acute pulmonary embolism with acute cor pulmonale (HCC)  Assessment & Plan:  We will continue his apixaban on a regular basis.  He will need to be on the Eliquis for 6 months.      3. Pneumonia due to COVID-19 virus  Assessment & Plan:  He does seem to really probably improved from his Covid pneumonia.  Unfortunately he got a secondary pulmonary embolism from his Covid infection.  As result he remains short of breath.        Orders Placed Today:     No orders of the defined types were placed in this encounter.       Management Plan:     An After Visit Summary was printed and given to the patient at discharge.    Follow-up: Return in about 4 weeks (around 3/1/2022) for Recheck.    Erik Macias DO 2/1/2022 11:41 EST  This note was electronically signed.

## 2022-02-01 NOTE — ASSESSMENT & PLAN NOTE
His shortness of breath is somewhat improved.  Hopefully over the next month his shortness of breath continue to improve and hopefully be able to get off his oxygen.  We will reassess him at that time he does have a follow-up with pulmonary as well as cardiology the beginning of March as well.

## 2022-02-02 RX ORDER — LEVOTHYROXINE SODIUM 100 MCG
TABLET ORAL
Qty: 90 TABLET | Refills: 3 | Status: SHIPPED | OUTPATIENT
Start: 2022-02-02

## 2022-02-04 ENCOUNTER — READMISSION MANAGEMENT (OUTPATIENT)
Dept: CALL CENTER | Facility: HOSPITAL | Age: 77
End: 2022-02-04

## 2022-02-04 NOTE — OUTREACH NOTE
Medical Week 2 Survey      Responses   [facility] patient discharged from? [facility]   Does the patient have one of the following disease processes/diagnoses(primary or secondary)? Other   Week 2 attempt successful? Yes   Call start time 1550   Discharge diagnosis Pulmonary embolism with right heart strain mechanical thrombectomy and pulmonary angiogram    Call end time 1559   Person spoke with today (if not patient) and relationship Lily,wife   Meds reviewed with patient/caregiver? Yes   Is the patient having any side effects they believe may be caused by any medication additions or changes? No   Does the patient have all medications ordered at discharge? Yes   Is the patient taking all medications as directed (includes completed medication regime)? Yes   Does the patient have a primary care provider?  Yes   Does the patient have an appointment with their PCP within 7 days of discharge? Yes   Has the patient kept scheduled appointments due by today? Yes   What is the Home health agency?  [facility]---visit scheduled for today    Has home health visited the patient within 72 hours of discharge? Yes   What DME was ordered? [employer]- walker---to be delivered/                 received W/C,  has walker   Has all DME been delivered? Yes   Psychosocial issues? No   What is the patient's perception of their health status since discharge? Improving   Is the patient/caregiver able to teach back signs and symptoms related to disease process for when to call PCP? Yes   Is the patient/caregiver able to teach back signs and symptoms related to disease process for when to call 911? Yes   Is the patient/caregiver able to teach back the hierarchy of who to call/visit for symptoms/problems? PCP, Specialist, Home health nurse, Urgent Care, ED, 911 Yes   Week 2 Call Completed? Yes   Wrap up additional comments Wife states pt is doing better, and getting around the house better. No questions/concerns.          Britt  EMILIANO Ochoa

## 2022-02-07 ENCOUNTER — TELEPHONE (OUTPATIENT)
Dept: PULMONOLOGY | Facility: CLINIC | Age: 77
End: 2022-02-07

## 2022-02-07 RX ORDER — PANTOPRAZOLE SODIUM 40 MG/1
40 TABLET, DELAYED RELEASE ORAL EVERY MORNING
Qty: 30 TABLET | Refills: 1 | Status: SHIPPED | OUTPATIENT
Start: 2022-02-07 | End: 2022-04-07 | Stop reason: SDUPTHER

## 2022-02-07 NOTE — TELEPHONE ENCOUNTER
Caller: MYRNA MUÑIZ    Relationship: Emergency Contact    Best call back number: [phone]    Requested Prescriptions:   Requested Prescriptions     Pending Prescriptions Disp Refills   • pantoprazole (PROTONIX) 40 MG EC tablet 30 tablet 0     Sig: Take 1 tablet by mouth Every Morning for 30 days.        Pharmacy where request should be sent: [facility] - [address] - [phone] [facility] [phone] FX     Additional details provided by patient:     Does the patient have less than a 3 day supply:  [x] Yes  [] No

## 2022-02-07 NOTE — TELEPHONE ENCOUNTER
PT's wife is calling regarding orders that was supposed to be put in for Physical Therapy please contact her at [phone].

## 2022-02-08 NOTE — TELEPHONE ENCOUNTER
Called patients wife back and explained that Celia referred him to pulmonary rehab not physical therapy. Patients wife stated he needs physical therapy instead, stated she will talk to dr paez about it in her appt tomorrow.

## 2022-02-09 DIAGNOSIS — R53.1 WEAKNESS: Primary | ICD-10-CM

## 2022-02-09 DIAGNOSIS — U07.1 COVID-19 VIRUS DETECTED: Primary | ICD-10-CM

## 2022-02-10 ENCOUNTER — READMISSION MANAGEMENT (OUTPATIENT)
Dept: CALL CENTER | Facility: HOSPITAL | Age: 77
End: 2022-02-10

## 2022-02-10 NOTE — OUTREACH NOTE
"Medical Week 3 Survey      Responses   [facility] patient discharged from? [facility]   Does the patient have one of the following disease processes/diagnoses(primary or secondary)? Other   Week 3 attempt successful? Yes   Call start time 1443   Call end time 1450   Discharge diagnosis Pulmonary embolism with right heart strain mechanical thrombectomy and pulmonary angiogram    Person spoke with today (if not patient) and relationship Lily,wife   Medication alerts for this patient Eliquis--Bleeding precautions reviewed   Meds reviewed with patient/caregiver? Yes   Is the patient having any side effects they believe may be caused by any medication additions or changes? No   Is the patient taking all medications as directed (includes completed medication regime)? Yes   Does the patient have a primary care provider?  Yes   Comments regarding PCP Hospital PCP FOLLOW UP APPOINTMENT IS 2/1/22@1045am---compliant   Does the patient have an appointment with their PCP within 7 days of discharge? Yes   Has the patient kept scheduled appointments due by today? Yes   Comments REviewed next appt on  on March 1st, 2022   What is the Home health agency?  [facility]   Has home health visited the patient within 72 hours of discharge? Yes   Home health comments working with PT/OT   DME comments Pt now only using O2 at 2 lpm per nc only prn    Did the patient receive a copy of their discharge instructions? Yes   Nursing interventions Reviewed instructions with patient   What is the patient's perception of their health status since discharge? Improving  [\"doing wonderful\"---Pt has improved greatly and able to tolerate greater periods of time w/o O2, active with his therapies.  Wife understands to monitor for s/s and watches pt closely. ]   Is the patient/caregiver able to teach back signs and symptoms related to disease process for when to call PCP? Yes   Is the patient/caregiver able to teach back signs and symptoms " related to disease process for when to call 911? Yes   Is the patient/caregiver able to teach back the hierarchy of who to call/visit for symptoms/problems? PCP, Specialist, Home health nurse, Urgent Care, ED, 911 Yes   If the patient is a current smoker, are they able to teach back resources for cessation? Not a smoker   Additional teach back comments Sats 90's without oxygen yesterday---encouraged to monitor to keep O2 above 88-90%,seek care for sudden decrease in sats, SOA, chest pain and unable to increase sats despite use of O2, DB and rest   Week 3 Call Completed? Yes          Debby Feliciano, RN

## 2022-02-14 ENCOUNTER — LAB (OUTPATIENT)
Dept: LAB | Facility: HOSPITAL | Age: 77
End: 2022-02-14

## 2022-02-14 DIAGNOSIS — U07.1 COVID-19 VIRUS DETECTED: ICD-10-CM

## 2022-02-14 PROCEDURE — C9803 HOPD COVID-19 SPEC COLLECT: HCPCS

## 2022-02-14 PROCEDURE — 36415 COLL VENOUS BLD VENIPUNCTURE: CPT

## 2022-02-14 PROCEDURE — 86769 SARS-COV-2 COVID-19 ANTIBODY: CPT

## 2022-02-16 ENCOUNTER — TELEPHONE (OUTPATIENT)
Dept: FAMILY MEDICINE CLINIC | Facility: CLINIC | Age: 77
End: 2022-02-16

## 2022-02-16 LAB
SARS-COV-2 AB SERPL IA-ACNC: >2500 U/ML
SARS-COV-2 AB SERPL-IMP: POSITIVE

## 2022-02-16 NOTE — TELEPHONE ENCOUNTER
Caller: PONCE CABALLERO [facility]      Best call back number: [phone]    What orders are you requesting (i.e. lab or imaging): ORDERS FOR DISCHARGE PER PATIENT REQUEST - SO HE CAN GO TO OUTPATIENT THERAPY ORDERED BY HIS PULMONOLOGIST, DR GONZALEZ.    Additional notes: ADA HAS TO HAVE ORDER FOR DISCHARGE COME FROM PRIMARY CARE -- CAN BE VERBAL ORDERS

## 2022-02-21 ENCOUNTER — READMISSION MANAGEMENT (OUTPATIENT)
Dept: CALL CENTER | Facility: HOSPITAL | Age: 77
End: 2022-02-21

## 2022-02-21 DIAGNOSIS — I82.A19 AXILLARY VEIN THROMBOSIS: Primary | ICD-10-CM

## 2022-02-21 NOTE — OUTREACH NOTE
Medical Week 4 Survey      Responses   [facility] patient discharged from? [facility]   Does the patient have one of the following disease processes/diagnoses(primary or secondary)? Other   Week 4 attempt successful? Yes   Call start time 1336   Call end time 1341   Discharge diagnosis Pulmonary embolism with right heart strain mechanical thrombectomy and pulmonary angiogram    Person spoke with today (if not patient) and relationship Lily,wife   Meds reviewed with patient/caregiver? Yes   Is the patient taking all medications as directed (includes completed medication regime)? Yes   Has the patient kept scheduled appointments due by today? Yes   Home health comments Outpt PT   What is the patient's perception of their health status since discharge? Improving   Week 4 Call Completed? Yes   Would the patient like one additional call? No   Graduated Yes   Is the patient interested in additional calls from an ambulatory ?  NOTE:  applies to high risk patients requiring additional follow-up. No   Did the patient feel the follow up calls were helpful during their recovery period? Yes   Was the number of calls appropriate? Yes   Wrap up additional comments Is completely off O2 now.  Doing better. Wife appreciates the follow up.            Shirin Eubanks RN

## 2022-03-01 ENCOUNTER — OFFICE VISIT (OUTPATIENT)
Dept: CARDIOLOGY | Facility: CLINIC | Age: 77
End: 2022-03-01

## 2022-03-01 ENCOUNTER — CLINICAL SUPPORT NO REQUIREMENTS (OUTPATIENT)
Dept: CARDIOLOGY | Facility: CLINIC | Age: 77
End: 2022-03-01

## 2022-03-01 VITALS
DIASTOLIC BLOOD PRESSURE: 63 MMHG | WEIGHT: 203 LBS | HEIGHT: 74 IN | SYSTOLIC BLOOD PRESSURE: 123 MMHG | BODY MASS INDEX: 26.05 KG/M2 | HEART RATE: 73 BPM

## 2022-03-01 DIAGNOSIS — I10 ESSENTIAL HYPERTENSION: ICD-10-CM

## 2022-03-01 DIAGNOSIS — I49.5 SSS (SICK SINUS SYNDROME): ICD-10-CM

## 2022-03-01 DIAGNOSIS — I26.99 PULMONARY EMBOLISM ASSOCIATED WITH COVID-19: Primary | ICD-10-CM

## 2022-03-01 DIAGNOSIS — Z79.01 CHRONIC ANTICOAGULATION: ICD-10-CM

## 2022-03-01 DIAGNOSIS — U07.1 PULMONARY EMBOLISM ASSOCIATED WITH COVID-19: Primary | ICD-10-CM

## 2022-03-01 DIAGNOSIS — I49.5 SICK SINUS SYNDROME: ICD-10-CM

## 2022-03-01 DIAGNOSIS — Z95.0 PACEMAKER: Primary | ICD-10-CM

## 2022-03-01 DIAGNOSIS — Z95.0 PRESENCE OF CARDIAC PACEMAKER: ICD-10-CM

## 2022-03-01 PROCEDURE — 99213 OFFICE O/P EST LOW 20 MIN: CPT | Performed by: INTERNAL MEDICINE

## 2022-03-01 PROCEDURE — 93280 PM DEVICE PROGR EVAL DUAL: CPT | Performed by: INTERNAL MEDICINE

## 2022-03-01 RX ORDER — LATANOPROST 50 UG/ML
1 SOLUTION/ DROPS OPHTHALMIC DAILY
COMMUNITY
Start: 2022-02-09

## 2022-03-01 NOTE — PROGRESS NOTES
Normal Dual Chamber Pacemaker Device Interrogation and Device Testing.  Normal evaluation of device function and lead measurements.  No optimization was needed of parameters or maximization of device longevity.  Patient is on Manual Remotes, I will request transfer.  Remaining battery is 7.5 years on battery.

## 2022-03-01 NOTE — PROGRESS NOTES
Chief Complaint  Hypertension (new patient), Shortness of Breath (pacemaker check), and Hospital Follow Up Visit    Subjective            Elmer CARLOS Garvin presents to [facility] CARDIOLOGY  History of Present Illness  Mr. Garvin presents for hospital follow-up today and states he is feeling much better and his breathing has improved significantly since his discharge.  He initially developed Covid-19 pneumonia in late December 2021 and then subsequently developed a submassive pulmonary embolism (primarily left-sided) with CT scan evidence of right heart strain and a dilated right ventricle with elevated estimated pulmonary arterial pressures on an echocardiogram obtained at that time.  He elected to undergo mechanical aspiration thrombectomy, and numerous pieces of acute/subacute thrombus were removed from the upper and lower lobar branches of his left pulmonary artery.  His PA pressures normalized upon completion of the procedure.  Mr. Garvin was transitioned to oral anticoagulation with Eliquis prior to discharge and remains on therapy with Eliquis at present.  He does not report any bleeding problems other than a mild nosebleed in the first week of therapy, which resolved with manual pressure for several minutes.  He also has a history of sick sinus syndrome, status post pacemaker placement.  His PPM interrogation obtained in the office today revealed normal device measurements and function with no evidence of arrhythmias or mode switching and 7.5 years of remaining battery life.  His BP was well controlled at 123/63 today and he is tolerating all his home medications well.  No episodes of chest pain/pressure, palpitations, orthopnea, PND, peripheral edema, lightheadedness, or syncope reported.  A lower extremity venous Doppler ultrasound obtained during his hospitalization last month revealed significant residual DVT in the right lower extremity.      Past Medical History:   Diagnosis Date   •  Hypertension    • Lyme disease    • Shortness of breath    • SSS (sick sinus syndrome) ([facility])        Past Surgical History:   • CARDIAC CATHETERIZATION    Procedure: Coronary angiography;  Surgeon: Wilton Brown MD;  Location: [facility] CATH INVASIVE LOCATION;  Service: Cardiovascular   • CARDIAC CATHETERIZATION    Procedure: Left heart cath;  Surgeon: Wilton Brown MD;  Location: [facility] CATH INVASIVE LOCATION;  Service: Cardiovascular   • CARDIAC CATHETERIZATION    Procedure: Left ventriculography;  Surgeon: Wilton Brown MD;  Location: [facility] CATH INVASIVE LOCATION;  Service: Cardiovascular   • CARDIAC CATHETERIZATION    Procedure: Right heart cath;  Surgeon: Wilton Brown MD;  Location: [facility] CATH INVASIVE LOCATION;  Service: Cardiovascular   • CARDIAC CATHETERIZATION    Procedure: Percutaneous Manual Thrombectomy;  Surgeon: Maninder Penn MD;  Location: [facility] INVASIVE LOCATION;  Service: Cardiovascular;  Laterality: Right;   • CATARACT EXTRACTION   • INSERT / REPLACE / REMOVE PACEMAKER   • INTERVENTIONAL RADIOLOGY PROCEDURE    Procedure: Pulmonary Angiogram;  Surgeon: Maninder Penn MD;  Location: [facility] INVASIVE LOCATION;  Service: Cardiovascular;  Laterality: Right;   • PACEMAKER IMPLANTATION       Social History     Tobacco Use   • Smoking status: Former Smoker     Years: 12.00     Types: Pipe   • Smokeless tobacco: Never Used   • Tobacco comment: CAFFEINE USE 3 CUPS COFFEE DAILY   Vaping Use   • Vaping Use: Never used   Substance Use Topics   • Alcohol use: No   • Drug use: Never       Family History   Problem Relation Age of Onset   • Cancer Mother    • Heart disease Father    • Cancer Father    • Heart disease Sister    • Sudden death Sister    • Heart disease Brother    • Cancer Paternal Grandfather         Current Outpatient Medications on File Prior to Visit   Medication Sig   • albuterol sulfate  (90 Base) MCG/ACT inhaler Inhale 2 puffs Every 4 (Four) Hours As  "Needed for Wheezing.   • apixaban (ELIQUIS) 5 MG tablet tablet Take 1 tablet by mouth 2 (Two) Times a Day.   • cetirizine (zyrTEC) 10 MG tablet Take 1 tablet by mouth Daily.   • fluticasone (FLONASE) 50 MCG/ACT nasal spray 2 sprays into the nostril(s) as directed by provider Daily for 30 days.   • ipratropium-albuterol (DUO-NEB) 0.5-2.5 mg/3 ml nebulizer Take 3 mL by nebulization 4 (Four) Times a Day As Needed for Wheezing or Shortness of Air.   • latanoprost (XALATAN) 0.005 % ophthalmic solution    • losartan (COZAAR) 100 MG tablet Take 1 tablet by mouth Daily for 90 days.   • montelukast (SINGULAIR) 10 MG tablet Take 1 tablet by mouth Daily for 90 days.   • pantoprazole (PROTONIX) 40 MG EC tablet Take 1 tablet by mouth Every Morning for 30 days.   • Synthroid 100 MCG tablet Take 1 tablet by mouth every morning *on empty stomach*     No current facility-administered medications on file prior to visit.       Allergies   Allergen Reactions   • Latex Other (See Comments)     Sinus congestion       Review of Systems   Constitutional: Negative for chills, fever and unexpected weight gain.   HENT: Negative for hearing loss, nosebleeds and sore throat.    Eyes: Negative for pain and visual disturbance.   Respiratory: Negative for cough, chest tightness, shortness of breath and wheezing.    Cardiovascular: Negative for chest pain, palpitations and leg swelling.   Gastrointestinal: Negative for abdominal pain, blood in stool and vomiting.   Genitourinary: Negative for dysuria and hematuria.   Musculoskeletal: Positive for arthralgias. Negative for joint swelling and myalgias.   Skin: Negative for color change, pallor and rash.   Neurological: Negative for tremors, syncope, light-headedness and headache.        Objective     /63 (BP Location: Right arm, Patient Position: Sitting)   Pulse 73   Ht 188 cm (74\")   Wt 92.1 kg (203 lb)   BMI 26.06 kg/m²       Physical Exam  Constitutional:       General: He is not in " acute distress.     Appearance: Normal appearance.   HENT:      Head: Atraumatic.      Mouth/Throat:      Mouth: Mucous membranes are moist.      Pharynx: Oropharynx is clear. No oropharyngeal exudate.   Eyes:      General: No scleral icterus.     Conjunctiva/sclera: Conjunctivae normal.   Neck:      Vascular: No carotid bruit or JVD.   Cardiovascular:      Rate and Rhythm: Normal rate and regular rhythm.  No extrasystoles are present.     Chest Wall: PMI is not displaced.      Pulses:           Radial pulses are 2+ on the right side and 2+ on the left side.      Heart sounds: S1 normal and S2 normal. No murmur heard.  No friction rub. No gallop. No S3 or S4 sounds.    Pulmonary:      Effort: Pulmonary effort is normal. No tachypnea or respiratory distress.      Breath sounds: No decreased breath sounds, wheezing, rhonchi or rales.   Chest:      Chest wall: No tenderness.   Abdominal:      General: Bowel sounds are normal. There is no distension.      Palpations: Abdomen is soft. There is no mass.      Tenderness: There is no abdominal tenderness.   Musculoskeletal:         General: No swelling, tenderness or deformity.      Cervical back: Neck supple. No tenderness.      Right lower leg: No edema.      Left lower leg: No edema.   Skin:     General: Skin is warm and dry.      Coloration: Skin is not jaundiced.      Findings: No erythema or rash.      Nails: There is no clubbing.   Neurological:      General: No focal deficit present.      Mental Status: He is alert and oriented to person, place, and time.      Motor: No weakness.   Psychiatric:         Mood and Affect: Mood normal.         Behavior: Behavior normal.       Result Review :     The following data was reviewed by: Maninder Penn MD on 03/01/2022:    CMP    CMP 1/25/22 1/26/22 1/27/22   Glucose 174 (A) 126 (A) 127 (A)   BUN 16 14 15   Creatinine 1.11 1.06 1.17   eGFR Non  Am 65 68 61   Sodium 140 142 139   Potassium 4.6 4.2 4.3   Chloride 104  108 (A) 105   Calcium 9.3 8.8 8.7   Albumin 4.00 3.50    Total Bilirubin 0.6 0.6    Alkaline Phosphatase 49 39    AST (SGOT) 16 12    ALT (SGPT) 25 20    (A) Abnormal value            CBC    CBC 1/25/22 1/25/22 1/26/22 1/27/22    1429 2149     WBC 5.47 5.57 4.50 4.61   RBC 4.99 4.66 4.47 3.84 (A)   Hemoglobin 14.2 13.4 12.9 (A) 11.1 (A)   Hematocrit 42.7 39.8 38.5 32.2 (A)   MCV 85.6 85.4 86.1 83.9   MCH 28.5 28.8 28.9 28.9   MCHC 33.3 33.7 33.5 34.5   RDW 15.8 (A) 15.7 (A) 15.7 (A) 15.9 (A)   Platelets 121 (A) 123 (A) 121 (A) 122 (A)   (A) Abnormal value            Lipid Panel    Lipid Panel 3/25/21 9/27/21   Total Cholesterol  202 (A)   Total Cholesterol 183    Triglycerides 157 (A) 218 (A)   HDL Cholesterol 43 50   VLDL Cholesterol 31 38   LDL Cholesterol  109 (A) 114 (A)   LDL/HDL Ratio  2.17   (A) Abnormal value       Comments are available for some flowsheets but are not being displayed.                Assessment and Plan      Diagnoses and all orders for this visit:    1. Pulmonary embolism associated with COVID-19 (HCC) (Primary)    2. Chronic anticoagulation    3. Sick sinus syndrome (HCC)    4. Presence of cardiac pacemaker    5. Essential hypertension    -Recent submassive PE associated with COVID-19 infection, s/p mechanical aspiration thrombectomy of his left pulmonary artery:  He continues to recover well with marked improvement in his previously reported respiratory symptoms.  We will continue anticoagulation with apixaban for a total of 6 months; no significant bleeding problems reported.  I will plan to repeat a lower extremity venous Doppler ultrasound and a CT scan of the chest after he has completed his course of anticoagulation to assess for residual DVT or PE.    -Sick sinus syndrome, s/p PPM placement:  His PPM interrogation today showed completely normal device function with 7.5 years of remaining battery life.    -Hypertension:  BP remains well controlled on his chronic dose of losartan;  continue same.      Follow Up     Return in about 5 months (around 8/1/2022).    Patient was given instructions and counseling regarding his condition or for health maintenance advice. Please see specific information pulled into the AVS if appropriate.     Alexandruarianne CARLOS Garvin  reports that he has quit smoking. His smoking use included pipe. He quit after 12.00 years of use. He has never used smokeless tobacco.. I have educated him on the risk of diseases from using tobacco products such as cancer, COPD and heart disease.  Continued tobacco cessation was advised.

## 2022-03-03 ENCOUNTER — OFFICE VISIT (OUTPATIENT)
Dept: PULMONOLOGY | Facility: CLINIC | Age: 77
End: 2022-03-03

## 2022-03-03 VITALS
HEART RATE: 79 BPM | TEMPERATURE: 97.3 F | BODY MASS INDEX: 24.03 KG/M2 | RESPIRATION RATE: 18 BRPM | HEIGHT: 78 IN | SYSTOLIC BLOOD PRESSURE: 112 MMHG | OXYGEN SATURATION: 98 % | WEIGHT: 207.7 LBS | DIASTOLIC BLOOD PRESSURE: 60 MMHG

## 2022-03-03 DIAGNOSIS — J12.82 PNEUMONIA DUE TO COVID-19 VIRUS: ICD-10-CM

## 2022-03-03 DIAGNOSIS — I26.09 ACUTE PULMONARY EMBOLISM WITH ACUTE COR PULMONALE, UNSPECIFIED PULMONARY EMBOLISM TYPE: Primary | ICD-10-CM

## 2022-03-03 DIAGNOSIS — R06.02 SHORTNESS OF BREATH: ICD-10-CM

## 2022-03-03 DIAGNOSIS — R53.1 WEAKNESS: ICD-10-CM

## 2022-03-03 DIAGNOSIS — U07.1 PNEUMONIA DUE TO COVID-19 VIRUS: ICD-10-CM

## 2022-03-03 PROCEDURE — 99213 OFFICE O/P EST LOW 20 MIN: CPT | Performed by: NURSE PRACTITIONER

## 2022-03-09 ENCOUNTER — OFFICE VISIT (OUTPATIENT)
Dept: CARDIAC REHAB | Facility: HOSPITAL | Age: 77
End: 2022-03-09

## 2022-03-09 VITALS
HEIGHT: 73 IN | BODY MASS INDEX: 27.47 KG/M2 | OXYGEN SATURATION: 95 % | HEART RATE: 74 BPM | RESPIRATION RATE: 16 BRPM | DIASTOLIC BLOOD PRESSURE: 80 MMHG | SYSTOLIC BLOOD PRESSURE: 140 MMHG | WEIGHT: 207.23 LBS

## 2022-03-09 DIAGNOSIS — U07.1 CLINICAL DIAGNOSIS OF SEVERE ACUTE RESPIRATORY SYNDROME CORONAVIRUS 2 (SARS-COV-2) DISEASE: Primary | ICD-10-CM

## 2022-03-09 DIAGNOSIS — R06.02 SHORTNESS OF BREATH: ICD-10-CM

## 2022-03-09 NOTE — PROGRESS NOTES
Did history and unable to do physical activity.  Waiting on order signed from MD.  Will return Monday to orient

## 2022-03-14 ENCOUNTER — TREATMENT (OUTPATIENT)
Dept: CARDIAC REHAB | Facility: HOSPITAL | Age: 77
End: 2022-03-14

## 2022-03-14 DIAGNOSIS — U07.1 PNEUMONIA DUE TO COVID-19 VIRUS: Primary | ICD-10-CM

## 2022-03-14 DIAGNOSIS — J12.82 PNEUMONIA DUE TO COVID-19 VIRUS: Primary | ICD-10-CM

## 2022-03-14 DIAGNOSIS — R06.02 SHORTNESS OF BREATH: ICD-10-CM

## 2022-03-14 PROCEDURE — G0239 OTH RESP PROC, GROUP: HCPCS

## 2022-03-17 ENCOUNTER — TREATMENT (OUTPATIENT)
Dept: CARDIAC REHAB | Facility: HOSPITAL | Age: 77
End: 2022-03-17

## 2022-03-17 DIAGNOSIS — U07.1 CLINICAL DIAGNOSIS OF SEVERE ACUTE RESPIRATORY SYNDROME CORONAVIRUS 2 (SARS-COV-2) DISEASE: Primary | ICD-10-CM

## 2022-03-17 DIAGNOSIS — R06.02 SHORTNESS OF BREATH: ICD-10-CM

## 2022-03-17 PROCEDURE — G0239 OTH RESP PROC, GROUP: HCPCS

## 2022-03-21 ENCOUNTER — TREATMENT (OUTPATIENT)
Dept: CARDIAC REHAB | Facility: HOSPITAL | Age: 77
End: 2022-03-21

## 2022-03-21 DIAGNOSIS — U07.1 CLINICAL DIAGNOSIS OF SEVERE ACUTE RESPIRATORY SYNDROME CORONAVIRUS 2 (SARS-COV-2) DISEASE: Primary | ICD-10-CM

## 2022-03-21 PROCEDURE — G0239 OTH RESP PROC, GROUP: HCPCS

## 2022-03-24 ENCOUNTER — TREATMENT (OUTPATIENT)
Dept: CARDIAC REHAB | Facility: HOSPITAL | Age: 77
End: 2022-03-24

## 2022-03-24 DIAGNOSIS — U07.1 CLINICAL DIAGNOSIS OF SEVERE ACUTE RESPIRATORY SYNDROME CORONAVIRUS 2 (SARS-COV-2) DISEASE: Primary | ICD-10-CM

## 2022-03-24 PROCEDURE — G0239 OTH RESP PROC, GROUP: HCPCS

## 2022-03-28 ENCOUNTER — TREATMENT (OUTPATIENT)
Dept: CARDIAC REHAB | Facility: HOSPITAL | Age: 77
End: 2022-03-28

## 2022-03-28 DIAGNOSIS — U07.1 CLINICAL DIAGNOSIS OF SEVERE ACUTE RESPIRATORY SYNDROME CORONAVIRUS 2 (SARS-COV-2) DISEASE: Primary | ICD-10-CM

## 2022-03-28 PROCEDURE — G0239 OTH RESP PROC, GROUP: HCPCS

## 2022-03-29 ENCOUNTER — TELEPHONE (OUTPATIENT)
Dept: PULMONOLOGY | Facility: CLINIC | Age: 77
End: 2022-03-29

## 2022-03-31 ENCOUNTER — TREATMENT (OUTPATIENT)
Dept: CARDIAC REHAB | Facility: HOSPITAL | Age: 77
End: 2022-03-31

## 2022-03-31 DIAGNOSIS — U07.1 CLINICAL DIAGNOSIS OF SEVERE ACUTE RESPIRATORY SYNDROME CORONAVIRUS 2 (SARS-COV-2) DISEASE: Primary | ICD-10-CM

## 2022-03-31 PROCEDURE — G0239 OTH RESP PROC, GROUP: HCPCS

## 2022-04-04 ENCOUNTER — TREATMENT (OUTPATIENT)
Dept: CARDIAC REHAB | Facility: HOSPITAL | Age: 77
End: 2022-04-04

## 2022-04-04 DIAGNOSIS — U07.1 CLINICAL DIAGNOSIS OF SEVERE ACUTE RESPIRATORY SYNDROME CORONAVIRUS 2 (SARS-COV-2) DISEASE: Primary | ICD-10-CM

## 2022-04-04 PROCEDURE — G0239 OTH RESP PROC, GROUP: HCPCS

## 2022-04-05 ENCOUNTER — OFFICE VISIT (OUTPATIENT)
Dept: PULMONOLOGY | Facility: CLINIC | Age: 77
End: 2022-04-05

## 2022-04-05 VITALS
SYSTOLIC BLOOD PRESSURE: 127 MMHG | TEMPERATURE: 99.1 F | HEART RATE: 77 BPM | DIASTOLIC BLOOD PRESSURE: 79 MMHG | RESPIRATION RATE: 18 BRPM | WEIGHT: 207 LBS | HEIGHT: 73 IN | BODY MASS INDEX: 27.43 KG/M2 | OXYGEN SATURATION: 96 %

## 2022-04-05 DIAGNOSIS — I27.24 CTEPH (CHRONIC THROMBOEMBOLIC PULMONARY HYPERTENSION): Primary | ICD-10-CM

## 2022-04-05 DIAGNOSIS — J96.21 ACUTE ON CHRONIC RESPIRATORY FAILURE WITH HYPOXIA: ICD-10-CM

## 2022-04-05 DIAGNOSIS — I26.09 OTHER ACUTE PULMONARY EMBOLISM WITH ACUTE COR PULMONALE: ICD-10-CM

## 2022-04-05 PROCEDURE — 99214 OFFICE O/P EST MOD 30 MIN: CPT | Performed by: INTERNAL MEDICINE

## 2022-04-05 NOTE — ASSESSMENT & PLAN NOTE
Improving significantly we will continue to try to wean patient off oxygen    Suspect we can get him off oxygen by the time his next office visit approaches

## 2022-04-05 NOTE — PROGRESS NOTES
"Chief Complaint  Follow-up, Asthma, Shortness of Breath, and Chest Tightness (Happens on and off. )    Subjective          Alexandruarianne Garvin presents to [facility] PULMONARY & CRITICAL CARE MEDICINE  History of Present Illness  Patient with respiratory failure multiple pulmonary emboli secondary to recent COVID-19 infection here for follow-up  Tolerating blood thinners well at this time with no bleeding  Breathing slowly improving  Ill requiring oxygen occasionally but slowly weaning himself off  Still uses it with exertional activities as he still drops  Coughing is still persistent but it overall is improving significantly  Objective   Vital Signs:   /79 (BP Location: Right arm, Patient Position: Sitting, Cuff Size: Adult)   Pulse 77   Temp 99.1 °F (37.3 °C)   Resp 18   Ht 185.4 cm (73\")   Wt 93.9 kg (207 lb)   SpO2 96%   BMI 27.31 kg/m²            Physical Exam   Exam well-appearing 77-year-old male  Lungs there are some focal crackles on the right mid to lower lobe there are no wheezes present  Extremities no clubbing no edema  Cardiac S1-S2 no JVD  Neuro alert and oriented x3 cranial nerves II through XII grossly intact moves all 4 extremities    Result Review :                 Assessment and Plan    Diagnoses and all orders for this visit:    1. 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 Acute on chronic respiratory failure with hypoxia (HCC)  Assessment & Plan:  Improving significantly we will continue to try to wean patient off oxygen    Suspect we can get him " off oxygen by the time his next office visit approaches      3. Other acute pulmonary embolism with acute cor pulmonale (HCC)  Assessment & Plan:  Patient is status post mechanical thrombectomy with penumbra device by Dr. Penn    Has had significant improvement in his pressures    Had no evidence of right ventricular failure at this time    We will continue anticoagulation we will plan on treating him for a total of 6 months based upon the results of the CT scan and echocardiogram presuming that the clot has resolved and there is no evidence of significant pH on echo        Follow Up   Return in about 3 months (around 7/5/2022).  Patient was given instructions and counseling regarding his condition or for health maintenance advice. Please see specific information pulled into the AVS if appropriate.

## 2022-04-05 NOTE — ASSESSMENT & PLAN NOTE
Patient is status post mechanical thrombectomy with penumbra device by Dr. Penn    Has had significant improvement in his pressures    Had no evidence of right ventricular failure at this time    We will continue anticoagulation we will plan on treating him for a total of 6 months based upon the results of the CT scan and echocardiogram presuming that the clot has resolved and there is no evidence of significant pH on echo

## 2022-04-05 NOTE — ASSESSMENT & PLAN NOTE
Patient status post thrombectomy for multiple PE with elevated PA pressures and RV pressure volume overload    Continue Eliquis    Repeat echocardiogram and CT scan of the chest in 2 months    I suspect that the patient's thromboembolic pulmonary pretension will have resolved as result of thrombectomy and anticoagulation

## 2022-04-07 ENCOUNTER — OFFICE VISIT (OUTPATIENT)
Dept: FAMILY MEDICINE CLINIC | Facility: CLINIC | Age: 77
End: 2022-04-07

## 2022-04-07 ENCOUNTER — TREATMENT (OUTPATIENT)
Dept: CARDIAC REHAB | Facility: HOSPITAL | Age: 77
End: 2022-04-07

## 2022-04-07 VITALS
HEART RATE: 80 BPM | HEIGHT: 78 IN | BODY MASS INDEX: 24.51 KG/M2 | OXYGEN SATURATION: 98 % | WEIGHT: 211.8 LBS | SYSTOLIC BLOOD PRESSURE: 124 MMHG | DIASTOLIC BLOOD PRESSURE: 66 MMHG | TEMPERATURE: 96.7 F

## 2022-04-07 DIAGNOSIS — I27.24 CTEPH (CHRONIC THROMBOEMBOLIC PULMONARY HYPERTENSION): ICD-10-CM

## 2022-04-07 DIAGNOSIS — U07.1 CLINICAL DIAGNOSIS OF SEVERE ACUTE RESPIRATORY SYNDROME CORONAVIRUS 2 (SARS-COV-2) DISEASE: Primary | ICD-10-CM

## 2022-04-07 DIAGNOSIS — U07.1 PNEUMONIA DUE TO COVID-19 VIRUS: ICD-10-CM

## 2022-04-07 DIAGNOSIS — J12.82 PNEUMONIA DUE TO COVID-19 VIRUS: ICD-10-CM

## 2022-04-07 DIAGNOSIS — I26.09 OTHER ACUTE PULMONARY EMBOLISM WITH ACUTE COR PULMONALE: Primary | ICD-10-CM

## 2022-04-07 PROCEDURE — 99214 OFFICE O/P EST MOD 30 MIN: CPT | Performed by: FAMILY MEDICINE

## 2022-04-07 PROCEDURE — G0239 OTH RESP PROC, GROUP: HCPCS

## 2022-04-07 RX ORDER — PANTOPRAZOLE SODIUM 40 MG/1
40 TABLET, DELAYED RELEASE ORAL EVERY MORNING
Qty: 30 TABLET | Refills: 12 | Status: SHIPPED | OUTPATIENT
Start: 2022-04-07 | End: 2022-05-07

## 2022-04-07 NOTE — ASSESSMENT & PLAN NOTE
He has been now on the Eliquis for just a little over 2 months.  His shortness of breath is improved.  He denies any excessive bruising, bloody noses, gross hematuria, or blood per rectum.

## 2022-04-07 NOTE — PROGRESS NOTES
Chief Complaint   Patient presents with   • Shortness of Breath        Subjective     Elmer Garvin  has a past medical history of Hypertension, Lyme disease, Shortness of breath, and SSS (sick sinus syndrome) ([facility]).    Shortness of breath-he states he still has some shortness of breath although improved.  He gets short of breath with any activity.  He just saw pulmonary, Dr. Gong, this week.  Overall he is doing fine.  He did have a pulmonary embolism with right-sided heart strain.  As well as Covid pneumonia prior to the onset of his shortness of breath.  He also had a catheterization that showed a right heart strain with some right-sided failure.      PHQ-2 Depression Screening  Little interest or pleasure in doing things?     Feeling down, depressed, or hopeless?     PHQ-2 Total Score     PHQ-9 Depression Screening  Little interest or pleasure in doing things?     Feeling down, depressed, or hopeless?     Trouble falling or staying asleep, or sleeping too much?     Feeling tired or having little energy?     Poor appetite or overeating?     Feeling bad about yourself - or that you are a failure or have let yourself or your family down?     Trouble concentrating on things, such as reading the newspaper or watching television?     Moving or speaking so slowly that other people could have noticed? Or the opposite - being so fidgety or restless that you have been moving around a lot more than usual?     Thoughts that you would be better off dead, or of hurting yourself in some way?     PHQ-9 Total Score     If you checked off any problems, how difficult have these problems made it for you to do your work, take care of things at home, or get along with other people?       Allergies   Allergen Reactions   • Latex Other (See Comments)     Sinus congestion       Prior to Admission medications    Medication Sig Start Date End Date Taking? Authorizing Provider   apixaban (ELIQUIS) 5 MG tablet tablet Take 1 tablet by  mouth 2 (Two) Times a Day. 2/21/22  Yes Celia Hampton APRN   cetirizine (zyrTEC) 10 MG tablet Take 1 tablet by mouth Daily. 1/24/22  Yes Celia Hampton APRN   fluticasone (FLONASE) 50 MCG/ACT nasal spray 2 sprays into the nostril(s) as directed by provider Daily for 30 days. 1/10/22 3/1/22 Yes Diann Cuevas MD   ipratropium-albuterol (DUO-NEB) 0.5-2.5 mg/3 ml nebulizer Take 3 mL by nebulization 4 (Four) Times a Day As Needed for Wheezing or Shortness of Air. 1/24/22  Yes Celia Hampton APRN   latanoprost (XALATAN) 0.005 % ophthalmic solution  2/9/22  Yes ProviderRagini MD   losartan (COZAAR) 100 MG tablet Take 1 tablet by mouth Daily for 90 days. 9/27/21 3/1/22 Yes Erik Macias DO   montelukast (SINGULAIR) 10 MG tablet Take 1 tablet by mouth Daily for 90 days. 1/24/22 4/24/22 Yes Celia Hampton APRN   Synthroid 100 MCG tablet Take 1 tablet by mouth every morning *on empty stomach* 2/2/22  Yes Erik Macias DO   albuterol sulfate  (90 Base) MCG/ACT inhaler Inhale 2 puffs Every 4 (Four) Hours As Needed for Wheezing. 1/24/22   Celia Hampton APRN        Patient Active Problem List   Diagnosis   • Angina at rest ([facility])   • Hypertension   • SSS (sick sinus syndrome) ([facility])   • Mild intermittent asthma without complication   • Gastroesophageal reflux disease without esophagitis   • Taylor's esophagus without dysplasia   • Acquired hypothyroidism   • MRSA (methicillin resistant Staphylococcus aureus)   • Pre-diabetes   • Acute respiratory failure with hypoxia ([facility])   • Arthritis   • Asthma   • Benign prostatic hyperplasia   • Cataract   • Deviated nasal septum   • Family history of prostate cancer in father   • Hypertrophy of both inferior nasal turbinates   • Impaired glucose tolerance   • 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   • Nasal congestion   • Nasal obstruction   • Pacemaker   • Acute on chronic respiratory failure with hypoxia ([facility])   •  Acute pulmonary embolism (HCC)   • Bilateral vitreous detachment   • Epiretinal membrane   • Glaucomatous atrophy of optic disc   • After cataract   • Pneumonia due to COVID-19 virus   • CTEPH (chronic thromboembolic pulmonary hypertension) (HCC)        Past Surgical History:   Procedure Laterality Date   • CARDIAC CATHETERIZATION N/A 2019    Procedure: Coronary angiography;  Surgeon: Wilton Brown MD;  Location: [facility] CATH INVASIVE LOCATION;  Service: Cardiovascular   • CARDIAC CATHETERIZATION N/A 2019    Procedure: Left heart cath;  Surgeon: Wilton Brown MD;  Location: [facility] CATH INVASIVE LOCATION;  Service: Cardiovascular   • CARDIAC CATHETERIZATION N/A 2019    Procedure: Left ventriculography;  Surgeon: Wilton Brown MD;  [facility];  Service: Cardiovascular   • CARDIAC CATHETERIZATION N/A 2019    Procedure: Right heart cath;  Surgeon: Wilton Brown MD;  Location: [facility] CATH INVASIVE LOCATION;  Service: Cardiovascular   • CARDIAC CATHETERIZATION Right 2022    Procedure: Percutaneous Manual Thrombectomy;  Surgeon: Maninder Penn MD;  Location: [facility] INVASIVE LOCATION;  Service: Cardiovascular;  Laterality: Right;   • CATARACT EXTRACTION     • INSERT / REPLACE / REMOVE PACEMAKER  2014   • INTERVENTIONAL RADIOLOGY PROCEDURE Right 2022    Procedure: Pulmonary Angiogram;  Surgeon: Maninder Penn MD;  Location: [facility] INVASIVE LOCATION;  Service: Cardiovascular;  Laterality: Right;   • PACEMAKER IMPLANTATION         Social History     Socioeconomic History   • Marital status:    Tobacco Use   • Smoking status: Former Smoker     Packs/day: 0.25     Years: 12.00     Pack years: 3.00     Types: Pipe, Cigarettes     Start date:      Quit date:      Years since quittin.2   • Smokeless tobacco: Never Used   • Tobacco comment: CAFFEINE USE 3 CUPS COFFEE DAILY   Vaping Use   • Vaping Use: Never used  "  Substance and Sexual Activity   • Alcohol use: No   • Drug use: Never   • Sexual activity: Defer       Family History   Problem Relation Age of Onset   • Cancer Mother    • Heart disease Father    • Cancer Father    • Heart disease Sister    • Sudden death Sister    • Heart disease Brother    • Cancer Paternal Grandfather        Family history, surgical history, past medical history, Allergies and med's reviewed with patient today and updated in [facility] EMR.     ROS:  Review of Systems   Constitutional: Positive for fatigue.   Respiratory: Positive for cough, chest tightness and shortness of breath. Negative for wheezing.    Cardiovascular: Negative for chest pain, palpitations and leg swelling.       OBJECTIVE:  Vitals:    04/07/22 1439   BP: 124/66   Pulse: 80   Temp: 96.7 °F (35.9 °C)   TempSrc: Temporal   SpO2: 98%   Weight: 96.1 kg (211 lb 12.8 oz)   Height: 200.7 cm (79\")     No exam data present   Body mass index is 23.86 kg/m².  No LMP for male patient.    Physical Exam  Vitals and nursing note reviewed.   Constitutional:       General: He is not in acute distress.     Appearance: Normal appearance. He is normal weight.   HENT:      Head: Normocephalic.   Cardiovascular:      Rate and Rhythm: Normal rate and regular rhythm.      Heart sounds: Normal heart sounds. No murmur heard.  Pulmonary:      Effort: Pulmonary effort is normal.      Breath sounds: Normal breath sounds. No wheezing, rhonchi or rales.   Neurological:      Mental Status: He is alert.         Procedures    No visits with results within 30 Day(s) from this visit.   Latest known visit with results is:   Lab on 02/14/2022   Component Date Value Ref Range Status   • SARS-CoV-2 Semi-Quant Ab 02/14/2022 >2500.0  Negative<0.8 U/mL Final    Antibodies against the SARS-CoV-2 spike protein receptor binding  domain (RBD) were detected. It is yet undetermined what level of  antibody to SARS-CoV-2 spike protein correlates to immunity against  developing " symptomatic SARS-CoV-2 disease. Studies are underway to  measure the quantitative levels of specific SARS-CoV-2 antibodies  following vaccination. Such studies will provide valuable insights  into the correlation between protection from vaccination and  antibody levels.   • SARS-COV-2 SPIKE AB INTERP 02/14/2022 Positive   Final    Roche ElecReplenishs Anti-SARS-CoV-2 S       ASSESSMENT/ PLAN:    Diagnoses and all orders for this visit:    1. Other acute pulmonary embolism with acute cor pulmonale (HCC) (Primary)  Assessment & Plan:  He has been now on the Eliquis for just a little over 2 months.  His shortness of breath is improved.  He denies any excessive bruising, bloody noses, gross hematuria, or blood per rectum.      2. Pneumonia due to COVID-19 virus  Assessment & Plan:  He still has some residual fatigue and shortness of breath.  Hopefully over the next several months he will be improved.  He does have a follow-up with pulmonary and a follow-up CAT scan in another couple months.      3. CTEPH (chronic thromboembolic pulmonary hypertension) ([facility])  Assessment & Plan:  Overall he is stable at this time.  Hopefully some of his pulmonary hypertension and right-sided heart function will improve with more time.  He is still going to cardiopulmonary rehab twice weekly.        Orders Placed Today:     No orders of the defined types were placed in this encounter.       Management Plan:     An After Visit Summary was printed and given to the patient at discharge.    Follow-up: Return in about 6 months (around 10/7/2022) for Recheck.    Erik Macias,  4/7/2022 15:13 EDT  This note was electronically signed.

## 2022-04-07 NOTE — ASSESSMENT & PLAN NOTE
Overall he is stable at this time.  Hopefully some of his pulmonary hypertension and right-sided heart function will improve with more time.  He is still going to cardiopulmonary rehab twice weekly.

## 2022-04-07 NOTE — ASSESSMENT & PLAN NOTE
He still has some residual fatigue and shortness of breath.  Hopefully over the next several months he will be improved.  He does have a follow-up with pulmonary and a follow-up CAT scan in another couple months.

## 2022-04-11 ENCOUNTER — TREATMENT (OUTPATIENT)
Dept: CARDIAC REHAB | Facility: HOSPITAL | Age: 77
End: 2022-04-11

## 2022-04-11 DIAGNOSIS — U07.1 CLINICAL DIAGNOSIS OF SEVERE ACUTE RESPIRATORY SYNDROME CORONAVIRUS 2 (SARS-COV-2) DISEASE: Primary | ICD-10-CM

## 2022-04-11 PROCEDURE — G0239 OTH RESP PROC, GROUP: HCPCS

## 2022-04-14 ENCOUNTER — TREATMENT (OUTPATIENT)
Dept: CARDIAC REHAB | Facility: HOSPITAL | Age: 77
End: 2022-04-14

## 2022-04-14 DIAGNOSIS — U07.1 CLINICAL DIAGNOSIS OF SEVERE ACUTE RESPIRATORY SYNDROME CORONAVIRUS 2 (SARS-COV-2) DISEASE: Primary | ICD-10-CM

## 2022-04-14 PROCEDURE — G0239 OTH RESP PROC, GROUP: HCPCS

## 2022-04-18 ENCOUNTER — APPOINTMENT (OUTPATIENT)
Dept: CARDIAC REHAB | Facility: HOSPITAL | Age: 77
End: 2022-04-18

## 2022-04-21 ENCOUNTER — TREATMENT (OUTPATIENT)
Dept: CARDIAC REHAB | Facility: HOSPITAL | Age: 77
End: 2022-04-21

## 2022-04-21 DIAGNOSIS — R06.02 SHORTNESS OF BREATH: ICD-10-CM

## 2022-04-21 DIAGNOSIS — U07.1 CLINICAL DIAGNOSIS OF SEVERE ACUTE RESPIRATORY SYNDROME CORONAVIRUS 2 (SARS-COV-2) DISEASE: Primary | ICD-10-CM

## 2022-04-21 PROCEDURE — G0239 OTH RESP PROC, GROUP: HCPCS

## 2022-04-21 NOTE — PROGRESS NOTES
Patient tolerated  rehab exercise session without difficulty.  See exercise session report for details.

## 2022-04-25 ENCOUNTER — TREATMENT (OUTPATIENT)
Dept: CARDIAC REHAB | Facility: HOSPITAL | Age: 77
End: 2022-04-25

## 2022-04-25 DIAGNOSIS — U07.1 CLINICAL DIAGNOSIS OF SEVERE ACUTE RESPIRATORY SYNDROME CORONAVIRUS 2 (SARS-COV-2) DISEASE: Primary | ICD-10-CM

## 2022-04-25 PROCEDURE — G0239 OTH RESP PROC, GROUP: HCPCS

## 2022-04-28 ENCOUNTER — TREATMENT (OUTPATIENT)
Dept: CARDIAC REHAB | Facility: HOSPITAL | Age: 77
End: 2022-04-28

## 2022-04-28 DIAGNOSIS — R06.02 SHORTNESS OF BREATH: ICD-10-CM

## 2022-04-28 DIAGNOSIS — U07.1 PNEUMONIA DUE TO COVID-19 VIRUS: Primary | ICD-10-CM

## 2022-04-28 DIAGNOSIS — J12.82 PNEUMONIA DUE TO COVID-19 VIRUS: Primary | ICD-10-CM

## 2022-04-28 PROCEDURE — G0239 OTH RESP PROC, GROUP: HCPCS

## 2022-05-02 ENCOUNTER — TREATMENT (OUTPATIENT)
Dept: CARDIAC REHAB | Facility: HOSPITAL | Age: 77
End: 2022-05-02

## 2022-05-02 DIAGNOSIS — U07.1 PNEUMONIA DUE TO COVID-19 VIRUS: Primary | ICD-10-CM

## 2022-05-02 DIAGNOSIS — J12.82 PNEUMONIA DUE TO COVID-19 VIRUS: Primary | ICD-10-CM

## 2022-05-02 PROCEDURE — G0239 OTH RESP PROC, GROUP: HCPCS

## 2022-05-05 ENCOUNTER — TREATMENT (OUTPATIENT)
Dept: CARDIAC REHAB | Facility: HOSPITAL | Age: 77
End: 2022-05-05

## 2022-05-05 DIAGNOSIS — R06.02 SHORTNESS OF BREATH: ICD-10-CM

## 2022-05-05 DIAGNOSIS — J12.82 PNEUMONIA DUE TO COVID-19 VIRUS: Primary | ICD-10-CM

## 2022-05-05 DIAGNOSIS — U07.1 PNEUMONIA DUE TO COVID-19 VIRUS: Primary | ICD-10-CM

## 2022-05-05 PROCEDURE — G0239 OTH RESP PROC, GROUP: HCPCS

## 2022-05-09 ENCOUNTER — TREATMENT (OUTPATIENT)
Dept: CARDIAC REHAB | Facility: HOSPITAL | Age: 77
End: 2022-05-09

## 2022-05-09 DIAGNOSIS — R06.02 SHORTNESS OF BREATH: ICD-10-CM

## 2022-05-09 DIAGNOSIS — U07.1 PNEUMONIA DUE TO COVID-19 VIRUS: Primary | ICD-10-CM

## 2022-05-09 DIAGNOSIS — J12.82 PNEUMONIA DUE TO COVID-19 VIRUS: Primary | ICD-10-CM

## 2022-05-09 PROCEDURE — G0239 OTH RESP PROC, GROUP: HCPCS

## 2022-05-12 ENCOUNTER — TREATMENT (OUTPATIENT)
Dept: CARDIAC REHAB | Facility: HOSPITAL | Age: 77
End: 2022-05-12

## 2022-05-12 DIAGNOSIS — U07.1 PNEUMONIA DUE TO COVID-19 VIRUS: Primary | ICD-10-CM

## 2022-05-12 DIAGNOSIS — J12.82 PNEUMONIA DUE TO COVID-19 VIRUS: Primary | ICD-10-CM

## 2022-05-12 PROCEDURE — G0239 OTH RESP PROC, GROUP: HCPCS

## 2022-05-16 ENCOUNTER — APPOINTMENT (OUTPATIENT)
Dept: CARDIAC REHAB | Facility: HOSPITAL | Age: 77
End: 2022-05-16

## 2022-05-19 ENCOUNTER — APPOINTMENT (OUTPATIENT)
Dept: CARDIAC REHAB | Facility: HOSPITAL | Age: 77
End: 2022-05-19

## 2022-05-23 ENCOUNTER — TREATMENT (OUTPATIENT)
Dept: CARDIAC REHAB | Facility: HOSPITAL | Age: 77
End: 2022-05-23

## 2022-05-23 DIAGNOSIS — U07.1 PNEUMONIA DUE TO COVID-19 VIRUS: Primary | ICD-10-CM

## 2022-05-23 DIAGNOSIS — J12.82 PNEUMONIA DUE TO COVID-19 VIRUS: Primary | ICD-10-CM

## 2022-05-23 PROCEDURE — G0239 OTH RESP PROC, GROUP: HCPCS

## 2022-05-24 DIAGNOSIS — Z12.5 PROSTATE CANCER SCREENING: Primary | ICD-10-CM

## 2022-05-26 ENCOUNTER — TREATMENT (OUTPATIENT)
Dept: CARDIAC REHAB | Facility: HOSPITAL | Age: 77
End: 2022-05-26

## 2022-05-26 DIAGNOSIS — U07.1 PNEUMONIA DUE TO COVID-19 VIRUS: Primary | ICD-10-CM

## 2022-05-26 DIAGNOSIS — J12.82 PNEUMONIA DUE TO COVID-19 VIRUS: Primary | ICD-10-CM

## 2022-05-26 PROCEDURE — G0239 OTH RESP PROC, GROUP: HCPCS

## 2022-05-31 ENCOUNTER — LAB (OUTPATIENT)
Dept: LAB | Facility: HOSPITAL | Age: 77
End: 2022-05-31

## 2022-05-31 DIAGNOSIS — Z12.5 PROSTATE CANCER SCREENING: ICD-10-CM

## 2022-05-31 LAB — PSA SERPL-MCNC: 0.9 NG/ML (ref 0–4)

## 2022-05-31 PROCEDURE — 36415 COLL VENOUS BLD VENIPUNCTURE: CPT

## 2022-05-31 PROCEDURE — G0103 PSA SCREENING: HCPCS

## 2022-06-02 ENCOUNTER — OFFICE VISIT (OUTPATIENT)
Dept: UROLOGY | Facility: CLINIC | Age: 77
End: 2022-06-02

## 2022-06-02 ENCOUNTER — TREATMENT (OUTPATIENT)
Dept: CARDIAC REHAB | Facility: HOSPITAL | Age: 77
End: 2022-06-02

## 2022-06-02 VITALS — HEIGHT: 74 IN | BODY MASS INDEX: 27.57 KG/M2 | WEIGHT: 214.8 LBS

## 2022-06-02 DIAGNOSIS — Z12.5 PROSTATE CANCER SCREENING: Primary | ICD-10-CM

## 2022-06-02 DIAGNOSIS — N40.0 BENIGN PROSTATIC HYPERPLASIA WITHOUT LOWER URINARY TRACT SYMPTOMS: ICD-10-CM

## 2022-06-02 DIAGNOSIS — U07.1 PNEUMONIA DUE TO COVID-19 VIRUS: Primary | ICD-10-CM

## 2022-06-02 DIAGNOSIS — J12.82 PNEUMONIA DUE TO COVID-19 VIRUS: Primary | ICD-10-CM

## 2022-06-02 PROCEDURE — G0239 OTH RESP PROC, GROUP: HCPCS

## 2022-06-02 PROCEDURE — 99213 OFFICE O/P EST LOW 20 MIN: CPT | Performed by: UROLOGY

## 2022-06-02 NOTE — PROGRESS NOTES
UROLOGY OFFICE follow-up NOTE    Subjective   HPI    Elmer Garvin is a 77 y.o. male.  who is seen in follow-up for BPH with urinary frequency. He is doing well at the present time.  He has had occasional nocturia and feels like he is emptying his bladder well.    Update 19: Presents for annual follow up. Patient without complaints. Denies bothersome LUTS, believes he is emptying his bladder completely. Denies bothersome nocturia. Had PSA drawn recently. Father  of prostate cancer in his 70s; otherwise good longevity in his family.   Also reports having recent lung biopsy, results are pending. Denies other changes in history since last visit.    Update 2021: Patient presents for and annual routine follow-up with PSA prior.  Patient reports some urinary hesitancy as well as we can affect of stream since last visit.  These things are not particularly bothersome.  Denies sensation of incomplete emptying.  No bothersome nocturia at this time.  Patient wishes to avoid medications if possible.  Biggest concern is his arthritis and being taken off his NSAID; notes significant discomfort due to this.  PVR today: 18 cc    Update 2022: Presents for routine annual visit, history of BPH, urinary frequency.  Prior PSA screening to today's appointment. The patient is here for an annual visit. He wishes to continue to have the PSA blood work completed, yearly. The patient states that his dad was 71 years-old when he passed away, in  from prostate cancer. He is unaware if his dad had any treatment for prostate, or when his dad was diagnosed. The patient denies any current urinary concerns. The patient drinks mostly water.       ________________  PSA   2022: 0.896  2021: 0.83  19: 0.76      Results for orders placed or performed in visit on 22   PSA Screen    Specimen: Blood   Result Value Ref Range    PSA 0.896 0.000 - 4.000 ng/mL         Medical History:  Past Medical History:    Diagnosis Date   • Hypertension    • Lyme disease    • Shortness of breath    • SSS (sick sinus syndrome) ([facility])         Social History:  Social History     Socioeconomic History   • Marital status:    Tobacco Use   • Smoking status: Former Smoker     Packs/day: 0.25     Years: 12.00     Pack years: 3.00     Types: Pipe, Cigarettes     Start date:      Quit date:      Years since quittin.4   • Smokeless tobacco: Never Used   • Tobacco comment: CAFFEINE USE 3 CUPS COFFEE DAILY   Vaping Use   • Vaping Use: Never used   Substance and Sexual Activity   • Alcohol use: No   • Drug use: Never   • Sexual activity: Defer        Family History:  Family History   Problem Relation Age of Onset   • Cancer Mother    • Heart disease Father    • Cancer Father    • Heart disease Sister    • Sudden death Sister    • Heart disease Brother    • Cancer Paternal Grandfather         Surgical History:  Past Surgical History:   Procedure Laterality Date   • CARDIAC CATHETERIZATION N/A 2019    Procedure: Coronary angiography;  Surgeon: Wilton Brown MD;  Location: [facility] INVASIVE LOCATION;  Service: Cardiovascular   • CARDIAC CATHETERIZATION N/A 2019    Procedure: Left heart cath;  Surgeon: Wilton Brown MD;  Location: [facility] INVASIVE LOCATION;  Service: Cardiovascular   • CARDIAC CATHETERIZATION N/A 2019    Procedure: Left ventriculography;  Surgeon: Wilton Brown MD;  Location: [facility] INVASIVE LOCATION;  Service: Cardiovascular   • CARDIAC CATHETERIZATION N/A 2019    Procedure: Right heart cath;  Surgeon: Wilton Brown MD;  Location: [facility] INVASIVE LOCATION;  Service: Cardiovascular   • CARDIAC CATHETERIZATION Right 2022    Procedure: Percutaneous Manual Thrombectomy;  Surgeon: Maninder Penn MD;  Location: [facility] CATH INVASIVE LOCATION;  Service: Cardiovascular;  Laterality: Right;   • CATARACT EXTRACTION     • INSERT / REPLACE / REMOVE PACEMAKER  2014  "  • INTERVENTIONAL RADIOLOGY PROCEDURE Right 1/26/2022    Procedure: Pulmonary Angiogram;  Surgeon: Maninder ePnn MD;  Location: [facility] INVASIVE LOCATION;  Service: Cardiovascular;  Laterality: Right;   • PACEMAKER IMPLANTATION          Allergies:  Allergies   Allergen Reactions   • Latex Other (See Comments)     Sinus congestion        Current Medications:  Current Outpatient Medications   Medication Sig Dispense Refill   • albuterol sulfate  (90 Base) MCG/ACT inhaler Inhale 2 puffs Every 4 (Four) Hours As Needed for Wheezing. 1 g 6   • apixaban (ELIQUIS) 5 MG tablet tablet Take 1 tablet by mouth 2 (Two) Times a Day. 60 tablet 6   • cetirizine (zyrTEC) 10 MG tablet Take 1 tablet by mouth Daily. 90 tablet 3   • fluticasone (FLONASE) 50 MCG/ACT nasal spray 2 sprays into the nostril(s) as directed by provider Daily for 30 days. 18.2 mL 0   • ipratropium-albuterol (DUO-NEB) 0.5-2.5 mg/3 ml nebulizer Take 3 mL by nebulization 4 (Four) Times a Day As Needed for Wheezing or Shortness of Air. 360 mL 3   • latanoprost (XALATAN) 0.005 % ophthalmic solution      • losartan (COZAAR) 100 MG tablet Take 1 tablet by mouth Daily for 90 days. 90 tablet 3   • montelukast (SINGULAIR) 10 MG tablet Take 1 tablet by mouth Daily for 90 days. 90 tablet 3   • Synthroid 100 MCG tablet Take 1 tablet by mouth every morning *on empty stomach* 90 tablet 3     No current facility-administered medications for this visit.       Review of systems  A review of systems was performed, and positive findings are noted in the HPI.    Objective     Vital Signs:   Ht 188 cm (74\")   Wt 97.4 kg (214 lb 12.8 oz)   BMI 27.58 kg/m²       Physical exam  No acute distress, well-nourished  Awake alert and oriented  Mood normal; affect normal    Problem List:  Patient Active Problem List   Diagnosis   • Angina at rest ([facility])   • Hypertension   • SSS (sick sinus syndrome) ([facility])   • Mild intermittent asthma without complication   • Gastroesophageal " reflux disease without esophagitis   • Taylor's esophagus without dysplasia   • Acquired hypothyroidism   • MRSA (methicillin resistant Staphylococcus aureus)   • Pre-diabetes   • Acute respiratory failure with hypoxia (HCC)   • Arthritis   • Asthma   • Benign prostatic hyperplasia   • Cataract   • Deviated nasal septum   • Family history of prostate cancer in father   • Hypertrophy of both inferior nasal turbinates   • Impaired glucose tolerance   • 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   • Nasal congestion   • Nasal obstruction   • Pacemaker   • Acute on chronic respiratory failure with hypoxia (HCC)   • Acute pulmonary embolism (HCC)   • Bilateral vitreous detachment   • Epiretinal membrane   • Glaucomatous atrophy of optic disc   • After cataract   • Pneumonia due to COVID-19 virus   • CTEPH (chronic thromboembolic pulmonary hypertension) ([facility])   • Shortness of breath       Assessment & Plan   Diagnoses and all orders for this visit:    1. Prostate cancer screening (Primary)  -     PSA Screen; Future    2. Benign prostatic hyperplasia without lower urinary tract symptoms      Denies voiding complaints today    PSA screening; PSA stable trend less than 1  Discussed again AUA guidelines for prostate cancer detection noting that PSA screening is not recommended after the age of 70 give the indolent nature of most prostate cancers but may be performed on an individual basis according risk factors, comorbidities, and life expectancy.  Discussed options with patient including to stop further PSA screening or to continue to monitor and trend psa with consideration of biopsy if PSA > 10. Patient participated in the discussion and asked appropriate questions.  Patient wishes to continue PSA screening at this point.    Follow up in 1 year with PSA prior or earlier as needed  All questions addressed

## 2022-06-06 ENCOUNTER — TREATMENT (OUTPATIENT)
Dept: CARDIAC REHAB | Facility: HOSPITAL | Age: 77
End: 2022-06-06

## 2022-06-06 DIAGNOSIS — U07.1 PNEUMONIA DUE TO COVID-19 VIRUS: Primary | ICD-10-CM

## 2022-06-06 DIAGNOSIS — J12.82 PNEUMONIA DUE TO COVID-19 VIRUS: Primary | ICD-10-CM

## 2022-06-06 PROCEDURE — G0239 OTH RESP PROC, GROUP: HCPCS

## 2022-06-09 ENCOUNTER — TREATMENT (OUTPATIENT)
Dept: CARDIAC REHAB | Facility: HOSPITAL | Age: 77
End: 2022-06-09

## 2022-06-09 DIAGNOSIS — J12.82 PNEUMONIA DUE TO COVID-19 VIRUS: Primary | ICD-10-CM

## 2022-06-09 DIAGNOSIS — U07.1 PNEUMONIA DUE TO COVID-19 VIRUS: Primary | ICD-10-CM

## 2022-06-09 PROCEDURE — G0239 OTH RESP PROC, GROUP: HCPCS

## 2022-06-13 ENCOUNTER — TREATMENT (OUTPATIENT)
Dept: CARDIAC REHAB | Facility: HOSPITAL | Age: 77
End: 2022-06-13

## 2022-06-13 DIAGNOSIS — J12.82 PNEUMONIA DUE TO COVID-19 VIRUS: Primary | ICD-10-CM

## 2022-06-13 DIAGNOSIS — U07.1 PNEUMONIA DUE TO COVID-19 VIRUS: Primary | ICD-10-CM

## 2022-06-13 PROCEDURE — G0239 OTH RESP PROC, GROUP: HCPCS

## 2022-06-16 ENCOUNTER — TREATMENT (OUTPATIENT)
Dept: CARDIAC REHAB | Facility: HOSPITAL | Age: 77
End: 2022-06-16

## 2022-06-16 DIAGNOSIS — U07.1 PNEUMONIA DUE TO COVID-19 VIRUS: Primary | ICD-10-CM

## 2022-06-16 DIAGNOSIS — J12.82 PNEUMONIA DUE TO COVID-19 VIRUS: Primary | ICD-10-CM

## 2022-06-16 PROCEDURE — G0239 OTH RESP PROC, GROUP: HCPCS

## 2022-06-20 ENCOUNTER — TREATMENT (OUTPATIENT)
Dept: CARDIAC REHAB | Facility: HOSPITAL | Age: 77
End: 2022-06-20

## 2022-06-20 DIAGNOSIS — U07.1 PNEUMONIA DUE TO COVID-19 VIRUS: Primary | ICD-10-CM

## 2022-06-20 DIAGNOSIS — J12.82 PNEUMONIA DUE TO COVID-19 VIRUS: Primary | ICD-10-CM

## 2022-06-20 PROCEDURE — G0239 OTH RESP PROC, GROUP: HCPCS

## 2022-06-23 ENCOUNTER — TELEPHONE (OUTPATIENT)
Dept: PULMONOLOGY | Facility: CLINIC | Age: 77
End: 2022-06-23

## 2022-06-23 ENCOUNTER — TREATMENT (OUTPATIENT)
Dept: CARDIAC REHAB | Facility: HOSPITAL | Age: 77
End: 2022-06-23

## 2022-06-23 DIAGNOSIS — U07.1 PNEUMONIA DUE TO COVID-19 VIRUS: Primary | ICD-10-CM

## 2022-06-23 DIAGNOSIS — J12.82 PNEUMONIA DUE TO COVID-19 VIRUS: Primary | ICD-10-CM

## 2022-06-23 PROBLEM — Z86.711 HISTORY OF PULMONARY EMBOLISM: Status: ACTIVE | Noted: 2022-06-23

## 2022-06-23 PROBLEM — Z86.16 PERSONAL HISTORY OF COVID-19: Status: ACTIVE | Noted: 2022-06-23

## 2022-06-23 PROBLEM — E03.8 OTHER SPECIFIED HYPOTHYROIDISM: Status: ACTIVE | Noted: 2022-06-23

## 2022-06-23 PROCEDURE — G0239 OTH RESP PROC, GROUP: HCPCS

## 2022-06-23 NOTE — TELEPHONE ENCOUNTER
[place] pharmacy called and needed another DX code for patients nebulizer medications, I have called and spoke with Celia at the pharmacy.

## 2022-06-24 ENCOUNTER — OFFICE VISIT (OUTPATIENT)
Dept: INTERNAL MEDICINE | Facility: CLINIC | Age: 77
End: 2022-06-24

## 2022-06-24 VITALS
DIASTOLIC BLOOD PRESSURE: 78 MMHG | HEIGHT: 74 IN | BODY MASS INDEX: 27.49 KG/M2 | TEMPERATURE: 97.4 F | OXYGEN SATURATION: 97 % | WEIGHT: 214.2 LBS | SYSTOLIC BLOOD PRESSURE: 136 MMHG | HEART RATE: 78 BPM

## 2022-06-24 DIAGNOSIS — M19.90 ARTHRITIS: ICD-10-CM

## 2022-06-24 DIAGNOSIS — Z76.89 ESTABLISHING CARE WITH NEW DOCTOR, ENCOUNTER FOR: Primary | ICD-10-CM

## 2022-06-24 DIAGNOSIS — Z86.16 PERSONAL HISTORY OF COVID-19: ICD-10-CM

## 2022-06-24 DIAGNOSIS — Z13.6 SCREENING FOR CARDIOVASCULAR CONDITION: ICD-10-CM

## 2022-06-24 DIAGNOSIS — E03.8 OTHER SPECIFIED HYPOTHYROIDISM: ICD-10-CM

## 2022-06-24 DIAGNOSIS — D69.6 ANEMIA WITH LOW PLATELET COUNT: ICD-10-CM

## 2022-06-24 DIAGNOSIS — I10 ESSENTIAL HYPERTENSION: ICD-10-CM

## 2022-06-24 DIAGNOSIS — Z86.711 HISTORY OF PULMONARY EMBOLISM: ICD-10-CM

## 2022-06-24 DIAGNOSIS — D64.9 NORMOCYTIC ANEMIA: ICD-10-CM

## 2022-06-24 DIAGNOSIS — Z95.0 PACEMAKER: ICD-10-CM

## 2022-06-24 DIAGNOSIS — I27.24 CTEPH (CHRONIC THROMBOEMBOLIC PULMONARY HYPERTENSION): ICD-10-CM

## 2022-06-24 DIAGNOSIS — R73.03 PRE-DIABETES: ICD-10-CM

## 2022-06-24 DIAGNOSIS — N40.0 BENIGN PROSTATIC HYPERPLASIA WITHOUT LOWER URINARY TRACT SYMPTOMS: ICD-10-CM

## 2022-06-24 DIAGNOSIS — I49.5 SSS (SICK SINUS SYNDROME): ICD-10-CM

## 2022-06-24 DIAGNOSIS — Z11.59 NEED FOR HEPATITIS C SCREENING TEST: ICD-10-CM

## 2022-06-24 LAB
ALBUMIN SERPL-MCNC: 4.6 G/DL (ref 3.5–5.2)
ALBUMIN/GLOB SERPL: 1.8 G/DL
ALP SERPL-CCNC: 43 U/L (ref 39–117)
ALT SERPL W P-5'-P-CCNC: 13 U/L (ref 1–41)
ANION GAP SERPL CALCULATED.3IONS-SCNC: 8.7 MMOL/L (ref 5–15)
AST SERPL-CCNC: 13 U/L (ref 1–40)
BASOPHILS # BLD AUTO: 0.04 10*3/MM3 (ref 0–0.2)
BASOPHILS NFR BLD AUTO: 0.8 % (ref 0–1.5)
BILIRUB SERPL-MCNC: 0.4 MG/DL (ref 0–1.2)
BUN SERPL-MCNC: 18 MG/DL (ref 8–23)
BUN/CREAT SERPL: 12.2 (ref 7–25)
CALCIUM SPEC-SCNC: 9.5 MG/DL (ref 8.6–10.5)
CHLORIDE SERPL-SCNC: 102 MMOL/L (ref 98–107)
CHOLEST SERPL-MCNC: 202 MG/DL (ref 0–200)
CO2 SERPL-SCNC: 27.3 MMOL/L (ref 22–29)
CREAT SERPL-MCNC: 1.48 MG/DL (ref 0.76–1.27)
DEPRECATED RDW RBC AUTO: 39.7 FL (ref 37–54)
EGFRCR SERPLBLD CKD-EPI 2021: 48.7 ML/MIN/1.73
EOSINOPHIL # BLD AUTO: 0.07 10*3/MM3 (ref 0–0.4)
EOSINOPHIL NFR BLD AUTO: 1.3 % (ref 0.3–6.2)
ERYTHROCYTE [DISTWIDTH] IN BLOOD BY AUTOMATED COUNT: 13.8 % (ref 12.3–15.4)
GLOBULIN UR ELPH-MCNC: 2.5 GM/DL
GLUCOSE SERPL-MCNC: 84 MG/DL (ref 65–99)
HBA1C MFR BLD: 5.8 % (ref 4.8–5.6)
HCT VFR BLD AUTO: 41.8 % (ref 37.5–51)
HCV AB SER DONR QL: NORMAL
HDLC SERPL-MCNC: 50 MG/DL (ref 40–60)
HGB BLD-MCNC: 13.9 G/DL (ref 13–17.7)
IMM GRANULOCYTES # BLD AUTO: 0.02 10*3/MM3 (ref 0–0.05)
IMM GRANULOCYTES NFR BLD AUTO: 0.4 % (ref 0–0.5)
LDLC SERPL CALC-MCNC: 124 MG/DL (ref 0–100)
LDLC/HDLC SERPL: 2.4 {RATIO}
LYMPHOCYTES # BLD AUTO: 1.54 10*3/MM3 (ref 0.7–3.1)
LYMPHOCYTES NFR BLD AUTO: 29.2 % (ref 19.6–45.3)
MCH RBC QN AUTO: 26.7 PG (ref 26.6–33)
MCHC RBC AUTO-ENTMCNC: 33.3 G/DL (ref 31.5–35.7)
MCV RBC AUTO: 80.2 FL (ref 79–97)
MONOCYTES # BLD AUTO: 0.43 10*3/MM3 (ref 0.1–0.9)
MONOCYTES NFR BLD AUTO: 8.2 % (ref 5–12)
NEUTROPHILS NFR BLD AUTO: 3.17 10*3/MM3 (ref 1.7–7)
NEUTROPHILS NFR BLD AUTO: 60.1 % (ref 42.7–76)
NRBC BLD AUTO-RTO: 0 /100 WBC (ref 0–0.2)
PLATELET # BLD AUTO: 206 10*3/MM3 (ref 140–450)
PMV BLD AUTO: 10.4 FL (ref 6–12)
POTASSIUM SERPL-SCNC: 4.8 MMOL/L (ref 3.5–5.2)
PROT SERPL-MCNC: 7.1 G/DL (ref 6–8.5)
RBC # BLD AUTO: 5.21 10*6/MM3 (ref 4.14–5.8)
SODIUM SERPL-SCNC: 138 MMOL/L (ref 136–145)
TRIGL SERPL-MCNC: 159 MG/DL (ref 0–150)
TSH SERPL DL<=0.05 MIU/L-ACNC: 3.3 UIU/ML (ref 0.27–4.2)
VLDLC SERPL-MCNC: 28 MG/DL (ref 5–40)
WBC NRBC COR # BLD: 5.27 10*3/MM3 (ref 3.4–10.8)

## 2022-06-24 PROCEDURE — 83036 HEMOGLOBIN GLYCOSYLATED A1C: CPT | Performed by: STUDENT IN AN ORGANIZED HEALTH CARE EDUCATION/TRAINING PROGRAM

## 2022-06-24 PROCEDURE — 80053 COMPREHEN METABOLIC PANEL: CPT | Performed by: STUDENT IN AN ORGANIZED HEALTH CARE EDUCATION/TRAINING PROGRAM

## 2022-06-24 PROCEDURE — 85025 COMPLETE CBC W/AUTO DIFF WBC: CPT | Performed by: STUDENT IN AN ORGANIZED HEALTH CARE EDUCATION/TRAINING PROGRAM

## 2022-06-24 PROCEDURE — 84443 ASSAY THYROID STIM HORMONE: CPT | Performed by: STUDENT IN AN ORGANIZED HEALTH CARE EDUCATION/TRAINING PROGRAM

## 2022-06-24 PROCEDURE — 36415 COLL VENOUS BLD VENIPUNCTURE: CPT | Performed by: STUDENT IN AN ORGANIZED HEALTH CARE EDUCATION/TRAINING PROGRAM

## 2022-06-24 PROCEDURE — 86803 HEPATITIS C AB TEST: CPT | Performed by: STUDENT IN AN ORGANIZED HEALTH CARE EDUCATION/TRAINING PROGRAM

## 2022-06-24 PROCEDURE — 99215 OFFICE O/P EST HI 40 MIN: CPT | Performed by: STUDENT IN AN ORGANIZED HEALTH CARE EDUCATION/TRAINING PROGRAM

## 2022-06-24 PROCEDURE — 80061 LIPID PANEL: CPT | Performed by: STUDENT IN AN ORGANIZED HEALTH CARE EDUCATION/TRAINING PROGRAM

## 2022-06-24 RX ORDER — LOSARTAN POTASSIUM 100 MG/1
100 TABLET ORAL DAILY
Qty: 90 TABLET | Refills: 3 | Status: SHIPPED | OUTPATIENT
Start: 2022-06-24 | End: 2022-06-25

## 2022-06-24 RX ORDER — PANTOPRAZOLE SODIUM 40 MG/1
40 TABLET, DELAYED RELEASE ORAL DAILY
Status: ON HOLD | COMMUNITY
Start: 2022-06-08 | End: 2022-10-13

## 2022-06-24 RX ORDER — PHENOL 1.4 %
10 AEROSOL, SPRAY (ML) MUCOUS MEMBRANE NIGHTLY
COMMUNITY

## 2022-06-24 NOTE — PROGRESS NOTES
"Chief Complaint  Establish Care (Previous PCP Dr Macias/ discuss pneumonia injection)    Subjective          Elmer Garvin presents to [facility]  History of Present Illness    Previous PCP:  Dr Macias  Specialist(s): Dr Gong - Pulm, Montse - gastro, Fili - cardio, Viktoria - urology, Dr. Lisbet Flores ([facility] in [place], [place]; a family medicine physician whose practice appears to center on the treatment of tickborne illnesses)  COVID vaccine: doesn't want  Pneumonia vaccine: none  Shingles vaccine: none  Colon cancer screening: scheduled    Mr. Garvin is here with his wife today, to establish care.    Mr. 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    He follows with Dr. Gong of pulmonology, and is tentatively planned for a 6 month course of DOAC for his pulmonary embolism.  Assuming full resolution at upcoming CT, he is planned to terminate DOAC.    Reports was diagnosed last year with borrelia and babesia infection by Dr. Flores in [place] (he has presented with feelings of malaise since around 2017).  He received 5-6 months of antimicrobials, and after that was started on supplements to \"clean up any residual.\"  He was taken off these supplements due to concern for potential interaction with his DOAC.    He was also taken on sulindac, which he had previously used for management of his arthritic pains.  He currently takes tylenol (as much as 4,500 mg in a day) and finds it ineffective in comparison to sulindac.  Has arthritis pain primarily in hands, hips and knees.      Current Outpatient Medications   Medication Instructions   • albuterol sulfate  (90 Base) MCG/ACT inhaler 2 puffs, Inhalation, Every 4 Hours PRN   • apixaban (ELIQUIS) 5 mg, Oral, 2 Times Daily   • " "cetirizine (ZYRTEC) 10 mg, Oral, Daily   • fluticasone (FLONASE) 50 MCG/ACT nasal spray 2 sprays, Nasal, Daily   • ipratropium-albuterol (DUO-NEB) 0.5-2.5 mg/3 ml nebulizer 3 mL, Nebulization, 4 Times Daily PRN   • latanoprost (XALATAN) 0.005 % ophthalmic solution No dose, route, or frequency recorded.   • losartan (COZAAR) 100 mg, Oral, Daily   • Melatonin 10 mg, Oral   • montelukast (SINGULAIR) 10 mg, Oral, Daily   • pantoprazole (PROTONIX) 40 mg, Oral, Daily   • Synthroid 100 MCG tablet Take 1 tablet by mouth every morning *on empty stomach*       The following portions of the patient's history were reviewed and updated as appropriate: allergies, current medications, past family history, past medical history, past social history, past surgical history, and problem list.    Objective   Vital Signs:   /78   Pulse 78   Temp 97.4 °F (36.3 °C) (Temporal)   Ht 188 cm (74\")   Wt 97.2 kg (214 lb 3.2 oz)   SpO2 97%   BMI 27.50 kg/m²     Wt Readings from Last 3 Encounters:   06/24/22 97.2 kg (214 lb 3.2 oz)   06/02/22 97.4 kg (214 lb 12.8 oz)   04/07/22 96.1 kg (211 lb 12.8 oz)     BP Readings from Last 3 Encounters:   06/24/22 136/78   04/07/22 124/66   04/05/22 127/79     Physical Exam  Vitals reviewed.   Constitutional:       General: He is not in acute distress.     Appearance: Normal appearance.   HENT:      Head: Normocephalic and atraumatic.   Eyes:      Conjunctiva/sclera: Conjunctivae normal.   Cardiovascular:      Rate and Rhythm: Normal rate and regular rhythm.      Pulses: Normal pulses.      Heart sounds: Normal heart sounds. No murmur heard.  Pulmonary:      Effort: Pulmonary effort is normal.      Breath sounds: Normal breath sounds. No wheezing.   Abdominal:      General: Abdomen is flat.      Palpations: Abdomen is soft. There is no mass.      Tenderness: There is no abdominal tenderness.   Musculoskeletal:      Right lower leg: No edema.      Left lower leg: No edema.   Skin:     General: Skin " is warm and dry.   Neurological:      General: No focal deficit present.      Mental Status: He is alert. Mental status is at baseline.   Psychiatric:         Mood and Affect: Mood normal.         Behavior: Behavior normal.         Thought Content: Thought content normal.         Judgment: Judgment normal.       Result Review :   The following data was reviewed by: Roberto Carlos Jimenez MD on 06/24/2022:  Common labs    Common Labsle 1/26/22 1/26/22 1/27/22 1/27/22 5/31/22    0423 0423 0513 0513    Glucose  126 (A)  127 (A)    BUN  14  15    Creatinine  1.06  1.17    eGFR Non African Am  68  61    Sodium  142  139    Potassium  4.2  4.3    Chloride  108 (A)  105    Calcium  8.8  8.7    Albumin  3.50      Total Bilirubin  0.6      Alkaline Phosphatase  39      AST (SGOT)  12      ALT (SGPT)  20      WBC 4.50  4.61     Hemoglobin 12.9 (A)  11.1 (A)     Hematocrit 38.5  32.2 (A)     Platelets 121 (A)  122 (A)     PSA     0.896   (A) Abnormal value                   Lab Results   Component Value Date    COVID19 Detected (C) 12/26/2021    FLU Negative 12/26/2021    FLU Negative 12/26/2021    INR 0.87 (L) 01/25/2022       Procedures        Assessment and Plan    Diagnoses and all orders for this visit:    1. Establishing care with new doctor, encounter for (Primary)    2. CTEPH (chronic thromboembolic pulmonary hypertension) (HCC)    3. Benign prostatic hyperplasia without lower urinary tract symptoms    4. Pre-diabetes  -     Comprehensive Metabolic Panel  -     Hemoglobin A1c    5. Pacemaker    6. SSS (sick sinus syndrome) (HCC)    7. Other specified hypothyroidism  -     TSH    8. Essential hypertension  -     Comprehensive Metabolic Panel  -     losartan (COZAAR) 100 MG tablet; Take 1 tablet by mouth Daily for 90 days.  Dispense: 90 tablet; Refill: 3    9. Need for hepatitis C screening test  -     Hepatitis C Antibody    10. Normocytic anemia  -     CBC & Differential    11. Anemia with low platelet count (HCC)  -     CBC  & Differential    12. Screening for cardiovascular condition  -     Lipid Panel    13. Personal history of COVID-19    14. History of pulmonary embolism    15. Arthritis      Arthritis:  -I see no evidence of CKD on previous labwork  -I discussed that if able to safely discontinue DOAC, I would be agreeable to a trial of sulindac  -I did  him that he should consume no more than 4,000 mg of tylenol in a 24 hour period    Hx of PE:  -I counseled him that although COVID infection is the likely trigger, he does need an age appropriate cancer screen  -PSA was NWL this year, and he has had extensive chest imaging already  -he is scheduled for colonoscopy    History of Tickborne Illness:  -noted  -I will request records from Dr. Flores    Misc:  -I have asked  to request records from Dr. Flores in [place]  -would be okay for 20 valent pneumonia around October this year or later    Medications Discontinued During This Encounter   Medication Reason   • losartan (COZAAR) 100 MG tablet Reorder        I spent 50 minutes caring for Edward on this date of service. This time includes time spent by me in the following activities:preparing for the visit, reviewing tests, obtaining and/or reviewing a separately obtained history, performing a medically appropriate examination and/or evaluation , counseling and educating the patient/family/caregiver, ordering medications, tests, or procedures and documenting information in the medical record  Follow Up   Return in about 31 days (around 7/25/2022) for Medicare Wellness.  Patient was given instructions and counseling regarding his condition or for health maintenance advice. Please see specific information pulled into the AVS if appropriate.       Roberto Carlos Jimenez MD  06/24/22  17:32 EDT

## 2022-06-25 DIAGNOSIS — I10 ESSENTIAL HYPERTENSION: Primary | ICD-10-CM

## 2022-06-25 DIAGNOSIS — N17.9 AKI (ACUTE KIDNEY INJURY): ICD-10-CM

## 2022-06-25 DIAGNOSIS — E78.2 MIXED HYPERLIPIDEMIA: ICD-10-CM

## 2022-06-25 RX ORDER — AMLODIPINE BESYLATE 10 MG/1
10 TABLET ORAL DAILY
Qty: 90 TABLET | Refills: 3 | Status: ON HOLD | OUTPATIENT
Start: 2022-06-25 | End: 2022-10-13

## 2022-06-25 RX ORDER — ATORVASTATIN CALCIUM 40 MG/1
40 TABLET, FILM COATED ORAL DAILY
Qty: 90 TABLET | Refills: 3 | Status: SHIPPED | OUTPATIENT
Start: 2022-06-25 | End: 2022-08-01

## 2022-06-25 NOTE — PROGRESS NOTES
TSH is within normal limits.    CMP (which measures kidney function as well as liver enzymes and electrolytes) shows evidence of an acute kidney injury.  I'd like to hold your losartan for now.  I have sent a replacement blood pressure medicine to take instead of the losartan.  I would like for you to come to our outpatient lab in about 2 weeks time for a repeat BMP to check your kidney function.    Hepatitis C screen is negative.    Hemoglobin A1c is stable at 5.8%.    Lipids notable for elevated total and LDL cholesterol as well as elevated triglycerides.  Given this and your overall cardiovascular risk factors, I'd like to start you on a statin.  I have sent a prescription.

## 2022-06-27 ENCOUNTER — TREATMENT (OUTPATIENT)
Dept: CARDIAC REHAB | Facility: HOSPITAL | Age: 77
End: 2022-06-27

## 2022-06-27 ENCOUNTER — TELEPHONE (OUTPATIENT)
Dept: INTERNAL MEDICINE | Facility: CLINIC | Age: 77
End: 2022-06-27

## 2022-06-27 DIAGNOSIS — J12.82 PNEUMONIA DUE TO COVID-19 VIRUS: Primary | ICD-10-CM

## 2022-06-27 DIAGNOSIS — U07.1 PNEUMONIA DUE TO COVID-19 VIRUS: Primary | ICD-10-CM

## 2022-06-27 PROCEDURE — G0239 OTH RESP PROC, GROUP: HCPCS

## 2022-06-27 NOTE — TELEPHONE ENCOUNTER
----- Message from Roberto Carlos Jimenez MD sent at 6/25/2022  8:56 AM EDT -----  TSH is within normal limits.    CMP (which measures kidney function as well as liver enzymes and electrolytes) shows evidence of an acute kidney injury.  I'd like to hold your losartan for now.  I have sent a replacement blood pressure medicine to take instead of the losartan.  I would like for you to come to our outpatient lab in about 2 weeks time for a repeat BMP to check your kidney function.    Hepatitis C screen is negative.    Hemoglobin A1c is stable at 5.8%.    Lipids notable for elevated total and LDL cholesterol as well as elevated triglycerides.  Given this and your overall cardiovascular risk factors, I'd like to start you on a statin.  I have sent a prescription.

## 2022-06-30 ENCOUNTER — LAB (OUTPATIENT)
Dept: LAB | Facility: HOSPITAL | Age: 77
End: 2022-06-30

## 2022-06-30 ENCOUNTER — APPOINTMENT (OUTPATIENT)
Dept: CARDIAC REHAB | Facility: HOSPITAL | Age: 77
End: 2022-06-30

## 2022-06-30 DIAGNOSIS — R50.9 FEVER, UNSPECIFIED FEVER CAUSE: Primary | ICD-10-CM

## 2022-06-30 DIAGNOSIS — R68.83 CHILLS: ICD-10-CM

## 2022-06-30 DIAGNOSIS — R50.9 FEVER, UNSPECIFIED FEVER CAUSE: ICD-10-CM

## 2022-06-30 DIAGNOSIS — R53.83 FATIGUE, UNSPECIFIED TYPE: ICD-10-CM

## 2022-06-30 PROCEDURE — U0005 INFEC AGEN DETEC AMPLI PROBE: HCPCS

## 2022-06-30 PROCEDURE — U0004 COV-19 TEST NON-CDC HGH THRU: HCPCS

## 2022-07-01 ENCOUNTER — TELEPHONE (OUTPATIENT)
Dept: PULMONOLOGY | Facility: CLINIC | Age: 77
End: 2022-07-01

## 2022-07-01 DIAGNOSIS — U07.1 COVID-19 VIRUS DETECTED: Primary | ICD-10-CM

## 2022-07-01 DIAGNOSIS — U07.1 COVID-19 VIRUS DETECTED: ICD-10-CM

## 2022-07-01 LAB — SARS-COV-2 RNA PNL SPEC NAA+PROBE: DETECTED

## 2022-07-01 NOTE — TELEPHONE ENCOUNTER
Patient's wife called in regards to patient testing positive for COVID 6/30. Patient's wife was wondering if he could still get his ct scan done 7/11 and sees Celia 7/12. Please call patient's wife back at [phone]. Thank you.

## 2022-07-01 NOTE — TELEPHONE ENCOUNTER
I spoke with Celia and she stated these appointments dates was ok for for pt to go ahead and get it done. Pt was called and informed.

## 2022-07-04 ENCOUNTER — APPOINTMENT (OUTPATIENT)
Dept: CARDIAC REHAB | Facility: HOSPITAL | Age: 77
End: 2022-07-04

## 2022-07-07 ENCOUNTER — PREP FOR SURGERY (OUTPATIENT)
Dept: OTHER | Facility: HOSPITAL | Age: 77
End: 2022-07-07

## 2022-07-07 ENCOUNTER — CLINICAL SUPPORT (OUTPATIENT)
Dept: GASTROENTEROLOGY | Facility: CLINIC | Age: 77
End: 2022-07-07

## 2022-07-07 ENCOUNTER — APPOINTMENT (OUTPATIENT)
Dept: CARDIAC REHAB | Facility: HOSPITAL | Age: 77
End: 2022-07-07

## 2022-07-07 DIAGNOSIS — Z12.11 COLON CANCER SCREENING: Primary | ICD-10-CM

## 2022-07-07 NOTE — PROGRESS NOTES
Alexandruarianne GONZALEZ Bharathi     REASON FOR CALL: colon screening, 3 year colon recall (2019)  SENT IN PREP: Omar  DOS: 10.13.2022    Past Medical History:   Diagnosis Date   • Hypertension    • Lyme disease    • Shortness of breath    • SSS (sick sinus syndrome) (HCC)      Allergies   Allergen Reactions   • Latex Other (See Comments)     Sinus congestion     Past Surgical History:   Procedure Laterality Date   • CARDIAC CATHETERIZATION N/A 07/25/2019    Procedure: Coronary angiography;  Surgeon: Wilton Brown MD;  Location: [facility] CATH INVASIVE LOCATION;  Service: Cardiovascular   • CARDIAC CATHETERIZATION N/A 07/25/2019    Procedure: Left heart cath;  Surgeon: Wilton Brown MD;  Location: [facility] CATH INVASIVE LOCATION;  Service: Cardiovascular   • CARDIAC CATHETERIZATION N/A 07/25/2019    Procedure: Left ventriculography;  Surgeon: Wilton Brown MD;  Location: [facility] CATH INVASIVE LOCATION;  Service: Cardiovascular   • CARDIAC CATHETERIZATION N/A 07/25/2019    Procedure: Right heart cath;  Surgeon: Wilton Brown MD;  Location: [facility] CATH INVASIVE LOCATION;  Service: Cardiovascular   • CARDIAC CATHETERIZATION Right 01/26/2022    Procedure: Percutaneous Manual Thrombectomy;  Surgeon: Maninder Penn MD;  Location: [facility] INVASIVE LOCATION;  Service: Cardiovascular;  Laterality: Right;   • CATARACT EXTRACTION     • COLONOSCOPY  2019   • INSERT / REPLACE / REMOVE PACEMAKER  08/2014   • INTERVENTIONAL RADIOLOGY PROCEDURE Right 01/26/2022    Procedure: Pulmonary Angiogram;  Surgeon: Maninder Penn MD;  Location: [facility] INVASIVE LOCATION;  Service: Cardiovascular;  Laterality: Right;   • PACEMAKER IMPLANTATION     • PULMONARY EMBOLISM SURGERY  01/2022     Social History     Socioeconomic History   • Marital status:    Tobacco Use   • Smoking status: Former Smoker     Packs/day: 0.25     Years: 12.00     Pack years: 3.00     Types: Pipe, Cigarettes     Start date: 1964     Quit date: 1976      Years since quittin.5   • Smokeless tobacco: Never Used   • Tobacco comment: CAFFEINE USE 3 CUPS COFFEE DAILY   Vaping Use   • Vaping Use: Never used   Substance and Sexual Activity   • Alcohol use: No   • Drug use: Defer   • Sexual activity: Defer     Family History   Problem Relation Age of Onset   • Cancer Mother    • Heart disease Father    • Cancer Father    • Heart disease Sister    • Sudden death Sister    • Heart disease Brother    • Cancer Paternal Grandfather    • Colon cancer Neg Hx        Current Outpatient Medications:   •  amLODIPine (NORVASC) 10 MG tablet, Take 1 tablet by mouth Daily., Disp: 90 tablet, Rfl: 3  •  apixaban (ELIQUIS) 5 MG tablet tablet, Take 1 tablet by mouth 2 (Two) Times a Day., Disp: 60 tablet, Rfl: 6  •  atorvastatin (Lipitor) 40 MG tablet, Take 1 tablet by mouth Daily., Disp: 90 tablet, Rfl: 3  •  cetirizine (zyrTEC) 10 MG tablet, Take 1 tablet by mouth Daily., Disp: 90 tablet, Rfl: 3  •  latanoprost (XALATAN) 0.005 % ophthalmic solution, , Disp: , Rfl:   •  Melatonin 10 MG tablet, Take 10 mg by mouth., Disp: , Rfl:   •  pantoprazole (PROTONIX) 40 MG EC tablet, Take 40 mg by mouth Daily., Disp: , Rfl:   •  Synthroid 100 MCG tablet, Take 1 tablet by mouth every morning *on empty stomach*, Disp: 90 tablet, Rfl: 3  •  albuterol sulfate  (90 Base) MCG/ACT inhaler, Inhale 2 puffs Every 4 (Four) Hours As Needed for Wheezing., Disp: 1 g, Rfl: 6  •  fluticasone (FLONASE) 50 MCG/ACT nasal spray, 2 sprays into the nostril(s) as directed by provider Daily for 30 days., Disp: 18.2 mL, Rfl: 0  •  ipratropium-albuterol (DUO-NEB) 0.5-2.5 mg/3 ml nebulizer, Take 3 mL by nebulization 4 (Four) Times a Day As Needed for Wheezing or Shortness of Air., Disp: 360 mL, Rfl: 3  •  montelukast (SINGULAIR) 10 MG tablet, Take 1 tablet by mouth Daily for 90 days., Disp: 90 tablet, Rfl: 3

## 2022-07-08 ENCOUNTER — TELEPHONE (OUTPATIENT)
Dept: PULMONOLOGY | Facility: CLINIC | Age: 77
End: 2022-07-08

## 2022-07-08 NOTE — TELEPHONE ENCOUNTER
Patient's wife called in regards to patient taking trial medication that Celia prescribed to him. The patient is starting to break out in red and white areas on his legs which appeared this morning. Please call the patient or his wife back at [phone]. Thank you.

## 2022-07-08 NOTE — TELEPHONE ENCOUNTER
Spoke to pts wife, pt started Paxlovid and woke up this morning with a rash on his legs. Pts wife reports that they are red itchy spots with a white center. Pt states they are itchy but it is not unbearable. Please advise, thank you!

## 2022-07-11 ENCOUNTER — HOSPITAL ENCOUNTER (OUTPATIENT)
Dept: CT IMAGING | Facility: HOSPITAL | Age: 77
Discharge: HOME | End: 2022-07-11
Admitting: INTERNAL MEDICINE

## 2022-07-11 ENCOUNTER — APPOINTMENT (OUTPATIENT)
Dept: CARDIAC REHAB | Facility: HOSPITAL | Age: 77
End: 2022-07-11

## 2022-07-11 DIAGNOSIS — I27.24 CTEPH (CHRONIC THROMBOEMBOLIC PULMONARY HYPERTENSION): ICD-10-CM

## 2022-07-11 PROCEDURE — 0 IOPAMIDOL PER 1 ML: Performed by: INTERNAL MEDICINE

## 2022-07-11 PROCEDURE — 71260 CT THORAX DX C+: CPT

## 2022-07-11 RX ADMIN — IOPAMIDOL 100 ML: 755 INJECTION, SOLUTION INTRAVENOUS at 11:02

## 2022-07-12 ENCOUNTER — LAB (OUTPATIENT)
Dept: LAB | Facility: HOSPITAL | Age: 77
End: 2022-07-12

## 2022-07-12 ENCOUNTER — OFFICE VISIT (OUTPATIENT)
Dept: PULMONOLOGY | Facility: CLINIC | Age: 77
End: 2022-07-12

## 2022-07-12 VITALS
BODY MASS INDEX: 27.46 KG/M2 | HEIGHT: 74 IN | TEMPERATURE: 98.7 F | RESPIRATION RATE: 18 BRPM | HEART RATE: 80 BPM | SYSTOLIC BLOOD PRESSURE: 147 MMHG | WEIGHT: 214 LBS | DIASTOLIC BLOOD PRESSURE: 77 MMHG | OXYGEN SATURATION: 98 %

## 2022-07-12 DIAGNOSIS — I26.09 ACUTE PULMONARY EMBOLISM WITH ACUTE COR PULMONALE, UNSPECIFIED PULMONARY EMBOLISM TYPE: Primary | ICD-10-CM

## 2022-07-12 DIAGNOSIS — U07.1 COVID-19 VIRUS DETECTED: ICD-10-CM

## 2022-07-12 DIAGNOSIS — J96.21 ACUTE ON CHRONIC RESPIRATORY FAILURE WITH HYPOXIA: ICD-10-CM

## 2022-07-12 DIAGNOSIS — N17.9 AKI (ACUTE KIDNEY INJURY): ICD-10-CM

## 2022-07-12 LAB
ANION GAP SERPL CALCULATED.3IONS-SCNC: 12.4 MMOL/L (ref 5–15)
BUN SERPL-MCNC: 16 MG/DL (ref 8–23)
BUN/CREAT SERPL: 14.4 (ref 7–25)
CALCIUM SPEC-SCNC: 9.7 MG/DL (ref 8.6–10.5)
CHLORIDE SERPL-SCNC: 102 MMOL/L (ref 98–107)
CO2 SERPL-SCNC: 26.6 MMOL/L (ref 22–29)
CREAT SERPL-MCNC: 1.11 MG/DL (ref 0.76–1.27)
EGFRCR SERPLBLD CKD-EPI 2021: 68.8 ML/MIN/1.73
GLUCOSE SERPL-MCNC: 72 MG/DL (ref 65–99)
POTASSIUM SERPL-SCNC: 4.6 MMOL/L (ref 3.5–5.2)
SODIUM SERPL-SCNC: 141 MMOL/L (ref 136–145)

## 2022-07-12 PROCEDURE — 99213 OFFICE O/P EST LOW 20 MIN: CPT | Performed by: NURSE PRACTITIONER

## 2022-07-12 PROCEDURE — 36415 COLL VENOUS BLD VENIPUNCTURE: CPT

## 2022-07-12 PROCEDURE — 80048 BASIC METABOLIC PNL TOTAL CA: CPT

## 2022-07-12 NOTE — PROGRESS NOTES
Primary Care Provider  Roberto Carlos Jimenez MD   Referring Provider  No ref. provider found    Patient Complaint  Follow-up (Post Covid) and Asthma      SUBJECTIVE    History of Presenting Illness  Elmer Garvin is a pleasant 77 y.o. male who presents to [facility] for follow-up appointment.  Patient is here with his wife today.  Patient had his CT scan done yesterday 7/11/2022 which shows CT scan of chest with IV contrast demonstrating no findings of PE.  Patient has not been scheduled for his echo at this time.  Patient recently was positive for COVID on 6/30/2022 and was treated with Paxlovid on 7/1/2022.  Patient did develop a rash on his lower extremities secondary to COVID.  Today patient states he is feeling much better states that within 24 to 40 hours of taking Paxlovid he was feeling much better.  Patient is inquiring about stopping the Eliquis today.  Patient is 98% on room air.  He has titrated his oxygen to using 1 L of O2 via nasal cannula as needed for shortness of air.  States he will occasionally have some shortness of air with moderate exertion.  Help with O2 and rest he recovers easily.    Denies coughing, wheezing, headaches, chest pain, weight loss or hemoptysis. Denies fevers, chills and night sweats. Elmer Garvin is able to perform ADLs without difficulties and denies any swollen glands/lymph nodes in the head or neck.    I have personally reviewed the review of systems, past family, social, medical and surgical histories; and agree with their findings.    Review of Systems   Constitutional: Negative.  Negative for chills, fatigue and fever.   HENT: Negative.    Eyes: Negative.    Respiratory: Negative.  Negative for cough, shortness of breath and wheezing.    Cardiovascular: Negative.  Negative for chest pain, palpitations and leg swelling.   Musculoskeletal: Negative.    Skin: Negative.         Family History   Problem Relation Age of  Onset   • Cancer Mother    • Heart disease Father    • Cancer Father    • Heart disease Sister    • Sudden death Sister    • Heart disease Brother    • Cancer Paternal Grandfather    • Colon cancer Neg Hx         Social History     Socioeconomic History   • Marital status:    Tobacco Use   • Smoking status: Former Smoker     Packs/day: 0.25     Years: 12.00     Pack years: 3.00     Types: Pipe, Cigarettes     Start date:      Quit date:      Years since quittin.5   • Smokeless tobacco: Never Used   • Tobacco comment: CAFFEINE USE 3 CUPS COFFEE DAILY   Vaping Use   • Vaping Use: Never used   Substance and Sexual Activity   • Alcohol use: No   • Drug use: Defer   • Sexual activity: Defer        Past Medical History:   Diagnosis Date   • Hypertension    • Lyme disease    • Shortness of breath    • SSS (sick sinus syndrome) (HCC)         Immunization History   Administered Date(s) Administered   • OPV 1996   • Td 1996   • Yellow Fever 1996       Allergies   Allergen Reactions   • Latex Other (See Comments)     Sinus congestion          Current Outpatient Medications:   •  albuterol sulfate  (90 Base) MCG/ACT inhaler, Inhale 2 puffs Every 4 (Four) Hours As Needed for Wheezing., Disp: 1 g, Rfl: 6  •  amLODIPine (NORVASC) 10 MG tablet, Take 1 tablet by mouth Daily., Disp: 90 tablet, Rfl: 3  •  apixaban (ELIQUIS) 5 MG tablet tablet, Take 1 tablet by mouth 2 (Two) Times a Day., Disp: 60 tablet, Rfl: 6  •  atorvastatin (Lipitor) 40 MG tablet, Take 1 tablet by mouth Daily., Disp: 90 tablet, Rfl: 3  •  cetirizine (zyrTEC) 10 MG tablet, Take 1 tablet by mouth Daily., Disp: 90 tablet, Rfl: 3  •  ipratropium-albuterol (DUO-NEB) 0.5-2.5 mg/3 ml nebulizer, Take 3 mL by nebulization 4 (Four) Times a Day As Needed for Wheezing or Shortness of Air., Disp: 360 mL, Rfl: 3  •  latanoprost (XALATAN) 0.005 % ophthalmic solution, , Disp: , Rfl:   •  Melatonin 10 MG tablet, Take 10 mg by mouth.,  "Disp: , Rfl:   •  pantoprazole (PROTONIX) 40 MG EC tablet, Take 40 mg by mouth Daily., Disp: , Rfl:   •  polyethylene glycol (GoLYTELY) 236 g solution, Starting at noon on day prior to procedure, drink 8 ounces every 30 minutes until all gone or stools are clear. May add flavor packet., Disp: 4000 mL, Rfl: 0  •  Synthroid 100 MCG tablet, Take 1 tablet by mouth every morning *on empty stomach*, Disp: 90 tablet, Rfl: 3  •  fluticasone (FLONASE) 50 MCG/ACT nasal spray, 2 sprays into the nostril(s) as directed by provider Daily for 30 days., Disp: 18.2 mL, Rfl: 0  •  montelukast (SINGULAIR) 10 MG tablet, Take 1 tablet by mouth Daily for 90 days., Disp: 90 tablet, Rfl: 3       OBJECTIVE    Vital Signs   /77 (BP Location: Left arm, Patient Position: Sitting, Cuff Size: Adult)   Pulse 80   Temp 98.7 °F (37.1 °C)   Resp 18   Ht 188 cm (74\")   Wt 97.1 kg (214 lb)   SpO2 98%   BMI 27.48 kg/m²     Physical Exam  Vitals reviewed.   Constitutional:       Appearance: Normal appearance.   HENT:      Head: Normocephalic and atraumatic.      Nose: Nose normal.      Mouth/Throat:      Mouth: Mucous membranes are moist.      Pharynx: Oropharynx is clear.   Eyes:      Extraocular Movements: Extraocular movements intact.      Conjunctiva/sclera: Conjunctivae normal.      Pupils: Pupils are equal, round, and reactive to light.   Cardiovascular:      Rate and Rhythm: Normal rate and regular rhythm.      Pulses: Normal pulses.      Heart sounds: Normal heart sounds.   Pulmonary:      Effort: Pulmonary effort is normal. No respiratory distress.      Breath sounds: Normal breath sounds. No stridor. No wheezing, rhonchi or rales.   Abdominal:      General: Bowel sounds are normal.   Musculoskeletal:         General: Normal range of motion.      Cervical back: Normal range of motion and neck supple.   Skin:     General: Skin is warm and dry.   Neurological:      General: No focal deficit present.      Mental Status: He is alert " and oriented to person, place, and time.   Psychiatric:         Mood and Affect: Mood normal.         Behavior: Behavior normal.          Results Review  I have personally reviewed the office note of Dr. Gong, CT as follows  Study Result    Narrative & Impression   PROCEDURE:  CT CHEST W CONTRAST DIAGNOSTIC     COMPARISON:  [facility], CT, CT CHEST PULMONARY EMBOLISM, 1/25/2022, 12:28.  INDICATIONS:  FOLLOW UP PE FROM DEC 2021 AND ALSO 7-1-22. STILL ON BLOOD THINNER.     TECHNIQUE:    CT images were obtained with non-ionic intravenous contrast material.       PROTOCOL:     Pulmonary embolism imaging protocol performed                 RADIATION:      Automated exposure control was utilized to minimize radiation dose.   CONTRAST:      100cc Isovue 370 I.V.  LABS:   eGFR: 49ml/min/1.73m2     FINDINGS:          The pulmonary arteries are well opacified.  No filling defects are evident.  There are no findings   of PE.     The right ventricle appears dilated, with associated findings which may reflect increased pulmonary   artery pressures.     Lung window images reveal predominantly peripheral airspace disease with septal banding, improved   in comparison to 1/25/2022, consistent with evolution of COVID-19 pneumonia.     Mediastinal windows reveal no mediastinal, hilar, or axillary adenopathy.     Moderate degenerative spurring is seen in the thoracic spine.     IMPRESSION:                 CT scan of the chest with IV contrast demonstrating no findings of PE.     The right ventricle appears dilated, with associated findings which may reflect increased pulmonary   artery pressures.     Predominantly peripheral airspace disease with septal banding is improved in comparison to   1/25/2022, consistent with evolution of COVID-19 pneumonia.            EMELIA LAZO MD         Electronically Signed and Approved By: EMELIA LAZO MD on 7/11/2022 at 16:34            ASSESSMENT & PLAN    Patient Active Problem  List   Diagnosis   • Angina at rest ([facility])   • Essential hypertension   • SSS (sick sinus syndrome) ([facility])   • Mild intermittent asthma without complication   • Gastroesophageal reflux disease without esophagitis   • Taylor's esophagus without dysplasia   • Acquired hypothyroidism   • MRSA (methicillin resistant Staphylococcus aureus)   • Pre-diabetes   • Acute respiratory failure with hypoxia (HCC)   • Arthritis   • Asthma   • Benign prostatic hyperplasia   • Cataract   • Deviated nasal septum   • Family history of prostate cancer in father   • Hypertrophy of both inferior nasal turbinates   • Impaired glucose tolerance   • 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   • Nasal congestion   • Nasal obstruction   • Pacemaker   • Acute on chronic respiratory failure with hypoxia (HCC)   • Acute pulmonary embolism (HCC)   • Bilateral vitreous detachment   • Epiretinal membrane   • Glaucomatous atrophy of optic disc   • After cataract   • Pneumonia due to COVID-19 virus   • CTEPH (chronic thromboembolic pulmonary hypertension) ([facility])   • Shortness of breath   • Other specified hypothyroidism   • Personal history of COVID-19   • History of pulmonary embolism   • Mixed hyperlipidemia   • Colon cancer screening       Diagnoses and all orders for this visit:    1. Acute pulmonary embolism with acute cor pulmonale, unspecified pulmonary embolism type (HCC) (Primary)  -     Adult Transthoracic Echo Complete W/ Cont if Necessary Per Protocol; Future    2. COVID-19 virus detected    3. Acute on chronic respiratory failure with hypoxia (HCC)         Plan:  To continue with Eliquis at this time.  We will schedule patient for an echocardiogram to follow-up on pulmonary hypertension..  Patient continue to titrate/wean off 02.  Patient's CT shows no PE at this time.  Recommend patient to follow-up in 2 to 3 months or sooner if needed    Smoking status: Former  Vaccination status: Declines  Medications  personally reviewed    Follow Up  Return in about 3 months (around 10/12/2022).    Patient was given instructions and counseling regarding his condition or for health maintenance advice. Please see specific information pulled into the AVS if appropriate.

## 2022-07-13 DIAGNOSIS — I10 ESSENTIAL HYPERTENSION: Primary | ICD-10-CM

## 2022-07-13 RX ORDER — LOSARTAN POTASSIUM 25 MG/1
25 TABLET ORAL DAILY
Qty: 90 TABLET | Refills: 2 | Status: SHIPPED | OUTPATIENT
Start: 2022-07-13 | End: 2022-09-02 | Stop reason: ALTCHOICE

## 2022-07-14 ENCOUNTER — APPOINTMENT (OUTPATIENT)
Dept: CARDIAC REHAB | Facility: HOSPITAL | Age: 77
End: 2022-07-14

## 2022-07-18 ENCOUNTER — APPOINTMENT (OUTPATIENT)
Dept: CARDIAC REHAB | Facility: HOSPITAL | Age: 77
End: 2022-07-18

## 2022-07-19 ENCOUNTER — HOSPITAL ENCOUNTER (OUTPATIENT)
Dept: CARDIOLOGY | Facility: HOSPITAL | Age: 77
Discharge: HOME | End: 2022-07-19
Admitting: NURSE PRACTITIONER

## 2022-07-19 DIAGNOSIS — I26.09 ACUTE PULMONARY EMBOLISM WITH ACUTE COR PULMONALE, UNSPECIFIED PULMONARY EMBOLISM TYPE: ICD-10-CM

## 2022-07-19 LAB
BH CV ECHO MEAS - AO ROOT DIAM: 3 CM
BH CV ECHO MEAS - EF(MOD-BP): 64.2 %
BH CV ECHO MEAS - IVSD: 1.2 CM
BH CV ECHO MEAS - LA DIMENSION: 3.3 CM
BH CV ECHO MEAS - LAT PEAK E' VEL: 14.7 CM/SEC
BH CV ECHO MEAS - LVIDD: 4.1 CM
BH CV ECHO MEAS - LVIDS: 2.3 CM
BH CV ECHO MEAS - LVOT DIAM: 2.4 CM
BH CV ECHO MEAS - LVPWD: 1.3 CM
BH CV ECHO MEAS - MED PEAK E' VEL: 6.34 CM/SEC
BH CV ECHO MEAS - MV A MAX VEL: 87 CM/SEC
BH CV ECHO MEAS - MV DEC TIME: 317 MSEC
BH CV ECHO MEAS - MV E MAX VEL: 69.2 CM/SEC
BH CV ECHO MEAS - MV E/A: 0.8
BH CV ECHO MEAS - RVDD: 3.3 CM
BH CV ECHO MEAS - TR MAX PG: 36 MMHG
BH CV ECHO MEAS - TR MAX VEL: 300 CM/SEC
BH CV ECHO MEASUREMENTS AVERAGE E/E' RATIO: 6.58
IVRT: 77 MSEC
LEFT ATRIUM VOLUME INDEX: 21.7 ML/M2
MAXIMAL PREDICTED HEART RATE: 144 BPM
STRESS TARGET HR: 122 BPM

## 2022-07-19 PROCEDURE — 93306 TTE W/DOPPLER COMPLETE: CPT

## 2022-07-19 PROCEDURE — 93306 TTE W/DOPPLER COMPLETE: CPT | Performed by: INTERNAL MEDICINE

## 2022-07-20 ENCOUNTER — TELEPHONE (OUTPATIENT)
Dept: INTERNAL MEDICINE | Facility: CLINIC | Age: 77
End: 2022-07-20

## 2022-07-20 ENCOUNTER — TELEPHONE (OUTPATIENT)
Dept: CARDIOLOGY | Facility: CLINIC | Age: 77
End: 2022-07-20

## 2022-07-20 ENCOUNTER — TELEPHONE (OUTPATIENT)
Dept: PULMONOLOGY | Facility: CLINIC | Age: 77
End: 2022-07-20

## 2022-07-20 NOTE — TELEPHONE ENCOUNTER
Received VM from patient's wife.    Returned call. Stated that Dr. Gong's office stated that the patient needed to be seen sooner due to echo results.     Reviewed echo results. Advised that there were no acute findings that needed a sooner appointment.

## 2022-07-20 NOTE — TELEPHONE ENCOUNTER
ATTEMPTED TO CONTACT PT PER PROVIDER'S INSTRUCTIONS     NO ANSWER     LEFT VOICEMAIL WITH INSTRUCTIONS TO RETURN CALL TO OFFICE AT [phone]    OK FOR HUB TO ADVISE/READ   PHARMACY PREFERENCE HAS BEEN UPDATED     THANK YOU

## 2022-07-20 NOTE — TELEPHONE ENCOUNTER
Caller: MYRNA MUÑIZ    Relationship to patient: Emergency Contact    Best call back number: [phone]    Patient is needing: PATIENT CALLED STATING SHE IS NEEDING TO LET MS [facility] KNOW THAT THEY RECEIVED MEDICATION THROUGH THE MAIL. SHE STATED THEY DO NOT USE [facility] THEY ONLY USE [facility] AND WOULD LIKE TO REMOVE THE [facility] OFF THE PATIENTS CHART. PLEASE ADVISE THANK YOU.

## 2022-07-20 NOTE — TELEPHONE ENCOUNTER
Patient's wife called inquiring if patient could stop Eliquis.  She states he had his testing that did not show blood clots.  She also stated Dr. Penn said there were no blood clots.  Advised Mrs. Garvin that Mr. Garvin should continue the Eliquis until receiving instruction to stop it.  Mrs. Garvin indicated the Rx is very expensive.    Pharmacy gave a partial fill of 10 tablets for the patient to continue therapy pending a response from [facility] provider.

## 2022-07-28 ENCOUNTER — TREATMENT (OUTPATIENT)
Dept: CARDIAC REHAB | Facility: HOSPITAL | Age: 77
End: 2022-07-28

## 2022-07-28 DIAGNOSIS — J12.82 PNEUMONIA DUE TO COVID-19 VIRUS: Primary | ICD-10-CM

## 2022-07-28 DIAGNOSIS — U07.1 PNEUMONIA DUE TO COVID-19 VIRUS: Primary | ICD-10-CM

## 2022-07-28 PROCEDURE — G0239 OTH RESP PROC, GROUP: HCPCS

## 2022-08-01 ENCOUNTER — OFFICE VISIT (OUTPATIENT)
Dept: INTERNAL MEDICINE | Facility: CLINIC | Age: 77
End: 2022-08-01

## 2022-08-01 ENCOUNTER — TREATMENT (OUTPATIENT)
Dept: CARDIAC REHAB | Facility: HOSPITAL | Age: 77
End: 2022-08-01

## 2022-08-01 VITALS
SYSTOLIC BLOOD PRESSURE: 142 MMHG | TEMPERATURE: 97.3 F | BODY MASS INDEX: 27.34 KG/M2 | WEIGHT: 213 LBS | DIASTOLIC BLOOD PRESSURE: 73 MMHG | OXYGEN SATURATION: 95 % | HEIGHT: 74 IN | HEART RATE: 78 BPM

## 2022-08-01 DIAGNOSIS — U07.1 PNEUMONIA DUE TO COVID-19 VIRUS: Primary | ICD-10-CM

## 2022-08-01 DIAGNOSIS — Z86.711 HISTORY OF PULMONARY EMBOLISM: ICD-10-CM

## 2022-08-01 DIAGNOSIS — R52 ACHING: ICD-10-CM

## 2022-08-01 DIAGNOSIS — E78.2 MIXED HYPERLIPIDEMIA: ICD-10-CM

## 2022-08-01 DIAGNOSIS — I10 ESSENTIAL HYPERTENSION: ICD-10-CM

## 2022-08-01 DIAGNOSIS — Z95.0 PACEMAKER: ICD-10-CM

## 2022-08-01 DIAGNOSIS — J12.82 PNEUMONIA DUE TO COVID-19 VIRUS: Primary | ICD-10-CM

## 2022-08-01 DIAGNOSIS — R73.03 PRE-DIABETES: ICD-10-CM

## 2022-08-01 DIAGNOSIS — Z86.16 PERSONAL HISTORY OF COVID-19: ICD-10-CM

## 2022-08-01 DIAGNOSIS — I49.5 SSS (SICK SINUS SYNDROME): ICD-10-CM

## 2022-08-01 DIAGNOSIS — D22.39 FIBROUS PAPULE OF NOSE: ICD-10-CM

## 2022-08-01 DIAGNOSIS — N40.0 BENIGN PROSTATIC HYPERPLASIA WITHOUT LOWER URINARY TRACT SYMPTOMS: ICD-10-CM

## 2022-08-01 DIAGNOSIS — Z00.00 MEDICARE ANNUAL WELLNESS VISIT, SUBSEQUENT: Primary | ICD-10-CM

## 2022-08-01 LAB
ALBUMIN SERPL-MCNC: 4.6 G/DL (ref 3.5–5.2)
ALBUMIN/GLOB SERPL: 2.1 G/DL
ALP SERPL-CCNC: 48 U/L (ref 39–117)
ALT SERPL W P-5'-P-CCNC: 22 U/L (ref 1–41)
ANION GAP SERPL CALCULATED.3IONS-SCNC: 12 MMOL/L (ref 5–15)
AST SERPL-CCNC: 21 U/L (ref 1–40)
BILIRUB SERPL-MCNC: 0.5 MG/DL (ref 0–1.2)
BUN SERPL-MCNC: 15 MG/DL (ref 8–23)
BUN/CREAT SERPL: 13.4 (ref 7–25)
CALCIUM SPEC-SCNC: 9.4 MG/DL (ref 8.6–10.5)
CHLORIDE SERPL-SCNC: 103 MMOL/L (ref 98–107)
CO2 SERPL-SCNC: 25 MMOL/L (ref 22–29)
CREAT SERPL-MCNC: 1.12 MG/DL (ref 0.76–1.27)
EGFRCR SERPLBLD CKD-EPI 2021: 68.1 ML/MIN/1.73
GLOBULIN UR ELPH-MCNC: 2.2 GM/DL
GLUCOSE SERPL-MCNC: 87 MG/DL (ref 65–99)
POTASSIUM SERPL-SCNC: 4.3 MMOL/L (ref 3.5–5.2)
PROT SERPL-MCNC: 6.8 G/DL (ref 6–8.5)
SODIUM SERPL-SCNC: 140 MMOL/L (ref 136–145)

## 2022-08-01 PROCEDURE — G0239 OTH RESP PROC, GROUP: HCPCS

## 2022-08-01 PROCEDURE — G0439 PPPS, SUBSEQ VISIT: HCPCS | Performed by: STUDENT IN AN ORGANIZED HEALTH CARE EDUCATION/TRAINING PROGRAM

## 2022-08-01 PROCEDURE — 80053 COMPREHEN METABOLIC PANEL: CPT | Performed by: STUDENT IN AN ORGANIZED HEALTH CARE EDUCATION/TRAINING PROGRAM

## 2022-08-01 PROCEDURE — 99213 OFFICE O/P EST LOW 20 MIN: CPT | Performed by: STUDENT IN AN ORGANIZED HEALTH CARE EDUCATION/TRAINING PROGRAM

## 2022-08-01 PROCEDURE — 1159F MED LIST DOCD IN RCRD: CPT | Performed by: STUDENT IN AN ORGANIZED HEALTH CARE EDUCATION/TRAINING PROGRAM

## 2022-08-01 PROCEDURE — 1170F FXNL STATUS ASSESSED: CPT | Performed by: STUDENT IN AN ORGANIZED HEALTH CARE EDUCATION/TRAINING PROGRAM

## 2022-08-01 NOTE — PROGRESS NOTES
The ABCs of the Annual Wellness Visit  Subsequent Medicare Wellness Visit    Chief Complaint   Patient presents with   • Medicare Wellness-subsequent      Subjective    History of Present Illness:  Elmer Garvin is a 77 y.o. male who presents for a Subsequent Medicare Wellness Visit.    The following portions of the patient's history were reviewed and   updated as appropriate: allergies, current medications, past family history, past medical history, past social history, past surgical history and problem list.    Compared to one year ago, the patient feels his physical   health is worse.    Compared to one year ago, the patient feels his mental   health is the same.    Recent Hospitalizations:  This patient has had a [facility] admission record on file within the last 365 days.    History of PE:    Has been off apixaban for about a week.  Seeing pulm again later this week.    Pulmonary rehab was postponed during fairly recent COVID infection, but has re-started it recently.  Just came from rehab today.    Leg aches:    Since starting lipitor about a month ago, reports constant aches in muscles of legs.    Nose papule:    Also since last visit, has developed a papule develop on nose.      Current Medical Providers:  Patient Care Team:  Roberto Carlos Jimenez MD as PCP - General (Internal Medicine)  Jeanette Marie MD as Consulting Physician (Urology)    Outpatient Medications Prior to Visit   Medication Sig Dispense Refill   • albuterol sulfate  (90 Base) MCG/ACT inhaler Inhale 2 puffs Every 4 (Four) Hours As Needed for Wheezing. 1 g 6   • amLODIPine (NORVASC) 10 MG tablet Take 1 tablet by mouth Daily. 90 tablet 3   • cetirizine (zyrTEC) 10 MG tablet Take 1 tablet by mouth Daily. 90 tablet 3   • ipratropium-albuterol (DUO-NEB) 0.5-2.5 mg/3 ml nebulizer Take 3 mL by nebulization 4 (Four) Times a Day As Needed for Wheezing or Shortness of Air. 360 mL 3   • latanoprost (XALATAN) 0.005 % ophthalmic solution       • Melatonin 10 MG tablet Take 10 mg by mouth.     • montelukast (SINGULAIR) 10 MG tablet Take 1 tablet by mouth Daily for 90 days. 90 tablet 3   • pantoprazole (PROTONIX) 40 MG EC tablet Take 40 mg by mouth Daily.     • Synthroid 100 MCG tablet Take 1 tablet by mouth every morning *on empty stomach* 90 tablet 3   • atorvastatin (Lipitor) 40 MG tablet Take 1 tablet by mouth Daily. 90 tablet 3   • apixaban (ELIQUIS) 5 MG tablet tablet Take 1 tablet by mouth 2 (Two) Times a Day. 60 tablet 6   • fluticasone (FLONASE) 50 MCG/ACT nasal spray 2 sprays into the nostril(s) as directed by provider Daily for 30 days. 18.2 mL 0   • losartan (Cozaar) 25 MG tablet Take 1 tablet by mouth Daily. 90 tablet 2   • polyethylene glycol (GoLYTELY) 236 g solution Starting at noon on day prior to procedure, drink 8 ounces every 30 minutes until all gone or stools are clear. May add flavor packet. 4000 mL 0     No facility-administered medications prior to visit.       No opioid medication identified on active medication list. I have reviewed chart for other potential  high risk medication/s and harmful drug interactions in the elderly.          Aspirin is not on active medication list.  Aspirin use is not indicated based on review of current medical condition/s. Risk of harm outweighs potential benefits.  .    Patient Active Problem List   Diagnosis   • Angina at rest ([facility])   • Essential hypertension   • SSS (sick sinus syndrome) ([facility])   • Mild intermittent asthma without complication   • Gastroesophageal reflux disease without esophagitis   • Taylor's esophagus without dysplasia   • Acquired hypothyroidism   • MRSA (methicillin resistant Staphylococcus aureus)   • Pre-diabetes   • Acute respiratory failure with hypoxia ([facility])   • Arthritis   • Asthma   • Benign prostatic hyperplasia   • Cataract   • Deviated nasal septum   • Family history of prostate cancer in father   • Hypertrophy of both inferior nasal turbinates   • Impaired  "glucose tolerance   • 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   • Nasal congestion   • Nasal obstruction   • Pacemaker   • Acute on chronic respiratory failure with hypoxia (HCC)   • Acute pulmonary embolism (HCC)   • Bilateral vitreous detachment   • Epiretinal membrane   • Glaucomatous atrophy of optic disc   • After cataract   • Pneumonia due to COVID-19 virus   • CTEPH (chronic thromboembolic pulmonary hypertension) (HCC)   • Shortness of breath   • Other specified hypothyroidism   • Personal history of COVID-19   • History of pulmonary embolism   • Mixed hyperlipidemia   • Colon cancer screening   • Fibrous papule of nose     Advance Care Planning  Advance Directive is not on file.  ACP discussion was held with the patient during this visit. Patient has an advance directive (not in EMR), copy requested.          Objective    Vitals:    08/01/22 1414   BP: 142/73   BP Location: Left arm   Patient Position: Sitting   Cuff Size: Adult   Pulse: 78   Temp: 97.3 °F (36.3 °C)   TempSrc: Oral   SpO2: 95%  Comment: patient on 1L of O2 PRN   Weight: 96.6 kg (213 lb)   Height: 188 cm (74\")     Estimated body mass index is 27.35 kg/m² as calculated from the following:    Height as of this encounter: 188 cm (74\").    Weight as of this encounter: 96.6 kg (213 lb).    BMI is >= 25 and <30. (Overweight) The following options were offered after discussion;: exercise counseling/recommendations and nutrition counseling/recommendations      Does the patient have evidence of cognitive impairment? No    Physical Exam  Vitals reviewed.   Constitutional:       General: He is not in acute distress.     Appearance: Normal appearance. He is not toxic-appearing.   HENT:      Head: Normocephalic and atraumatic.   Eyes:      Conjunctiva/sclera: Conjunctivae normal.   Cardiovascular:      Rate and Rhythm: Normal rate and regular rhythm.      Pulses: Normal pulses.      Heart sounds: Normal heart sounds. No " murmur heard.  Pulmonary:      Effort: Pulmonary effort is normal. No respiratory distress.      Breath sounds: Normal breath sounds. No wheezing.   Abdominal:      General: Abdomen is flat.      Palpations: Abdomen is soft. There is no mass.      Tenderness: There is no abdominal tenderness.   Musculoskeletal:      Right lower leg: No edema.      Left lower leg: No edema.   Skin:     General: Skin is warm and dry.      Comments: Papule noted on nose, about 0.5 cm diameter   Neurological:      General: No focal deficit present.      Mental Status: He is alert. Mental status is at baseline.   Psychiatric:         Mood and Affect: Mood normal.         Behavior: Behavior normal.         Thought Content: Thought content normal.         Judgment: Judgment normal.       Lab Results   Component Value Date    TRIG 159 (H) 2022    HDL 50 2022     (H) 2022    VLDL 28 2022    HGBA1C 5.80 (H) 2022            HEALTH RISK ASSESSMENT    Smoking Status:  Social History     Tobacco Use   Smoking Status Former Smoker   • Packs/day: 0.25   • Years: 12.00   • Pack years: 3.00   • Types: Pipe, Cigarettes   • Start date:    • Quit date:    • Years since quittin.6   Smokeless Tobacco Never Used   Tobacco Comment    CAFFEINE USE 3 CUPS COFFEE DAILY     Alcohol Consumption:  Social History     Substance and Sexual Activity   Alcohol Use No     Fall Risk Screen:    ANAHIADI Fall Risk Assessment was completed, and patient is at LOW risk for falls.Assessment completed on:2022    Depression Screening:  PHQ-2/PHQ-9 Depression Screening 2022   Retired PHQ-9 Total Score -   Retired Total Score -   Little Interest or Pleasure in Doing Things 0-->not at all   Feeling Down, Depressed or Hopeless 0-->not at all   Trouble Falling or Staying Asleep, or Sleeping Too Much -   Feeling Tired or Having Little Energy -   PHQ-9: Brief Depression Severity Measure Score 0       Health Habits and Functional  and Cognitive Screening:  Functional & Cognitive Status 8/1/2022   Do you have difficulty preparing food and eating? No   Do you have difficulty bathing yourself, getting dressed or grooming yourself? No   Do you have difficulty using the toilet? No   Do you have difficulty moving around from place to place? No   Do you have trouble with steps or getting out of a bed or a chair? No   Current Diet Well Balanced Diet   Dental Exam Not up to date   Eye Exam Up to date   Exercise (times per week) 7 times per week   Current Exercises Include Yard Work;Walking;Other   Do you need help using the phone?  No   Are you deaf or do you have serious difficulty hearing?  No   Do you need help with transportation? No   Do you need help shopping? No   Do you need help preparing meals?  No   Do you need help with housework?  No   Do you need help with laundry? No   Do you need help taking your medications? No   Do you need help managing money? No   Do you ever drive or ride in a car without wearing a seat belt? No   Have you felt unusual stress, anger or loneliness in the last month? No   Who do you live with? Spouse   If you need help, do you have trouble finding someone available to you? No   Have you been bothered in the last four weeks by sexual problems? No   Do you have difficulty concentrating, remembering or making decisions? No       Age-appropriate Screening Schedule:  Refer to the list below for future screening recommendations based on patient's age, sex and/or medical conditions. Orders for these recommended tests are listed in the plan section. The patient has been provided with a written plan.    Health Maintenance   Topic Date Due   • ZOSTER VACCINE (1 of 2) Never done   • TDAP/TD VACCINES (2 - Tdap) 04/09/2006   • INFLUENZA VACCINE  10/01/2022   • LIPID PANEL  06/24/2023              Assessment & Plan   CMS Preventative Services Quick Reference  Risk Factors Identified During Encounter  Hearing  Problem  Obesity/Overweight   The above risks/problems have been discussed with the patient.  Follow up actions/plans if indicated are seen below in the Assessment/Plan Section.  Pertinent information has been shared with the patient in the After Visit Summary.    Diagnoses and all orders for this visit:    1. Medicare annual wellness visit, subsequent (Primary)  -     Comprehensive Metabolic Panel    2. Essential hypertension  -     Comprehensive Metabolic Panel    3. Mixed hyperlipidemia  -     Pitavastatin Calcium 4 MG tablet; Take 1 tablet by mouth Every Night.  Dispense: 30 tablet; Refill: 0    4. Benign prostatic hyperplasia without lower urinary tract symptoms    5. SSS (sick sinus syndrome) (HCC)    6. Pacemaker    7. Pre-diabetes    8. Fibrous papule of nose  -     Ambulatory Referral to Dermatology    9. Personal history of COVID-19    10. History of pulmonary embolism    11. Aching, bilateral legs      HTN:  -BP above goal, but just got out of rehab today  -have asked to keep BP log for 2 weeks for review next visit    HLD:  -will stop atorvastatin as having leg aches, and will trial pitavastatin    History of PE:  -in pulmonary rehab, following with pulm  -CT shows no evidence of PE, echo reassuring  -off DOAC for about  Week, and seeing pulm again in 3 days  -I did tell him that I wanted to hold off on sulindac until he sees pulm, at least    Papule on nose:  -appearance and andres growth concerning for squamous cell carcinoma  -have placed derm referral      Follow Up:   Return in about 3 months (around 11/1/2022) for HTN.     An After Visit Summary and PPPS were made available to the patient.    {Optional Chart Navigation Links Wrapup  Review (Popup)  Advance Care Planning  Labs  CC  Problem List  Visit Diagnosis  Medications  Result Review  Imaging  [facility] Maintenance  Quality  BestPractice  SmartSets  SnapShot  Encounters  Notes  Media  Procedures :23}

## 2022-08-02 ENCOUNTER — TELEPHONE (OUTPATIENT)
Dept: INTERNAL MEDICINE | Facility: CLINIC | Age: 77
End: 2022-08-02

## 2022-08-04 ENCOUNTER — TREATMENT (OUTPATIENT)
Dept: CARDIAC REHAB | Facility: HOSPITAL | Age: 77
End: 2022-08-04

## 2022-08-04 ENCOUNTER — OFFICE VISIT (OUTPATIENT)
Dept: PULMONOLOGY | Facility: CLINIC | Age: 77
End: 2022-08-04

## 2022-08-04 VITALS
WEIGHT: 204 LBS | DIASTOLIC BLOOD PRESSURE: 71 MMHG | HEART RATE: 62 BPM | SYSTOLIC BLOOD PRESSURE: 121 MMHG | BODY MASS INDEX: 26.18 KG/M2 | OXYGEN SATURATION: 97 % | RESPIRATION RATE: 16 BRPM | HEIGHT: 74 IN | TEMPERATURE: 98.2 F

## 2022-08-04 DIAGNOSIS — I27.24 CTEPH (CHRONIC THROMBOEMBOLIC PULMONARY HYPERTENSION): ICD-10-CM

## 2022-08-04 DIAGNOSIS — J96.21 ACUTE ON CHRONIC RESPIRATORY FAILURE WITH HYPOXIA: ICD-10-CM

## 2022-08-04 DIAGNOSIS — U07.1 PNEUMONIA DUE TO COVID-19 VIRUS: Primary | ICD-10-CM

## 2022-08-04 DIAGNOSIS — R06.02 SHORTNESS OF BREATH: Primary | ICD-10-CM

## 2022-08-04 DIAGNOSIS — M19.90 ARTHRITIS: Primary | ICD-10-CM

## 2022-08-04 DIAGNOSIS — J12.82 PNEUMONIA DUE TO COVID-19 VIRUS: Primary | ICD-10-CM

## 2022-08-04 PROCEDURE — G0239 OTH RESP PROC, GROUP: HCPCS

## 2022-08-04 PROCEDURE — 99213 OFFICE O/P EST LOW 20 MIN: CPT | Performed by: NURSE PRACTITIONER

## 2022-08-04 RX ORDER — SULINDAC 150 MG/1
150 TABLET ORAL 2 TIMES DAILY
Qty: 180 TABLET | Refills: 2 | Status: SHIPPED | OUTPATIENT
Start: 2022-08-04

## 2022-08-04 NOTE — PROGRESS NOTES
Primary Care Provider  Roberto Carlos Jimenez MD   Referring Provider  No ref. provider found    Patient Complaint  Chest Tightness (Intermittent, seems worse when raining ) and Shortness of Breath (With exertion but worsens with chest heaviness )      SUBJECTIVE    History of Presenting Illness  Elmer Garvin is a pleasant 77 y.o. male who presents to [facility] for follow-up appointment.  Patient is here with his spouse today.  Patient has a history of saddle PE with right heart strain January 26, 2022 as well as CTEPH.  Patient has stopped his Eliquis currently is only using albuterol nebulizer and inhaler as needed for shortness of air.  Patient states he every now and then he will have some shortness of air with exertion of moderate severity but will use his oxygen to help and rest and recovers within a few minutes he states.    Denies coughing, wheezing, headaches, chest pain, weight loss or hemoptysis. Denies fevers, chills and night sweats. Elmer Garvin is able to perform ADLs without difficulties and denies any swollen glands/lymph nodes in the head or neck.    I have personally reviewed the review of systems, past family, social, medical and surgical histories; and agree with their findings.    Review of Systems   Constitutional: Negative.  Negative for chills, fatigue and fever.   HENT: Negative.    Eyes: Negative.    Respiratory: Positive for shortness of breath. Negative for cough and wheezing.    Cardiovascular: Negative.  Negative for chest pain, palpitations and leg swelling.   Musculoskeletal: Negative.    Skin: Negative.         Family History   Problem Relation Age of Onset   • Cancer Mother    • Heart disease Father    • Cancer Father    • Heart disease Sister    • Sudden death Sister    • Heart disease Brother    • Cancer Paternal Grandfather    • Colon cancer Neg Hx         Social History     Socioeconomic History   • Marital status:     Tobacco Use   • Smoking status: Former Smoker     Packs/day: 0.25     Years: 12.00     Pack years: 3.00     Types: Pipe, Cigarettes     Start date:      Quit date:      Years since quittin.6   • Smokeless tobacco: Never Used   • Tobacco comment: CAFFEINE USE 3 CUPS COFFEE DAILY   Vaping Use   • Vaping Use: Never used   Substance and Sexual Activity   • Alcohol use: No   • Drug use: Defer   • Sexual activity: Defer        Past Medical History:   Diagnosis Date   • Hypertension    • Lyme disease    • Shortness of breath    • SSS (sick sinus syndrome) ([facility])         Immunization History   Administered Date(s) Administered   • OPV 1996   • Td 1996   • Yellow Fever 1996       Allergies   Allergen Reactions   • Latex Other (See Comments)     Sinus congestion          Current Outpatient Medications:   •  amLODIPine (NORVASC) 10 MG tablet, Take 1 tablet by mouth Daily., Disp: 90 tablet, Rfl: 3  •  cetirizine (zyrTEC) 10 MG tablet, Take 1 tablet by mouth Daily., Disp: 90 tablet, Rfl: 3  •  fluticasone (FLONASE) 50 MCG/ACT nasal spray, 2 sprays into the nostril(s) as directed by provider Daily for 30 days. (Patient taking differently: 2 sprays into the nostril(s) as directed by provider As Needed.), Disp: 18.2 mL, Rfl: 0  •  latanoprost (XALATAN) 0.005 % ophthalmic solution, , Disp: , Rfl:   •  Melatonin 10 MG tablet, Take 10 mg by mouth., Disp: , Rfl:   •  montelukast (SINGULAIR) 10 MG tablet, Take 1 tablet by mouth Daily for 90 days., Disp: 90 tablet, Rfl: 3  •  pantoprazole (PROTONIX) 40 MG EC tablet, Take 40 mg by mouth Daily., Disp: , Rfl:   •  Pitavastatin Calcium 4 MG tablet, Take 1 tablet by mouth Every Night., Disp: 30 tablet, Rfl: 0  •  polyethylene glycol (GoLYTELY) 236 g solution, Starting at noon on day prior to procedure, drink 8 ounces every 30 minutes until all gone or stools are clear. May add flavor packet., Disp: 4000 mL, Rfl: 0  •  Synthroid 100 MCG tablet, Take 1 tablet by  "mouth every morning *on empty stomach*, Disp: 90 tablet, Rfl: 3  •  albuterol sulfate  (90 Base) MCG/ACT inhaler, Inhale 2 puffs Every 4 (Four) Hours As Needed for Wheezing., Disp: 1 g, Rfl: 6  •  ipratropium-albuterol (DUO-NEB) 0.5-2.5 mg/3 ml nebulizer, Take 3 mL by nebulization 4 (Four) Times a Day As Needed for Wheezing or Shortness of Air., Disp: 360 mL, Rfl: 3  •  losartan (Cozaar) 25 MG tablet, Take 1 tablet by mouth Daily., Disp: 90 tablet, Rfl: 2       OBJECTIVE    Vital Signs   /71 (BP Location: Left arm, Patient Position: Sitting, Cuff Size: Adult)   Pulse 62   Temp 98.2 °F (36.8 °C) (Infrared)   Resp 16   Ht 188 cm (74\")   Wt 92.5 kg (204 lb)   SpO2 97% Comment: Room air, uses 1 liter as needed  BMI 26.19 kg/m²     Physical Exam  Vitals reviewed.   Constitutional:       Appearance: Normal appearance.   HENT:      Head: Normocephalic and atraumatic.      Nose: Nose normal.      Mouth/Throat:      Mouth: Mucous membranes are moist.      Pharynx: Oropharynx is clear.   Eyes:      Extraocular Movements: Extraocular movements intact.      Conjunctiva/sclera: Conjunctivae normal.      Pupils: Pupils are equal, round, and reactive to light.   Cardiovascular:      Rate and Rhythm: Normal rate and regular rhythm.      Pulses: Normal pulses.      Heart sounds: Normal heart sounds.   Pulmonary:      Effort: Pulmonary effort is normal. No respiratory distress.      Breath sounds: Normal breath sounds. No stridor. No wheezing, rhonchi or rales.   Abdominal:      General: Bowel sounds are normal.   Musculoskeletal:         General: Normal range of motion.      Cervical back: Normal range of motion and neck supple.   Skin:     General: Skin is warm and dry.   Neurological:      General: No focal deficit present.      Mental Status: He is alert and oriented to person, place, and time.   Psychiatric:         Mood and Affect: Mood normal.         Behavior: Behavior normal.          Results Review  I " have personally reviewed the prior office notes and diagnostics.      ASSESSMENT & PLAN    Patient Active Problem List   Diagnosis   • Angina at rest ([facility])   • Essential hypertension   • SSS (sick sinus syndrome) ([facility])   • Mild intermittent asthma without complication   • Gastroesophageal reflux disease without esophagitis   • Taylor's esophagus without dysplasia   • Acquired hypothyroidism   • MRSA (methicillin resistant Staphylococcus aureus)   • Pre-diabetes   • Acute respiratory failure with hypoxia ([facility])   • Arthritis   • Asthma   • Benign prostatic hyperplasia   • Cataract   • Deviated nasal septum   • Family history of prostate cancer in father   • Hypertrophy of both inferior nasal turbinates   • Impaired glucose tolerance   • 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   • Nasal congestion   • Nasal obstruction   • Pacemaker   • Acute on chronic respiratory failure with hypoxia ([facility])   • Acute pulmonary embolism ([facility])   • Bilateral vitreous detachment   • Epiretinal membrane   • Glaucomatous atrophy of optic disc   • After cataract   • Pneumonia due to COVID-19 virus   • CTEPH (chronic thromboembolic pulmonary hypertension) ([facility])   • Shortness of breath   • Other specified hypothyroidism   • Personal history of COVID-19   • History of pulmonary embolism   • Mixed hyperlipidemia   • Colon cancer screening   • Fibrous papule of nose       Diagnoses and all orders for this visit:    1. Shortness of breath (Primary)    2. CTEPH (chronic thromboembolic pulmonary hypertension) ([facility])    3. Acute on chronic respiratory failure with hypoxia ([facility])           Plan:  Patient to continue with rescue inhaler and DuoNeb as needed for shortness of air or wheeze.  Patient also to titrate his oxygen to keep saturation above 92%.  Patient instructed  should he develop shortness of air/chest pains call 911 or go to emergency room.  Follow-up in 2 months or sooner as needed.    Smoking status:  Former  Vaccination status: Declines  Medications personally reviewed    Follow Up  Return in about 2 months (around 10/4/2022) for with Dr. Gong.    Patient was given instructions and counseling regarding his condition or for health maintenance advice. Please see specific information pulled into the AVS if appropriate.

## 2022-08-08 ENCOUNTER — TREATMENT (OUTPATIENT)
Dept: CARDIAC REHAB | Facility: HOSPITAL | Age: 77
End: 2022-08-08

## 2022-08-08 DIAGNOSIS — U07.1 PNEUMONIA DUE TO COVID-19 VIRUS: Primary | ICD-10-CM

## 2022-08-08 DIAGNOSIS — J12.82 PNEUMONIA DUE TO COVID-19 VIRUS: Primary | ICD-10-CM

## 2022-08-08 PROCEDURE — G0239 OTH RESP PROC, GROUP: HCPCS

## 2022-08-11 ENCOUNTER — TREATMENT (OUTPATIENT)
Dept: CARDIAC REHAB | Facility: HOSPITAL | Age: 77
End: 2022-08-11

## 2022-08-11 DIAGNOSIS — U07.1 PNEUMONIA DUE TO COVID-19 VIRUS: Primary | ICD-10-CM

## 2022-08-11 DIAGNOSIS — J12.82 PNEUMONIA DUE TO COVID-19 VIRUS: Primary | ICD-10-CM

## 2022-08-11 PROCEDURE — G0239 OTH RESP PROC, GROUP: HCPCS

## 2022-08-15 ENCOUNTER — TREATMENT (OUTPATIENT)
Dept: CARDIAC REHAB | Facility: HOSPITAL | Age: 77
End: 2022-08-15

## 2022-08-15 DIAGNOSIS — J12.82 PNEUMONIA DUE TO COVID-19 VIRUS: Primary | ICD-10-CM

## 2022-08-15 DIAGNOSIS — U07.1 PNEUMONIA DUE TO COVID-19 VIRUS: Primary | ICD-10-CM

## 2022-08-15 PROCEDURE — G0239 OTH RESP PROC, GROUP: HCPCS

## 2022-08-18 ENCOUNTER — TREATMENT (OUTPATIENT)
Dept: CARDIAC REHAB | Facility: HOSPITAL | Age: 77
End: 2022-08-18

## 2022-08-18 DIAGNOSIS — J12.82 PNEUMONIA DUE TO COVID-19 VIRUS: Primary | ICD-10-CM

## 2022-08-18 DIAGNOSIS — U07.1 PNEUMONIA DUE TO COVID-19 VIRUS: Primary | ICD-10-CM

## 2022-08-18 PROCEDURE — G0239 OTH RESP PROC, GROUP: HCPCS

## 2022-08-22 ENCOUNTER — TREATMENT (OUTPATIENT)
Dept: CARDIAC REHAB | Facility: HOSPITAL | Age: 77
End: 2022-08-22

## 2022-08-22 DIAGNOSIS — J12.82 PNEUMONIA DUE TO COVID-19 VIRUS: Primary | ICD-10-CM

## 2022-08-22 DIAGNOSIS — U07.1 PNEUMONIA DUE TO COVID-19 VIRUS: Primary | ICD-10-CM

## 2022-08-22 PROCEDURE — G0239 OTH RESP PROC, GROUP: HCPCS

## 2022-08-25 ENCOUNTER — APPOINTMENT (OUTPATIENT)
Dept: CARDIAC REHAB | Facility: HOSPITAL | Age: 77
End: 2022-08-25

## 2022-08-29 ENCOUNTER — APPOINTMENT (OUTPATIENT)
Dept: CARDIAC REHAB | Facility: HOSPITAL | Age: 77
End: 2022-08-29

## 2022-09-01 ENCOUNTER — APPOINTMENT (OUTPATIENT)
Dept: CARDIAC REHAB | Facility: HOSPITAL | Age: 77
End: 2022-09-01

## 2022-09-02 ENCOUNTER — OFFICE VISIT (OUTPATIENT)
Dept: CARDIOLOGY | Facility: CLINIC | Age: 77
End: 2022-09-02

## 2022-09-02 VITALS
BODY MASS INDEX: 26.95 KG/M2 | HEART RATE: 64 BPM | SYSTOLIC BLOOD PRESSURE: 135 MMHG | HEIGHT: 74 IN | DIASTOLIC BLOOD PRESSURE: 64 MMHG | WEIGHT: 210 LBS

## 2022-09-02 DIAGNOSIS — Z95.0 PRESENCE OF CARDIAC PACEMAKER: ICD-10-CM

## 2022-09-02 DIAGNOSIS — I49.5 SICK SINUS SYNDROME: ICD-10-CM

## 2022-09-02 DIAGNOSIS — I10 ESSENTIAL HYPERTENSION: ICD-10-CM

## 2022-09-02 DIAGNOSIS — I26.99 PULMONARY EMBOLISM ASSOCIATED WITH COVID-19: Primary | ICD-10-CM

## 2022-09-02 DIAGNOSIS — U07.1 PULMONARY EMBOLISM ASSOCIATED WITH COVID-19: Primary | ICD-10-CM

## 2022-09-02 PROCEDURE — 99213 OFFICE O/P EST LOW 20 MIN: CPT | Performed by: INTERNAL MEDICINE

## 2022-09-02 RX ORDER — CHLORAL HYDRATE 500 MG
CAPSULE ORAL
COMMUNITY

## 2022-09-02 NOTE — PROGRESS NOTES
Chief Complaint  Hypertension and Shortness of Breath    Subjective            Elmer Garvin presents to [facility] CARDIOLOGY  History of Present Illness  Mr. Garvin presents for routine follow up today and states he is feeling well and remains at his chronic baseline.  He underwent surgical excision of a skin cancer from his nose yesterday, but states he is recovering well from this procedure.  He does report some stable chronic exertional dyspnea since he developed COVID-19 pneumonia in late December 2021, but no episodes of chest pain/pressure, palpitations, orthopnea, PND, lightheadedness, or syncope.  He has a history of a submassive pulmonary embolism (primarily left-sided, with CT scan evidence of right heart strain and a dilated right ventricle with elevated estimated pulmonary arterial pressures on an echocardiogram) in January 2022 that was attributed to recent COVID-19 infection.  He underwent successful mechanical aspiration thrombectomy at that time and then completed 6 months or anticoagulation with Eliquis.  Mr. Garvin is now off anticoagulants and states he underwent a workup for hypercoagulable states, which was unremarkable.  He has completed a pulmonary rehab program and states his physical activity level has increased since his last visit here.  He also has a history of sick sinus syndrome, status post pacemaker placement.  His last PPM interrogation revealed normal device measurements and function with no evidence of arrhythmias or mode switching and adequate remaining battery life.  He continues to be on home monitoring for his pacer and has appropriate Device Clinic follow up.  His BP was 135/64 in the office today and he reports excellent compliance with his home medications.  His PCP recently switched his antihypertensive therapy from losartan to amlodipine 10 mg daily.  He has experienced some trace bilateral ankle edema since this change was made, but no significant  swelling.     Past Medical History:   Diagnosis Date   • Hypertension    • Lyme disease    • Shortness of breath    • SSS (sick sinus syndrome) ([facility])        Past Surgical History:   • CARDIAC CATHETERIZATION    Procedure: Coronary angiography;  Surgeon: Wilton Brown MD;  Location: [facility] CATH INVASIVE LOCATION;  Service: Cardiovascular   • CARDIAC CATHETERIZATION    Procedure: Left heart cath;  Surgeon: Wilton Brown MD;  Location: [facility] CATH INVASIVE LOCATION;  Service: Cardiovascular   • CARDIAC CATHETERIZATION    Procedure: Left ventriculography;  Surgeon: Wilton Brown MD;  Location: [facility] CATH INVASIVE LOCATION;  Service: Cardiovascular   • CARDIAC CATHETERIZATION    Procedure: Right heart cath;  Surgeon: Wilton Brown MD;  Location: [facility] CATH INVASIVE LOCATION;  Service: Cardiovascular   • CARDIAC CATHETERIZATION    Procedure: Percutaneous Manual Thrombectomy;  Surgeon: Maninder Penn MD;  Location: [facility] INVASIVE LOCATION;  Service: Cardiovascular;  Laterality: Right;   • CATARACT EXTRACTION   • COLONOSCOPY   • INSERT / REPLACE / REMOVE PACEMAKER   • INTERVENTIONAL RADIOLOGY PROCEDURE    Procedure: Pulmonary Angiogram;  Surgeon: Maninder Penn MD;  Location: [facility] INVASIVE LOCATION;  Service: Cardiovascular;  Laterality: Right;   • PACEMAKER IMPLANTATION   • PULMONARY EMBOLISM SURGERY       Social History     Tobacco Use   • Smoking status: Former Smoker     Packs/day: 0.25     Years: 12.00     Pack years: 3.00     Types: Pipe, Cigarettes     Start date:      Quit date:      Years since quittin.7   • Smokeless tobacco: Never Used   • Tobacco comment: CAFFEINE USE 3 CUPS COFFEE DAILY   Vaping Use   • Vaping Use: Never used   Substance Use Topics   • Alcohol use: No   • Drug use: Defer       Family History   Problem Relation Age of Onset   • Cancer Mother    • Heart disease Father    • Cancer Father    • Heart disease Sister    • Sudden death Sister    • Heart  disease Brother    • Cancer Paternal Grandfather    • Colon cancer Neg Hx         Current Outpatient Medications on File Prior to Visit   Medication Sig   • albuterol sulfate  (90 Base) MCG/ACT inhaler Inhale 2 puffs Every 4 (Four) Hours As Needed for Wheezing.   • amLODIPine (NORVASC) 10 MG tablet Take 1 tablet by mouth Daily.   • cetirizine (zyrTEC) 10 MG tablet Take 1 tablet by mouth Daily.   • fluticasone (FLONASE) 50 MCG/ACT nasal spray 2 sprays into the nostril(s) as directed by provider Daily for 30 days. (Patient taking differently: 2 sprays into the nostril(s) as directed by provider As Needed.)   • ipratropium-albuterol (DUO-NEB) 0.5-2.5 mg/3 ml nebulizer Take 3 mL by nebulization 4 (Four) Times a Day As Needed for Wheezing or Shortness of Air.   • latanoprost (XALATAN) 0.005 % ophthalmic solution    • Melatonin 10 MG tablet Take 10 mg by mouth.   • montelukast (SINGULAIR) 10 MG tablet Take 1 tablet by mouth Daily for 90 days.   • Omega-3 Fatty Acids (fish oil) 1000 MG capsule capsule Take  by mouth Daily With Breakfast.   • pantoprazole (PROTONIX) 40 MG EC tablet Take 40 mg by mouth Daily.   • Pitavastatin Calcium 4 MG tablet Take 1 tablet by mouth Every Night.   • polyethylene glycol (GoLYTELY) 236 g solution Starting at noon on day prior to procedure, drink 8 ounces every 30 minutes until all gone or stools are clear. May add flavor packet.   • sulindac (CLINORIL) 150 MG tablet Take 1 tablet by mouth 2 (Two) Times a Day.   • Synthroid 100 MCG tablet Take 1 tablet by mouth every morning *on empty stomach*   • [DISCONTINUED] losartan (Cozaar) 25 MG tablet Take 1 tablet by mouth Daily.     No current facility-administered medications on file prior to visit.       Allergies   Allergen Reactions   • Latex Other (See Comments)     Sinus congestion       Review of Systems   Constitutional: Positive for fatigue. Negative for chills and fever.   HENT: Negative for hearing loss, nosebleeds and sore throat.  "   Eyes: Negative for pain and visual disturbance.   Respiratory: Negative for cough, shortness of breath and wheezing.    Cardiovascular: Negative for chest pain, palpitations and leg swelling.   Gastrointestinal: Negative for abdominal pain, blood in stool and vomiting.   Genitourinary: Negative for dysuria and hematuria.   Musculoskeletal: Negative for joint swelling and myalgias.   Skin: Positive for skin lesions. Negative for color change, pallor and rash.   Neurological: Negative for tremors, syncope, light-headedness and headache.   Hematological: Negative for adenopathy. Does not bruise/bleed easily.        Objective     /64   Pulse 64   Ht 188 cm (74\")   Wt 95.3 kg (210 lb)   BMI 26.96 kg/m²       Physical Exam  Constitutional:       General: He is not in acute distress.     Appearance: Normal appearance.   HENT:      Head: Atraumatic.      Comments: Bandage present over patient's nose.     Mouth/Throat:      Mouth: Mucous membranes are moist.      Pharynx: Oropharynx is clear. No oropharyngeal exudate.   Eyes:      General: No scleral icterus.     Conjunctiva/sclera: Conjunctivae normal.   Neck:      Vascular: No carotid bruit or JVD.   Cardiovascular:      Rate and Rhythm: Normal rate and regular rhythm.  No extrasystoles are present.     Chest Wall: PMI is not displaced.      Pulses:           Radial pulses are 2+ on the right side and 2+ on the left side.      Heart sounds: S1 normal and S2 normal. No murmur heard.    No friction rub. No gallop. No S3 sounds.      Comments: Trace ankle edema bilaterally.   Pulmonary:      Effort: Pulmonary effort is normal. No tachypnea or respiratory distress.      Breath sounds: No decreased breath sounds, wheezing, rhonchi or rales.   Chest:      Chest wall: No tenderness.   Abdominal:      General: Bowel sounds are normal. There is no distension.      Palpations: Abdomen is soft. There is no mass.      Tenderness: There is no abdominal tenderness. "   Musculoskeletal:         General: No swelling, tenderness or deformity.      Cervical back: Neck supple. No tenderness.   Skin:     General: Skin is warm and dry.      Coloration: Skin is not jaundiced.      Findings: No erythema or rash.      Nails: There is no clubbing.   Neurological:      General: No focal deficit present.      Mental Status: He is alert and oriented to person, place, and time.      Motor: No weakness.   Psychiatric:         Mood and Affect: Mood normal.         Behavior: Behavior normal.         Result Review :     The following data was reviewed by: Maninder Penn MD on 09/02/2022:    CMP    CMP 6/24/22 7/12/22 8/1/22   Glucose 84 72 87   BUN 18 16 15   Creatinine 1.48 (A) 1.11 1.12   Sodium 138 141 140   Potassium 4.8 4.6 4.3   Chloride 102 102 103   Calcium 9.5 9.7 9.4   Albumin 4.60  4.60   Total Bilirubin 0.4  0.5   Alkaline Phosphatase 43  48   AST (SGOT) 13  21   ALT (SGPT) 13  22   (A) Abnormal value            CBC    CBC 1/26/22 1/27/22 6/24/22   WBC 4.50 4.61 5.27   RBC 4.47 3.84 (A) 5.21   Hemoglobin 12.9 (A) 11.1 (A) 13.9   Hematocrit 38.5 32.2 (A) 41.8   MCV 86.1 83.9 80.2   MCH 28.9 28.9 26.7   MCHC 33.5 34.5 33.3   RDW 15.7 (A) 15.9 (A) 13.8   Platelets 121 (A) 122 (A) 206   (A) Abnormal value            Lipid Panel    Lipid Panel 9/27/21 6/24/22   Total Cholesterol 202 (A) 202 (A)   Triglycerides 218 (A) 159 (A)   HDL Cholesterol 50 50   VLDL Cholesterol 38 28   LDL Cholesterol  114 (A) 124 (A)   LDL/HDL Ratio 2.17 2.40   (A) Abnormal value            Echocardiogram 7/19/22:  · Left ventricular wall thickness is consistent with mild concentric hypertrophy.  · Left ventricular ejection fraction appears to be 61 - 65%.  · Left ventricular diastolic function is consistent with (grade I) impaired relaxation.  · The right ventricular cavity is mildly dilated.  · Mild tricuspid valve regurgitation.  · Estimated right ventricular systolic pressure from tricuspid regurgitation is  mildly elevated (35-45 mmHg).         Assessment and Plan      Diagnoses and all orders for this visit:    1. Pulmonary embolism associated with COVID-19 (HCC) (Primary)    2. Sick sinus syndrome (HCC)    3. Presence of cardiac pacemaker    4. Essential hypertension    -Submassive PE (in January 2022) associated with COVID-19 infection, s/p mechanical aspiration thrombectomy of his left pulmonary artery:  He has recovered well with only mild exertional dyspnea and his recent echocardiogram revealed normalization of his right ventricular systolic function.  He completed 6 months of oral anticoagulation with Eliquis and is now off anticoagulants.  A workup for hypercoagulable states was reportedly negative.      -Sick sinus syndrome, s/p PPM placement:  His last PPM interrogation showed completely normal device function with adequate remaining battery life.  He will continue in the home monitoring program and has appropriate Device Clinic follow up arranged.      -Hypertension:  BP remains adequately controlled on his current dose of amlodipine; continue same.        Follow Up     Return in about 1 year (around 9/2/2023) for with EDWINA Manzo.    Patient was given instructions and counseling regarding his condition or for health maintenance advice. Please see specific information pulled into the AVS if appropriate.     Elmer Garvin  reports that he quit smoking about 46 years ago. His smoking use included pipe and cigarettes. He started smoking about 58 years ago. He has a 3.00 pack-year smoking history. He has never used smokeless tobacco.  I have educated him on the risk of diseases from using tobacco products such as cancer, COPD and heart disease.  Continued cessation was advised.

## 2022-09-05 ENCOUNTER — APPOINTMENT (OUTPATIENT)
Dept: CARDIAC REHAB | Facility: HOSPITAL | Age: 77
End: 2022-09-05

## 2022-09-29 ENCOUNTER — TELEPHONE (OUTPATIENT)
Dept: GASTROENTEROLOGY | Facility: CLINIC | Age: 77
End: 2022-09-29

## 2022-09-29 NOTE — TELEPHONE ENCOUNTER
I left message reminding mr carias of his scheduled scope on 10.13.22, arrival time of 12:30pm. Reminded of liquid diet the day prior. Reminded of bowel prep and instructions. Stated I would sent [employer] message too. ana

## 2022-10-12 NOTE — PRE-PROCEDURE INSTRUCTIONS
Education provided on laxative administration and skin preparation, as well as diet restrictions.  Medications were assessed and reviewed with instructions given on which medications are okay to take prior to procedure.  Advised Tylenol is okay, however do not take any NSAIDS.  Also, do not take any vitamins or supplements.  Discharge instructions will be given post-procedure regarding which medications to continue/restart.

## 2022-10-13 ENCOUNTER — HOSPITAL ENCOUNTER (OUTPATIENT)
Facility: HOSPITAL | Age: 77
Setting detail: HOSPITAL OUTPATIENT SURGERY
Discharge: HOME | End: 2022-10-13
Attending: INTERNAL MEDICINE | Admitting: INTERNAL MEDICINE

## 2022-10-13 ENCOUNTER — ANESTHESIA (OUTPATIENT)
Dept: GASTROENTEROLOGY | Facility: HOSPITAL | Age: 77
End: 2022-10-13

## 2022-10-13 ENCOUNTER — ANESTHESIA EVENT (OUTPATIENT)
Dept: GASTROENTEROLOGY | Facility: HOSPITAL | Age: 77
End: 2022-10-13

## 2022-10-13 VITALS
TEMPERATURE: 97.8 F | DIASTOLIC BLOOD PRESSURE: 83 MMHG | OXYGEN SATURATION: 98 % | HEIGHT: 75 IN | RESPIRATION RATE: 22 BRPM | HEART RATE: 66 BPM | SYSTOLIC BLOOD PRESSURE: 136 MMHG | WEIGHT: 205.91 LBS | BODY MASS INDEX: 25.6 KG/M2

## 2022-10-13 PROCEDURE — G0105 COLORECTAL SCRN; HI RISK IND: HCPCS | Performed by: INTERNAL MEDICINE

## 2022-10-13 PROCEDURE — 25010000002 PROPOFOL 10 MG/ML EMULSION: Performed by: NURSE ANESTHETIST, CERTIFIED REGISTERED

## 2022-10-13 RX ORDER — SODIUM CHLORIDE, SODIUM LACTATE, POTASSIUM CHLORIDE, CALCIUM CHLORIDE 600; 310; 30; 20 MG/100ML; MG/100ML; MG/100ML; MG/100ML
30 INJECTION, SOLUTION INTRAVENOUS CONTINUOUS
Status: DISCONTINUED | OUTPATIENT
Start: 2022-10-13 | End: 2022-10-13 | Stop reason: HOSPADM

## 2022-10-13 RX ORDER — LIDOCAINE HYDROCHLORIDE 20 MG/ML
INJECTION, SOLUTION EPIDURAL; INFILTRATION; INTRACAUDAL; PERINEURAL AS NEEDED
Status: DISCONTINUED | OUTPATIENT
Start: 2022-10-13 | End: 2022-10-13 | Stop reason: SURG

## 2022-10-13 RX ORDER — PROPOFOL 10 MG/ML
VIAL (ML) INTRAVENOUS AS NEEDED
Status: DISCONTINUED | OUTPATIENT
Start: 2022-10-13 | End: 2022-10-13 | Stop reason: SURG

## 2022-10-13 RX ORDER — LOSARTAN POTASSIUM 25 MG/1
100 TABLET ORAL DAILY
COMMUNITY
End: 2022-12-21

## 2022-10-13 RX ADMIN — PROPOFOL 150 MCG/KG/MIN: 10 INJECTION, EMULSION INTRAVENOUS at 15:47

## 2022-10-13 RX ADMIN — LIDOCAINE HYDROCHLORIDE 100 MG: 20 INJECTION, SOLUTION EPIDURAL; INFILTRATION; INTRACAUDAL; PERINEURAL at 15:47

## 2022-10-13 RX ADMIN — SODIUM CHLORIDE, POTASSIUM CHLORIDE, SODIUM LACTATE AND CALCIUM CHLORIDE 30 ML/HR: 600; 310; 30; 20 INJECTION, SOLUTION INTRAVENOUS at 15:13

## 2022-10-13 RX ADMIN — PROPOFOL 50 MG: 10 INJECTION, EMULSION INTRAVENOUS at 15:47

## 2022-10-13 NOTE — H&P
ScreeningPre Procedure History & Physical    Chief Complaint:   Screening     Subjective     HPI:   Screening     Past Medical History:   Past Medical History:   Diagnosis Date   • Allergies    • Hyperlipidemia    • Hypertension    • Hypothyroidism    • Lyme disease    • Shortness of breath    • SSS (sick sinus syndrome) (HCC)        Past Surgical History:  Past Surgical History:   Procedure Laterality Date   • CARDIAC CATHETERIZATION N/A 07/25/2019    Procedure: Coronary angiography;  Surgeon: Wilton Brown MD;  Location: [facility] CATH INVASIVE LOCATION;  Service: Cardiovascular   • CARDIAC CATHETERIZATION N/A 07/25/2019    Procedure: Left heart cath;  Surgeon: Wilton Brown MD;  Location: [facility] CATH INVASIVE LOCATION;  Service: Cardiovascular   • CARDIAC CATHETERIZATION N/A 07/25/2019    Procedure: Left ventriculography;  Surgeon: Wilton Brown MD;  Location: [facility] CATH INVASIVE LOCATION;  Service: Cardiovascular   • CARDIAC CATHETERIZATION N/A 07/25/2019    Procedure: Right heart cath;  Surgeon: Wilton Brown MD;  Location: [facility] CATH INVASIVE LOCATION;  Service: Cardiovascular   • CARDIAC CATHETERIZATION Right 01/26/2022    Procedure: Percutaneous Manual Thrombectomy;  Surgeon: Maninder Penn MD;  Location: [facility] INVASIVE LOCATION;  Service: Cardiovascular;  Laterality: Right;   • CATARACT EXTRACTION     • COLONOSCOPY  2019   • INSERT / REPLACE / REMOVE PACEMAKER  08/2014   • INTERVENTIONAL RADIOLOGY PROCEDURE Right 01/26/2022    Procedure: Pulmonary Angiogram;  Surgeon: Maninder Penn MD;  Location: [facility] INVASIVE LOCATION;  Service: Cardiovascular;  Laterality: Right;   • PACEMAKER IMPLANTATION     • PULMONARY EMBOLISM SURGERY  01/2022       Family History:  Family History   Problem Relation Age of Onset   • Cancer Mother    • Heart disease Father    • Cancer Father    • Heart disease Sister    • Sudden death Sister    • Heart disease Brother    • Cancer Paternal Grandfather   "  • Colon cancer Neg Hx    • Malig Hyperthermia Neg Hx        Social History:   reports that he quit smoking about 46 years ago. His smoking use included pipe and cigarettes. He started smoking about 58 years ago. He has a 3.00 pack-year smoking history. He has never used smokeless tobacco. He reports that he does not drink alcohol and does not use drugs.    Medications:   Medications Prior to Admission   Medication Sig Dispense Refill Last Dose   • albuterol sulfate  (90 Base) MCG/ACT inhaler Inhale 2 puffs Every 4 (Four) Hours As Needed for Wheezing. 1 g 6    • amLODIPine (NORVASC) 10 MG tablet Take 1 tablet by mouth Daily. 90 tablet 3    • cetirizine (zyrTEC) 10 MG tablet Take 1 tablet by mouth Daily. 90 tablet 3    • latanoprost (XALATAN) 0.005 % ophthalmic solution Administer 1 drop to both eyes Daily.      • Melatonin 10 MG tablet Take 1 tablet by mouth Every Night.      • montelukast (SINGULAIR) 10 MG tablet Take 1 tablet by mouth Daily for 90 days. 90 tablet 3    • Omega-3 Fatty Acids (fish oil) 1000 MG capsule capsule Take  by mouth Daily With Breakfast.      • sulindac (CLINORIL) 150 MG tablet Take 1 tablet by mouth 2 (Two) Times a Day. 180 tablet 2    • Synthroid 100 MCG tablet Take 1 tablet by mouth every morning *on empty stomach* 90 tablet 3    • pantoprazole (PROTONIX) 40 MG EC tablet Take 40 mg by mouth Daily.      • polyethylene glycol (GoLYTELY) 236 g solution Starting at noon on day prior to procedure, drink 8 ounces every 30 minutes until all gone or stools are clear. May add flavor packet. 4000 mL 0        Allergies:  Latex        Objective     Height 190.5 cm (75\"), weight 93.4 kg (205 lb 14.6 oz).    Physical Exam   Constitutional: Pt is oriented to person, place, and time and well-developed, well-nourished, and in no distress.   Mouth/Throat: Oropharynx is clear and moist.   Neck: Normal range of motion.   Cardiovascular: Normal rate, regular rhythm and normal heart sounds.  "   Pulmonary/Chest: Effort normal and breath sounds normal.   Abdominal: Soft. Nontender  Skin: Skin is warm and dry.   Psychiatric: Mood, memory, affect and judgment normal.     Assessment & Plan     Diagnosis:  Screening colonoscopy  H/o colon polyps     Anticipated Surgical Procedure:  colonoscopy    The risks, benefits, and alternatives of this procedure have been discussed with the patient or the responsible party- the patient understands and agrees to proceed.

## 2022-10-13 NOTE — ANESTHESIA PREPROCEDURE EVALUATION
Anesthesia Evaluation     Patient summary reviewed and Nursing notes reviewed   no history of anesthetic complications:  NPO Solid Status: > 8 hours  NPO Liquid Status: > 2 hours           Airway   Mallampati: II  TM distance: >3 FB  Neck ROM: full  No difficulty expected  Dental      Pulmonary - normal exam    breath sounds clear to auscultation  (+) pneumonia , pulmonary embolism, asthma,shortness of breath,   Cardiovascular - normal exam  Exercise tolerance: good (4-7 METS)    Rhythm: regular  Rate: normal    (+) hypertension, angina, hyperlipidemia,       Neuro/Psych- negative ROS  GI/Hepatic/Renal/Endo    (+)  GERD,  thyroid problem hypothyroidism    Musculoskeletal     Abdominal    Substance History - negative use     OB/GYN negative ob/gyn ROS         Other   arthritis,      ROS/Med Hx Other: PAT Nursing Notes unavailable.                   Anesthesia Plan    ASA 3     general     (Total IV Anesthesia    Patient understands anesthesia not responsible for dental damage.  )  intravenous induction     Anesthetic plan, risks, benefits, and alternatives have been provided, discussed and informed consent has been obtained with: patient.    Plan discussed with CRNA.        CODE STATUS:

## 2022-10-13 NOTE — ANESTHESIA POSTPROCEDURE EVALUATION
Patient: Elmer Garvin    Procedure Summary     Date: 10/13/22 Room / Location: [facility] ENDOSCOPY 3 / [facility] END[facility]COPY    Anesthesia Start: 1545 Anesthesia Stop: 1604    Procedure: COLONOSCOPY FOR SCREENING Diagnosis:       Colon cancer screening      (Colon cancer screening [Z12.11])    Surgeons: Deng Monteiro MD Provider: Maninder Dolan MD    Anesthesia Type: general ASA Status: 3          Anesthesia Type: general    Vitals  No vitals data found for the desired time range.          Post Anesthesia Care and Evaluation    Patient location during evaluation: bedside  Patient participation: complete - patient participated  Level of consciousness: awake  Pain management: adequate    Airway patency: patent  Anesthetic complications: No anesthetic complications  PONV Status: none  Cardiovascular status: acceptable and stable  Respiratory status: acceptable  Hydration status: acceptable    Comments: An Anesthesiologist personally participated in the most demanding procedures (including induction and emergence if applicable) in the anesthesia plan, monitored the course of anesthesia administration at frequent intervals and remained physically present and available for immediate diagnosis and treatment of emergencies.

## 2022-10-18 ENCOUNTER — TELEPHONE (OUTPATIENT)
Dept: GASTROENTEROLOGY | Facility: CLINIC | Age: 77
End: 2022-10-18

## 2022-10-18 NOTE — TELEPHONE ENCOUNTER
I have reviewed the patients colonoscopy report. The report showed a normal colonoscopy with diverticulosis and internal hemorrhoids.  No polyps removed or specimens collected. Repeat colonoscopy for screening purposes not recommended due to patient's age.

## 2022-11-03 ENCOUNTER — OFFICE VISIT (OUTPATIENT)
Dept: INTERNAL MEDICINE | Facility: CLINIC | Age: 77
End: 2022-11-03

## 2022-11-03 ENCOUNTER — LAB (OUTPATIENT)
Dept: LAB | Facility: HOSPITAL | Age: 77
End: 2022-11-03

## 2022-11-03 VITALS
OXYGEN SATURATION: 97 % | DIASTOLIC BLOOD PRESSURE: 77 MMHG | HEIGHT: 75 IN | WEIGHT: 208.7 LBS | SYSTOLIC BLOOD PRESSURE: 122 MMHG | TEMPERATURE: 97.4 F | HEART RATE: 68 BPM | BODY MASS INDEX: 25.95 KG/M2

## 2022-11-03 DIAGNOSIS — Z95.0 CARDIAC PACEMAKER: ICD-10-CM

## 2022-11-03 DIAGNOSIS — I10 ESSENTIAL HYPERTENSION: Primary | ICD-10-CM

## 2022-11-03 DIAGNOSIS — H91.93 BILATERAL HEARING LOSS, UNSPECIFIED HEARING LOSS TYPE: ICD-10-CM

## 2022-11-03 DIAGNOSIS — R23.8 PAPULE OF SKIN: ICD-10-CM

## 2022-11-03 DIAGNOSIS — I49.5 SSS (SICK SINUS SYNDROME): ICD-10-CM

## 2022-11-03 LAB
ANION GAP SERPL CALCULATED.3IONS-SCNC: 8 MMOL/L (ref 5–15)
BUN SERPL-MCNC: 16 MG/DL (ref 8–23)
BUN/CREAT SERPL: 12.1 (ref 7–25)
CALCIUM SPEC-SCNC: 9.6 MG/DL (ref 8.6–10.5)
CHLORIDE SERPL-SCNC: 100 MMOL/L (ref 98–107)
CO2 SERPL-SCNC: 29 MMOL/L (ref 22–29)
CREAT SERPL-MCNC: 1.32 MG/DL (ref 0.76–1.27)
EGFRCR SERPLBLD CKD-EPI 2021: 55.9 ML/MIN/1.73
GLUCOSE SERPL-MCNC: 86 MG/DL (ref 65–99)
POTASSIUM SERPL-SCNC: 5 MMOL/L (ref 3.5–5.2)
SODIUM SERPL-SCNC: 137 MMOL/L (ref 136–145)

## 2022-11-03 PROCEDURE — 80048 BASIC METABOLIC PNL TOTAL CA: CPT | Performed by: STUDENT IN AN ORGANIZED HEALTH CARE EDUCATION/TRAINING PROGRAM

## 2022-11-03 PROCEDURE — 99214 OFFICE O/P EST MOD 30 MIN: CPT | Performed by: STUDENT IN AN ORGANIZED HEALTH CARE EDUCATION/TRAINING PROGRAM

## 2022-11-03 RX ORDER — UBIDECARENONE 200 MG
200 CAPSULE ORAL DAILY
Qty: 90 CAPSULE | Refills: 2 | Status: SHIPPED | OUTPATIENT
Start: 2022-11-03 | End: 2023-11-03

## 2022-11-03 RX ORDER — TIMOLOL MALEATE 5 MG/ML
SOLUTION/ DROPS OPHTHALMIC
COMMUNITY
Start: 2022-09-30

## 2022-11-03 NOTE — PROGRESS NOTES
Chief Complaint  Hypertension (FOLLOW UP), referral (Pt would like a referral to [facility] ), referral  (Pt has a spot on nose and would like a possible dermatology referral ), and bump on nose (Pt has  a bump on tip of nose thinks its possibly skin cancer again )    Subjective          Elmer Garvin presents to [facility]  History of Present Illness    Reports re-started losartan in the interim.   They are unsure of the dosage, but will need a refill soon.    Other BP medicine caused pain per his report.    Asking for derm referral for papule on nose.  Had basal cell carcinoma removed from nose at [facility] in August per his report.    Requests audiology for help with getting hearing aids.    Reports chronic right ankle swelling has resolved after everting phone by accident about 2 weeks ago.     Current Outpatient Medications   Medication Instructions   • albuterol sulfate  (90 Base) MCG/ACT inhaler 2 puffs, Inhalation, Every 4 Hours PRN   • cetirizine (ZYRTEC) 10 mg, Oral, Daily   • Coenzyme Q10 200 mg, Oral, Daily   • latanoprost (XALATAN) 0.005 % ophthalmic solution 1 drop, Both Eyes, Daily   • losartan (COZAAR) 25 mg, Oral, Daily   • Melatonin 10 mg, Oral, Nightly   • montelukast (SINGULAIR) 10 mg, Oral, Daily   • Omega-3 Fatty Acids (fish oil) 1000 MG capsule capsule Oral, Daily With Breakfast   • sulindac (CLINORIL) 150 mg, Oral, 2 Times Daily   • Synthroid 100 MCG tablet Take 1 tablet by mouth every morning *on empty stomach*   • timolol (TIMOPTIC) 0.5 % ophthalmic solution No dose, route, or frequency recorded.       The following portions of the patient's history were reviewed and updated as appropriate: allergies, current medications, past family history, past medical history, past social history, past surgical history, and problem list.    Objective   Vital Signs:   /77 (BP Location: Left arm, Patient Position: Sitting, Cuff  "Size: Adult)   Pulse 68   Temp 97.4 °F (36.3 °C) (Temporal)   Ht 190.5 cm (75\")   Wt 94.7 kg (208 lb 11.2 oz)   SpO2 97%   BMI 26.09 kg/m²     Wt Readings from Last 3 Encounters:   11/03/22 94.7 kg (208 lb 11.2 oz)   10/13/22 93.4 kg (205 lb 14.6 oz)   09/02/22 95.3 kg (210 lb)     BP Readings from Last 3 Encounters:   11/03/22 122/77   10/13/22 136/83   09/02/22 135/64     Physical Exam  Vitals and nursing note reviewed.   Constitutional:       General: He is not in acute distress.     Appearance: Normal appearance. He is well-developed. He is not ill-appearing, toxic-appearing or diaphoretic.   HENT:      Head: Normocephalic and atraumatic.      Right Ear: External ear normal.      Left Ear: External ear normal.      Nose:      Comments: Flesh colored papule noted right nose area  Eyes:      Conjunctiva/sclera: Conjunctivae normal.      Pupils: Pupils are equal, round, and reactive to light.   Cardiovascular:      Rate and Rhythm: Normal rate and regular rhythm.      Heart sounds: No murmur heard.    No friction rub. No gallop.   Pulmonary:      Effort: Pulmonary effort is normal.      Breath sounds: Normal breath sounds. No wheezing or rhonchi.   Abdominal:      General: Bowel sounds are normal. There is no distension.      Palpations: Abdomen is soft.      Tenderness: There is no abdominal tenderness.   Musculoskeletal:      Right lower leg: No edema.      Left lower leg: No edema.      Comments: Right ankle examined.  NTTP posterior edge of lateral and medial malleoli.  Navicular and base of 5th metatarsal NTTP.   Skin:     General: Skin is warm and dry.   Neurological:      Mental Status: He is alert and oriented to person, place, and time.      Cranial Nerves: No cranial nerve deficit.   Psychiatric:         Mood and Affect: Mood and affect normal.         Behavior: Behavior normal.         Thought Content: Thought content normal.         Judgment: Judgment normal.          Result Review :   The " following data was reviewed by: Roberto Carlos Jimenez MD on 11/03/2022:  Common labs    Common Labs 6/24/22 6/24/22 6/24/22 6/24/22 7/12/22 8/1/22    1606 1606 1606 1606     Glucose   84  72 87   BUN   18  16 15   Creatinine   1.48 (A)  1.11 1.12   Sodium   138  141 140   Potassium   4.8  4.6 4.3   Chloride   102  102 103   Calcium   9.5  9.7 9.4   Albumin   4.60   4.60   Total Bilirubin   0.4   0.5   Alkaline Phosphatase   43   48   AST (SGOT)   13   21   ALT (SGPT)   13   22   WBC 5.27        Hemoglobin 13.9        Hematocrit 41.8        Platelets 206        Total Cholesterol    202 (A)     Triglycerides    159 (A)     HDL Cholesterol    50     LDL Cholesterol     124 (A)     Hemoglobin A1C  5.80 (A)       (A) Abnormal value                   Lab Results   Component Value Date    COVID19 Detected (C) 06/30/2022    FLU Negative 12/26/2021    FLU Negative 12/26/2021    INR 0.87 (L) 01/25/2022       Procedures        Assessment and Plan    Diagnoses and all orders for this visit:    1. Essential hypertension (Primary)  -     Basic Metabolic Panel    2. SSS (sick sinus syndrome) (HCC)  -     Basic Metabolic Panel    3. Cardiac pacemaker    4. Bilateral hearing loss, unspecified hearing loss type  -     Ambulatory Referral to Audiology    5. Papule of skin  -     Ambulatory Referral to Dermatology    Other orders  -     Coenzyme Q10 200 MG capsule; Take 200 mg by mouth Daily.  Dispense: 90 capsule; Refill: 2          There are no discontinued medications.       Follow Up   Return in about 3 months (around 2/3/2023) for HTN.  Patient was given instructions and counseling regarding his condition or for health maintenance advice. Please see specific information pulled into the AVS if appropriate.       Roberto Carlos Jimenez MD  11/03/22  17:59 EDT

## 2022-11-04 ENCOUNTER — PATIENT MESSAGE (OUTPATIENT)
Dept: INTERNAL MEDICINE | Facility: CLINIC | Age: 77
End: 2022-11-04

## 2022-11-09 ENCOUNTER — TELEPHONE (OUTPATIENT)
Dept: CARDIOLOGY | Facility: CLINIC | Age: 77
End: 2022-11-09

## 2022-11-09 NOTE — TELEPHONE ENCOUNTER
I called to request a pacemaker download, I spoke to his wife.  She will remind him to send one in.

## 2022-11-15 ENCOUNTER — OFFICE VISIT (OUTPATIENT)
Dept: PULMONOLOGY | Facility: CLINIC | Age: 77
End: 2022-11-15

## 2022-11-15 VITALS
HEIGHT: 74 IN | DIASTOLIC BLOOD PRESSURE: 73 MMHG | OXYGEN SATURATION: 98 % | TEMPERATURE: 96.2 F | RESPIRATION RATE: 19 BRPM | HEART RATE: 74 BPM | SYSTOLIC BLOOD PRESSURE: 112 MMHG | WEIGHT: 207 LBS | BODY MASS INDEX: 26.56 KG/M2

## 2022-11-15 DIAGNOSIS — R06.02 SHORTNESS OF BREATH: ICD-10-CM

## 2022-11-15 DIAGNOSIS — J96.01 ACUTE RESPIRATORY FAILURE WITH HYPOXIA: ICD-10-CM

## 2022-11-15 DIAGNOSIS — J96.11 CHRONIC RESPIRATORY FAILURE WITH HYPOXIA: Primary | ICD-10-CM

## 2022-11-15 DIAGNOSIS — I27.24 CTEPH (CHRONIC THROMBOEMBOLIC PULMONARY HYPERTENSION): ICD-10-CM

## 2022-11-15 PROCEDURE — 94618 PULMONARY STRESS TESTING: CPT | Performed by: INTERNAL MEDICINE

## 2022-11-15 PROCEDURE — 90662 IIV NO PRSV INCREASED AG IM: CPT | Performed by: INTERNAL MEDICINE

## 2022-11-15 PROCEDURE — G0008 ADMIN INFLUENZA VIRUS VAC: HCPCS | Performed by: INTERNAL MEDICINE

## 2022-11-15 PROCEDURE — 99213 OFFICE O/P EST LOW 20 MIN: CPT | Performed by: INTERNAL MEDICINE

## 2022-11-15 RX ORDER — MONTELUKAST SODIUM 10 MG/1
10 TABLET ORAL NIGHTLY
Qty: 90 TABLET | Refills: 11 | Status: SHIPPED | OUTPATIENT
Start: 2022-11-15 | End: 2023-02-03

## 2022-11-15 NOTE — ASSESSMENT & PLAN NOTE
This has improved  We will walk patient today to see if she still needs oxygen during the day  Check overnight oximetry I suspect patient does have some nocturnal desaturations as he is having some fatigue in the morning upon awakening

## 2022-11-15 NOTE — ASSESSMENT & PLAN NOTE
I suspect that the patient has some chronic lung disease secondary to his severe COVID which caused ARDS last winter    Obtain pulmonary function studies      Flu vaccine here today in the office

## 2022-11-15 NOTE — PROGRESS NOTES
"Chief Complaint  Asthma, Follow-up (2-3 Month ), Shortness of Breath, and Cough (allergies)    Subjective        Elmer Garvin presents to [facility]  History of Present Illness  Follow-up for shortness of breath  Has had some shortness of breath since having severe COVID last winter  Uses oxygen throughout the day due to shortness of breath  Does feel very fatigued after waking at night  Does have some allergy type symptoms  Shortness of breath worse with exertion better with rest  Objective   Vital Signs:  /73 (BP Location: Right arm, Patient Position: Sitting, Cuff Size: Large Adult)   Pulse 74   Temp 96.2 °F (35.7 °C) (Temporal)   Resp 19   Ht 189 cm (74.4\")   Wt 93.9 kg (207 lb)   SpO2 98% Comment: room air  BMI 26.29 kg/m²   Estimated body mass index is 26.29 kg/m² as calculated from the following:    Height as of this encounter: 189 cm (74.4\").    Weight as of this encounter: 93.9 kg (207 lb).          Physical Exam   Vital Signs Reviewed  General WDWN, Alert, NAD.    Chest:  good aeration, clear to auscultation bilaterally, tympanic to percussion bilaterally, no work of breathing noted  CV: RRR, no MGR, .  EXT:  no clubbing, no cyanosis, no edema, no joint tenderness  Neuro:  A&Ox3, CN grossly intact, no focal deficits.  Skin: No rashes or lesions noted  Result Review :                Assessment and Plan   Diagnoses and all orders for this visit:    1. Chronic respiratory failure with hypoxia (HCC) (Primary)  -     Overnight Sleep Oximetry Study; Future  -     Full Pulmonary Function Test With Bronchodilator; Future  -     Overnight Sleep Oximetry Study; Future    2. Acute respiratory failure with hypoxia (HCC)  Assessment & Plan:  This has improved  We will walk patient today to see if she still needs oxygen during the day  Check overnight oximetry I suspect patient does have some nocturnal desaturations as he is having some fatigue in the " morning upon awakening      3. 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 Shortness of breath  Assessment & Plan:  I suspect that the patient has some chronic lung disease secondary to his severe COVID which caused ARDS last winter    Obtain pulmonary function studies      Flu vaccine here today in the office      Other orders  -     montelukast (SINGULAIR) 10 MG tablet; Take 1 tablet by mouth Every Night.  Dispense: 90 tablet; Refill: 11  -     Fluzone High-Dose 65+yrs ([id number])           Follow Up   Return in about 4 months (around 3/15/2023).  Patient was given instructions and counseling regarding his condition or for health maintenance advice. Please see specific information pulled into the AVS if appropriate.

## 2022-11-16 NOTE — TELEPHONE ENCOUNTER
From: Elmer Garvin  To: Roberto Carlos Jimenez MD  Sent: 11/4/2022 9:10 PM EDT  Subject: Current medication list requested by Dr Marie Garvin [birth year] is on the following:  Cetirizine 10mg; Timolol; Latanoprost 1 drop both eyes bedtime; Losartan 100mg; Sulindac 150mg 2 times a day; Melatonin 10mg; CoQ10; Fish Oil

## 2022-11-23 ENCOUNTER — TELEPHONE (OUTPATIENT)
Dept: PULMONOLOGY | Facility: CLINIC | Age: 77
End: 2022-11-23

## 2022-11-23 NOTE — TELEPHONE ENCOUNTER
Patient's wife, Lily, called in with questions on the ordered at-home sleep study.  She stated that no kit ever arrived via mail and she had no follow up phone calls on this. Can someone please review this and let the patient know when this will arrive?

## 2022-11-28 NOTE — TELEPHONE ENCOUNTER
Per Katherine with [facility] she is unsure why pt hasnt been contacted.  She will get with that department and have him called today to get scheduled.

## 2022-11-28 NOTE — TELEPHONE ENCOUNTER
This was sent to [employer] on 11/16.  I have emailed [employer] to see what the hold up is.  I will contact pt as soon as I hear back.

## 2022-12-08 DIAGNOSIS — J45.909 ASTHMA, UNSPECIFIED ASTHMA SEVERITY, UNSPECIFIED WHETHER COMPLICATED, UNSPECIFIED WHETHER PERSISTENT: ICD-10-CM

## 2022-12-08 DIAGNOSIS — J96.01 ACUTE RESPIRATORY FAILURE WITH HYPOXIA: Primary | ICD-10-CM

## 2022-12-21 RX ORDER — LOSARTAN POTASSIUM 100 MG/1
TABLET ORAL
Qty: 90 TABLET | Refills: 3 | Status: SHIPPED | OUTPATIENT
Start: 2022-12-21

## 2022-12-27 ENCOUNTER — TELEPHONE (OUTPATIENT)
Dept: INTERNAL MEDICINE | Facility: CLINIC | Age: 77
End: 2022-12-27

## 2023-01-04 ENCOUNTER — PRIOR AUTHORIZATION (OUTPATIENT)
Dept: INTERNAL MEDICINE | Facility: CLINIC | Age: 78
End: 2023-01-04
Payer: MEDICARE

## 2023-01-10 NOTE — TELEPHONE ENCOUNTER
PA STARTED: 1/10/2023    COVER MY MEDS KEY: [id number]    WAITING ON INSURANCE REPLY    Outcome  Approved today  PA Case: [id number], Status: Approved, Coverage Starts on: 1/1/2023 12:00:00 AM, Coverage Ends on: 12/31/2023 12:00:00 AM. Questions? Contact [phone].  Drug  Nexletol 180MG tablets  Form  [employer] PA Form

## 2023-01-19 ENCOUNTER — OFFICE VISIT (OUTPATIENT)
Dept: PULMONOLOGY | Facility: CLINIC | Age: 78
End: 2023-01-19
Payer: MEDICARE

## 2023-01-19 ENCOUNTER — HOSPITAL ENCOUNTER (OUTPATIENT)
Dept: GENERAL RADIOLOGY | Facility: HOSPITAL | Age: 78
Discharge: HOME | End: 2023-01-19
Admitting: NURSE PRACTITIONER
Payer: MEDICARE

## 2023-01-19 VITALS
DIASTOLIC BLOOD PRESSURE: 70 MMHG | OXYGEN SATURATION: 97 % | RESPIRATION RATE: 20 BRPM | TEMPERATURE: 98.2 F | HEIGHT: 74 IN | SYSTOLIC BLOOD PRESSURE: 148 MMHG | WEIGHT: 208.8 LBS | HEART RATE: 78 BPM | BODY MASS INDEX: 26.8 KG/M2

## 2023-01-19 DIAGNOSIS — J96.01 ACUTE RESPIRATORY FAILURE WITH HYPOXIA: ICD-10-CM

## 2023-01-19 DIAGNOSIS — R06.02 SHORTNESS OF BREATH: ICD-10-CM

## 2023-01-19 DIAGNOSIS — I27.24 CTEPH (CHRONIC THROMBOEMBOLIC PULMONARY HYPERTENSION): ICD-10-CM

## 2023-01-19 DIAGNOSIS — R05.1 ACUTE COUGH: Primary | ICD-10-CM

## 2023-01-19 DIAGNOSIS — G47.34 NOCTURNAL HYPOXIA: ICD-10-CM

## 2023-01-19 PROCEDURE — 71046 X-RAY EXAM CHEST 2 VIEWS: CPT

## 2023-01-19 PROCEDURE — 99213 OFFICE O/P EST LOW 20 MIN: CPT | Performed by: NURSE PRACTITIONER

## 2023-01-19 RX ORDER — PREDNISONE 10 MG/1
TABLET ORAL
Qty: 42 TABLET | Refills: 0 | Status: SHIPPED | OUTPATIENT
Start: 2023-01-19 | End: 2023-02-03

## 2023-01-19 RX ORDER — BENZONATATE 200 MG/1
200 CAPSULE ORAL 3 TIMES DAILY PRN
Qty: 42 CAPSULE | Refills: 3 | Status: SHIPPED | OUTPATIENT
Start: 2023-01-19 | End: 2023-02-03

## 2023-01-19 NOTE — PROGRESS NOTES
Primary Care Provider  Roberto Carlos Jimenez MD   Referring Provider  No ref. provider found    Patient Complaint  Follow-up, Asthma, Shortness of Breath, and Cough      SUBJECTIVE    History of Presenting Illness  Elmer Garvin is a pleasant 77 y.o. male who presents to [facility] for acute visit for cough.  Patient is here with his spouse today.  Patient states he has had a productive cough for the last 3 to 4 days.  It is yellow sputum that he is coughing up.  He states initially was a dry cough for the past couple weeks.  Patient is currently only using albuterol inhaler once a day as needed for shortness of air.  Patient is currently on O2 at 1 L via nasal cannula.  He did fail the overnight pulse oximetry 11/29/2022 with his low saturation of 80% on room air.  Patient did go get his chest x-ray before his visit today however report not available at this time.    Denies wheezing, headaches, chest pain, weight loss or hemoptysis. Denies fevers, chills and night sweats. Elmer Garvin is able to perform ADLs without difficulties and denies any swollen glands/lymph nodes in the head or neck.    I have personally reviewed the review of systems, past family, social, medical and surgical histories; and agree with their findings.    Review of Systems   Constitutional: Negative.    HENT: Negative.    Eyes: Negative.    Respiratory: Positive for cough and shortness of breath.    Cardiovascular: Negative.    Musculoskeletal: Negative.    Skin: Negative.         Family History   Problem Relation Age of Onset   • Cancer Mother    • Heart disease Father    • Cancer Father    • Heart disease Sister    • Sudden death Sister    • Heart disease Brother    • Cancer Paternal Grandfather    • Colon cancer Neg Hx    • Malig Hyperthermia Neg Hx         Social History     Socioeconomic History   • Marital status:    Tobacco Use   • Smoking status: Former     Packs/day: 0.25      Years: 12.00     Pack years: 3.00     Types: Pipe, Cigarettes     Start date:      Quit date:      Years since quittin.0   • Smokeless tobacco: Never   • Tobacco comments:     CAFFEINE USE 3 CUPS COFFEE DAILY   Vaping Use   • Vaping Use: Never used   Substance and Sexual Activity   • Alcohol use: No   • Drug use: Never   • Sexual activity: Defer        Past Medical History:   Diagnosis Date   • Allergies    • Hyperlipidemia    • Hypertension    • Hypothyroidism    • Lyme disease    • Shortness of breath    • SSS (sick sinus syndrome) (HCC)         Immunization History   Administered Date(s) Administered   • Fluzone High-Dose 65+yrs 11/15/2022   • OPV 1996   • Td 1996   • Yellow Fever 1996       Allergies   Allergen Reactions   • Latex Other (See Comments)     Sinus congestion, pt states has never had a reaction          Current Outpatient Medications:   •  albuterol sulfate  (90 Base) MCG/ACT inhaler, Inhale 2 puffs Every 4 (Four) Hours As Needed for Wheezing., Disp: 1 g, Rfl: 6  •  Bempedoic Acid 180 MG tablet, Take 1 tablet by mouth Daily., Disp: 90 tablet, Rfl: 2  •  cetirizine (zyrTEC) 10 MG tablet, Take 1 tablet by mouth Daily., Disp: 90 tablet, Rfl: 3  •  Coenzyme Q10 200 MG capsule, Take 200 mg by mouth Daily., Disp: 90 capsule, Rfl: 2  •  latanoprost (XALATAN) 0.005 % ophthalmic solution, Administer 1 drop to both eyes Daily., Disp: , Rfl:   •  losartan (COZAAR) 100 MG tablet, Take 1 tablet by mouth every day, Disp: 90 tablet, Rfl: 3  •  Melatonin 10 MG tablet, Take 1 tablet by mouth Every Night., Disp: , Rfl:   •  montelukast (SINGULAIR) 10 MG tablet, Take 1 tablet by mouth Daily for 90 days., Disp: 90 tablet, Rfl: 3  •  montelukast (SINGULAIR) 10 MG tablet, Take 1 tablet by mouth Every Night., Disp: 90 tablet, Rfl: 11  •  Omega-3 Fatty Acids (fish oil) 1000 MG capsule capsule, Take  by mouth Daily With Breakfast., Disp: , Rfl:   •  sulindac (CLINORIL) 150 MG  "tablet, Take 1 tablet by mouth 2 (Two) Times a Day., Disp: 180 tablet, Rfl: 2  •  Synthroid 100 MCG tablet, Take 1 tablet by mouth every morning *on empty stomach*, Disp: 90 tablet, Rfl: 3  •  timolol (TIMOPTIC) 0.5 % ophthalmic solution, , Disp: , Rfl:   •  benzonatate (TESSALON) 200 MG capsule, Take 1 capsule by mouth 3 (Three) Times a Day As Needed for Cough., Disp: 42 capsule, Rfl: 3  •  predniSONE (DELTASONE) 10 MG tablet, 6 daily x 2 days, 5 daily x 2 days, 4 daily x 2 days, 3 daily x 2 days, 2 daily x 2 days, 1 daily x 2 days, Disp: 42 tablet, Rfl: 0       OBJECTIVE    Vital Signs   /70 (BP Location: Right arm, Patient Position: Sitting, Cuff Size: Adult)   Pulse 78   Temp 98.2 °F (36.8 °C) (Tympanic)   Resp 20   Ht 189 cm (74.4\")   Wt 94.7 kg (208 lb 12.8 oz)   SpO2 97% Comment: 1 liter nc pd  BMI 26.52 kg/m²     Physical Exam  Vitals reviewed.   Constitutional:       Appearance: Normal appearance.   HENT:      Head: Normocephalic and atraumatic.      Nose: Nose normal.      Mouth/Throat:      Mouth: Mucous membranes are moist.      Pharynx: Oropharynx is clear.   Eyes:      Extraocular Movements: Extraocular movements intact.      Conjunctiva/sclera: Conjunctivae normal.      Pupils: Pupils are equal, round, and reactive to light.   Cardiovascular:      Rate and Rhythm: Normal rate and regular rhythm.      Pulses: Normal pulses.      Heart sounds: Normal heart sounds.   Pulmonary:      Effort: Pulmonary effort is normal.      Breath sounds: Normal breath sounds.   Abdominal:      General: Bowel sounds are normal.   Musculoskeletal:         General: Normal range of motion.      Cervical back: Normal range of motion and neck supple.   Skin:     General: Skin is warm and dry.   Neurological:      Mental Status: He is alert and oriented to person, place, and time.   Psychiatric:         Behavior: Behavior normal.          Results Review  I have personally reviewed the prior office " notes.      ASSESSMENT & PLAN    Patient Active Problem List   Diagnosis   • Angina at rest ([facility])   • Essential hypertension   • SSS (sick sinus syndrome) ([facility])   • Mild intermittent asthma without complication   • Gastroesophageal reflux disease without esophagitis   • Taylor's esophagus without dysplasia   • Acquired hypothyroidism   • MRSA (methicillin resistant Staphylococcus aureus)   • Pre-diabetes   • Acute respiratory failure with hypoxia ([facility])   • Arthritis   • Asthma   • Benign prostatic hyperplasia   • Cataract   • Deviated nasal septum   • Family history of prostate cancer in father   • Hypertrophy of both inferior nasal turbinates   • Impaired glucose tolerance   • 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   • Nasal congestion   • Nasal obstruction   • Pacemaker   • Acute on chronic respiratory failure with hypoxia ([facility])   • Acute pulmonary embolism ([facility])   • Bilateral vitreous detachment   • Epiretinal membrane   • Glaucomatous atrophy of optic disc   • After cataract   • Pneumonia due to COVID-19 virus   • 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 Acute cough (Primary)  -     XR Chest 2 View; Future  -     predniSONE (DELTASONE) 10 MG tablet; 6 daily x 2 days, 5 daily x 2 days, 4 daily x 2 days, 3 daily x 2 days, 2 daily x 2 days, 1 daily x 2 days  Dispense: 42 tablet; Refill: 0  -     benzonatate (TESSALON) 200 MG capsule; Take 1 capsule by mouth 3 (Three) Times a Day As Needed for Cough.  Dispense: 42 capsule; Refill: 3  -     6 Minute Walk Test; Future    2. Nocturnal hypoxia  -     XR Chest 2 View; Future  -     predniSONE (DELTASONE) 10 MG tablet; 6 daily x 2 days, 5 daily x 2 days, 4 daily x 2  days, 3 daily x 2 days, 2 daily x 2 days, 1 daily x 2 days  Dispense: 42 tablet; Refill: 0  -     benzonatate (TESSALON) 200 MG capsule; Take 1 capsule by mouth 3 (Three) Times a Day As Needed for Cough.  Dispense: 42 capsule; Refill: 3  -     6 Minute Walk Test; Future    3. Shortness of breath  -     XR Chest 2 View; Future  -     predniSONE (DELTASONE) 10 MG tablet; 6 daily x 2 days, 5 daily x 2 days, 4 daily x 2 days, 3 daily x 2 days, 2 daily x 2 days, 1 daily x 2 days  Dispense: 42 tablet; Refill: 0  -     benzonatate (TESSALON) 200 MG capsule; Take 1 capsule by mouth 3 (Three) Times a Day As Needed for Cough.  Dispense: 42 capsule; Refill: 3  -     6 Minute Walk Test; Future    4. CTEPH (chronic thromboembolic pulmonary hypertension) (HCC)  -     XR Chest 2 View; Future  -     predniSONE (DELTASONE) 10 MG tablet; 6 daily x 2 days, 5 daily x 2 days, 4 daily x 2 days, 3 daily x 2 days, 2 daily x 2 days, 1 daily x 2 days  Dispense: 42 tablet; Refill: 0  -     benzonatate (TESSALON) 200 MG capsule; Take 1 capsule by mouth 3 (Three) Times a Day As Needed for Cough.  Dispense: 42 capsule; Refill: 3  -     6 Minute Walk Test; Future           Plan:  Patient to continue with O2 at 1-2 l via nasal cannula for sleep and naps titrating to keep saturation above 90%.  We will send in steroid taper dose and Tessalon for cough at this time.  In addition 6-minute walk in the office today with patient passing lowest desaturation 92% on room air.  Patient to continue with O2 at 2 L via nasal cannula for nocturnal hypoxia.  Patient continues to benefit from O2 at night for nocturnal hypoxia.  Will notify patient of chest x-ray results when available.  Patient advised should his symptoms worsen he is to call 911 or go to emergency room.  Patient spouse both verbalized understanding.    Smoking status:former  Vaccination status:up to date with flu vaccine only  Medications personally reviewed    Follow Up  Return for Next  scheduled follow up.    Patient was given instructions and counseling regarding his condition or for health maintenance advice. Please see specific information pulled into the AVS if appropriate.

## 2023-01-20 ENCOUNTER — LAB (OUTPATIENT)
Dept: LAB | Facility: HOSPITAL | Age: 78
End: 2023-01-20
Payer: MEDICARE

## 2023-01-20 DIAGNOSIS — J18.9 PNEUMONIA OF BOTH LUNGS DUE TO INFECTIOUS ORGANISM, UNSPECIFIED PART OF LUNG: Primary | ICD-10-CM

## 2023-01-20 DIAGNOSIS — R06.02 SHORTNESS OF BREATH: ICD-10-CM

## 2023-01-20 DIAGNOSIS — R05.1 ACUTE COUGH: ICD-10-CM

## 2023-01-20 DIAGNOSIS — J18.9 PNEUMONIA OF BOTH LUNGS DUE TO INFECTIOUS ORGANISM, UNSPECIFIED PART OF LUNG: ICD-10-CM

## 2023-01-20 LAB — NT-PROBNP SERPL-MCNC: 772 PG/ML (ref 0–1800)

## 2023-01-20 PROCEDURE — 36415 COLL VENOUS BLD VENIPUNCTURE: CPT

## 2023-01-20 PROCEDURE — 83880 ASSAY OF NATRIURETIC PEPTIDE: CPT

## 2023-01-20 RX ORDER — LEVOFLOXACIN 750 MG/1
750 TABLET ORAL DAILY
Qty: 7 TABLET | Refills: 0 | Status: SHIPPED | OUTPATIENT
Start: 2023-01-20 | End: 2023-02-03

## 2023-02-03 ENCOUNTER — OFFICE VISIT (OUTPATIENT)
Dept: INTERNAL MEDICINE | Facility: CLINIC | Age: 78
End: 2023-02-03
Payer: MEDICARE

## 2023-02-03 VITALS
OXYGEN SATURATION: 95 % | BODY MASS INDEX: 25.07 KG/M2 | TEMPERATURE: 97.6 F | WEIGHT: 201.6 LBS | DIASTOLIC BLOOD PRESSURE: 74 MMHG | HEART RATE: 71 BPM | SYSTOLIC BLOOD PRESSURE: 115 MMHG | HEIGHT: 75 IN

## 2023-02-03 DIAGNOSIS — J30.9 ALLERGIC RHINITIS, UNSPECIFIED SEASONALITY, UNSPECIFIED TRIGGER: ICD-10-CM

## 2023-02-03 DIAGNOSIS — I49.5 SSS (SICK SINUS SYNDROME): ICD-10-CM

## 2023-02-03 DIAGNOSIS — Z95.0 CARDIAC PACEMAKER: ICD-10-CM

## 2023-02-03 DIAGNOSIS — I10 ESSENTIAL HYPERTENSION: Primary | ICD-10-CM

## 2023-02-03 DIAGNOSIS — I27.24 CTEPH (CHRONIC THROMBOEMBOLIC PULMONARY HYPERTENSION): ICD-10-CM

## 2023-02-03 DIAGNOSIS — H91.93 BILATERAL HEARING LOSS, UNSPECIFIED HEARING LOSS TYPE: ICD-10-CM

## 2023-02-03 DIAGNOSIS — J18.9 PNEUMONIA OF BOTH LUNGS DUE TO INFECTIOUS ORGANISM, UNSPECIFIED PART OF LUNG: ICD-10-CM

## 2023-02-03 PROCEDURE — 99214 OFFICE O/P EST MOD 30 MIN: CPT | Performed by: STUDENT IN AN ORGANIZED HEALTH CARE EDUCATION/TRAINING PROGRAM

## 2023-02-03 PROCEDURE — 80053 COMPREHEN METABOLIC PANEL: CPT | Performed by: STUDENT IN AN ORGANIZED HEALTH CARE EDUCATION/TRAINING PROGRAM

## 2023-02-03 RX ORDER — CETIRIZINE HYDROCHLORIDE 10 MG/1
10 TABLET ORAL DAILY
Qty: 90 TABLET | Refills: 3 | Status: SHIPPED | OUTPATIENT
Start: 2023-02-03

## 2023-02-03 NOTE — PROGRESS NOTES
"Chief Complaint  Hypertension and Follow-up (pneumonia)    Subjective          Elmer Garvin presents to [facility]  History of Present Illness    Here with wife.    In the interim, was placed on course of steroids and levofloxacin for radiographic evidence of pneumonia on chest x-ray by pulm.    Started night time oxygen about two weeks ago.    Cough is basically resolved now.    Feeling tired, but no fever or sick symptoms      Current Outpatient Medications   Medication Instructions   • albuterol sulfate  (90 Base) MCG/ACT inhaler 2 puffs, Inhalation, Every 4 Hours PRN   • cetirizine (ZYRTEC) 10 mg, Oral, Daily   • Coenzyme Q10 200 mg, Oral, Daily   • latanoprost (XALATAN) 0.005 % ophthalmic solution 1 drop, Both Eyes, Daily   • losartan (COZAAR) 100 MG tablet Take 1 tablet by mouth every day   • Melatonin 10 mg, Oral, Nightly   • montelukast (SINGULAIR) 10 mg, Oral, Daily   • Omega-3 Fatty Acids (fish oil) 1000 MG capsule capsule Oral, Daily With Breakfast   • sulindac (CLINORIL) 150 mg, Oral, 2 Times Daily   • Synthroid 100 MCG tablet Take 1 tablet by mouth every morning *on empty stomach*   • timolol (TIMOPTIC) 0.5 % ophthalmic solution No dose, route, or frequency recorded.       The following portions of the patient's history were reviewed and updated as appropriate: allergies, current medications, past family history, past medical history, past social history, past surgical history, and problem list.    Objective   Vital Signs:   /74   Pulse 71   Temp 97.6 °F (36.4 °C) (Temporal)   Ht 190.5 cm (75\")   Wt 91.4 kg (201 lb 9.6 oz)   SpO2 95%   BMI 25.20 kg/m²     Wt Readings from Last 3 Encounters:   02/03/23 91.4 kg (201 lb 9.6 oz)   01/19/23 94.7 kg (208 lb 12.8 oz)   11/15/22 93.9 kg (207 lb)     BP Readings from Last 3 Encounters:   02/03/23 115/74   01/19/23 148/70   11/15/22 112/73     Physical Exam  Vitals reviewed.   Constitutional:  "      General: He is not in acute distress.     Appearance: Normal appearance. He is not ill-appearing, toxic-appearing or diaphoretic.   HENT:      Head: Normocephalic and atraumatic.      Right Ear: External ear normal.      Left Ear: External ear normal.   Eyes:      Conjunctiva/sclera: Conjunctivae normal.   Cardiovascular:      Rate and Rhythm: Normal rate and regular rhythm.      Pulses: Normal pulses.      Heart sounds: Normal heart sounds. No murmur heard.    No friction rub. No gallop.   Pulmonary:      Effort: Pulmonary effort is normal. No respiratory distress.      Breath sounds: Normal breath sounds. No stridor. No wheezing, rhonchi or rales.   Chest:      Chest wall: No tenderness.   Abdominal:      General: Abdomen is flat.      Palpations: Abdomen is soft. There is no mass.      Tenderness: There is no abdominal tenderness.   Musculoskeletal:      Right lower leg: No edema.      Left lower leg: No edema.   Skin:     General: Skin is warm and dry.   Neurological:      General: No focal deficit present.      Mental Status: He is alert. Mental status is at baseline.   Psychiatric:         Mood and Affect: Mood normal.         Behavior: Behavior normal.         Thought Content: Thought content normal.         Judgment: Judgment normal.         Result Review :   The following data was reviewed by: Roberto Carlos Jimenez MD on 02/03/2023:  Common labs    Common Labs 7/12/22 8/1/22 11/3/22   Glucose 72 87 86   BUN 16 15 16   Creatinine 1.11 1.12 1.32 (A)   Sodium 141 140 137   Potassium 4.6 4.3 5.0   Chloride 102 103 100   Calcium 9.7 9.4 9.6   Albumin  4.60    Total Bilirubin  0.5    Alkaline Phosphatase  48    AST (SGOT)  21    ALT (SGPT)  22    (A) Abnormal value                   Lab Results   Component Value Date    COVID19 Detected (C) 06/30/2022    FLU Negative 12/26/2021    FLU Negative 12/26/2021    INR 0.87 (L) 01/25/2022       Procedures        Assessment and Plan    Diagnoses and all orders for this  visit:    1. Essential hypertension (Primary)  -     Comprehensive Metabolic Panel    2. SSS (sick sinus syndrome) ([facility])    3. Cardiac pacemaker    4. Bilateral hearing loss, unspecified hearing loss type    5. Pneumonia of both lungs due to infectious organism, unspecified part of lung    6. Allergic rhinitis, unspecified seasonality, unspecified trigger  -     cetirizine (zyrTEC) 10 MG tablet; Take 1 tablet by mouth Daily.  Dispense: 90 tablet; Refill: 3    7. 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  Patient was given instructions and counseling regarding his condition or for health maintenance advice. Please see specific information pulled into the AVS if appropriate.       Roberto Carlos Jimenez MD  02/03/23  20:37 EST

## 2023-02-04 LAB
ALBUMIN SERPL-MCNC: 3.9 G/DL (ref 3.5–5.2)
ALBUMIN/GLOB SERPL: 1.4 G/DL
ALP SERPL-CCNC: 52 U/L (ref 39–117)
ALT SERPL W P-5'-P-CCNC: 15 U/L (ref 1–41)
ANION GAP SERPL CALCULATED.3IONS-SCNC: 8.1 MMOL/L (ref 5–15)
AST SERPL-CCNC: 15 U/L (ref 1–40)
BILIRUB SERPL-MCNC: 0.5 MG/DL (ref 0–1.2)
BUN SERPL-MCNC: 28 MG/DL (ref 8–23)
BUN/CREAT SERPL: 20.1 (ref 7–25)
CALCIUM SPEC-SCNC: 9.5 MG/DL (ref 8.6–10.5)
CHLORIDE SERPL-SCNC: 101 MMOL/L (ref 98–107)
CO2 SERPL-SCNC: 27.9 MMOL/L (ref 22–29)
CREAT SERPL-MCNC: 1.39 MG/DL (ref 0.76–1.27)
EGFRCR SERPLBLD CKD-EPI 2021: 52.5 ML/MIN/1.73
GLOBULIN UR ELPH-MCNC: 2.7 GM/DL
GLUCOSE SERPL-MCNC: 85 MG/DL (ref 65–99)
POTASSIUM SERPL-SCNC: 4.7 MMOL/L (ref 3.5–5.2)
PROT SERPL-MCNC: 6.6 G/DL (ref 6–8.5)
SODIUM SERPL-SCNC: 137 MMOL/L (ref 136–145)

## 2023-02-09 ENCOUNTER — HOSPITAL ENCOUNTER (OUTPATIENT)
Dept: RESPIRATORY THERAPY | Facility: HOSPITAL | Age: 78
Discharge: HOME | End: 2023-02-09
Admitting: INTERNAL MEDICINE
Payer: MEDICARE

## 2023-02-09 DIAGNOSIS — J96.11 CHRONIC RESPIRATORY FAILURE WITH HYPOXIA: ICD-10-CM

## 2023-02-09 PROCEDURE — 94726 PLETHYSMOGRAPHY LUNG VOLUMES: CPT | Performed by: INTERNAL MEDICINE

## 2023-02-09 PROCEDURE — 94729 DIFFUSING CAPACITY: CPT

## 2023-02-09 PROCEDURE — 94060 EVALUATION OF WHEEZING: CPT | Performed by: INTERNAL MEDICINE

## 2023-02-09 PROCEDURE — 94010 BREATHING CAPACITY TEST: CPT

## 2023-02-09 PROCEDURE — 94729 DIFFUSING CAPACITY: CPT | Performed by: INTERNAL MEDICINE

## 2023-02-09 PROCEDURE — 94726 PLETHYSMOGRAPHY LUNG VOLUMES: CPT

## 2023-02-09 RX ORDER — ALBUTEROL SULFATE 2.5 MG/3ML
2.5 SOLUTION RESPIRATORY (INHALATION) ONCE
Status: COMPLETED | OUTPATIENT
Start: 2023-02-09 | End: 2023-02-09

## 2023-02-09 RX ADMIN — ALBUTEROL SULFATE 2.5 MG: 2.5 SOLUTION RESPIRATORY (INHALATION) at 15:55

## 2023-03-13 NOTE — PROGRESS NOTES
Primary Care Provider  Roberto Carlos Jimenez MD   Referring Provider  No ref. provider found    Patient Complaint  Follow-up, Shortness of Breath, and oxygen      SUBJECTIVE    History of Presenting Illness  Elmer Garvin is a pleasant 77 y.o. male who presents to [facility] for 2-month follow-up.  Patient is here with his spouse today.  Patient recently had a pulmonary function test 2/9/2023 and here for results.  I last saw the patient 1/19/2023 for an acute visit.  Patient had a chest x-ray done 1/19/2023 which showed infectious multifocal pneumonia.  Patient was treated with Levaquin at that time.  Today patient is doing well overall feels that his breathing is at baseline.  Patient is only using albuterol inhaler as needed.  And nebs is not very frequent he states he continues to be on Zyrtec and Singulair.  He continues using O2 at night for sleep and naps for nocturnal hypoxia.  His [employer].  He is a former smoker quit in 1976 is up-to-date with flu only declines COVID-vaccine and pneumonia.  Patient had PFT 2/9/2023 which showed no obstruction, mild restrictive defect and decreased diffusion capacity.  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  But recovers easily with rest.    Denies coughing, wheezing, headaches, chest pain, weight loss or hemoptysis. Denies fevers, chills and night sweats. Elmer Garvin is able to perform ADLs without difficulties and denies any swollen glands/lymph nodes in the head or neck.    I have personally reviewed the review of systems, past family, social, medical and surgical histories; and agree with their findings.    Review of Systems  Constitutional symptoms:  Denied complaints   Ear, nose, throat: Denied complaints  Cardiovascular:  Denied complaints  Respiratory: Shortness of air with exertional activities   Gastrointestinal:  Denied complaints  Musculoskeletal: Denied complaints    Family History   Problem Relation Age of Onset   • Cancer Mother    • Heart disease Father    • Cancer Father    • Heart disease Sister    • Sudden death Sister    • Heart disease Brother    • Cancer Paternal Grandfather    • Colon cancer Neg Hx    • Malig Hyperthermia Neg Hx         Social History     Socioeconomic History   • Marital status:    Tobacco Use   • Smoking status: Former     Packs/day: 0.25     Years: 12.00     Pack years: 3.00     Types: Pipe, Cigarettes     Start date:      Quit date:      Years since quittin.2   • Smokeless tobacco: Never   • Tobacco comments:     CAFFEINE USE 3 CUPS COFFEE DAILY   Vaping Use   • Vaping Use: Never used   Substance and Sexual Activity   • Alcohol use: No   • Drug use: Never   • Sexual activity: Defer        Past Medical History:   Diagnosis Date   • Allergies    • Hyperlipidemia    • Hypertension    • Hypothyroidism    • Lyme disease    • Shortness of breath    • SSS (sick sinus syndrome) (HCC)         Immunization History   Administered Date(s) Administered   • Fluzone High-Dose 65+yrs 11/15/2022   • OPV 1996   • Td 1996   • Yellow Fever 1996       Allergies   Allergen Reactions   • Latex Other (See Comments)     Sinus congestion, pt states has never had a reaction          Current Outpatient Medications:   •  albuterol sulfate  (90 Base) MCG/ACT inhaler, Inhale 2 puffs Every 4 (Four) Hours As Needed for Wheezing., Disp: 1 g, Rfl: 6  •  cetirizine (zyrTEC) 10 MG tablet, Take 1 tablet by mouth Daily., Disp: 90 tablet, Rfl: 3  •  Coenzyme Q10 200 MG capsule, Take 200 mg by mouth Daily., Disp: 90 capsule, Rfl: 2  •  latanoprost (XALATAN) 0.005 % ophthalmic solution, Administer 1 drop to both eyes Daily., Disp: , Rfl:   •  losartan (COZAAR) 100 MG tablet, Take 1 tablet by mouth every day, Disp: 90 tablet, Rfl: 3  •  Melatonin 10 MG tablet, Take 1 tablet by mouth Every  "Night., Disp: , Rfl:   •  montelukast (SINGULAIR) 10 MG tablet, Take 1 tablet by mouth Daily for 90 days., Disp: 90 tablet, Rfl: 3  •  Omega-3 Fatty Acids (fish oil) 1000 MG capsule capsule, Take  by mouth Daily With Breakfast., Disp: , Rfl:   •  sulindac (CLINORIL) 150 MG tablet, Take 1 tablet by mouth 2 (Two) Times a Day., Disp: 180 tablet, Rfl: 2  •  Synthroid 100 MCG tablet, Take 1 tablet by mouth every morning *on empty stomach*, Disp: 90 tablet, Rfl: 3  •  timolol (TIMOPTIC) 0.5 % ophthalmic solution, , Disp: , Rfl:            Vital Signs   /74 (BP Location: Left arm, Patient Position: Sitting, Cuff Size: Adult)   Pulse 65   Temp 97.8 °F (36.6 °C) (Tympanic)   Resp 20   Ht 190.5 cm (75\")   Wt 95.6 kg (210 lb 12.8 oz)   SpO2 99% Comment: room air  BMI 26.35 kg/m²       OBJECTIVE    Physical Exam  Vital Signs Reviewed   WDWN, Alert, NAD.    HEENT:  PERRL, EOMI.  OP, nares clear  Neck:  Supple, no JVD, no thyromegaly  Chest:  good aeration, clear to auscultation bilaterally, tympanic to percussion bilaterally, no work of breathing noted  CV: RRR, no MGR, pulses 2+, equal.  Abd:  Soft, NT, ND, + BS, no HSM  EXT:  no clubbing, no cyanosis, no edema  Neuro:  A&Ox3, CN grossly intact, no focal deficits.  Skin: No rashes or lesions noted    Results Review  I have personally reviewed the prior office notes and diagnostics including PFT from 2/9/2023 with the following:    Full Pulmonary Function Test With Bronchodilator: Patient Communication     Add Comments   Seen     Results  Full Pulmonary Function Test With Bronchodilator (Order [id number])  Order-Level Documents:    Scan on 2/9/2023 by Wilton Gong, DO: PULMONARY FUNCTION TEST, [facility], 02/09/2023         Author: -- Service: -- Author Type: --   Filed:  Date of Service:  Creation Time:    Status: (Other)   Spirometry  FEV1/FVC 85%  FEV1 89% of predicted  FVC 76% of predicted  There is no significant response to bronchodilator therapy " seen        Lung volumes  Total lung capacity 73% of predicted  Residual volume 72% of predicted           Diffusion  DLCO is 50% of predicted        Interpretation  No evidence of obstruction  There is evidence of a mild restrictive defect  There is significant reduction in DLCO noted  When compared with previous PFTs from October 2018 there is a 34% reduction in forced vital capacity and a 29% reduction in FEV1     Electronically signed by Wilton Gong DO, 02/13/23, 9:09 AM EST.            XR Chest 2 View [IMG36] (Order [id number])  Order  Status: Final result     Appointment Information    PACS Images     Radiology Images  Study Result    Narrative & Impression   PROCEDURE:  XR CHEST 2 VW     COMPARISON: [facility], CR, CHEST PA/AP & LAT 2V, 9/27/2017, 15:22.     INDICATIONS:  COUGH AND SHORTNESS OF BREATH.     FINDINGS:        Two views of the chest are provided for review.  New bilateral infiltrates are seen.  The   findings may represent infectious multifocal pneumonia.  Pulmonary edema is possible.  Probably no   cardiomediastinal enlargement.  No pneumothorax.  No pneumomediastinum.  No pleural effusion.    There is slight pulmonary hypoinflation.  There is a left-sided cardiac implantable electronic   device (CIED) in place, seen previously, and unchanged in position.  The thoracic aorta is   atherosclerotic.     IMPRESSION:               New bilateral infiltrates are seen.  The findings may represent infectious multifocal   pneumonia.  Pulmonary edema cannot be excluded.  Consider imaging follow-up to ensure a benign   progression.             Please note that portions of this note were completed with a voice recognition program.     KAVITHA GARCIA JR, MD         Electronically Signed and Approved By: KAVITHA GARCIA JR, MD on 1/20/2023 at 5:11          ASSESSMENT         Patient Active Problem List   Diagnosis   • Angina at rest (HCC)   • Essential hypertension   • SSS  (sick sinus syndrome) (HCC)   • Mild intermittent asthma without complication   • Gastroesophageal reflux disease without esophagitis   • Taylor's esophagus without dysplasia   • Acquired hypothyroidism   • MRSA (methicillin resistant Staphylococcus aureus)   • Pre-diabetes   • Acute respiratory failure with hypoxia (HCC)   • Arthritis   • Asthma   • Benign prostatic hyperplasia   • Cataract   • Deviated nasal septum   • Family history of prostate cancer in father   • Hypertrophy of both inferior nasal turbinates   • Impaired glucose tolerance   • 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   • Nasal congestion   • Nasal obstruction   • Pacemaker   • Acute on chronic respiratory failure with hypoxia (HCC)   • Acute pulmonary embolism (HCC)   • Bilateral vitreous detachment   • Epiretinal membrane   • Glaucomatous atrophy of optic disc   • After cataract   • Pneumonia due to COVID-19 virus   • CTEPH (chronic thromboembolic pulmonary hypertension) (HCC)   • Shortness of breath   • Other specified hypothyroidism   • Personal history of COVID-19   • History of pulmonary embolism   • Mixed hyperlipidemia   • Colon cancer screening   • Fibrous papule of nose       Encounter Diagnoses   Name Primary?   • Shortness of breath Yes   • Nocturnal hypoxia    • Chronic respiratory failure with hypoxia (HCC)       PLAN  Patient to continue with albuterol inhaler as needed for shortness of air or wheeze.  Patient to continue with Zyrtec and Singulair for seasonal allergies.  Patient will follow back up in 4 to 6 months or sooner if needed      Diagnoses and all orders for this visit:    1. Shortness of breath (Primary)    2. Nocturnal hypoxia    3. Chronic respiratory failure with hypoxia (HCC)           Smoking status: Former  Vaccination status: Up-to-date with flu only  Medications personally reviewed    Follow Up  Return in about 4 months (around 7/14/2023) for with Dr. Gong.    Patient was given  instructions and counseling regarding his condition or for health maintenance advice. Please see specific information pulled into the AVS if appropriate.

## 2023-03-14 ENCOUNTER — OFFICE VISIT (OUTPATIENT)
Dept: PULMONOLOGY | Facility: CLINIC | Age: 78
End: 2023-03-14
Payer: MEDICARE

## 2023-03-14 VITALS
BODY MASS INDEX: 26.21 KG/M2 | HEART RATE: 65 BPM | TEMPERATURE: 97.8 F | RESPIRATION RATE: 20 BRPM | OXYGEN SATURATION: 99 % | SYSTOLIC BLOOD PRESSURE: 153 MMHG | WEIGHT: 210.8 LBS | DIASTOLIC BLOOD PRESSURE: 74 MMHG | HEIGHT: 75 IN

## 2023-03-14 DIAGNOSIS — R06.02 SHORTNESS OF BREATH: Primary | ICD-10-CM

## 2023-03-14 DIAGNOSIS — G47.34 NOCTURNAL HYPOXIA: ICD-10-CM

## 2023-03-14 DIAGNOSIS — J96.11 CHRONIC RESPIRATORY FAILURE WITH HYPOXIA: ICD-10-CM

## 2023-03-14 PROCEDURE — 99214 OFFICE O/P EST MOD 30 MIN: CPT | Performed by: NURSE PRACTITIONER

## 2023-04-20 RX ORDER — LEVOTHYROXINE SODIUM 100 MCG
TABLET ORAL
Qty: 90 TABLET | Refills: 3 | OUTPATIENT
Start: 2023-04-20

## 2023-04-25 NOTE — TELEPHONE ENCOUNTER
Caller: ANTONINOMYRNA    Relationship: Emergency Contact    Best call back number: [phone]    Requested Prescriptions:   Requested Prescriptions     Pending Prescriptions Disp Refills   • Synthroid 100 MCG tablet [Pharmacy Med Name: SYNTHROID 100 MCG TABLET] 90 tablet 3     Sig: Take 1 tablet by mouth every morning *on empty stomach*        Pharmacy where request should be sent: [facility] [address] - [phone] [facility] [phone]      Last office visit with prescribing clinician: 2/3/2023     Next office visit with prescribing clinician: 5/4/2023     Additional details provided by patient: CALLER STATES THAT MEDICATION WAS ORIGINALLY PRESCRIBED BY DR. ABRAHAM, STATES THAT THEY HAD REFILLS LEFT ON PRESCRIPTION AFTER SWITCHING PROVIDERS. CALLER STATES THAT PATIENT IS IN NEED OF A NEW PRESCRIPTION. CALLER STATES THAT INSURANCE WILL COVER 90 DAY SUPPLY OF MEDICATION. PATIENT IS COMPLETELY OUT OF THE MEDICATION AND HAS BEEN OUT OF MEDICATION SINCE 04.21.2023    Does the patient have less than a 3 day supply:  [x] Yes  [] No    Would you like a call back once the refill request has been completed: [] Yes [x] No    If the office needs to give you a call back, can they leave a voicemail: [x] Yes [] No    Favian Frias Rep   04/25/23 10:22 EDT

## 2023-04-26 RX ORDER — LEVOTHYROXINE SODIUM 100 MCG
TABLET ORAL
Qty: 90 TABLET | Refills: 3 | Status: SHIPPED | OUTPATIENT
Start: 2023-04-26

## 2023-05-04 ENCOUNTER — OFFICE VISIT (OUTPATIENT)
Dept: INTERNAL MEDICINE | Facility: CLINIC | Age: 78
End: 2023-05-04
Payer: MEDICARE

## 2023-05-04 VITALS
DIASTOLIC BLOOD PRESSURE: 76 MMHG | TEMPERATURE: 97.8 F | HEIGHT: 75 IN | BODY MASS INDEX: 25.64 KG/M2 | WEIGHT: 206.2 LBS | OXYGEN SATURATION: 98 % | HEART RATE: 67 BPM | SYSTOLIC BLOOD PRESSURE: 130 MMHG

## 2023-05-04 DIAGNOSIS — I49.5 SSS (SICK SINUS SYNDROME): ICD-10-CM

## 2023-05-04 DIAGNOSIS — G47.34 NOCTURNAL HYPOXIA: ICD-10-CM

## 2023-05-04 DIAGNOSIS — I27.24 CTEPH (CHRONIC THROMBOEMBOLIC PULMONARY HYPERTENSION): ICD-10-CM

## 2023-05-04 DIAGNOSIS — H60.331 ACUTE SWIMMER'S EAR OF RIGHT SIDE: ICD-10-CM

## 2023-05-04 DIAGNOSIS — I10 ESSENTIAL HYPERTENSION: Primary | ICD-10-CM

## 2023-05-04 DIAGNOSIS — H91.93 BILATERAL HEARING LOSS, UNSPECIFIED HEARING LOSS TYPE: ICD-10-CM

## 2023-05-04 DIAGNOSIS — J96.11 CHRONIC RESPIRATORY FAILURE WITH HYPOXIA: ICD-10-CM

## 2023-05-04 LAB
ALBUMIN SERPL-MCNC: 4.4 G/DL (ref 3.5–5.2)
ALBUMIN/GLOB SERPL: 1.8 G/DL
ALP SERPL-CCNC: 61 U/L (ref 39–117)
ALT SERPL W P-5'-P-CCNC: 14 U/L (ref 1–41)
ANION GAP SERPL CALCULATED.3IONS-SCNC: 9 MMOL/L (ref 5–15)
AST SERPL-CCNC: 18 U/L (ref 1–40)
BILIRUB SERPL-MCNC: 0.5 MG/DL (ref 0–1.2)
BUN SERPL-MCNC: 19 MG/DL (ref 8–23)
BUN/CREAT SERPL: 14.6 (ref 7–25)
CALCIUM SPEC-SCNC: 9.3 MG/DL (ref 8.6–10.5)
CHLORIDE SERPL-SCNC: 99 MMOL/L (ref 98–107)
CO2 SERPL-SCNC: 29 MMOL/L (ref 22–29)
CREAT SERPL-MCNC: 1.3 MG/DL (ref 0.76–1.27)
EGFRCR SERPLBLD CKD-EPI 2021: 56.6 ML/MIN/1.73
GLOBULIN UR ELPH-MCNC: 2.4 GM/DL
GLUCOSE SERPL-MCNC: 135 MG/DL (ref 65–99)
POTASSIUM SERPL-SCNC: 4.3 MMOL/L (ref 3.5–5.2)
PROT SERPL-MCNC: 6.8 G/DL (ref 6–8.5)
SODIUM SERPL-SCNC: 137 MMOL/L (ref 136–145)

## 2023-05-04 PROCEDURE — 80053 COMPREHEN METABOLIC PANEL: CPT | Performed by: STUDENT IN AN ORGANIZED HEALTH CARE EDUCATION/TRAINING PROGRAM

## 2023-05-04 RX ORDER — CIPROFLOXACIN AND DEXAMETHASONE 3; 1 MG/ML; MG/ML
4 SUSPENSION/ DROPS AURICULAR (OTIC) 2 TIMES DAILY
Qty: 7.5 ML | Refills: 0 | Status: SHIPPED | OUTPATIENT
Start: 2023-05-04 | End: 2023-05-08

## 2023-05-04 NOTE — PROGRESS NOTES
"Chief Complaint  Hypertension and Ear Drainage (Both ears, clear fluid leaking from both, very itchy )    Subjective          Elmer Garvin presents to [facility]  History of Present Illness    Seen by pulm in the interim.  Still using 2L oxygen at night when sleeps.    Reprots having ear drainage in both ears this morning . Both ears quite itchy.  No fever.  He does use q-tips to clean his ears and relieve itching sensation.  He does wear hearing aids bilaterally.    He doesn't check his BP at home.      Current Outpatient Medications   Medication Instructions   • albuterol sulfate  (90 Base) MCG/ACT inhaler 2 puffs, Inhalation, Every 4 Hours PRN   • cetirizine (ZYRTEC) 10 mg, Oral, Daily   • ciprofloxacin-dexamethasone (Ciprodex) 0.3-0.1 % otic suspension 4 drops, Both Ears, 2 Times Daily   • Coenzyme Q10 200 mg, Oral, Daily   • latanoprost (XALATAN) 0.005 % ophthalmic solution 1 drop, Both Eyes, Daily   • losartan (COZAAR) 100 MG tablet Take 1 tablet by mouth every day   • Melatonin 10 mg, Oral, Nightly   • montelukast (SINGULAIR) 10 mg, Oral, Daily   • Omega-3 Fatty Acids (fish oil) 1000 MG capsule capsule Oral, Daily With Breakfast   • sulindac (CLINORIL) 150 mg, Oral, 2 Times Daily   • Synthroid 100 MCG tablet Take 1 tablet by mouth every morning *on empty stomach*   • timolol (TIMOPTIC) 0.5 % ophthalmic solution No dose, route, or frequency recorded.       The following portions of the patient's history were reviewed and updated as appropriate: allergies, current medications, past family history, past medical history, past social history, past surgical history, and problem list.    Objective   Vital Signs:   /76 (BP Location: Left arm, Patient Position: Sitting, Cuff Size: Adult)   Pulse 67   Temp 97.8 °F (36.6 °C) (Temporal)   Ht 190.5 cm (75\")   Wt 93.5 kg (206 lb 3.2 oz)   SpO2 98%   BMI 25.77 kg/m²     Wt Readings from Last 3 Encounters: "   05/04/23 93.5 kg (206 lb 3.2 oz)   03/14/23 95.6 kg (210 lb 12.8 oz)   02/03/23 91.4 kg (201 lb 9.6 oz)     BP Readings from Last 3 Encounters:   05/04/23 130/76   03/14/23 153/74   02/03/23 115/74     Physical Exam  Vitals reviewed.   Constitutional:       General: He is not in acute distress.     Appearance: Normal appearance. He is not ill-appearing, toxic-appearing or diaphoretic.   HENT:      Head: Normocephalic and atraumatic.      Right Ear: Tympanic membrane and external ear normal.      Left Ear: Tympanic membrane and external ear normal.      Ears:      Comments: Right ear canal erythematous in 5 o'clock position.  Left canal showing a small amount of cerumen  Eyes:      Conjunctiva/sclera: Conjunctivae normal.   Cardiovascular:      Rate and Rhythm: Normal rate and regular rhythm.      Pulses: Normal pulses.      Heart sounds: Normal heart sounds. No murmur heard.    No friction rub. No gallop.   Pulmonary:      Effort: Pulmonary effort is normal. No respiratory distress.      Breath sounds: Normal breath sounds. No stridor. No wheezing, rhonchi or rales.   Chest:      Chest wall: No tenderness.   Abdominal:      General: Abdomen is flat.      Palpations: Abdomen is soft. There is no mass.      Tenderness: There is no abdominal tenderness.   Musculoskeletal:      Right lower leg: No edema.      Left lower leg: No edema.   Skin:     General: Skin is warm and dry.   Neurological:      General: No focal deficit present.      Mental Status: He is alert. Mental status is at baseline.   Psychiatric:         Mood and Affect: Mood normal.         Behavior: Behavior normal.         Thought Content: Thought content normal.         Judgment: Judgment normal.         Result Review :   The following data was reviewed by: Roberto Carlos Jimenez MD on 05/04/2023:  Common labs        8/1/2022    16:21 11/3/2022    15:01 2/3/2023    13:59   Common Labs   Glucose 87   86   85     BUN 15   16   28     Creatinine 1.12   1.32    1.39     Sodium 140   137   137     Potassium 4.3   5.0   4.7     Chloride 103   100   101     Calcium 9.4   9.6   9.5     Albumin 4.60    3.9     Total Bilirubin 0.5    0.5     Alkaline Phosphatase 48    52     AST (SGOT) 21    15     ALT (SGPT) 22    15              Lab Results   Component Value Date    COVID19 Detected (C) 06/30/2022    FLU Negative 12/26/2021    FLU Negative 12/26/2021    INR 0.87 (L) 01/25/2022       Procedures        Assessment and Plan    Diagnoses and all orders for this visit:    1. Essential hypertension (Primary)  -     Comprehensive Metabolic Panel    2. SSS (sick sinus syndrome)    3. Bilateral hearing loss, unspecified hearing loss type    4. CTEPH (chronic thromboembolic pulmonary hypertension)    5. Chronic respiratory failure with hypoxia    6. Nocturnal hypoxia    7. Acute swimmer's ear of right side  -     ciprofloxacin-dexamethasone (Ciprodex) 0.3-0.1 % otic suspension; Administer 4 drops into both ears 2 (Two) Times a Day for 7 days.  Dispense: 7.5 mL; Refill: 0    HTN:  -stable, will continue current regimen    Swimmer's ear:  -have sent ciprodex ear drops  -recommended stop using q-tips to clean ears, as I suspect he's irritated the skin of his ear canal with them    Nocturnal hypoxia:  -stable, using 2L at night, follows with pulm    There are no discontinued medications.       Follow Up   Return in about 4 months (around 9/4/2023) for Medicare Wellness.  Patient was given instructions and counseling regarding his condition or for health maintenance advice. Please see specific information pulled into the AVS if appropriate.       Roberto Carlos Jimenez MD  05/04/23  13:37 EDT

## 2023-05-08 ENCOUNTER — TELEPHONE (OUTPATIENT)
Dept: INTERNAL MEDICINE | Facility: CLINIC | Age: 78
End: 2023-05-08
Payer: MEDICARE

## 2023-05-08 DIAGNOSIS — H60.331 ACUTE SWIMMER'S EAR OF RIGHT SIDE: ICD-10-CM

## 2023-05-08 RX ORDER — DEXAMETHASONE SODIUM PHOSPHATE 1 MG/ML
SOLUTION/ DROPS OPHTHALMIC
Qty: 5 ML | Refills: 0 | Status: SHIPPED | OUTPATIENT
Start: 2023-05-08 | End: 2023-05-15

## 2023-05-08 RX ORDER — CIPROFLOXACIN 0.5 MG/.25ML
SOLUTION/ DROPS AURICULAR (OTIC)
Qty: 7.5 ML | Refills: 0 | Status: SHIPPED | OUTPATIENT
Start: 2023-05-08

## 2023-05-08 RX ORDER — CIPROFLOXACIN AND DEXAMETHASONE 3; 1 MG/ML; MG/ML
4 SUSPENSION/ DROPS AURICULAR (OTIC) 2 TIMES DAILY
Qty: 7.5 ML | Refills: 0 | Status: CANCELLED | OUTPATIENT
Start: 2023-05-08 | End: 2023-05-15

## 2023-05-08 NOTE — TELEPHONE ENCOUNTER
Caller: MYRNA MUÑIZ    Relationship: Emergency Contact    Best call back number: [phone]    Requested Prescriptions:   Requested Prescriptions     Pending Prescriptions Disp Refills   • ciprofloxacin-dexamethasone (Ciprodex) 0.3-0.1 % otic suspension 7.5 mL 0     Sig: Administer 4 drops into both ears 2 (Two) Times a Day for 7 days.        Pharmacy where request should be sent: [address] [phone] [place] [phone] [facility]     Last office visit with prescribing clinician: 5/4/2023   Last telemedicine visit with prescribing clinician: 9/7/2023   Next office visit with prescribing clinician: 9/7/2023     Additional details provided by patient: PATIENT'S WIFE CALLED STATING THE PHARMACY LET HER KNOW THIS WILL COST $150 OUT OF POCKET AND THEY SENT OVER A PRIOR AUTHORIZATION TO THE OFFICE AND SUGGEST COVER MY MEDS, AN ALTERNATIVE OR A PRIOR AUTHORIZATION.     Does the patient have less than a 3 day supply:  [x] Yes  [] No    Would you like a call back once the refill request has been completed: [] Yes [] No    If the office needs to give you a call back, can they leave a voicemail: [] Yes [] No    Chikis Lantigua, PCT   05/08/23 09:37 EDT

## 2023-05-08 NOTE — TELEPHONE ENCOUNTER
PA DENIED, ALTERNATIVES LISTED BELOW:    neomycin-polymyxin-hydrocort ear drops suspension  neomycin-polymyxin-hydrocort ear solution

## 2023-05-08 NOTE — TELEPHONE ENCOUNTER
PA Required for following medication:    ciprofloxacin-dexamethasone (Ciprodex) 0.3-0.1 % otic suspension (05/04/2023)    Indexed via onbase

## 2023-05-15 DIAGNOSIS — M19.90 ARTHRITIS: ICD-10-CM

## 2023-05-15 RX ORDER — DEXAMETHASONE SODIUM PHOSPHATE 1 MG/ML
SOLUTION/ DROPS OPHTHALMIC
Qty: 5 ML | Refills: 0 | Status: SHIPPED | OUTPATIENT
Start: 2023-05-15

## 2023-05-15 RX ORDER — SULINDAC 150 MG/1
TABLET ORAL
Qty: 180 TABLET | Refills: 2 | Status: SHIPPED | OUTPATIENT
Start: 2023-05-15

## 2023-06-15 ENCOUNTER — LAB (OUTPATIENT)
Dept: LAB | Facility: HOSPITAL | Age: 78
End: 2023-06-15
Payer: MEDICARE

## 2023-06-15 DIAGNOSIS — Z12.5 PROSTATE CANCER SCREENING: ICD-10-CM

## 2023-06-15 DIAGNOSIS — N40.0 BENIGN PROSTATIC HYPERPLASIA WITHOUT LOWER URINARY TRACT SYMPTOMS: Primary | ICD-10-CM

## 2023-06-15 LAB — PSA SERPL-MCNC: 0.92 NG/ML (ref 0–4)

## 2023-06-15 PROCEDURE — 36415 COLL VENOUS BLD VENIPUNCTURE: CPT

## 2023-06-15 PROCEDURE — G0103 PSA SCREENING: HCPCS

## 2023-07-21 NOTE — PROGRESS NOTES
Primary Care Provider  Roberto Carlos Jimenez MD   Referring Provider  No ref. provider found    Patient Complaint  Asthma, Cough, and Follow-up (4mth follow up)    SUBJECTIVE    History of Presenting Illness  Elmer Garvin is a pleasant 78 y.o. male patient of Dr. Dash who presents to [facility] for 4  month followup appointment. Patient has a history of saddle PE with right heart strain January 26, 2022 as well as CTEPH.  Patient has a history of multifocal infectious pneumonia.He is a former smoker quit in 1976, is up-to-date with flu only, declines COVID-vaccine and pneumonia. He continues using O2 at night for sleep and naps for nocturnal hypoxia. His DME company is [employer]. Patient is only using albuterol inhaler as needed. And he continues to be on Zyrtec and Singulair.  Patient states he has been using his albuterol at least twice a day here lately.  He does have a bit of a productive cough at times with some yellow thick mucus production.  He is concerned regarding there being mold that was found in his [employer] building states he is in his [employer] approximately 3 times a week up to 7 hours a week.  He is concerned that he may be exposed to some type of mold.     Denies dyspnea, wheezing, headaches, chest pain, weight loss or hemoptysis. Denies fevers, chills and night sweats. Elmer Garvin is able to perform ADLs without difficulties and denies any swollen glands/lymph nodes in the head or neck.    I have personally reviewed the review of systems, past family, social, medical and surgical histories; and agree with their findings.    Review of Systems  Constitutional symptoms:  Denied complaints   Ear, nose, throat: Denied complaints  Cardiovascular:  Denied complaints  Respiratory: Cough  Gastrointestinal: Denied complaints  Musculoskeletal: Denied complaints    Family History   Problem Relation Age of Onset    Cancer Mother     Heart disease Father      Cancer Father     Heart disease Sister     Sudden death Sister     Heart disease Brother     Cancer Paternal Grandfather     Colon cancer Neg Hx     Malig Hyperthermia Neg Hx         Social History     Socioeconomic History    Marital status:    Tobacco Use    Smoking status: Former     Packs/day: 0.25     Years: 12.00     Pack years: 3.00     Types: Cigarettes, Pipe     Start date: 1964     Quit date: 1976     Years since quittin.5    Smokeless tobacco: Never    Tobacco comments:     CAFFEINE USE 3 CUPS COFFEE DAILY   Vaping Use    Vaping Use: Never used   Substance and Sexual Activity    Alcohol use: No    Drug use: Never    Sexual activity: Not Currently        Past Medical History:   Diagnosis Date    Allergies     GERD (gastroesophageal reflux disease)     Hyperlipidemia     Hypertension     Hypothyroidism     Lyme disease     Pneumonia dec 27,2020    Pulmonary embolism 2022    Shortness of breath     SSS (sick sinus syndrome)         Immunization History   Administered Date(s) Administered    Fluzone High-Dose 65+yrs 11/15/2022    OPV 1996    Td (TDVAX) 1996    Yellow Fever 1996       Allergies   Allergen Reactions    Latex Other (See Comments)     Sinus congestion, pt states has never had a reaction          Current Outpatient Medications:     albuterol sulfate  (90 Base) MCG/ACT inhaler, Inhale 2 puffs Every 4 (Four) Hours As Needed for Wheezing., Disp: 1 g, Rfl: 6    brimonidine (ALPHAGAN) 0.2 % ophthalmic solution, 1 drop 3 (Three) Times a Day., Disp: , Rfl:     cetirizine (zyrTEC) 10 MG tablet, Take 1 tablet by mouth Daily., Disp: 90 tablet, Rfl: 3    Coenzyme Q10 200 MG capsule, Take 200 mg by mouth Daily., Disp: 90 capsule, Rfl: 2    latanoprost (XALATAN) 0.005 % ophthalmic solution, Administer 1 drop to both eyes Daily., Disp: , Rfl:     losartan (COZAAR) 100 MG tablet, Take 1 tablet by mouth every day, Disp: 90 tablet, Rfl: 3    Melatonin 10 MG  "tablet, Take 1 tablet by mouth Every Night., Disp: , Rfl:     montelukast (SINGULAIR) 10 MG tablet, Take 1 tablet by mouth Daily for 90 days., Disp: 90 tablet, Rfl: 3    Omega-3 Fatty Acids (fish oil) 1000 MG capsule capsule, Take  by mouth Daily With Breakfast., Disp: , Rfl:     sulindac (CLINORIL) 150 MG tablet, Take 1 tablet by mouth 2 (two) times a day.  - take with food, Disp: 180 tablet, Rfl: 2    Synthroid 100 MCG tablet, Take 1 tablet by mouth every morning *on empty stomach*, Disp: 90 tablet, Rfl: 3           Vital Signs   /78 (BP Location: Right arm, Patient Position: Sitting, Cuff Size: Adult)   Pulse 72   Temp 98.7 °F (37.1 °C) (Temporal)   Resp 18   Ht 188 cm (74\")   Wt 97.2 kg (214 lb 3.2 oz)   SpO2 97% Comment: room air/nocturnal 2liters  BMI 27.50 kg/m²       OBJECTIVE    Physical Exam  Vital Signs Reviewed   WDWN, Alert, NAD.    HEENT:  PERRL, EOMI.  OP, nares clear  Neck:  Supple, no JVD, no thyromegaly  Chest:  good aeration, clear to auscultation bilaterally, tympanic to percussion bilaterally, no work of breathing noted  CV: RRR, no MGR, pulses 2+, equal.  Abd:  Soft, NT, ND, + BS, no HSM  EXT:  no clubbing, no cyanosis, no edema  Neuro:  A&Ox3, CN grossly intact, no focal deficits.  Skin: No rashes or lesions noted    Results Review  I have personally reviewed the prior office notes, hospital records, labs, and diagnostics.    ASSESSMENT         Patient Active Problem List   Diagnosis    Angina at rest    Essential hypertension    SSS (sick sinus syndrome)    Mild intermittent asthma without complication    Gastroesophageal reflux disease without esophagitis    Taylor's esophagus without dysplasia    Acquired hypothyroidism    MRSA (methicillin resistant Staphylococcus aureus)    Pre-diabetes    Acute respiratory failure with hypoxia    Arthritis    Asthma    Benign prostatic hyperplasia    Cataract    Deviated nasal septum    Family history of prostate cancer in father    " Hypertrophy of both inferior nasal turbinates    Impaired glucose tolerance    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    Nasal congestion    Nasal obstruction    Pacemaker    Acute on chronic respiratory failure with hypoxia    Acute pulmonary embolism    Bilateral vitreous detachment    Epiretinal membrane    Glaucomatous atrophy of optic disc    After cataract    Pneumonia due to COVID-19 virus    CTEPH (chronic thromboembolic pulmonary hypertension)    Shortness of breath    Other specified hypothyroidism    Personal history of COVID-19    History of pulmonary embolism    Mixed hyperlipidemia    Colon cancer screening    Fibrous papule of nose    Chronic respiratory failure with hypoxia    Nocturnal hypoxia       Encounter Diagnoses   Name Primary?    Cough, unspecified type Yes    Nocturnal hypoxia     Chronic respiratory failure with hypoxia       PLAN  At this time we will get patient set up to have a respiratory culture and AFB sputum culture.  We will notify patient of results when available.  We will trial patient on some Spiriva at this time.  If patient feels the Spiriva is helping with his cough and breathing then he is to call us to get a prescription sent to his pharmacy.  I did discuss with patient possible bronchoscopy but that he will try the respiratory cultures at this time.  Patient will follow back up in 3 to 4 months or sooner if needed.  Diagnoses and all orders for this visit:    1. Cough, unspecified type (Primary)  -     AFB Culture - Sputum, Cough; Future  -     Respiratory Culture - Sputum, Cough; Future    2. Nocturnal hypoxia    3. Chronic respiratory failure with hypoxia           Smoking status: Former  Vaccination status: Up-to-date with flu only  Medications personally reviewed    Follow Up  Return in about 4 months (around 11/24/2023) for Dr. Gong.    Patient was given instructions and counseling regarding his condition or for health maintenance  advice. Please see specific information pulled into the AVS if appropriate.

## 2023-07-24 ENCOUNTER — OFFICE VISIT (OUTPATIENT)
Dept: PULMONOLOGY | Facility: CLINIC | Age: 78
End: 2023-07-24
Payer: MEDICARE

## 2023-07-24 VITALS
HEIGHT: 74 IN | TEMPERATURE: 98.7 F | SYSTOLIC BLOOD PRESSURE: 125 MMHG | RESPIRATION RATE: 18 BRPM | OXYGEN SATURATION: 97 % | WEIGHT: 214.2 LBS | DIASTOLIC BLOOD PRESSURE: 78 MMHG | HEART RATE: 72 BPM | BODY MASS INDEX: 27.49 KG/M2

## 2023-07-24 DIAGNOSIS — G47.34 NOCTURNAL HYPOXIA: ICD-10-CM

## 2023-07-24 DIAGNOSIS — R05.9 COUGH, UNSPECIFIED TYPE: Primary | ICD-10-CM

## 2023-07-24 DIAGNOSIS — J96.11 CHRONIC RESPIRATORY FAILURE WITH HYPOXIA: ICD-10-CM

## 2023-07-24 PROCEDURE — 1159F MED LIST DOCD IN RCRD: CPT | Performed by: NURSE PRACTITIONER

## 2023-07-24 PROCEDURE — 1160F RVW MEDS BY RX/DR IN RCRD: CPT | Performed by: NURSE PRACTITIONER

## 2023-07-24 PROCEDURE — 3078F DIAST BP <80 MM HG: CPT | Performed by: NURSE PRACTITIONER

## 2023-07-24 PROCEDURE — 3074F SYST BP LT 130 MM HG: CPT | Performed by: NURSE PRACTITIONER

## 2023-07-24 PROCEDURE — 99214 OFFICE O/P EST MOD 30 MIN: CPT | Performed by: NURSE PRACTITIONER

## 2023-07-24 RX ORDER — BRIMONIDINE TARTRATE 2 MG/ML
1 SOLUTION/ DROPS OPHTHALMIC 3 TIMES DAILY
COMMUNITY

## 2023-07-24 RX ORDER — TIOTROPIUM BROMIDE INHALATION SPRAY 1.56 UG/1
2 SPRAY, METERED RESPIRATORY (INHALATION)
Qty: 2 EACH | Refills: 0 | COMMUNITY
Start: 2023-07-24 | End: 2023-07-25

## 2023-09-01 ENCOUNTER — OFFICE VISIT (OUTPATIENT)
Dept: CARDIOLOGY | Facility: CLINIC | Age: 78
End: 2023-09-01
Payer: MEDICARE

## 2023-09-01 VITALS
HEIGHT: 74 IN | HEART RATE: 81 BPM | DIASTOLIC BLOOD PRESSURE: 84 MMHG | WEIGHT: 211.2 LBS | SYSTOLIC BLOOD PRESSURE: 146 MMHG | BODY MASS INDEX: 27.11 KG/M2

## 2023-09-01 DIAGNOSIS — I10 ESSENTIAL HYPERTENSION: Primary | ICD-10-CM

## 2023-09-01 DIAGNOSIS — Z95.0 PRESENCE OF CARDIAC PACEMAKER: ICD-10-CM

## 2023-09-01 NOTE — ASSESSMENT & PLAN NOTE
Blood pressure mildly elevated in the office today, generally better controlled.  For now go ahead and continue current regimen with losartan 100 mg daily.  Continue to monitor home BP if readings are elevated, we can make medication adjustment as needed.

## 2023-09-01 NOTE — PROGRESS NOTES
Chief Complaint  Pulmonary embolism associated with COVID-19, Hypertension, and Follow-up    Subjective        History of Present Illness  Elmer Garvin presents to [facility] CARDIOLOGY   Mr. Garvin is a 78 year old male with history of hypertension, sick sinus syndrome with a  permanent pacemaker, previous pulmonary embolism, and hypothyroidism.  He was previously under the care of Dr. Penn.  His previous saddle pulmonary embolism caused right heart strain, however subsequentechocardiogram revealed normalization of his right ventricular systolic function.  He did undergo mechanical aspiration thrombectomy with Dr. Penn for PE.  He reports overall he is doing well.  He does occasionally have some shortness of breath and cough, he attributes this more to allergy symptoms.  He tolerates physical activity well without any symptoms.          Past Medical History:   Diagnosis Date    Allergies     GERD (gastroesophageal reflux disease)     Hyperlipidemia     Hypertension     Hypothyroidism     Lyme disease     Pneumonia dec 27,2020    Pulmonary embolism Feb 2022    Shortness of breath     SSS (sick sinus syndrome)        Allergies   Allergen Reactions    Latex Other (See Comments)     Sinus congestion, pt states has never had a reaction        Past Surgical History:   Procedure Laterality Date    CARDIAC CATHETERIZATION N/A 07/25/2019    Procedure: Coronary angiography;  Surgeon: Wilton Brown MD;  Location: [facility] INVASIVE LOCATION;  Service: Cardiovascular    CARDIAC CATHETERIZATION N/A 07/25/2019    Procedure: Left heart cath;  Surgeon: Wilton Brown MD;  [facility] CATH [facility]VAS[facility];  Service: Cardiovascular    CARDIAC CATHETERIZATION N/A 07/25/2019    Procedure: Left ventriculography;  Surgeon: Wilton Brown MD;  Location: [facility] CATH INVASIVE LOCATION;  Service: Cardiovascular    CARDIAC CATHETERIZATION N/A 07/25/2019    Procedure: Right heart cath;  Surgeon: Kevin  MD Wilton;  Location: [facility] CATH INVASIVE LOCATION;  Service: Cardiovascular    CARDIAC CATHETERIZATION Right 01/26/2022    Procedure: Percutaneous Manual Thrombectomy;  Surgeon: Maninder Penn MD;  Location: [facility] CATH INVASIVE LOCATION;  Service: Cardiovascular;  Laterality: Right;    CATARACT EXTRACTION      COLONOSCOPY  2019    COLONOSCOPY N/A 10/13/2022    Procedure: COLONOSCOPY FOR SCREENING;  Surgeon: Deng Monteiro MD;  Location: [facility] ENDOSCOPY;  Service: Gastroenterology;  Laterality: N/A;  DIVERTICULOSIS, HEMORRHOIDS    INSERT / REPLACE / REMOVE PACEMAKER  08/2014    INTERVENTIONAL RADIOLOGY PROCEDURE Right 01/26/2022    Procedure: Pulmonary Angiogram;  Surgeon: Maninder Penn MD;  Location: [facility] CATH INVASIVE LOCATION;  Service: Cardiovascular;  Laterality: Right;    PACEMAKER IMPLANTATION      PULMONARY EMBOLISM SURGERY  01/2022    SKIN CANCER EXCISION N/A 2022    NOSE        Social History  He  reports that he quit smoking about 47 years ago. His smoking use included cigarettes and pipe. He started smoking about 59 years ago. He has a 3.00 pack-year smoking history. He has never used smokeless tobacco. He reports that he does not drink alcohol and does not use drugs.    Family History  His family history includes Cancer in his father, mother, and paternal grandfather; Heart disease in his brother, father, and sister; Sudden death in his sister.       Current Outpatient Medications on File Prior to Visit   Medication Sig    albuterol sulfate  (90 Base) MCG/ACT inhaler Inhale 2 puffs Every 4 (Four) Hours As Needed for Wheezing.    brimonidine (ALPHAGAN) 0.2 % ophthalmic solution 1 drop 3 (Three) Times a Day.    cetirizine (zyrTEC) 10 MG tablet Take 1 tablet by mouth Daily.    Coenzyme Q10 200 MG capsule Take 200 mg by mouth Daily.    latanoprost (XALATAN) 0.005 % ophthalmic solution Administer 1 drop to both eyes Daily.    losartan (COZAAR) 100 MG tablet Take 1 tablet by  "mouth every day    Melatonin 10 MG tablet Take 1 tablet by mouth Every Night.    Omega-3 Fatty Acids (fish oil) 1000 MG capsule capsule Take  by mouth Daily With Breakfast.    sulindac (CLINORIL) 150 MG tablet Take 1 tablet by mouth 2 (two) times a day.  - take with food    Synthroid 100 MCG tablet Take 1 tablet by mouth every morning *on empty stomach*    Tiotropium Bromide Monohydrate (Spiriva Respimat) 1.25 MCG/ACT aerosol solution inhaler Inhale 2 puffs Daily for 1 day.    [DISCONTINUED] montelukast (SINGULAIR) 10 MG tablet Take 1 tablet by mouth Daily for 90 days.     No current facility-administered medications on file prior to visit.         Review of Systems   Constitutional:  Negative for fatigue.   Respiratory:  Positive for cough. Negative for chest tightness and shortness of breath.    Cardiovascular:  Negative for chest pain, palpitations and leg swelling.   Gastrointestinal:  Negative for nausea and vomiting.   Musculoskeletal:  Positive for arthralgias.   Neurological:  Negative for dizziness and syncope.      Objective   Vitals:    09/01/23 1119   BP: 146/84   Pulse: 81   Weight: 95.8 kg (211 lb 3.2 oz)   Height: 188 cm (74\")         Physical Exam  General : Alert, awake, no acute distress  Neck : Supple, no carotid bruit, no jugular venous distention  CVS : Regular rate and rhythm, no murmur, rubs or gallops  Lungs: Clear to auscultation bilaterally, no crackles or rhonchi  Abdomen: Soft, nontender, bowel sounds active  Extremities: Warm, well-perfused, no pedal edema      Result Review     The following data was reviewed by EDWINA Manzo  proBNP   Date Value Ref Range Status   01/20/2023 772.0 0.0 - 1,800.0 pg/mL Final     CMP          11/3/2022    15:01 2/3/2023    13:59 5/4/2023    13:40   CMP   Glucose 86  85  135    BUN 16  28  19    Creatinine 1.32  1.39  1.30    EGFR 55.9  52.5  56.6    Sodium 137  137  137    Potassium 5.0  4.7  4.3    Chloride 100  101  99    Calcium 9.6  9.5  9.3  "   Total Protein  6.6  6.8    Albumin  3.9  4.4    Globulin  2.7  2.4    Total Bilirubin  0.5  0.5    Alkaline Phosphatase  52  61    AST (SGOT)  15  18    ALT (SGPT)  15  14    Albumin/Globulin Ratio  1.4  1.8    BUN/Creatinine Ratio 12.1  20.1  14.6    Anion Gap 8.0  8.1  9.0         Lab Results   Component Value Date    TSH 3.300 06/24/2022      Lab Results   Component Value Date    FREET4 1.50 09/27/2021      No results found for: DDIMERQUANT  Magnesium   Date Value Ref Range Status   01/27/2022 2.2 1.6 - 2.4 mg/dL Final      No results found for: DIGOXIN   Lab Results   Component Value Date    TROPONINT <0.010 01/25/2022               Results for orders placed during the hospital encounter of 07/19/22    Adult Transthoracic Echo Complete W/ Cont if Necessary Per Protocol    Interpretation Summary  ú Left ventricular wall thickness is consistent with mild concentric hypertrophy.  ú Left ventricular ejection fraction appears to be 61 - 65%.  ú Left ventricular diastolic function is consistent with (grade I) impaired relaxation.  ú The right ventricular cavity is mildly dilated.  ú Mild tricuspid valve regurgitation.  ú Estimated right ventricular systolic pressure from tricuspid regurgitation is mildly elevated (35-45 mmHg).         Assessment and Plan   Diagnoses and all orders for this visit:    1. Essential hypertension (Primary)  Assessment & Plan:  Blood pressure mildly elevated in the office today, generally better controlled.  For now go ahead and continue current regimen with losartan 100 mg daily.  Continue to monitor home BP if readings are elevated, we can make medication adjustment as needed.      2. Presence of cardiac pacemaker  Assessment & Plan:  History of sick sinus syndrome, his previous in office interrogation showed normally functioning device.  He is past due for device interrogation.,  We will schedule the next few weeks.  Continue home remote monitoring.           Follow Up   Return in  about 1 year (around 9/1/2024) for with new MD d/t Dr Penn leaving;   needs to schedule device check in next few weeks.    Patient was given instructions and counseling regarding his condition or for health maintenance advice. Please see specific information pulled into the AVS if appropriate.     Signed,  Elva Vyas, APRN  09/01/2023     Dictated Utilizing Dragon Dictation: Please note that portions of this note were completed with a voice recognition program.  Part of this note may be an electronic transcription/translation of spoken language to printed text using the Dragon Dictation System.

## 2023-09-01 NOTE — ASSESSMENT & PLAN NOTE
History of sick sinus syndrome, his previous in office interrogation showed normally functioning device.  He is past due for device interrogation.,  We will schedule the next few weeks.  Continue home remote monitoring.

## 2023-09-02 ENCOUNTER — APPOINTMENT (OUTPATIENT)
Dept: GENERAL RADIOLOGY | Facility: HOSPITAL | Age: 78
End: 2023-09-02
Payer: MEDICARE

## 2023-09-02 ENCOUNTER — HOSPITAL ENCOUNTER (EMERGENCY)
Facility: HOSPITAL | Age: 78
Discharge: OTHER INSTITUTION | End: 2023-09-02
Attending: EMERGENCY MEDICINE
Payer: MEDICARE

## 2023-09-02 VITALS
BODY MASS INDEX: 26.68 KG/M2 | DIASTOLIC BLOOD PRESSURE: 80 MMHG | HEIGHT: 74 IN | HEART RATE: 60 BPM | RESPIRATION RATE: 18 BRPM | SYSTOLIC BLOOD PRESSURE: 167 MMHG | WEIGHT: 207.89 LBS | TEMPERATURE: 98.1 F | OXYGEN SATURATION: 100 %

## 2023-09-02 DIAGNOSIS — T14.8XXA OPEN FRACTURE: Primary | ICD-10-CM

## 2023-09-02 PROCEDURE — 25010000002 TETANUS-DIPHTH-ACELL PERTUSSIS 5-2.5-18.5 LF-MCG/0.5 SUSPENSION PREFILLED SYRINGE: Performed by: EMERGENCY MEDICINE

## 2023-09-02 PROCEDURE — 25010000002 CEFAZOLIN PER 500 MG: Performed by: EMERGENCY MEDICINE

## 2023-09-02 PROCEDURE — 25010000002 HYDROMORPHONE 1 MG/ML SOLUTION: Performed by: EMERGENCY MEDICINE

## 2023-09-02 PROCEDURE — 99284 EMERGENCY DEPT VISIT MOD MDM: CPT

## 2023-09-02 PROCEDURE — 73130 X-RAY EXAM OF HAND: CPT

## 2023-09-02 PROCEDURE — 96372 THER/PROPH/DIAG INJ SC/IM: CPT

## 2023-09-02 PROCEDURE — 90715 TDAP VACCINE 7 YRS/> IM: CPT | Performed by: EMERGENCY MEDICINE

## 2023-09-02 PROCEDURE — 90471 IMMUNIZATION ADMIN: CPT | Performed by: EMERGENCY MEDICINE

## 2023-09-02 RX ORDER — CEFAZOLIN SODIUM 1 G/3ML
1 INJECTION, POWDER, FOR SOLUTION INTRAMUSCULAR; INTRAVENOUS ONCE
Status: COMPLETED | OUTPATIENT
Start: 2023-09-02 | End: 2023-09-02

## 2023-09-02 RX ORDER — WATER 1000 ML/1000ML
INJECTION, SOLUTION INTRAVENOUS
Status: COMPLETED
Start: 2023-09-02 | End: 2023-09-02

## 2023-09-02 RX ADMIN — HYDROMORPHONE HYDROCHLORIDE 1 MG: 1 INJECTION, SOLUTION INTRAMUSCULAR; INTRAVENOUS; SUBCUTANEOUS at 17:40

## 2023-09-02 RX ADMIN — CEFAZOLIN SODIUM 1 G: 1 INJECTION, POWDER, FOR SOLUTION INTRAMUSCULAR; INTRAVENOUS at 17:46

## 2023-09-02 RX ADMIN — WATER 2.5 ML: 1 INJECTION INTRAMUSCULAR; INTRAVENOUS; SUBCUTANEOUS at 17:50

## 2023-09-02 RX ADMIN — TETANUS TOXOID, REDUCED DIPHTHERIA TOXOID AND ACELLULAR PERTUSSIS VACCINE, ADSORBED 0.5 ML: 5; 2.5; 8; 8; 2.5 SUSPENSION INTRAMUSCULAR at 17:44

## 2023-09-02 NOTE — DISCHARGE INSTRUCTIONS
Go directly to [facility].  Do not eat or drink anything while on your way.  They are expecting you.    Return for any new concerns.

## 2023-09-02 NOTE — ED PROVIDER NOTES
Time: 5:15 PM EDT  Date of encounter:  9/2/2023  Independent Historian/Clinical History and Information was obtained by:   Patient    History is limited by: N/A    Chief Complaint: Right finger laceration      History of Present Illness:  Patient is a 78 y.o. year old male who presents to the emergency department accompanied by his wife for evaluation of right finger laceration.  Patient states he was moving some former equipment when a concrete block gave way and his finger was crushed in the equipment.  He denies any other injuries.  Patient is right-hand dominant.  Unknown last tetanus.    HPI    Patient Care Team  Primary Care Provider: Roberto Carlos Jimenez MD    Past Medical History:     Allergies   Allergen Reactions    Latex Other (See Comments)     Sinus congestion, pt states has never had a reaction     Past Medical History:   Diagnosis Date    Allergies     GERD (gastroesophageal reflux disease)     Hyperlipidemia     Hypertension     Hypothyroidism     Lyme disease     Pneumonia dec 27,2020    Pulmonary embolism Feb 2022    Shortness of breath     SSS (sick sinus syndrome)      Past Surgical History:   Procedure Laterality Date    CARDIAC CATHETERIZATION N/A 07/25/2019    Procedure: Coronary angiography;  Surgeon: Wilton Brown MD;  Location: [facility] INVASIVE LOCATION;  Service: Cardiovascular    CARDIAC CATHETERIZATION N/A 07/25/2019    Procedure: Left heart cath;  Surgeon: Wilton Brown MD;  Location: [facility] CATH INVASIVE LOCATION;  Service: Cardiovascular    CARDIAC CATHETERIZATION N/A 07/25/2019    Procedure: Left ventriculography;  Surgeon: Wilton Brown MD;  Location: [facility] CATH INVASIVE LOCATION;  Service: Cardiovascular    CARDIAC CATHETERIZATION N/A 07/25/2019    Procedure: Right heart cath;  Surgeon: Wilton Brown MD;  Location: [facility] CATH INVASIVE LOCATION;  Service: Cardiovascular    CARDIAC CATHETERIZATION Right 01/26/2022    Procedure: Percutaneous Manual Thrombectomy;   Surgeon: Maninder Penn MD;  Location: [facility] CATH INVASIVE LOCATION;  Service: Cardiovascular;  Laterality: Right;    CATARACT EXTRACTION      COLONOSCOPY  2019    COLONOSCOPY N/A 10/13/2022    Procedure: COLONOSCOPY FOR SCREENING;  Surgeon: Deng Monteiro MD;  Location: [facility] ENDOSCOPY;  Service: Gastroenterology;  Laterality: N/A;  DIVERTICULOSIS, HEMORRHOIDS    INSERT / REPLACE / REMOVE PACEMAKER  08/2014    INTERVENTIONAL RADIOLOGY PROCEDURE Right 01/26/2022    Procedure: Pulmonary Angiogram;  Surgeon: Maninder Penn MD;  Location: [facility] CATH INVASIVE LOCATION;  Service: Cardiovascular;  Laterality: Right;    PACEMAKER IMPLANTATION      PULMONARY EMBOLISM SURGERY  01/2022    SKIN CANCER EXCISION N/A 2022    NOSE     Family History   Problem Relation Age of Onset    Cancer Mother     Heart disease Father     Cancer Father     Heart disease Sister     Sudden death Sister     Heart disease Brother     Cancer Paternal Grandfather     Colon cancer Neg Hx     Malig Hyperthermia Neg Hx        Home Medications:  Prior to Admission medications    Medication Sig Start Date End Date Taking? Authorizing Provider   albuterol sulfate  (90 Base) MCG/ACT inhaler Inhale 2 puffs Every 4 (Four) Hours As Needed for Wheezing. 1/24/22   Celia Hampton APRN   brimonidine (ALPHAGAN) 0.2 % ophthalmic solution 1 drop 3 (Three) Times a Day.    Ragini Villavicencio MD   cetirizine (zyrTEC) 10 MG tablet Take 1 tablet by mouth Daily. 2/3/23   Roberto Carlos Jimenez MD   Coenzyme Q10 200 MG capsule Take 200 mg by mouth Daily. 11/3/22 11/3/23  Roberto Carlos Jimenez MD   latanoprost (XALATAN) 0.005 % ophthalmic solution Administer 1 drop to both eyes Daily. 2/9/22   Ragini Villavicencio MD   losartan (COZAAR) 100 MG tablet Take 1 tablet by mouth every day 12/21/22   Roberto Carlos Jimenez MD   Melatonin 10 MG tablet Take 1 tablet by mouth Every Night.    Ragini Villavicencio MD   Omega-3 Fatty Acids (fish oil) 1000 MG capsule  "capsule Take  by mouth Daily With Breakfast.    Provider, MD Ragini   sulindac (CLINORIL) 150 MG tablet Take 1 tablet by mouth 2 (two) times a day.  - take with food 5/15/23   Roberto Carlos Jimenez MD   Synthroid 100 MCG tablet Take 1 tablet by mouth every morning *on empty stomach* 23   Roberto Carlos Jimenez MD   Tiotropium Bromide Monohydrate (Spiriva Respimat) 1.25 MCG/ACT aerosol solution inhaler Inhale 2 puffs Daily for 1 day. 23  Celia Hampton APRN        Social History:   Social History     Tobacco Use    Smoking status: Former     Packs/day: 0.25     Years: 12.00     Pack years: 3.00     Types: Cigarettes, Pipe     Start date: 1964     Quit date: 1976     Years since quittin.7    Smokeless tobacco: Never    Tobacco comments:     CAFFEINE USE 3 CUPS COFFEE DAILY   Vaping Use    Vaping Use: Never used   Substance Use Topics    Alcohol use: No    Drug use: Never         Review of Systems:  Review of Systems   I performed a 10 point review of systems which was all negative, except for the positives found in the HPI above.  Physical Exam:  /80 (BP Location: Left arm, Patient Position: Sitting)   Pulse 60   Temp 98.1 °F (36.7 °C) (Oral)   Resp 18   Ht 188 cm (74\")   Wt 94.3 kg (207 lb 14.3 oz)   SpO2 100%   BMI 26.69 kg/m²     Physical Exam     General: Awake alert and in moderate pain distress     HEENT: Head normocephalic atraumatic, eyes PERRLA EOMI, nose normal, oropharynx normal.     Neck: Supple full range of motion, no meningismus, no lymphadenopathy     Heart: Regular rate and rhythm, no murmurs or rubs, 2+ radial pulses bilaterally     Lungs: Clear to auscultation bilaterally without wheezes or crackles, no respiratory distress     Abdomen: Soft, nontender, nondistended, no rebound or guarding     Back exam:  No L-spine tenderness.  No rash.     Skin: Warm, dry, no rash     Musculoskeletal: Decreased ROM of right index finger secondary to pain, laceration noted " with nailbed injury, neurovascular status intact, no lower extremity edema     Neurologic: Oriented x3, no motor deficits no sensory deficits     Psychiatric: Mood appears stable, no psychosis          Procedures:  Procedures      Medical Decision Making:      Comorbidities that affect care:    None    External Notes reviewed:          The following orders were placed and all results were independently analyzed by me:  Orders Placed This Encounter   Procedures    XR Hand 3+ View Right       Medications Given in the Emergency Department:  Medications   Tetanus-Diphth-Acell Pertussis (BOOSTRIX) injection 0.5 mL (0.5 mL Intramuscular Given 9/2/23 1744)   ceFAZolin (ANCEF) injection 1 g (1 g Intramuscular Given 9/2/23 1746)   HYDROmorphone (DILAUDID) injection 1 mg (1 mg Intramuscular Given 9/2/23 1740)   sterile water (preservative free) injection  - ADS Override Pull (2.5 mL  Given 9/2/23 1750)        ED Course:         Labs:    Lab Results (last 24 hours)       ** No results found for the last 24 hours. **             Imaging:    XR Hand 3+ View Right    Result Date: 9/2/2023  PROCEDURE: XR HAND 3+ VW RIGHT  COMPARISON: [facility], CR, HAND >OR= 3V RT, 10/08/2019, 14:58.  INDICATIONS: 2nd digit pain and laceration after crushing injury to right hand, very limited range of motion  FINDINGS:  BONES: There is a displaced fracture of the proximal aspect of distal phalanx of the right 2nd finger. SOFT TISSUES: Soft tissue injury is present adjacent to the fracture of the distal phalanx of the right 2nd finger. EFFUSION: None visible.  OTHER: Negative.        Displaced fracture of the proximal aspect of the distal phalanx of the right 2nd finger with associated soft tissue laceration.      SONIYA DELATORRE MD       Electronically Signed and Approved By: SONIYA DELATORRE MD on 9/02/2023 at 17:13                Differential Diagnosis and Discussion:    Extremity Pain: Differential diagnosis includes but  is not limited to soft tissue sprain, tendonitis, tendon injury, dislocation, fracture, deep vein thrombosis, arterial insufficiency, osteoarthritis, bursitis, and ligamentous damage.    All X-rays impressions were independently interpreted by me.    MDM  Number of Diagnoses or Management Options  Open fracture  Diagnosis management comments: Patient presented to ED for evaluation of right index finger laceration after a crush injury.  Vital signs have been stable.  Imaging revealed displaced fracture with laceration, concerning for open fracture.  Discussed with [facility] staff who accept the patient in transfer.  Counseled patient to remain n.p.o. until he arrives at [facility] and voiced understanding.  Will transfer via POV.       Amount and/or Complexity of Data Reviewed  Tests in the radiology section of CPT®: reviewed and ordered    Risk of Complications, Morbidity, and/or Mortality  Presenting problems: moderate  Diagnostic procedures: low  Management options: moderate    Patient Progress  Patient progress: stable           Patient Care Considerations:          Consultants/Shared Management Plan:    Transfer Provider: I have discussed the case with JUANJOSE Porter on behalf of Dr. Ammon Medina at [facility] who agrees to accept the patient as a transfer.    Social Determinants of Health:    Patient is independent, reliable, and has access to care.       Disposition and Care Coordination:    Transferred: Through independent evaluation of the patient's history, physical, and imperical data, the patient meets criteria to be transferred to another hospital for evaluation/admission.        Final diagnoses:   Open fracture        ED Disposition       ED Disposition   Transfer to Another Facility     Condition   --    Comment   --               This medical record created using voice recognition software.             Destiny Bright, EDWINA  09/03/23 [id number]

## 2023-09-02 NOTE — ED PROVIDER NOTES
"Patient is 78 y.o. year old male that presents to the ED for evaluation of injury of right index finger after crush injury.         ED Course:    /80 (BP Location: Left arm, Patient Position: Sitting)   Pulse 60   Temp 98.1 °F (36.7 °C) (Oral)   Resp 18   Ht 188 cm (74\")   Wt 94.3 kg (207 lb 14.3 oz)   SpO2 100%   BMI 26.69 kg/m²   Results for orders placed or performed in visit on 06/15/23   PSA Screen    Specimen: Blood   Result Value Ref Range    PSA 0.915 0.000 - 4.000 ng/mL     Medications   Tetanus-Diphth-Acell Pertussis (BOOSTRIX) injection 0.5 mL (0.5 mL Intramuscular Given 9/2/23 1744)   ceFAZolin (ANCEF) injection 1 g (1 g Intramuscular Given 9/2/23 1746)   HYDROmorphone (DILAUDID) injection 1 mg (1 mg Intramuscular Given 9/2/23 1740)   sterile water (preservative free) injection  - ADS Override Pull (2.5 mL  Given 9/2/23 1750)     XR Hand 3+ View Right    Result Date: 9/2/2023  Narrative: PROCEDURE: XR HAND 3+ VW RIGHT  COMPARISON: [facility], CR, HAND >OR= 3V RT, 10/08/2019, 14:58.  INDICATIONS: 2nd digit pain and laceration after crushing injury to right hand, very limited range of motion  FINDINGS:  BONES: There is a displaced fracture of the proximal aspect of distal phalanx of the right 2nd finger. SOFT TISSUES: Soft tissue injury is present adjacent to the fracture of the distal phalanx of the right 2nd finger. EFFUSION: None visible.  OTHER: Negative.       Impression:  Displaced fracture of the proximal aspect of the distal phalanx of the right 2nd finger with associated soft tissue laceration.      SONIYA DELATORRE MD       Electronically Signed and Approved By: SONIYA DELATORRE MD on 9/02/2023 at 17:13              MDM:    Procedures      The case was discussed between the VIKKI and myself. Patient  care including, but not limited to ordered imaging, medications, and lab results were reviewed. I then performed the substantive portion of the visit including all " aspects of the medical decision making.        Nirmal Singh DO  13:19 EDT  09/04/23         Nirmal Singh,   09/04/23 131

## 2023-09-06 NOTE — PATIENT INSTRUCTIONS
Cooking With Less Salt  Cooking with less salt is one way to reduce the amount of sodium you get from food. Sodium is one of the elements that make up salt. It is found naturally in foods and is also added to certain foods. Depending on your condition and overall health, your health care provider or dietitian may recommend that you reduce your sodium intake. Most people should have less than 2,300 milligrams (mg) of sodium each day. If you have high blood pressure (hypertension), you may need to limit your sodium to 1,500 mg each day. Follow the tips below to help reduce your sodium intake.  What are tips for eating less sodium?  Reading food labels       Check the food label before buying or using packaged ingredients. Always check the label for the serving size and sodium content.  Look for products with no more than 140 mg of sodium in one serving.  Check the % Daily Value column to see what percent of the daily recommended amount of sodium is provided in one serving of the product. Foods with 5% or less in this column are considered low in sodium. Foods with 20% or higher are considered high in sodium.  Do not choose foods with salt as one of the first three ingredients on the ingredients list. If salt is one of the first three ingredients, it usually means the item is high in sodium.     Shopping  Buy sodium-free or low-sodium products. Look for the following words on food labels:  Low-sodium.  Sodium-free.  Reduced-sodium.  No salt added.  Unsalted.  Always check the sodium content even if foods are labeled as low-sodium or no salt added.  Buy fresh foods.  Cooking  Use herbs, seasonings without salt, and spices as substitutes for salt.  Use sodium-free baking soda when baking.  [place], braise, or roast foods to add flavor with less salt.  Avoid adding salt to pasta, rice, or hot cereals.  Drain and rinse canned vegetables, beans, and meat before use.  Avoid adding salt when cooking sweets and desserts.  Cook  "with low-sodium ingredients.  What foods are high in sodium?  Vegetables  Regular canned vegetables (not low-sodium or reduced-sodium). Sauerkraut, pickled vegetables, and relishes. Olives. French fries. Onion rings. Regular canned tomato sauce and paste. Regular tomato and vegetable juice. Frozen vegetables in sauces.  Grains  Instant hot cereals. Bread stuffing, pancake, and biscuit mixes. Croutons. Seasoned rice or pasta mixes. Noodle soup cups. Boxed or frozen macaroni and cheese. Regular salted crackers. Self-rising flour. Rolls. Bagels. Flour tortillas and wraps.  Meats and other proteins  Meat or fish that is salted, canned, smoked, cured, spiced, or pickled. This includes de león, ham, sausages, hot dogs, corned beef, chipped beef, meat loaves, salt pork, jerky, pickled herring, anchovies, regular canned tuna, and sardines. Salted nuts.  Dairy  Processed cheese and cheese spreads. Cheese curds. Blue cheese. Feta cheese. String cheese. Regular cottage cheese. Buttermilk. Canned milk.  The items listed above may not be a complete list of foods high in sodium. Actual amounts of sodium may be different depending on processing. Contact a dietitian for more information.  What foods are low in sodium?  Fruits  Fresh, frozen, or canned fruit with no sauce added. Fruit juice.  Vegetables  Fresh or frozen vegetables with no sauce added. \"No salt added\" canned vegetables. \"No salt added\" tomato sauce and paste. Low-sodium or reduced-sodium tomato and vegetable juice.  Grains  Noodles, pasta, quinoa, rice. Shredded or puffed wheat or puffed rice. Regular or quick oats (not instant). Low-sodium crackers. Low-sodium bread. Whole-grain bread and whole-grain pasta. Unsalted popcorn.  Meats and other proteins  Fresh or frozen whole meats, poultry (not injected with sodium), and fish with no sauce added. Unsalted nuts. Dried peas, beans, and lentils without added salt. Unsalted canned beans. Eggs. Unsalted nut butters. " Low-sodium canned tuna or chicken.  Dairy  Milk. Soy milk. Yogurt. Low-sodium cheeses, such as Swiss, Tofte Karsten, mozzarella, and ricotta. Sherbet or ice cream (keep to ½ cup per serving). Cream cheese.  Fats and oils  Unsalted butter or margarine.  Other foods  Homemade pudding. Sodium-free baking soda and baking powder. Herbs and spices. Low-sodium seasoning mixes.  Beverages  Coffee and tea. Carbonated beverages.  The items listed above may not be a complete list of foods low in sodium. Actual amounts of sodium may be different depending on processing. Contact a dietitian for more information.  What are some salt alternatives when cooking?  The following are herbs, seasonings, and spices that can be used instead of salt to flavor your food. Herbs should be fresh or dried. Do not choose packaged mixes. Next to the name of the herb, spice, or seasoning are some examples of foods you can pair it with.  Herbs  Bay leaves - Soups, meat and vegetable dishes, and spaghetti sauce.  Basil - Italian dishes, soups, pasta, and fish dishes.  Cilantro - Meat, poultry, and vegetable dishes.  Chili powder - Marinades and Mexican dishes.  Chives - Salad dressings and potato dishes.  Cumin - Mexican dishes, couscous, and meat dishes.  Dill - Fish dishes, sauces, and salads.  Fennel - Meat and vegetable dishes, breads, and cookies.  Garlic (do not use garlic salt) - Italian dishes, meat dishes, salad dressings, and sauces.  Marjoram - Soups, potato dishes, and meat dishes.  Oregano - Pizza and spaghetti sauce.  Parsley - Salads, soups, pasta, and meat dishes.  [place]ma - Italian dishes, salad dressings, soups, and red meats.  Saffron - Fish dishes, pasta, and some poultry dishes.  Sy - Stuffings and sauces.  Tarragon - Fish and poultry dishes.  Thyme - Stuffing, meat, and fish dishes.  Seasonings  Lemon juice - Fish dishes, poultry dishes, vegetables, and salads.  Vinegar - Salad dressings, vegetables, and fish  "dishes.  Spices  Cinnamon - Sweet dishes, such as cakes, cookies, and puddings.  Cloves - Gingerbread, puddings, and marinades for meats.  Nolan - Vegetable dishes, fish and poultry dishes, and stir-wilkins dishes.  Cookie - Vegetable dishes, fish dishes, and stir-wilkins dishes.  Nutmeg - Pasta, vegetables, poultry, fish dishes, and custard.  Summary  Cooking with less salt is one way to reduce the amount of sodium that you get from food.  Buy sodium-free or low-sodium products.  Check the food label before using or buying packaged ingredients.  Use herbs, seasonings without salt, and spices as substitutes for salt in foods.  This information is not intended to replace advice given to you by your health care provider. Make sure you discuss any questions you have with your health care provider.  Document Revised: 12/09/2020 Document Reviewed: 12/09/2020  [employer] Patient Education © 2021 [employer]      https://www.nhlbi.nih.gov/files/docs/public/heart/dash_brief.pdf\">   DASH Eating Plan  DASH stands for Dietary Approaches to Stop Hypertension. The DASH eating plan is a healthy eating plan that has been shown to:  Reduce high blood pressure (hypertension).  Reduce your risk for type 2 diabetes, heart disease, and stroke.  Help with weight loss.  What are tips for following this plan?  Reading food labels  Check food labels for the amount of salt (sodium) per serving. Choose foods with less than 5 percent of the Daily Value of sodium. Generally, foods with less than 300 milligrams (mg) of sodium per serving fit into this eating plan.  To find whole grains, look for the word \"whole\" as the first word in the ingredient list.  Shopping  Buy products labeled as \"low-sodium\" or \"no salt added.\"  Buy fresh foods. Avoid canned foods and pre-made or frozen meals.  Cooking  Avoid adding salt when cooking. Use salt-free seasonings or herbs instead of table salt or sea salt. Check with your health care provider or pharmacist before " using salt substitutes.  Do not wilkins foods. Cook foods using healthy methods such as baking, boiling, grilling, roasting, and broiling instead.  Cook with heart-healthy oils, such as olive, canola, avocado, soybean, or sunflower oil.  Meal planning       Eat a balanced diet that includes:  4 or more servings of fruits and 4 or more servings of vegetables each day. Try to fill one-half of your plate with fruits and vegetables.  6-8 servings of whole grains each day.  Less than 6 oz (170 g) of lean meat, poultry, or fish each day. A 3-oz (85-g) serving of meat is about the same size as a deck of cards. One egg equals 1 oz (28 g).  2-3 servings of low-fat dairy each day. One serving is 1 cup (237 mL).  1 serving of nuts, seeds, or beans 5 times each week.  2-3 servings of heart-healthy fats. Healthy fats called omega-3 fatty acids are found in foods such as walnuts, flaxseeds, fortified milks, and eggs. These fats are also found in cold-water fish, such as sardines, salmon, and mackerel.  Limit how much you eat of:  Canned or prepackaged foods.  Food that is high in trans fat, such as some fried foods.  Food that is high in saturated fat, such as fatty meat.  Desserts and other sweets, sugary drinks, and other foods with added sugar.  Full-fat dairy products.  Do not salt foods before eating.  Do not eat more than 4 egg yolks a week.  Try to eat at least 2 vegetarian meals a week.  Eat more home-cooked food and less restaurant, buffet, and fast food.     Lifestyle  When eating at a restaurant, ask that your food be prepared with less salt or no salt, if possible.  If you drink alcohol:  Limit how much you use to:  0-1 drink a day for women who are not pregnant.  0-2 drinks a day for men.  Be aware of how much alcohol is in your drink. In the [place] one drink equals one 12 oz bottle of beer (355 mL), one 5 oz glass of wine (148 mL), or one 1½ oz glass of hard liquor (44 mL).  General information  Avoid eating more than  2,300 mg of salt a day. If you have hypertension, you may need to reduce your sodium intake to 1,500 mg a day.  Work with your health care provider to maintain a healthy body weight or to lose weight. Ask what an ideal weight is for you.  Get at least 30 minutes of exercise that causes your heart to beat faster (aerobic exercise) most days of the week. Activities may include walking, swimming, or biking.  Work with your health care provider or dietitian to adjust your eating plan to your individual calorie needs.  What foods should I eat?  Fruits  All fresh, dried, or frozen fruit. Canned fruit in natural juice (without added sugar).  Vegetables  Fresh or frozen vegetables (raw, steamed, roasted, or grilled). Low-sodium or reduced-sodium tomato and vegetable juice. Low-sodium or reduced-sodium tomato sauce and tomato paste. Low-sodium or reduced-sodium canned vegetables.  Grains  Whole-grain or whole-wheat bread. Whole-grain or whole-wheat pasta. Brown rice. Oatmeal. Quinoa. Bulgur. Whole-grain and low-sodium cereals. Patricia bread. Low-fat, low-sodium crackers. Whole-wheat flour tortillas.  Meats and other proteins  Skinless chicken or turkey. Ground chicken or turkey. Pork with fat trimmed off. Fish and seafood. Egg whites. Dried beans, peas, or lentils. Unsalted nuts, nut butters, and seeds. Unsalted canned beans. Lean cuts of beef with fat trimmed off. Low-sodium, lean precooked or cured meat, such as sausages or meat loaves.  Dairy  Low-fat (1%) or fat-free (skim) milk. Reduced-fat, low-fat, or fat-free cheeses. Nonfat, low-sodium ricotta or cottage cheese. Low-fat or nonfat yogurt. Low-fat, low-sodium cheese.  Fats and oils  Soft margarine without trans fats. Vegetable oil. Reduced-fat, low-fat, or light mayonnaise and salad dressings (reduced-sodium). Canola, safflower, olive, avocado, soybean, and sunflower oils. Avocado.  Seasonings and condiments  Herbs. Spices. Seasoning mixes without salt.  Other  foods  Unsalted popcorn and pretzels. Fat-free sweets.  The items listed above may not be a complete list of foods and beverages you can eat. Contact a dietitian for more information.  What foods should I avoid?  Fruits  Canned fruit in a light or heavy syrup. Fried fruit. Fruit in cream or butter sauce.  Vegetables  Creamed or fried vegetables. Vegetables in a cheese sauce. Regular canned vegetables (not low-sodium or reduced-sodium). Regular canned tomato sauce and paste (not low-sodium or reduced-sodium). Regular tomato and vegetable juice (not low-sodium or reduced-sodium). Pickles. Olives.  Grains  Baked goods made with fat, such as croissants, muffins, or some breads. Dry pasta or rice meal packs.  Meats and other proteins  Fatty cuts of meat. Ribs. Fried meat. [place]. Bologna, salami, and other precooked or cured meats, such as sausages or meat loaves. Fat from the back of a pig (fatback). Bratwurst. Salted nuts and seeds. Canned beans with added salt. Canned or smoked fish. Whole eggs or egg yolks. Chicken or turkey with skin.  Dairy  Whole or 2% milk, cream, and half-and-half. Whole or full-fat cream cheese. Whole-fat or sweetened yogurt. Full-fat cheese. Nondairy creamers. Whipped toppings. Processed cheese and cheese spreads.  Fats and oils  Butter. Stick margarine. Lard. Shortening. Ghee. [place]e fat. Tropical oils, such as coconut, palm kernel, or palm oil.  Seasonings and condiments  Onion salt, garlic salt, seasoned salt, table salt, and sea salt. Worcestershire sauce. Tartar sauce. Barbecue sauce. Teriyaki sauce. Soy sauce, including reduced-sodium. Steak sauce. Canned and packaged gravies. Fish sauce. Oyster sauce. Cocktail sauce. Store-bought horseradish. Ketchup. Mustard. Meat flavorings and tenderizers. Bouillon cubes. Hot sauces. Pre-made or packaged marinades. Pre-made or packaged taco seasonings. Relishes. Regular salad dressings.  Other foods  Salted popcorn and pretzels.  The items listed above  may not be a complete list of foods and beverages you should avoid. Contact a dietitian for more information.  Where to find more information  National Heart, Lung, and Blood [place]: www.nhlbi.nih.gov  American Heart Association: www.heart.org  Academy of Nutrition and Dietetics: www.eatright.org  National Kidney Foundation: www.kidney.org  Summary  The DASH eating plan is a healthy eating plan that has been shown to reduce high blood pressure (hypertension). It may also reduce your risk for type 2 diabetes, heart disease, and stroke.  When on the DASH eating plan, aim to eat more fresh fruits and vegetables, whole grains, lean proteins, low-fat dairy, and heart-healthy fats.  With the DASH eating plan, you should limit salt (sodium) intake to 2,300 mg a day. If you have hypertension, you may need to reduce your sodium intake to 1,500 mg a day.  Work with your health care provider or dietitian to adjust your eating plan to your individual calorie needs.  This information is not intended to replace advice given to you by your health care provider. Make sure you discuss any questions you have with your health care provider.  Document Revised: 11/20/2020 Document Reviewed: 11/20/2020  [employer] Patient Education © 2021 [employer].       Heart-Healthy Eating Plan  Heart-healthy meal planning includes:  Eating less unhealthy fats.  Eating more healthy fats.  Making other changes in your diet.  Talk with your doctor or a diet specialist (dietitian) to create an eating plan that is right for you.  What is my plan?  Your doctor may recommend an eating plan that includes:  Total fat: ______% or less of total calories a day.  Saturated fat: ______% or less of total calories a day.  Cholesterol: less than _________mg a day.  What are tips for following this plan?  Cooking  Avoid frying your food. Try to bake, boil, grill, or broil it instead. You can also reduce fat by:  Removing the skin from poultry.  Removing all  visible fats from meats.  Steaming vegetables in water or broth.  Meal planning       At meals, divide your plate into four equal parts:  Fill one-half of your plate with vegetables and green salads.  Fill one-fourth of your plate with whole grains.  Fill one-fourth of your plate with lean protein foods.  Eat 4-5 servings of vegetables per day. A serving of vegetables is:  1 cup of raw or cooked vegetables.  2 cups of raw leafy greens.  Eat 4-5 servings of fruit per day. A serving of fruit is:  1 medium whole fruit.  ¼ cup of dried fruit.  ½ cup of fresh, frozen, or canned fruit.  ½ cup of 100% fruit juice.  Eat more foods that have soluble fiber. These are apples, broccoli, carrots, beans, peas, and barley. Try to get 20-30 g of fiber per day.  Eat 4-5 servings of nuts, legumes, and seeds per week:  1 serving of dried beans or legumes equals ½ cup after being cooked.  1 serving of nuts is ¼ cup.  1 serving of seeds equals 1 tablespoon.     General information  Eat more home-cooked food. Eat less restaurant, buffet, and fast food.  Limit or avoid alcohol.  Limit foods that are high in starch and sugar.  Avoid fried foods.  Lose weight if you are overweight.  Keep track of how much salt (sodium) you eat. This is important if you have high blood pressure. Ask your doctor to tell you more about this.  Try to add vegetarian meals each week.  Fats  Choose healthy fats. These include olive oil and canola oil, flaxseeds, walnuts, almonds, and seeds.  Eat more omega-3 fats. These include salmon, mackerel, sardines, tuna, flaxseed oil, and ground flaxseeds. Try to eat fish at least 2 times each week.  Check food labels. Avoid foods with trans fats or high amounts of saturated fat.  Limit saturated fats.  These are often found in animal products, such as meats, butter, and cream.  These are also found in plant foods, such as palm oil, palm kernel oil, and coconut oil.  Avoid foods with partially hydrogenated oils in them.  These have trans fats. Examples are stick margarine, some tub margarines, cookies, crackers, and other baked goods.  What foods can I eat?  Fruits  All fresh, canned (in natural juice), or frozen fruits.  Vegetables  Fresh or frozen vegetables (raw, steamed, roasted, or grilled). Green salads.  Grains  Most grains. Choose whole wheat and whole grains most of the time. Rice and pasta, including brown rice and pastas made with whole wheat.  Meats and other proteins  Lean, well-trimmed beef, veal, pork, and lamb. Chicken and turkey without skin. All fish and shellfish. Wild duck, rabbit, pheasant, and venison. Egg whites or low-cholesterol egg substitutes. Dried beans, peas, lentils, and tofu. Seeds and most nuts.  Dairy  Low-fat or nonfat cheeses, including ricotta and mozzarella. Skim or 1% milk that is liquid, powdered, or evaporated. Buttermilk that is made with low-fat milk. Nonfat or low-fat yogurt.  Fats and oils  Non-hydrogenated (trans-free) margarines. Vegetable oils, including soybean, sesame, sunflower, olive, peanut, safflower, corn, canola, and cottonseed. Salad dressings or mayonnaise made with a vegetable oil.  Beverages  Mineral water. Coffee and tea. Diet carbonated beverages.  Sweets and desserts  Sherbet, gelatin, and fruit ice. Small amounts of dark chocolate.  Limit all sweets and desserts.  Seasonings and condiments  All seasonings and condiments.  The items listed above may not be a complete list of foods and drinks you can eat. Contact a dietitian for more options.  What foods should I avoid?  Fruits  Canned fruit in heavy syrup. Fruit in cream or butter sauce. Fried fruit. Limit coconut.  Vegetables  Vegetables cooked in cheese, cream, or butter sauce. Fried vegetables.  Grains  Breads that are made with saturated or trans fats, oils, or whole milk. Croissants. Sweet rolls. Donuts. High-fat crackers, such as cheese crackers.  Meats and other proteins  Fatty meats, such as hot dogs, ribs,  sausage, de león, rib-eye roast or steak. High-fat deli meats, such as salami and bologna. Caviar. Domestic duck and goose. Organ meats, such as liver.  Dairy  Cream, sour cream, cream cheese, and creamed cottage cheese. Whole-milk cheeses. Whole or 2% milk that is liquid, evaporated, or condensed. Whole buttermilk. Cream sauce or high-fat cheese sauce. Yogurt that is made from whole milk.  Fats and oils  Meat fat, or shortening. Cocoa butter, hydrogenated oils, palm oil, coconut oil, palm kernel oil. Solid fats and shortenings, including de león fat, salt pork, lard, and butter. Nondairy cream substitutes. Salad dressings with cheese or sour cream.  Beverages  Regular sodas and juice drinks with added sugar.  Sweets and desserts  Frosting. Pudding. Cookies. Cakes. Pies. Milk chocolate or white chocolate. Buttered syrups. Full-fat ice cream or ice cream drinks.  The items listed above may not be a complete list of foods and drinks to avoid. Contact a dietitian for more information.  Summary  Heart-healthy meal planning includes eating less unhealthy fats, eating more healthy fats, and making other changes in your diet.  Eat a balanced diet. This includes fruits and vegetables, low-fat or nonfat dairy, lean protein, nuts and legumes, whole grains, and heart-healthy oils and fats.  This information is not intended to replace advice given to you by your health care provider. Make sure you discuss any questions you have with your health care provider.  Document Revised: 02/21/2019 Document Reviewed: 01/25/2019  [employer] Patient Education © 2021 [employer]       Mediterranean Diet  A Mediterranean diet refers to food and lifestyle choices that are based on the traditions of countries located on the [place]. This way of eating has been shown to help prevent certain conditions and improve outcomes for people who have chronic diseases, like kidney disease and heart disease.  What are tips for following this  plan?  Lifestyle  Cook and eat meals together with your family, when possible.  Drink enough fluid to keep your urine clear or pale yellow.  Be physically active every day. This includes:  Aerobic exercise like running or swimming.  Leisure activities like gardening, walking, or housework.  Get 7-8 hours of sleep each night.  If recommended by your health care provider, drink red wine in moderation. This means 1 glass a day for nonpregnant women and 2 glasses a day for men. A glass of wine equals 5 oz (150 mL).  Reading food labels       Check the serving size of packaged foods. For foods such as rice and pasta, the serving size refers to the amount of cooked product, not dry.  Check the total fat in packaged foods. Avoid foods that have saturated fat or trans fats.  Check the ingredients list for added sugars, such as corn syrup.     Shopping  At the grocery store, buy most of your food from the areas near the walls of the store. This includes:  Fresh fruits and vegetables (produce).  Grains, beans, nuts, and seeds. Some of these may be available in unpackaged forms or large amounts (in bulk).  Fresh seafood.  Poultry and eggs.  Low-fat dairy products.  Buy whole ingredients instead of prepackaged foods.  Buy fresh fruits and vegetables in-season from local farmers markets.  Buy frozen fruits and vegetables in resealable bags.  If you do not have access to quality fresh seafood, buy precooked frozen shrimp or canned fish, such as tuna, salmon, or sardines.  Buy small amounts of raw or cooked vegetables, salads, or olives from the deli or salad bar at your store.  Stock your pantry so you always have certain foods on hand, such as olive oil, canned tuna, canned tomatoes, rice, pasta, and beans.  Cooking  Cook foods with extra-virgin olive oil instead of using butter or other vegetable oils.  Have meat as a side dish, and have vegetables or grains as your main dish. This means having meat in small portions or adding  small amounts of meat to foods like pasta or stew.  Use beans or vegetables instead of meat in common dishes like chili or lasagna.  [place] with different cooking methods. Try roasting or broiling vegetables instead of steaming or sautéeing them.  Add frozen vegetables to soups, stews, pasta, or rice.  Add nuts or seeds for added healthy fat at each meal. You can add these to yogurt, salads, or vegetable dishes.  Marinate fish or vegetables using olive oil, lemon juice, garlic, and fresh herbs.  Meal planning       Plan to eat 1 vegetarian meal one day each week. Try to work up to 2 vegetarian meals, if possible.  Eat seafood 2 or more times a week.  Have healthy snacks readily available, such as:  Vegetable sticks with hummus.  Greek yogurt.  Fruit and nut trail mix.  Eat balanced meals throughout the week. This includes:  Fruit: 2-3 servings a day  Vegetables: 4-5 servings a day  Low-fat dairy: 2 servings a day  Fish, poultry, or lean meat: 1 serving a day  Beans and legumes: 2 or more servings a week  Nuts and seeds: 1-2 servings a day  Whole grains: 6-8 servings a day  Extra-virgin olive oil: 3-4 servings a day  Limit red meat and sweets to only a few servings a month     What are my food choices?  Mediterranean diet  Recommended  Grains: Whole-grain pasta. Brown rice. Bulgar wheat. Polenta. Couscous. Whole-wheat bread. Oatmeal. Quinoa.  Vegetables: Artichokes. Beets. Broccoli. Cabbage. Carrots. Eggplant. Green beans. Chard. Kale. Spinach. Onions. Leeks. Peas. Squash. Tomatoes. Peppers. Radishes.  Fruits: Apples. Apricots. Avocado. Berries. Bananas. Cherries. Dates. Figs. Grapes. Prakash. Melon. Oranges. Peaches. Plums. Pomegranate.  Meats and other protein foods: Beans. Almonds. Sunflower seeds. Pine nuts. Peanuts. Cod. [place]. Scallops. Shrimp. Tuna. Tilapia. Clams. Oysters. Eggs.  Dairy: Low-fat milk. Cheese. Greek yogurt.  Beverages: Water. Red wine. Herbal tea.  Fats and oils: Extra virgin olive oil.  Avocado oil. Grape seed oil.  Sweets and desserts: Greek yogurt with honey. Baked apples. Poached pears. Trail mix.  Seasoning and other foods: Basil. Cilantro. Coriander. Cumin. Mint. Parsley. Sy. Rosemary. Tarragon. Garlic. Oregano. Thyme. Pepper. Balsalmic vinegar. Tahini. Hummus. Tomato sauce. Olives. Mushrooms.  Limit these  Grains: Prepackaged pasta or rice dishes. Prepackaged cereal with added sugar.  Vegetables: Deep fried potatoes (french fries).  Fruits: Fruit canned in syrup.  Meats and other protein foods: Beef. Pork. Lamb. Poultry with skin. Hot dogs. [place].  Dairy: Ice cream. Sour cream. Whole milk.  Beverages: Juice. Sugar-sweetened soft drinks. Beer. Liquor and spirits.  Fats and oils: Butter. Canola oil. Vegetable oil. Beef fat (tallow). Lard.  Sweets and desserts: Cookies. Cakes. Pies. Candy.  Seasoning and other foods: Mayonnaise. Premade sauces and marinades.  The items listed may not be a complete list. Talk with your dietitian about what dietary choices are right for you.  Summary  The Mediterranean diet includes both food and lifestyle choices.  Eat a variety of fresh fruits and vegetables, beans, nuts, seeds, and whole grains.  Limit the amount of red meat and sweets that you eat.  Talk with your health care provider about whether it is safe for you to drink red wine in moderation. This means 1 glass a day for nonpregnant women and 2 glasses a day for men. A glass of wine equals 5 oz (150 mL).  This information is not intended to replace advice given to you by your health care provider. Make sure you discuss any questions you have with your health care provider.  Document Revised: 08/17/2017 Document Reviewed: 08/10/2017  Elsevier Patient Education © 2020 [employer]         Exercising to Stay Healthy  To become healthy and stay healthy, it is recommended that you do moderate-intensity and vigorous-intensity exercise. You can tell that you are exercising at a moderate intensity if your  heart starts beating faster and you start breathing faster but can still hold a conversation. You can tell that you are exercising at a vigorous intensity if you are breathing much harder and faster and cannot hold a conversation while exercising.  Exercising regularly is important. It has many health benefits, such as:  Improving overall fitness, flexibility, and endurance.  Increasing bone density.  Helping with weight control.  Decreasing body fat.  Increasing muscle strength.  Reducing stress and tension.  Improving overall health.  How often should I exercise?  Choose an activity that you enjoy, and set realistic goals. Your health care provider can help you make an activity plan that works for you.  Exercise regularly as told by your health care provider. This may include:  Doing strength training two times a week, such as:  Lifting weights.  Using resistance bands.  Push-ups.  Sit-ups.  Yoga.  Doing a certain intensity of exercise for a given amount of time. Choose from these options:  A total of 150 minutes of moderate-intensity exercise every week.  A total of 75 minutes of vigorous-intensity exercise every week.  A mix of moderate-intensity and vigorous-intensity exercise every week.  Children, pregnant women, people who have not exercised regularly, people who are overweight, and older adults may need to talk with a health care provider about what activities are safe to do. If you have a medical condition, be sure to talk with your health care provider before you start a new exercise program.  What are some exercise ideas?    Moderate-intensity exercise ideas include:  Walking 1 mile (1.6 km) in about 15 minutes.  Biking.  Hiking.  Golfing.  Dancing.  Water aerobics.  Vigorous-intensity exercise ideas include:  Walking 4.5 miles (7.2 km) or more in about 1 hour.  Jogging or running 5 miles (8 km) in about 1 hour.  Biking 10 miles (16.1 km) or more in about 1 hour.  Lap swimming.  Roller-skating or in-line  skating.  Cross-country skiing.  Vigorous competitive sports, such as football, basketball, and soccer.  Jumping rope.  Aerobic dancing.  What are some everyday activities that can help me to get exercise?  Yard work, such as:  Pushing a .  Raking and bagging leaves.  Washing your car.  Pushing a stroller.  Shoveling snow.  Gardening.  Washing windows or floors.  How can I be more active in my day-to-day activities?  Use stairs instead of an elevator.  Take a walk during your lunch break.  If you drive, park your car farther away from your work or school.  If you take public transportation, get off one stop early and walk the rest of the way.  Stand up or walk around during all of your indoor phone calls.  Get up, stretch, and walk around every 30 minutes throughout the day.  Enjoy exercise with a friend. Support to continue exercising will help you keep a regular routine of activity.  What guidelines can I follow while exercising?  Before you start a new exercise program, talk with your health care provider.  Do not exercise so much that you hurt yourself, feel dizzy, or get very short of breath.  Wear comfortable clothes and wear shoes with good support.  Drink plenty of water while you exercise to prevent dehydration or heat stroke.  Work out until your breathing and your heartbeat get faster.  Where to find more information  [place] Department of Health and Human Services: www.hhs.gov  Centers for Disease Control and Prevention (CDC): www.cdc.gov  Summary  Exercising regularly is important. It will improve your overall fitness, flexibility, and endurance.  Regular exercise also will improve your overall health. It can help you control your weight, reduce stress, and improve your bone density.  Do not exercise so much that you hurt yourself, feel dizzy, or get very short of breath.  Before you start a new exercise program, talk with your health care provider.  This information is not intended to replace  advice given to you by your health care provider. Make sure you discuss any questions you have with your health care provider.  Document Revised: 11/30/2018 Document Reviewed: 11/08/2018  Elsevier Patient Education © 2021 [employer]           Mindfulness-Based Stress Reduction  Mindfulness-based stress reduction (MBSR) is a program that helps people learn to practice mindfulness. Mindfulness is the practice of intentionally paying attention to the present moment. It can be learned and practiced through techniques such as education, breathing exercises, meditation, and yoga. MBSR includes several mindfulness techniques in one program.  MBSR works best when you understand the treatment, are willing to try new things, and can commit to spending time practicing what you learn. MBSR training may include learning about:  How your emotions, thoughts, and reactions affect your body.  New ways to respond to things that cause negative thoughts to start (triggers).  How to notice your thoughts and let go of them.  Practicing awareness of everyday things that you normally do without thinking.  The techniques and goals of different types of meditation.  What are the benefits of MBSR?  MBSR can have many benefits, which include helping you to:  Develop self-awareness. This refers to knowing and understanding yourself.  Learn skills and attitudes that help you to participate in your own health care.  Learn new ways to care for yourself.  Be more accepting about how things are, and let things go.  Be less judgmental and approach things with an open mind.  Be patient with yourself and trust yourself more.  MBSR has also been shown to:  Reduce negative emotions, such as depression and anxiety.  Improve memory and focus.  Change how you sense and approach pain.  Boost your body's ability to fight infections.  Help you connect better with other people.  Improve your sense of well-being.  Follow these instructions at home:       Find  a local in-person or online MBSR program.  Set aside some time regularly for mindfulness practice.  Find a mindfulness practice that works best for you. This may include one or more of the following:  Meditation. Meditation involves focusing your mind on a certain thought or activity.  Breathing awareness exercises. These help you to stay present by focusing on your breath.  Body scan. For this practice, you lie down and pay attention to each part of your body from head to toe. You can identify tension and soreness and intentionally relax parts of your body.  Yoga. Yoga involves stretching and breathing, and it can improve your ability to move and be flexible. It can also provide an experience of testing your body's limits, which can help you release stress.  Mindful eating. This way of eating involves focusing on the taste, texture, color, and smell of each bite of food. Because this slows down eating and helps you feel full sooner, it can be an important part of a weight-loss plan.  Find a podcast or recording that provides guidance for breathing awareness, body scan, or meditation exercises. You can listen to these any time when you have a free moment to rest without distractions.  Follow your treatment plan as told by your health care provider. This may include taking regular medicines and making changes to your diet or lifestyle as recommended.  How to practice mindfulness  To do a basic awareness exercise:  Find a comfortable place to sit.  Pay attention to the present moment. Observe your thoughts, feelings, and surroundings just as they are.  Avoid placing judgment on yourself, your feelings, or your surroundings. Make note of any judgment that comes up, and let it go.  Your mind may wander, and that is okay. Make note of when your thoughts drift, and return your attention to the present moment.  To do basic mindfulness meditation:  Find a comfortable place to sit. This may include a stable chair or a firm  floor cushion.  Sit upright with your back straight. Let your arms fall next to your side with your hands resting on your legs.  If sitting in a chair, rest your feet flat on the floor.  If sitting on a cushion, cross your legs in front of you.  Keep your head in a neutral position with your chin dropped slightly. Relax your jaw and rest the tip of your tongue on the roof of your mouth. Drop your gaze to the floor. You can close your eyes if you like.  Breathe normally and pay attention to your breath. Feel the air moving in and out of your nose. Feel your belly expanding and relaxing with each breath.  Your mind may wander, and that is okay. Make note of when your thoughts drift, and return your attention to your breath.  Avoid placing judgment on yourself, your feelings, or your surroundings. Make note of any judgment or feelings that come up, let them go, and bring your attention back to your breath.  When you are ready, lift your gaze or open your eyes. Pay attention to how your body feels after the meditation.  Where to find more information  You can find more information about MBSR from:  Your health care provider.  Community-based meditation centers or programs.  Programs offered near you.  Summary  Mindfulness-based stress reduction (MBSR) is a program that teaches you how to intentionally pay attention to the present moment. It is used with other treatments to help you cope better with daily stress, emotions, and pain.  MBSR focuses on developing self-awareness, which allows you to respond to life stress without judgment or negative emotions.  MBSR programs may involve learning different mindfulness practices, such as breathing exercises, meditation, yoga, body scan, or mindful eating. Find a mindfulness practice that works best for you, and set aside time for it on a regular basis.  This information is not intended to replace advice given to you by your health care provider. Make sure you discuss any  questions you have with your health care provider.  Document Revised: 02/22/2021 Document Reviewed: 04/26/2018  Elsevier Patient Education © 2021 [employer]

## 2023-09-07 ENCOUNTER — OFFICE VISIT (OUTPATIENT)
Dept: INTERNAL MEDICINE | Facility: CLINIC | Age: 78
End: 2023-09-07
Payer: MEDICARE

## 2023-09-07 ENCOUNTER — LAB (OUTPATIENT)
Dept: LAB | Facility: HOSPITAL | Age: 78
End: 2023-09-07
Payer: MEDICARE

## 2023-09-07 VITALS
WEIGHT: 207.6 LBS | DIASTOLIC BLOOD PRESSURE: 73 MMHG | HEART RATE: 67 BPM | OXYGEN SATURATION: 96 % | TEMPERATURE: 97.5 F | SYSTOLIC BLOOD PRESSURE: 115 MMHG | BODY MASS INDEX: 26.65 KG/M2

## 2023-09-07 DIAGNOSIS — Z00.00 MEDICARE ANNUAL WELLNESS VISIT, SUBSEQUENT: Primary | ICD-10-CM

## 2023-09-07 DIAGNOSIS — E03.8 OTHER SPECIFIED HYPOTHYROIDISM: ICD-10-CM

## 2023-09-07 DIAGNOSIS — J96.11 CHRONIC RESPIRATORY FAILURE WITH HYPOXIA: ICD-10-CM

## 2023-09-07 DIAGNOSIS — N40.0 BENIGN PROSTATIC HYPERPLASIA WITHOUT LOWER URINARY TRACT SYMPTOMS: ICD-10-CM

## 2023-09-07 DIAGNOSIS — E78.2 MIXED HYPERLIPIDEMIA: ICD-10-CM

## 2023-09-07 DIAGNOSIS — Z00.00 MEDICARE ANNUAL WELLNESS VISIT, SUBSEQUENT: ICD-10-CM

## 2023-09-07 DIAGNOSIS — I10 ESSENTIAL HYPERTENSION: ICD-10-CM

## 2023-09-07 DIAGNOSIS — G47.34 NOCTURNAL HYPOXIA: ICD-10-CM

## 2023-09-07 DIAGNOSIS — I49.5 SSS (SICK SINUS SYNDROME): ICD-10-CM

## 2023-09-07 DIAGNOSIS — T14.8XXA OPEN FRACTURE: ICD-10-CM

## 2023-09-07 DIAGNOSIS — Z95.0 CARDIAC PACEMAKER: ICD-10-CM

## 2023-09-07 DIAGNOSIS — R73.03 PRE-DIABETES: ICD-10-CM

## 2023-09-07 DIAGNOSIS — I27.24 CTEPH (CHRONIC THROMBOEMBOLIC PULMONARY HYPERTENSION): ICD-10-CM

## 2023-09-07 DIAGNOSIS — H91.93 BILATERAL HEARING LOSS, UNSPECIFIED HEARING LOSS TYPE: ICD-10-CM

## 2023-09-07 LAB
ALBUMIN SERPL-MCNC: 4.3 G/DL (ref 3.5–5.2)
ALBUMIN/GLOB SERPL: 1.6 G/DL
ALP SERPL-CCNC: 49 U/L (ref 39–117)
ALT SERPL W P-5'-P-CCNC: 18 U/L (ref 1–41)
ANION GAP SERPL CALCULATED.3IONS-SCNC: 10.3 MMOL/L (ref 5–15)
AST SERPL-CCNC: 25 U/L (ref 1–40)
BASOPHILS # BLD AUTO: 0.03 10*3/MM3 (ref 0–0.2)
BASOPHILS NFR BLD AUTO: 0.6 % (ref 0–1.5)
BILIRUB SERPL-MCNC: 0.4 MG/DL (ref 0–1.2)
BUN SERPL-MCNC: 22 MG/DL (ref 8–23)
BUN/CREAT SERPL: 16.9 (ref 7–25)
CALCIUM SPEC-SCNC: 9.4 MG/DL (ref 8.6–10.5)
CHLORIDE SERPL-SCNC: 102 MMOL/L (ref 98–107)
CHOLEST SERPL-MCNC: 172 MG/DL (ref 0–200)
CO2 SERPL-SCNC: 27.7 MMOL/L (ref 22–29)
CREAT SERPL-MCNC: 1.3 MG/DL (ref 0.76–1.27)
DEPRECATED RDW RBC AUTO: 39.5 FL (ref 37–54)
EGFRCR SERPLBLD CKD-EPI 2021: 56.6 ML/MIN/1.73
EOSINOPHIL # BLD AUTO: 0.16 10*3/MM3 (ref 0–0.4)
EOSINOPHIL NFR BLD AUTO: 3.2 % (ref 0.3–6.2)
ERYTHROCYTE [DISTWIDTH] IN BLOOD BY AUTOMATED COUNT: 12.9 % (ref 12.3–15.4)
GLOBULIN UR ELPH-MCNC: 2.7 GM/DL
GLUCOSE SERPL-MCNC: 80 MG/DL (ref 65–99)
HBA1C MFR BLD: 6 % (ref 4.8–5.6)
HCT VFR BLD AUTO: 40.3 % (ref 37.5–51)
HDLC SERPL-MCNC: 39 MG/DL (ref 40–60)
HGB BLD-MCNC: 13.2 G/DL (ref 13–17.7)
IMM GRANULOCYTES # BLD AUTO: 0.01 10*3/MM3 (ref 0–0.05)
IMM GRANULOCYTES NFR BLD AUTO: 0.2 % (ref 0–0.5)
LDLC SERPL CALC-MCNC: 93 MG/DL (ref 0–100)
LDLC/HDLC SERPL: 2.18 {RATIO}
LYMPHOCYTES # BLD AUTO: 1.48 10*3/MM3 (ref 0.7–3.1)
LYMPHOCYTES NFR BLD AUTO: 29.8 % (ref 19.6–45.3)
MCH RBC QN AUTO: 27.7 PG (ref 26.6–33)
MCHC RBC AUTO-ENTMCNC: 32.8 G/DL (ref 31.5–35.7)
MCV RBC AUTO: 84.5 FL (ref 79–97)
MONOCYTES # BLD AUTO: 0.43 10*3/MM3 (ref 0.1–0.9)
MONOCYTES NFR BLD AUTO: 8.7 % (ref 5–12)
NEUTROPHILS NFR BLD AUTO: 2.85 10*3/MM3 (ref 1.7–7)
NEUTROPHILS NFR BLD AUTO: 57.5 % (ref 42.7–76)
NRBC BLD AUTO-RTO: 0 /100 WBC (ref 0–0.2)
PLATELET # BLD AUTO: 219 10*3/MM3 (ref 140–450)
PMV BLD AUTO: 10.8 FL (ref 6–12)
POTASSIUM SERPL-SCNC: 4.7 MMOL/L (ref 3.5–5.2)
PROT SERPL-MCNC: 7 G/DL (ref 6–8.5)
RBC # BLD AUTO: 4.77 10*6/MM3 (ref 4.14–5.8)
SODIUM SERPL-SCNC: 140 MMOL/L (ref 136–145)
TRIGL SERPL-MCNC: 239 MG/DL (ref 0–150)
TSH SERPL DL<=0.05 MIU/L-ACNC: 2.73 UIU/ML (ref 0.27–4.2)
VLDLC SERPL-MCNC: 40 MG/DL (ref 5–40)
WBC NRBC COR # BLD: 4.96 10*3/MM3 (ref 3.4–10.8)

## 2023-09-07 PROCEDURE — 80053 COMPREHEN METABOLIC PANEL: CPT

## 2023-09-07 PROCEDURE — 84443 ASSAY THYROID STIM HORMONE: CPT

## 2023-09-07 PROCEDURE — 80061 LIPID PANEL: CPT

## 2023-09-07 PROCEDURE — 83036 HEMOGLOBIN GLYCOSYLATED A1C: CPT

## 2023-09-07 PROCEDURE — 36415 COLL VENOUS BLD VENIPUNCTURE: CPT

## 2023-09-07 PROCEDURE — 85025 COMPLETE CBC W/AUTO DIFF WBC: CPT

## 2023-09-07 RX ORDER — CEFDINIR 300 MG/1
1 CAPSULE ORAL EVERY 12 HOURS SCHEDULED
COMMUNITY
Start: 2023-09-02

## 2023-09-07 RX ORDER — OXYCODONE HYDROCHLORIDE AND ACETAMINOPHEN 5; 325 MG/1; MG/1
TABLET ORAL
COMMUNITY
Start: 2023-09-02

## 2023-09-07 NOTE — PROGRESS NOTES
The ABCs of the Annual Wellness Visit  Subsequent Medicare Wellness Visit    Subjective    Elmer Garvin is a 78 y.o. male who presents for a Subsequent Medicare Wellness Visit.    The following portions of the patient's history were reviewed and   updated as appropriate: allergies, current medications, past family history, past medical history, past social history, past surgical history, and problem list.    Compared to one year ago, the patient feels his physical   health is better.    Compared to one year ago, the patient feels his mental   health is the same.    Recent Hospitalizations:  He was not admitted to the hospital during the last year.       Current Medical Providers:  Patient Care Team:  Roberto Carlos Jimenez MD as PCP - General (Internal Medicine)  Jeanette Marie MD as Consulting Physician (Urology)    Outpatient Medications Prior to Visit   Medication Sig Dispense Refill    albuterol sulfate  (90 Base) MCG/ACT inhaler Inhale 2 puffs Every 4 (Four) Hours As Needed for Wheezing. 1 g 6    brimonidine (ALPHAGAN) 0.2 % ophthalmic solution 1 drop 3 (Three) Times a Day.      cefdinir (OMNICEF) 300 MG capsule Take 1 capsule by mouth Every 12 (Twelve) Hours.      Coenzyme Q10 200 MG capsule Take 200 mg by mouth Daily. 90 capsule 2    latanoprost (XALATAN) 0.005 % ophthalmic solution Administer 1 drop to both eyes Daily.      losartan (COZAAR) 100 MG tablet Take 1 tablet by mouth every day 90 tablet 3    Melatonin 10 MG tablet Take 1 tablet by mouth Every Night.      Omega-3 Fatty Acids (fish oil) 1000 MG capsule capsule Take  by mouth Daily With Breakfast.      oxyCODONE-acetaminophen (PERCOCET) 5-325 MG per tablet TAKE 1 TABLET BY MOUTH EVERY 6 HOURS FOR 3 DAYS      sulindac (CLINORIL) 150 MG tablet Take 1 tablet by mouth 2 (two) times a day.  - take with food 180 tablet 2    Synthroid 100 MCG tablet Take 1 tablet by mouth every morning *on empty stomach* 90 tablet 3    cetirizine (zyrTEC) 10 MG  tablet Take 1 tablet by mouth Daily. 90 tablet 3    Tiotropium Bromide Monohydrate (Spiriva Respimat) 1.25 MCG/ACT aerosol solution inhaler Inhale 2 puffs Daily for 1 day. 2 each 0     No facility-administered medications prior to visit.       Opioid medication/s are on active medication list.  and I have evaluated his active treatment plan and pain score trends (see table).  There were no vitals filed for this visit.  I have reviewed the chart for potential of high risk medication and harmful drug interactions in the elderly.          Aspirin is not on active medication list.  Aspirin use is not indicated based on review of current medical condition/s. Risk of harm outweighs potential benefits.  .    Patient Active Problem List   Diagnosis    Angina at rest    Essential hypertension    SSS (sick sinus syndrome)    Mild intermittent asthma without complication    Gastroesophageal reflux disease without esophagitis    Taylor's esophagus without dysplasia    Acquired hypothyroidism    MRSA (methicillin resistant Staphylococcus aureus)    Pre-diabetes    Acute respiratory failure with hypoxia    Arthritis    Asthma    Benign prostatic hyperplasia    Cataract    Deviated nasal septum    Family history of prostate cancer in father    Hypertrophy of both inferior nasal turbinates    Impaired glucose tolerance    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    Nasal congestion    Nasal obstruction    Presence of cardiac pacemaker    Acute on chronic respiratory failure with hypoxia    Acute pulmonary embolism    Bilateral vitreous detachment    Epiretinal membrane    Glaucomatous atrophy of optic disc    After cataract    Pneumonia due to COVID-19 virus    CTEPH (chronic thromboembolic pulmonary hypertension)    Shortness of breath    Other specified hypothyroidism    Personal history of COVID-19    History of pulmonary embolism    Mixed hyperlipidemia    Colon cancer screening    Fibrous papule  "of nose    Chronic respiratory failure with hypoxia    Nocturnal hypoxia     Advance Care Planning   Advance Care Planning     Advance Directive is not on file.  ACP discussion was held with the patient during this visit. Patient has an advance directive (not in EMR), copy requested.     Objective    Vitals:    23 1058   BP: 115/73   BP Location: Right arm   Patient Position: Sitting   Cuff Size: Adult   Pulse: 67   Temp: 97.5 °F (36.4 °C)   TempSrc: Temporal   SpO2: 96%   Weight: 94.2 kg (207 lb 9.6 oz)     Estimated body mass index is 26.65 kg/m² as calculated from the following:    Height as of 23: 188 cm (74\").    Weight as of this encounter: 94.2 kg (207 lb 9.6 oz).    BMI is >= 25 and <30. (Overweight) The following options were offered after discussion;: exercise counseling/recommendations and nutrition counseling/recommendations      Does the patient have evidence of cognitive impairment? No          HEALTH RISK ASSESSMENT    Smoking Status:  Social History     Tobacco Use   Smoking Status Former    Packs/day: 0.25    Years: 12.00    Pack years: 3.00    Types: Cigarettes, Pipe    Start date: 1964    Quit date: 1976    Years since quittin.7   Smokeless Tobacco Never   Tobacco Comments    CAFFEINE USE 3 CUPS COFFEE DAILY     Alcohol Consumption:  Social History     Substance and Sexual Activity   Alcohol Use No     Fall Risk Screen:    ANAHIADI Fall Risk Assessment was completed, and patient is at LOW risk for falls.Assessment completed on:2023    Depression Screenin/7/2023    11:08 AM   PHQ-2/PHQ-9 Depression Screening   Little Interest or Pleasure in Doing Things 0-->not at all   Feeling Down, Depressed or Hopeless 0-->not at all   PHQ-9: Brief Depression Severity Measure Score 0       Health Habits and Functional and Cognitive Screenin/7/2023    11:00 AM   Functional & Cognitive Status   Do you have difficulty preparing food and eating? No   Do you have difficulty " bathing yourself, getting dressed or grooming yourself? No   Do you have difficulty using the toilet? No   Do you have difficulty moving around from place to place? No   Do you have trouble with steps or getting out of a bed or a chair? No   Current Diet Other   Dental Exam Up to date   Eye Exam Up to date   Exercise (times per week) 7 times per week   Current Exercises Include Walking;Yard Work   Do you need help using the phone?  Yes   Are you deaf or do you have serious difficulty hearing?  Yes   Do you need help to go to places out of walking distance? No   Do you need help shopping? No   Do you need help preparing meals?  No   Do you need help with housework?  No   Do you need help with laundry? No   Do you need help taking your medications? No   Do you need help managing money? No   Do you ever drive or ride in a car without wearing a seat belt? No   Have you felt unusual stress, anger or loneliness in the last month? No   Who do you live with? Spouse   If you need help, do you have trouble finding someone available to you? No   Have you been bothered in the last four weeks by sexual problems? No   Do you have difficulty concentrating, remembering or making decisions? No       Age-appropriate Screening Schedule:  Refer to the list below for future screening recommendations based on patient's age, sex and/or medical conditions. Orders for these recommended tests are listed in the plan section. The patient has been provided with a written plan.    Health Maintenance   Topic Date Due    COVID-19 Vaccine (1) Never done    ZOSTER VACCINE (1 of 2) Never done    LIPID PANEL  06/24/2023    Pneumococcal Vaccine 65+ (1 - PCV) 09/27/2023 (Originally 3/17/1951)    INFLUENZA VACCINE  10/01/2023    ANNUAL WELLNESS VISIT  09/07/2024    BMI FOLLOWUP  09/07/2024    TDAP/TD VACCINES (3 - Td or Tdap) 09/02/2033    HEPATITIS C SCREENING  Completed    COLORECTAL CANCER SCREENING  Discontinued                  CMS Preventative  Services Quick Reference  Risk Factors Identified During Encounter  Inactivity/Sedentary: Patient was advised to exercise at least 150 minutes a week per CDC recommendations.  The above risks/problems have been discussed with the patient.  Pertinent information has been shared with the patient in the After Visit Summary.  An After Visit Summary and PPPS were made available to the patient.    Follow Up:   Next Medicare Wellness visit to be scheduled in 1 year.       Additional E&M Note during same encounter follows:  Patient has multiple medical problems which are significant and separately identifiable that require additional work above and beyond the Medicare Wellness Visit.      Chief Complaint  Medicare Wellness-subsequent, Finger Injury (Broke his right finger and he went to [facility] on Saturday. Dropped a piece of farm equipment on it. Did xrays. ), and Med Refill (Patient unsure if he needs refills on routine medications. )    Subjective        HPI  Elmer Garvin is also being seen today for HTN.    Fractured index finger of right hand 9/2/23.  Seen at ER, and referred to [facility] where the open fracture was repaired by Dr. Ammon Medina.  He will be following up with them this coming week.    In the interim, he has also seen pulmonary as well as cardiology for routine follow up.       Objective   Vital Signs:  /73 (BP Location: Right arm, Patient Position: Sitting, Cuff Size: Adult)   Pulse 67   Temp 97.5 °F (36.4 °C) (Temporal)   Wt 94.2 kg (207 lb 9.6 oz)   SpO2 96%   BMI 26.65 kg/m²     Physical Exam  Vitals reviewed.   Constitutional:       General: He is not in acute distress.     Appearance: Normal appearance. He is not ill-appearing, toxic-appearing or diaphoretic.   HENT:      Head: Normocephalic and atraumatic.      Right Ear: External ear normal.      Left Ear: External ear normal.   Eyes:      Conjunctiva/sclera: Conjunctivae normal.   Cardiovascular:      Rate and  Rhythm: Normal rate and regular rhythm.      Pulses: Normal pulses.      Heart sounds: Normal heart sounds. No murmur heard.    No friction rub. No gallop.   Pulmonary:      Effort: Pulmonary effort is normal. No respiratory distress.      Breath sounds: Normal breath sounds. No stridor. No wheezing, rhonchi or rales.   Chest:      Chest wall: No tenderness.   Abdominal:      General: Abdomen is flat.      Palpations: Abdomen is soft. There is no mass.      Tenderness: There is no abdominal tenderness.   Musculoskeletal:      Right lower leg: No edema.      Left lower leg: No edema.      Comments: Right index finger with finger splint and dressing in place that is clean, dry and intact   Skin:     General: Skin is warm and dry.   Neurological:      General: No focal deficit present.      Mental Status: He is alert. Mental status is at baseline.   Psychiatric:         Mood and Affect: Mood normal.         Behavior: Behavior normal.         Thought Content: Thought content normal.         Judgment: Judgment normal.                       Assessment and Plan   Diagnoses and all orders for this visit:    1. Medicare annual wellness visit, subsequent (Primary)  -     CBC & Differential; Future  -     Comprehensive Metabolic Panel; Future  -     Hemoglobin A1c; Future  -     Lipid Panel; Future  -     TSH; Future    2. SSS (sick sinus syndrome)    3. Bilateral hearing loss, unspecified hearing loss type    4. Essential hypertension  -     Comprehensive Metabolic Panel; Future    5. CTEPH (chronic thromboembolic pulmonary hypertension)    6. Cardiac pacemaker    7. Nocturnal hypoxia    8. Chronic respiratory failure with hypoxia    9. Mixed hyperlipidemia  -     Lipid Panel; Future    10. Benign prostatic hyperplasia without lower urinary tract symptoms    11. Pre-diabetes  -     Comprehensive Metabolic Panel; Future  -     Hemoglobin A1c; Future    12. Other specified hypothyroidism  -     TSH; Future      Open fracture,  right index finger:  -s/p repair  -seeing Dr. Medina next week    Nocturnal hypoxia:  -on night time oxygen    HTN:  -BP at goal of < 130/80, and well tolerated  -labs ordered  -will continue losartan    Health Maintenance:  -nutritional counseling on the components of a heart healthy diet  -exercise counseling, recommending at least 2.5 hours of moderate exercise weekly         Follow Up   Return in about 3 months (around 12/7/2023) for HTN.  Patient was given instructions and counseling regarding his condition or for health maintenance advice. Please see specific information pulled into the AVS if appropriate.

## 2023-09-12 ENCOUNTER — TELEPHONE (OUTPATIENT)
Dept: CARDIOLOGY | Facility: CLINIC | Age: 78
End: 2023-09-12
Payer: MEDICARE

## 2023-09-12 NOTE — TELEPHONE ENCOUNTER
I spoke with Mrs. Garvin and requested Mr. Garvin to send in a pacemaker download every month.    He is currently not home but she will ask him to send one in [facility].

## 2023-09-20 ENCOUNTER — LAB (OUTPATIENT)
Dept: LAB | Facility: HOSPITAL | Age: 78
End: 2023-09-20
Payer: MEDICARE

## 2023-09-20 DIAGNOSIS — R05.9 COUGH, UNSPECIFIED TYPE: ICD-10-CM

## 2023-09-20 PROCEDURE — 87116 MYCOBACTERIA CULTURE: CPT

## 2023-09-20 PROCEDURE — 87206 SMEAR FLUORESCENT/ACID STAI: CPT

## 2023-09-20 PROCEDURE — 87205 SMEAR GRAM STAIN: CPT

## 2023-09-20 PROCEDURE — 87070 CULTURE OTHR SPECIMN AEROBIC: CPT

## 2023-09-22 LAB
BACTERIA SPEC RESP CULT: NORMAL
GRAM STN SPEC: NORMAL

## 2023-09-25 ENCOUNTER — CLINICAL SUPPORT NO REQUIREMENTS (OUTPATIENT)
Dept: CARDIOLOGY | Facility: CLINIC | Age: 78
End: 2023-09-25

## 2023-09-25 DIAGNOSIS — I49.5 SSS (SICK SINUS SYNDROME): Primary | ICD-10-CM

## 2023-09-25 DIAGNOSIS — Z95.0 PRESENCE OF CARDIAC PACEMAKER: ICD-10-CM

## 2023-09-25 PROCEDURE — 93280 PM DEVICE PROGR EVAL DUAL: CPT | Performed by: INTERNAL MEDICINE

## 2023-09-25 NOTE — PROGRESS NOTES
Normal Dual Chamber Pacemaker Device Interrogation and Device Testing.  Normal evaluation of device function and lead measurements.  No optimization was needed of parameters or maximization of device longevity.  Patient is on Manual Pacemaker downloads that have been requested to do once a month.

## 2023-09-27 LAB
MYCOBACTERIUM SPEC CULT: NORMAL
NIGHT BLUE STAIN TISS: NORMAL
NIGHT BLUE STAIN TISS: NORMAL

## 2023-10-09 LAB
MYCOBACTERIUM SPEC CULT: ABNORMAL
NIGHT BLUE STAIN TISS: ABNORMAL
NIGHT BLUE STAIN TISS: ABNORMAL

## 2023-10-18 LAB
MYCOBACTERIUM SPEC CULT: ABNORMAL
NIGHT BLUE STAIN TISS: ABNORMAL
NIGHT BLUE STAIN TISS: ABNORMAL

## 2023-12-11 ENCOUNTER — OFFICE VISIT (OUTPATIENT)
Dept: INTERNAL MEDICINE | Facility: CLINIC | Age: 78
End: 2023-12-11
Payer: MEDICARE

## 2023-12-11 VITALS
BODY MASS INDEX: 27.21 KG/M2 | TEMPERATURE: 98.2 F | WEIGHT: 212 LBS | HEIGHT: 74 IN | SYSTOLIC BLOOD PRESSURE: 126 MMHG | DIASTOLIC BLOOD PRESSURE: 68 MMHG

## 2023-12-11 DIAGNOSIS — Z95.0 CARDIAC PACEMAKER: ICD-10-CM

## 2023-12-11 DIAGNOSIS — I10 ESSENTIAL HYPERTENSION: Primary | ICD-10-CM

## 2023-12-11 DIAGNOSIS — I27.24 CTEPH (CHRONIC THROMBOEMBOLIC PULMONARY HYPERTENSION): ICD-10-CM

## 2023-12-11 DIAGNOSIS — G47.34 NOCTURNAL HYPOXIA: ICD-10-CM

## 2023-12-11 DIAGNOSIS — I49.5 SSS (SICK SINUS SYNDROME): ICD-10-CM

## 2023-12-11 DIAGNOSIS — J96.11 CHRONIC RESPIRATORY FAILURE WITH HYPOXIA: ICD-10-CM

## 2023-12-11 DIAGNOSIS — R73.03 PRE-DIABETES: ICD-10-CM

## 2023-12-11 DIAGNOSIS — E03.8 OTHER SPECIFIED HYPOTHYROIDISM: ICD-10-CM

## 2023-12-11 LAB
ALBUMIN SERPL-MCNC: 4.2 G/DL (ref 3.5–5.2)
ALBUMIN/GLOB SERPL: 2.2 G/DL
ALP SERPL-CCNC: 48 U/L (ref 39–117)
ALT SERPL W P-5'-P-CCNC: 19 U/L (ref 1–41)
ANION GAP SERPL CALCULATED.3IONS-SCNC: 11 MMOL/L (ref 5–15)
AST SERPL-CCNC: 23 U/L (ref 1–40)
BILIRUB SERPL-MCNC: 0.3 MG/DL (ref 0–1.2)
BUN SERPL-MCNC: 18 MG/DL (ref 8–23)
BUN/CREAT SERPL: 14.5 (ref 7–25)
CALCIUM SPEC-SCNC: 9.4 MG/DL (ref 8.6–10.5)
CHLORIDE SERPL-SCNC: 101 MMOL/L (ref 98–107)
CO2 SERPL-SCNC: 25 MMOL/L (ref 22–29)
CREAT SERPL-MCNC: 1.24 MG/DL (ref 0.76–1.27)
EGFRCR SERPLBLD CKD-EPI 2021: 59.9 ML/MIN/1.73
GLOBULIN UR ELPH-MCNC: 1.9 GM/DL
GLUCOSE SERPL-MCNC: 111 MG/DL (ref 65–99)
HBA1C MFR BLD: 5.8 % (ref 4.8–5.6)
POTASSIUM SERPL-SCNC: 4.5 MMOL/L (ref 3.5–5.2)
PROT SERPL-MCNC: 6.1 G/DL (ref 6–8.5)
SODIUM SERPL-SCNC: 137 MMOL/L (ref 136–145)
TSH SERPL DL<=0.05 MIU/L-ACNC: 2.23 UIU/ML (ref 0.27–4.2)

## 2023-12-11 PROCEDURE — 84443 ASSAY THYROID STIM HORMONE: CPT | Performed by: STUDENT IN AN ORGANIZED HEALTH CARE EDUCATION/TRAINING PROGRAM

## 2023-12-11 PROCEDURE — 3078F DIAST BP <80 MM HG: CPT | Performed by: STUDENT IN AN ORGANIZED HEALTH CARE EDUCATION/TRAINING PROGRAM

## 2023-12-11 PROCEDURE — 3074F SYST BP LT 130 MM HG: CPT | Performed by: STUDENT IN AN ORGANIZED HEALTH CARE EDUCATION/TRAINING PROGRAM

## 2023-12-11 PROCEDURE — 99214 OFFICE O/P EST MOD 30 MIN: CPT | Performed by: STUDENT IN AN ORGANIZED HEALTH CARE EDUCATION/TRAINING PROGRAM

## 2023-12-11 PROCEDURE — 83036 HEMOGLOBIN GLYCOSYLATED A1C: CPT | Performed by: STUDENT IN AN ORGANIZED HEALTH CARE EDUCATION/TRAINING PROGRAM

## 2023-12-11 PROCEDURE — 80053 COMPREHEN METABOLIC PANEL: CPT | Performed by: STUDENT IN AN ORGANIZED HEALTH CARE EDUCATION/TRAINING PROGRAM

## 2023-12-11 RX ORDER — LOSARTAN POTASSIUM 100 MG/1
100 TABLET ORAL DAILY
Qty: 90 TABLET | Refills: 3 | Status: SHIPPED | OUTPATIENT
Start: 2023-12-11 | End: 2023-12-15

## 2023-12-11 NOTE — PROGRESS NOTES
"Chief Complaint  Follow-up (3 MONTH FOLLOW UP), Hypertension (3 MONTH FOLLOW UP), and Med Refill (Losartan - medication pending //-Patient would like synthroid sent to the [facility] in [place] and all others sent to [facility] in [place] )    Subjective          Elmer Garvin presents to [facility]  History of Present Illness    No interim issues.  Compliant with medicines.  Takes synthroid on an empty stomach by itself, about 2 hours prior to other foods.    Current Outpatient Medications   Medication Instructions    albuterol sulfate  (90 Base) MCG/ACT inhaler 2 puffs, Inhalation, Every 4 Hours PRN    brimonidine (ALPHAGAN) 0.2 % ophthalmic solution 1 drop, 3 Times Daily    latanoprost (XALATAN) 0.005 % ophthalmic solution 1 drop, Both Eyes, Daily    losartan (COZAAR) 100 mg, Oral, Daily    Melatonin 10 mg, Oral, Nightly    Omega-3 Fatty Acids (fish oil) 1000 MG capsule capsule Oral, Daily With Breakfast    sulindac (CLINORIL) 150 MG tablet Take 1 tablet by mouth 2 (two) times a day.  - take with food    Synthroid 100 MCG tablet Take 1 tablet by mouth every morning *on empty stomach*       The following portions of the patient's history were reviewed and updated as appropriate: allergies, current medications, past family history, past medical history, past social history, past surgical history, and problem list.    Objective   Vital Signs:   /68 (BP Location: Left arm, Patient Position: Sitting)   Temp 98.2 °F (36.8 °C) (Temporal)   Ht 188 cm (74\")   Wt 96.2 kg (212 lb)   BMI 27.22 kg/m²     BP Readings from Last 3 Encounters:   12/11/23 126/68   09/07/23 115/73   09/02/23 167/80     Wt Readings from Last 3 Encounters:   12/11/23 96.2 kg (212 lb)   09/07/23 94.2 kg (207 lb 9.6 oz)   09/02/23 94.3 kg (207 lb 14.3 oz)       Physical Exam  Vitals reviewed.   Constitutional:       General: He is not in acute distress.     Appearance: Normal " appearance. He is not ill-appearing, toxic-appearing or diaphoretic.   HENT:      Head: Normocephalic and atraumatic.      Right Ear: External ear normal.      Left Ear: External ear normal.   Eyes:      Conjunctiva/sclera: Conjunctivae normal.   Cardiovascular:      Rate and Rhythm: Normal rate and regular rhythm.      Pulses: Normal pulses.      Heart sounds: Normal heart sounds. No murmur heard.     No friction rub. No gallop.   Pulmonary:      Effort: Pulmonary effort is normal. No respiratory distress.      Breath sounds: Normal breath sounds. No stridor. No wheezing, rhonchi or rales.   Chest:      Chest wall: No tenderness.   Abdominal:      General: Abdomen is flat.      Palpations: Abdomen is soft. There is no mass.      Tenderness: There is no abdominal tenderness.   Musculoskeletal:      Right lower leg: No edema.      Left lower leg: No edema.   Skin:     General: Skin is warm and dry.   Neurological:      General: No focal deficit present.      Mental Status: He is alert. Mental status is at baseline.   Psychiatric:         Behavior: Behavior normal.         Thought Content: Thought content normal.         Judgment: Judgment normal.            Result Review :   The following data was reviewed by: Roberto Carlos Jimenez MD on 12/11/2023:  Common labs          5/4/2023    13:40 6/15/2023    15:52 9/7/2023    12:22   Common Labs   Glucose 135   80    BUN 19   22    Creatinine 1.30   1.30    Sodium 137   140    Potassium 4.3   4.7    Chloride 99   102    Calcium 9.3   9.4    Albumin 4.4   4.3    Total Bilirubin 0.5   0.4    Alkaline Phosphatase 61   49    AST (SGOT) 18   25    ALT (SGPT) 14   18    WBC   4.96    Hemoglobin   13.2    Hematocrit   40.3    Platelets   219    Total Cholesterol   172    Triglycerides   239    HDL Cholesterol   39    LDL Cholesterol    93    Hemoglobin A1C   6.00    PSA  0.915              Lab Results   Component Value Date    COVID19 Detected (C) 06/30/2022    FLU Negative 12/26/2021     FLU Negative 12/26/2021    INR 0.87 (L) 01/25/2022       Procedures        Assessment and Plan    Diagnoses and all orders for this visit:    1. Essential hypertension (Primary)  -     Comprehensive Metabolic Panel  -     losartan (COZAAR) 100 MG tablet; Take 1 tablet by mouth Daily.  Dispense: 90 tablet; Refill: 3    2. SSS (sick sinus syndrome)    3. CTEPH (chronic thromboembolic pulmonary hypertension)    4. Cardiac pacemaker    5. Nocturnal hypoxia    6. Chronic respiratory failure with hypoxia    7. Pre-diabetes  -     Comprehensive Metabolic Panel  -     Hemoglobin A1c    8. Other specified hypothyroidism  -     TSH      HTN:  -BP at goal  -labs today  -will continue losartan      Medications Discontinued During This Encounter   Medication Reason    cefdinir (OMNICEF) 300 MG capsule *Therapy completed    oxyCODONE-acetaminophen (PERCOCET) 5-325 MG per tablet *Therapy completed    Tiotropium Bromide Monohydrate (Spiriva Respimat) 1.25 MCG/ACT aerosol solution inhaler *Therapy completed    losartan (COZAAR) 100 MG tablet Reorder          Follow Up   Return in about 6 months (around 6/11/2024) for Hypertension.  Patient was given instructions and counseling regarding his condition or for health maintenance advice. Please see specific information pulled into the AVS if appropriate.       Roberto Carlos Jimenez MD  12/11/23  17:55 EST

## 2023-12-12 ENCOUNTER — OFFICE VISIT (OUTPATIENT)
Dept: PULMONOLOGY | Facility: CLINIC | Age: 78
End: 2023-12-12
Payer: MEDICARE

## 2023-12-12 VITALS
DIASTOLIC BLOOD PRESSURE: 75 MMHG | RESPIRATION RATE: 18 BRPM | OXYGEN SATURATION: 98 % | HEIGHT: 74 IN | SYSTOLIC BLOOD PRESSURE: 137 MMHG | TEMPERATURE: 97.3 F | HEART RATE: 66 BPM | BODY MASS INDEX: 26.82 KG/M2 | WEIGHT: 209 LBS

## 2023-12-12 DIAGNOSIS — R06.02 SHORTNESS OF BREATH: ICD-10-CM

## 2023-12-12 DIAGNOSIS — G47.34 NOCTURNAL HYPOXIA: Primary | ICD-10-CM

## 2023-12-12 NOTE — PROGRESS NOTES
"Chief Complaint  Follow-up (5 Months) and Chronic respiratory failure with hypoxia    Subjective        Elmer Garvin presents to [facility]  History of Present Illness  Patient here for follow-up  Still with some cough  Still with some shortness of breath  Overall doing well  Objective   Vital Signs:  /75 (BP Location: Left arm, Patient Position: Sitting, Cuff Size: Adult)   Pulse 66   Temp 97.3 °F (36.3 °C) (Temporal)   Resp 18   Ht 188 cm (74\")   Wt 94.8 kg (209 lb)   SpO2 98% Comment: Room air  BMI 26.83 kg/m²   Estimated body mass index is 26.83 kg/m² as calculated from the following:    Height as of this encounter: 188 cm (74\").    Weight as of this encounter: 94.8 kg (209 lb).               Physical Exam   Vital Signs Reviewed  General WDWN, Alert, NAD.    Chest:  good aeration, clear to auscultation bilaterally, tympanic to percussion bilaterally, no work of breathing noted  CV: RRR, no MGR, .  EXT:  no clubbing, no cyanosis, no edema, no joint tenderness  Neuro:  A&Ox3, CN grossly intact, no focal deficits.  Skin: No rashes or lesions noted  Result Review :                   Assessment and Plan   Diagnoses and all orders for this visit:    1. Nocturnal hypoxia (Primary)    2. Shortness of breath    Other orders  -     Pneumococcal Conjugate Vaccine 20-Valent (PCV20)    Plan  Nocturnal oxygen  Overall doing well  Continue as needed albuterol  Does have some chronic lung disease and some scarring after having COVID and recovering  Encouraged RSV vaccine       Follow Up   Return in about 6 months (around 6/12/2024).  Patient was given instructions and counseling regarding his condition or for health maintenance advice. Please see specific information pulled into the AVS if appropriate.         "

## 2023-12-15 DIAGNOSIS — I10 ESSENTIAL HYPERTENSION: ICD-10-CM

## 2023-12-15 RX ORDER — LOSARTAN POTASSIUM 100 MG/1
100 TABLET ORAL DAILY
Qty: 90 TABLET | Refills: 3 | Status: SHIPPED | OUTPATIENT
Start: 2023-12-15

## 2024-01-15 RX ORDER — LEVOTHYROXINE SODIUM 100 MCG
100 TABLET ORAL
Qty: 90 TABLET | Refills: 3 | Status: SHIPPED | OUTPATIENT
Start: 2024-01-15

## 2024-02-01 ENCOUNTER — HOSPITAL ENCOUNTER (EMERGENCY)
Facility: HOSPITAL | Age: 79
Discharge: HOME | End: 2024-02-01
Attending: EMERGENCY MEDICINE
Payer: MEDICARE

## 2024-02-01 VITALS
HEART RATE: 72 BPM | TEMPERATURE: 97.5 F | SYSTOLIC BLOOD PRESSURE: 109 MMHG | RESPIRATION RATE: 16 BRPM | DIASTOLIC BLOOD PRESSURE: 68 MMHG | OXYGEN SATURATION: 100 %

## 2024-02-01 DIAGNOSIS — T16.2XXA FOREIGN BODY OF LEFT EAR, INITIAL ENCOUNTER: Primary | ICD-10-CM

## 2024-02-01 PROCEDURE — 99282 EMERGENCY DEPT VISIT SF MDM: CPT

## 2024-02-01 NOTE — ED PROVIDER NOTES
Time: 1:10 AM EST  Date of encounter:  2/1/2024  Independent Historian/Clinical History and Information was obtained by:   Patient    History is limited by: N/A    Chief Complaint: Foreign body in left ear canal        History of Present Illness:  Patient is a 78 y.o. year old male who presents to the emergency department for evaluation of piece of his hearing aid broke off and stuck in his left ear canal tonight.    He was in his otherwise normal state of health and denies any recent drainage from the ears or fever or chills or cough.        HPI    Patient Care Team  Primary Care Provider: Roberto Carlos Jimenez MD    Past Medical History:     Allergies   Allergen Reactions    Latex Other (See Comments)     Sinus congestion, pt states has never had a reaction     Past Medical History:   Diagnosis Date    Allergies     Arthritis 01/17/2022    Asthma shortness of breath, probably from pneumonia    Asthma 01/19/2018    Broken finger 09/02/2023    Cardiac pacemaker     GERD (gastroesophageal reflux disease)     HL (hearing loss)     Hyperlipidemia     Hypertension     Hypothyroidism     Lyme disease     Pneumonia dec 27,2020    Pulmonary embolism Feb 2022    Shortness of breath     SSS (sick sinus syndrome)      Past Surgical History:   Procedure Laterality Date    CARDIAC CATHETERIZATION N/A 07/25/2019    Procedure: Coronary angiography;  Surgeon: Wilton Brown MD;  Location: [facility] INVASIVE LOCATION;  Service: Cardiovascular    CARDIAC CATHETERIZATION N/A 07/25/2019    Procedure: Left heart cath;  Surgeon: Wilton Brown MD;  Location: [facility] CATH INVASIVE LOCATION;  Service: Cardiovascular    CARDIAC CATHETERIZATION N/A 07/25/2019    Procedure: Left ventriculography;  Surgeon: Wilton Brown MD;  Location: [facility] CATH INVASIVE LOCATION;  Service: Cardiovascular    CARDIAC CATHETERIZATION N/A 07/25/2019    Procedure: Right heart cath;  Surgeon: Wilton Brown MD;  Location: [facility] INVASIVE LOCATION;   Service: Cardiovascular    CARDIAC CATHETERIZATION Right 01/26/2022    Procedure: Percutaneous Manual Thrombectomy;  Surgeon: Maninder Penn MD;  Location: [facility] CATH INVASIVE LOCATION;  Service: Cardiovascular;  Laterality: Right;    CATARACT EXTRACTION      COLONOSCOPY  2019    COLONOSCOPY N/A 10/13/2022    Procedure: COLONOSCOPY FOR SCREENING;  Surgeon: Deng Monteiro MD;  Location: [facility] ENDOSCOPY;  Service: Gastroenterology;  Laterality: N/A;  DIVERTICULOSIS, HEMORRHOIDS    FRACTURE SURGERY  9/02/2023    INSERT / REPLACE / REMOVE PACEMAKER  08/2014    INTERVENTIONAL RADIOLOGY PROCEDURE Right 01/26/2022    Procedure: Pulmonary Angiogram;  Surgeon: Maninder Penn MD;  Location: [facility] CATH INVASIVE LOCATION;  Service: Cardiovascular;  Laterality: Right;    PACEMAKER IMPLANTATION      PULMONARY EMBOLISM SURGERY  01/2022    SKIN CANCER EXCISION N/A 2022    NOSE     Family History   Problem Relation Age of Onset    Cancer Mother     Heart disease Father     Cancer Father     Heart disease Sister         fatal heart attack    Sudden death Sister     Heart disease Brother         fatal heart attck    Cancer Paternal Grandfather     Colon cancer Neg Hx     Malig Hyperthermia Neg Hx        Home Medications:  Prior to Admission medications    Medication Sig Start Date End Date Taking? Authorizing Provider   albuterol sulfate  (90 Base) MCG/ACT inhaler Inhale 2 puffs Every 4 (Four) Hours As Needed for Wheezing. 1/24/22   Celia Hampton APRN   brimonidine (ALPHAGAN) 0.2 % ophthalmic solution 1 drop 2 (Two) Times a Day.    Ragini Villavicencio MD   latanoprost (XALATAN) 0.005 % ophthalmic solution Administer 1 drop to both eyes Daily. 2/9/22   Ragini Villavicencio MD   losartan (COZAAR) 100 MG tablet Take 1 tablet by mouth every day 12/15/23   Roberto Carlos Jimenez MD   Melatonin 10 MG tablet Take 1 tablet by mouth Every Night.    Ragini Villavicencio MD   Omega-3 Fatty Acids (fish oil) 1000 MG  capsule capsule Take  by mouth Daily With Breakfast.    Provider, MD Ragini   sulindac (CLINORIL) 150 MG tablet Take 1 tablet by mouth 2 (two) times a day.  - take with food 5/15/23   Roberto Carlos Jimenez MD   Synthroid 100 MCG tablet Take 1 tablet by mouth Every Morning. 1/15/24   Roberto Carlos Jimenez MD        Social History:   Social History     Tobacco Use    Smoking status: Former     Packs/day: 0.25     Years: 12.00     Additional pack years: 0.00     Total pack years: 3.00     Types: Cigarettes, Pipe     Start date: 1964     Quit date: 1976     Years since quittin.1    Smokeless tobacco: Never    Tobacco comments:     CAFFEINE USE 3 CUPS COFFEE DAILY   Vaping Use    Vaping Use: Never used   Substance Use Topics    Alcohol use: No    Drug use: Never         Review of Systems:  Review of Systems   I performed a 10 point review of systems which was all negative, except for the positives found in the HPI above.      Physical Exam:  /68 (BP Location: Right arm, Patient Position: Sitting)   Pulse 72   Temp 97.5 °F (36.4 °C) (Oral)   Resp 16   SpO2 100%       Physical Exam   General: Awake alert and in no obvious distress    HEENT: Head normocephalic atraumatic, eyes PERRLA EOMI, nose normal, oropharynx normal.  Left ear canal shows plastic foreign body present but no drainage or signs of infection.    Neck: Supple full range of motion, no meningismus, no lymphadenopathy    Heart: Regular rate and rhythm, no murmurs or rubs, 2+ radial pulses bilaterally    Lungs: Clear to auscultation bilaterally without wheezes or crackles, no respiratory distress    Abdomen: Soft, nontender, nondistended, no rebound or guarding    Skin: Warm, dry, no rash    Musculoskeletal: Normal range of motion, no lower extremity edema    Neurologic: Oriented x3, no motor deficits no sensory deficits    Psychiatric: Mood appears stable, no psychosis          Procedures:  Foreign Body Removal - Embedded    Date/Time:  2/1/2024 1:13 AM    Performed by: Edd Grossman MD  Authorized by: Edd Grossman MD    Consent:     Consent obtained:  Verbal    Consent given by:  Patient    Risks, benefits, and alternatives were discussed: yes      Risks discussed:  Pain    Alternatives discussed:  No treatment  Universal protocol:     Patient identity confirmed:  Verbally with patient and arm band  Location:     Location:  Ear    Ear location:  L ear (Left ear canal foreign body)  Pre-procedure details:     Imaging:  None  Anesthesia:     Anesthesia method:  None  Procedure type:     Procedure complexity:  Simple  Procedure details:     Localization method:  Visualized    Removal mechanism:  Forceps    Foreign bodies recovered:  1    Intact foreign body removal: yes    Post-procedure details:     Neurovascular status: intact      Confirmation:  No additional foreign bodies on visualization    Procedure completion:  Tolerated well, no immediate complications        Medical Decision Making:      Comorbidities that affect care:    Hearing aids, presbycusis    External Notes reviewed:    None      The following orders were placed and all results were independently analyzed by me:  Orders Placed This Encounter   Procedures    Foreign Body Removal       Medications Given in the Emergency Department:  Medications - No data to display     ED Course:         Labs:    Lab Results (last 24 hours)       ** No results found for the last 24 hours. **             Imaging:    No Radiology Exams Resulted Within Past 24 Hours      Differential Diagnosis and Discussion:    Ear Pain: Differential diagnosis includes but is not limited to this externa, otitis media, foreign body, bullous myringitis, furuncles, herpes zoster, mastoiditis, trauma, and tumors        MDM           This patient is a 78-year-old male with hearing aids presenting with a piece of his hearing aid broke off and now retained in his left ear canal and he was unable to get it out.    I can  visualize it with otoscopy in the left ear canal.    I was able to manually extract the foreign body from his left ear canal under otoscopy and patient tolerated well and no bleeding or complications noted.    He now looks stable to follow-up with his primary care physician.              Patient Care Considerations:          Consultants/Shared Management Plan:        Social Determinants of Health:    Patient is independent, reliable, and has access to care.       Disposition and Care Coordination:    Discharged: The patient is suitable and stable for discharge with no need for consideration of admission.    I have explained the patient´s condition, diagnoses and treatment plan based on the information available to me at this time. I have answered questions and addressed any concerns. The patient has a good  understanding of the patient´s diagnosis, condition, and treatment plan as can be expected at this point. The vital signs have been stable. The patient´s condition is stable and appropriate for discharge from the emergency department.      The patient will pursue further outpatient evaluation with the primary care physician or other designated or consulting physician as outlined in the discharge instructions. They are agreeable to this plan of care and follow-up instructions have been explained in detail. The patient has received these instructions in written format and have expressed an understanding of the discharge instructions. The patient is aware that any significant change in condition or worsening of symptoms should prompt an immediate return to this or the closest emergency department or call to 911.  I have explained discharge medications and the need for follow up with the patient/caretakers. This was also printed in the discharge instructions. Patient was discharged with the following medications and follow up:      Medication List      No changes were made to your prescriptions during this visit.       Roberto Carlos Jimenez MD  [address] [facility]  [address]  [place] [address]  [phone]    Call in 2 days  for a follow-up appointment       Final diagnoses:   Foreign body of left ear, initial encounter        ED Disposition       ED Disposition   Discharge    Condition   Stable    Comment   --               This medical record created using voice recognition software.             Edd Grossman MD  02/01/24 0114

## 2024-03-13 ENCOUNTER — TELEPHONE (OUTPATIENT)
Dept: CARDIOLOGY | Facility: CLINIC | Age: 79
End: 2024-03-13
Payer: MEDICARE

## 2024-03-13 NOTE — TELEPHONE ENCOUNTER
Procedure: Dental Work    Med Directive: NA    PMH: SSS-pacemaker, PE, HTN, HLD    Last Seen: 9/1/23

## 2024-05-13 DIAGNOSIS — M19.90 ARTHRITIS: ICD-10-CM

## 2024-05-13 RX ORDER — SULINDAC 150 MG/1
TABLET ORAL
Qty: 180 TABLET | Refills: 2 | Status: SHIPPED | OUTPATIENT
Start: 2024-05-13

## 2024-06-13 NOTE — PROGRESS NOTES
Chief Complaint  Hypertension and Hyperlipidemia    Subjective          Elmer Garvin presents to [facility]  History of Present Illness    Had cancerous spot on right ear removed in interim.      For last 5 weeks, reports episodic weakness of all 4 extremities.  Can be associated with muscle pain (cramping) and bone pain (bone pain, when present, mostly in lower bilateral shins near ankles).  Mr. Garvin reports a concern that it could be a sign of cancer.  No triggers, no discernible exacerbating or alleviating features.  Can happen as often as daily.    Also reports intermittent chest pain.  Physical activity does not trigger or worsen.  Reports had cath at [facility] last year that showed mild obstruction.  Has cardiology appointment in September.    PHQ-9 Depression Screening  Little interest or pleasure in doing things? 0-->not at all   Feeling down, depressed, or hopeless? 0-->not at all   Trouble falling or staying asleep, or sleeping too much?     Feeling tired or having little energy?     Poor appetite or overeating?     Feeling bad about yourself - or that you are a failure or have let yourself or your family down?     Trouble concentrating on things, such as reading the newspaper or watching television?     Moving or speaking so slowly that other people could have noticed? Or the opposite - being so fidgety or restless that you have been moving around a lot more than usual?     Thoughts that you would be better off dead, or of hurting yourself in some way?     PHQ-9 Total Score 0   If you checked off any problems, how difficult have these problems made it for you to do your work, take care of things at home, or get along with other people?       Current Outpatient Medications   Medication Instructions    albuterol sulfate  (90 Base) MCG/ACT inhaler 2 puffs, Inhalation, Every 4 Hours PRN    brimonidine (ALPHAGAN) 0.2 % ophthalmic solution 1 drop, 2  "Times Daily    latanoprost (XALATAN) 0.005 % ophthalmic solution 1 drop, Both Eyes, Daily    losartan (COZAAR) 100 mg, Oral, Daily    Melatonin 10 mg, Oral, Nightly    Omega-3 Fatty Acids (fish oil) 1000 MG capsule capsule Oral, Daily With Breakfast    sulindac (CLINORIL) 150 MG tablet Take 1 tablet by mouth two times a day - take with food    Synthroid 100 mcg, Oral, Every Early Morning       The following portions of the patient's history were reviewed and updated as appropriate: allergies, current medications, past family history, past medical history, past social history, past surgical history, and problem list.    Objective   Vital Signs:   /71 (BP Location: Right arm, Patient Position: Sitting, Cuff Size: Large Adult)   Pulse 64   Temp 97.7 °F (36.5 °C) (Temporal)   Ht 188 cm (74\")   Wt 95.3 kg (210 lb)   SpO2 97%   BMI 26.96 kg/m²     BP Readings from Last 3 Encounters:   06/17/24 107/71   02/01/24 109/68   12/12/23 137/75     Wt Readings from Last 3 Encounters:   06/17/24 95.3 kg (210 lb)   12/12/23 94.8 kg (209 lb)   12/11/23 96.2 kg (212 lb)      Physical Exam  Vitals reviewed.   Constitutional:       General: He is not in acute distress.     Appearance: Normal appearance. He is not ill-appearing, toxic-appearing or diaphoretic.   HENT:      Head: Normocephalic and atraumatic.      Right Ear: External ear normal.      Left Ear: External ear normal.   Eyes:      Conjunctiva/sclera: Conjunctivae normal.   Cardiovascular:      Rate and Rhythm: Normal rate and regular rhythm.      Pulses: Normal pulses.      Heart sounds: Normal heart sounds. No murmur heard.     No friction rub. No gallop.   Pulmonary:      Effort: Pulmonary effort is normal. No respiratory distress.      Breath sounds: Normal breath sounds. No stridor. No wheezing, rhonchi or rales.   Chest:      Chest wall: No tenderness.   Abdominal:      General: Abdomen is flat.      Palpations: Abdomen is soft. There is no mass.      " Tenderness: There is no abdominal tenderness.   Musculoskeletal:      Right lower leg: No edema.      Left lower leg: No edema.   Skin:     General: Skin is warm and dry.   Neurological:      General: No focal deficit present.      Mental Status: He is alert. Mental status is at baseline.   Psychiatric:         Mood and Affect: Mood normal.         Behavior: Behavior normal.         Thought Content: Thought content normal.         Judgment: Judgment normal.          Result Review :   The following data was reviewed by: Roberto Carlos Jimenez MD on 06/17/2024:  Common labs          9/7/2023    12:22 12/11/2023    16:34   Common Labs   Glucose 80  111    BUN 22  18    Creatinine 1.30  1.24    Sodium 140  137    Potassium 4.7  4.5    Chloride 102  101    Calcium 9.4  9.4    Albumin 4.3  4.2    Total Bilirubin 0.4  0.3    Alkaline Phosphatase 49  48    AST (SGOT) 25  23    ALT (SGPT) 18  19    WBC 4.96     Hemoglobin 13.2     Hematocrit 40.3     Platelets 219     Total Cholesterol 172     Triglycerides 239     HDL Cholesterol 39     LDL Cholesterol  93     Hemoglobin A1C 6.00  5.80             Lab Results   Component Value Date    COVID19 Detected (C) 06/30/2022    FLU Negative 12/26/2021    FLU Negative 12/26/2021    INR 0.87 (L) 01/25/2022       Procedures        Assessment and Plan    Diagnoses and all orders for this visit:    1. Essential hypertension (Primary)  -     Comprehensive Metabolic Panel    2. SSS (sick sinus syndrome)    3. Nocturnal hypoxia  -     CBC & Differential    4. CTEPH (chronic thromboembolic pulmonary hypertension)    5. Pre-diabetes  -     Comprehensive Metabolic Panel  -     Hemoglobin A1c    6. Chronic respiratory failure with hypoxia    7. Cardiac pacemaker    8. Other specified hypothyroidism  -     TSH    9. Mixed hyperlipidemia  -     Lipid Panel    10. Myalgia  -     CK    11. Chest pain, unspecified type    12. Limb weakness  -     CBC & Differential  -     TSH  -     CK      HTN:  -BP at  goal of < 130/80  -will conttinue losartan    Chest pain:  -cath last year at [facility]  -follows with cardiology  -I did recommend that he contact his cardiologist to update that he's been having chest pain, and to obtain an earlier appointment    Limb weakness:  -will check labs, including CK  -will consider EMG's    There are no discontinued medications.       Follow Up   Return in about 3 months (around 9/17/2024) for Medicare Wellness.  Patient was given instructions and counseling regarding his condition or for health maintenance advice. Please see specific information pulled into the AVS if appropriate.       Roberto Carlos Jimenez MD  06/17/24  12:41 EDT

## 2024-06-17 ENCOUNTER — OFFICE VISIT (OUTPATIENT)
Dept: INTERNAL MEDICINE | Facility: CLINIC | Age: 79
End: 2024-06-17
Payer: MEDICARE

## 2024-06-17 VITALS
HEIGHT: 74 IN | DIASTOLIC BLOOD PRESSURE: 71 MMHG | WEIGHT: 210 LBS | SYSTOLIC BLOOD PRESSURE: 107 MMHG | HEART RATE: 64 BPM | BODY MASS INDEX: 26.95 KG/M2 | TEMPERATURE: 97.7 F | OXYGEN SATURATION: 97 %

## 2024-06-17 DIAGNOSIS — I49.5 SSS (SICK SINUS SYNDROME): ICD-10-CM

## 2024-06-17 DIAGNOSIS — J96.11 CHRONIC RESPIRATORY FAILURE WITH HYPOXIA: ICD-10-CM

## 2024-06-17 DIAGNOSIS — I10 ESSENTIAL HYPERTENSION: Primary | ICD-10-CM

## 2024-06-17 DIAGNOSIS — R07.9 CHEST PAIN, UNSPECIFIED TYPE: ICD-10-CM

## 2024-06-17 DIAGNOSIS — Z95.0 CARDIAC PACEMAKER: ICD-10-CM

## 2024-06-17 DIAGNOSIS — M79.10 MYALGIA: ICD-10-CM

## 2024-06-17 DIAGNOSIS — R73.03 PRE-DIABETES: ICD-10-CM

## 2024-06-17 DIAGNOSIS — E78.2 MIXED HYPERLIPIDEMIA: ICD-10-CM

## 2024-06-17 DIAGNOSIS — I27.24 CTEPH (CHRONIC THROMBOEMBOLIC PULMONARY HYPERTENSION): ICD-10-CM

## 2024-06-17 DIAGNOSIS — G47.34 NOCTURNAL HYPOXIA: ICD-10-CM

## 2024-06-17 DIAGNOSIS — R29.898 LIMB WEAKNESS: ICD-10-CM

## 2024-06-17 DIAGNOSIS — E03.8 OTHER SPECIFIED HYPOTHYROIDISM: ICD-10-CM

## 2024-06-17 LAB
ALBUMIN SERPL-MCNC: 4.1 G/DL (ref 3.5–5.2)
ALBUMIN/GLOB SERPL: 1.6 G/DL
ALP SERPL-CCNC: 49 U/L (ref 39–117)
ALT SERPL W P-5'-P-CCNC: 16 U/L (ref 1–41)
ANION GAP SERPL CALCULATED.3IONS-SCNC: 9.7 MMOL/L (ref 5–15)
AST SERPL-CCNC: 13 U/L (ref 1–40)
BASOPHILS # BLD AUTO: 0.05 10*3/MM3 (ref 0–0.2)
BASOPHILS NFR BLD AUTO: 0.8 % (ref 0–1.5)
BILIRUB SERPL-MCNC: 0.3 MG/DL (ref 0–1.2)
BUN SERPL-MCNC: 18 MG/DL (ref 8–23)
BUN/CREAT SERPL: 14.6 (ref 7–25)
CALCIUM SPEC-SCNC: 9.5 MG/DL (ref 8.6–10.5)
CHLORIDE SERPL-SCNC: 102 MMOL/L (ref 98–107)
CHOLEST SERPL-MCNC: 185 MG/DL (ref 0–200)
CO2 SERPL-SCNC: 27.3 MMOL/L (ref 22–29)
CREAT SERPL-MCNC: 1.23 MG/DL (ref 0.76–1.27)
DEPRECATED RDW RBC AUTO: 40.9 FL (ref 37–54)
EGFRCR SERPLBLD CKD-EPI 2021: 60.1 ML/MIN/1.73
EOSINOPHIL # BLD AUTO: 0.18 10*3/MM3 (ref 0–0.4)
EOSINOPHIL NFR BLD AUTO: 2.8 % (ref 0.3–6.2)
ERYTHROCYTE [DISTWIDTH] IN BLOOD BY AUTOMATED COUNT: 13.1 % (ref 12.3–15.4)
GLOBULIN UR ELPH-MCNC: 2.5 GM/DL
GLUCOSE SERPL-MCNC: 108 MG/DL (ref 65–99)
HBA1C MFR BLD: 5.9 % (ref 4.8–5.6)
HCT VFR BLD AUTO: 42.9 % (ref 37.5–51)
HDLC SERPL-MCNC: 39 MG/DL (ref 40–60)
HGB BLD-MCNC: 13.7 G/DL (ref 13–17.7)
IMM GRANULOCYTES # BLD AUTO: 0.04 10*3/MM3 (ref 0–0.05)
IMM GRANULOCYTES NFR BLD AUTO: 0.6 % (ref 0–0.5)
LDLC SERPL CALC-MCNC: 114 MG/DL (ref 0–100)
LDLC/HDLC SERPL: 2.8 {RATIO}
LYMPHOCYTES # BLD AUTO: 1.71 10*3/MM3 (ref 0.7–3.1)
LYMPHOCYTES NFR BLD AUTO: 26.2 % (ref 19.6–45.3)
MCH RBC QN AUTO: 27.6 PG (ref 26.6–33)
MCHC RBC AUTO-ENTMCNC: 31.9 G/DL (ref 31.5–35.7)
MCV RBC AUTO: 86.5 FL (ref 79–97)
MONOCYTES # BLD AUTO: 0.55 10*3/MM3 (ref 0.1–0.9)
MONOCYTES NFR BLD AUTO: 8.4 % (ref 5–12)
NEUTROPHILS NFR BLD AUTO: 3.99 10*3/MM3 (ref 1.7–7)
NEUTROPHILS NFR BLD AUTO: 61.2 % (ref 42.7–76)
NRBC BLD AUTO-RTO: 0 /100 WBC (ref 0–0.2)
PLATELET # BLD AUTO: 234 10*3/MM3 (ref 140–450)
PMV BLD AUTO: 10.6 FL (ref 6–12)
POTASSIUM SERPL-SCNC: 4.9 MMOL/L (ref 3.5–5.2)
PROT SERPL-MCNC: 6.6 G/DL (ref 6–8.5)
RBC # BLD AUTO: 4.96 10*6/MM3 (ref 4.14–5.8)
SODIUM SERPL-SCNC: 139 MMOL/L (ref 136–145)
TRIGL SERPL-MCNC: 184 MG/DL (ref 0–150)
TSH SERPL DL<=0.05 MIU/L-ACNC: 3.2 UIU/ML (ref 0.27–4.2)
VLDLC SERPL-MCNC: 32 MG/DL (ref 5–40)
WBC NRBC COR # BLD AUTO: 6.52 10*3/MM3 (ref 3.4–10.8)

## 2024-06-17 PROCEDURE — 83036 HEMOGLOBIN GLYCOSYLATED A1C: CPT | Performed by: STUDENT IN AN ORGANIZED HEALTH CARE EDUCATION/TRAINING PROGRAM

## 2024-06-17 PROCEDURE — 84443 ASSAY THYROID STIM HORMONE: CPT | Performed by: STUDENT IN AN ORGANIZED HEALTH CARE EDUCATION/TRAINING PROGRAM

## 2024-06-17 PROCEDURE — 3078F DIAST BP <80 MM HG: CPT | Performed by: STUDENT IN AN ORGANIZED HEALTH CARE EDUCATION/TRAINING PROGRAM

## 2024-06-17 PROCEDURE — G2211 COMPLEX E/M VISIT ADD ON: HCPCS | Performed by: STUDENT IN AN ORGANIZED HEALTH CARE EDUCATION/TRAINING PROGRAM

## 2024-06-17 PROCEDURE — 80061 LIPID PANEL: CPT | Performed by: STUDENT IN AN ORGANIZED HEALTH CARE EDUCATION/TRAINING PROGRAM

## 2024-06-17 PROCEDURE — 80053 COMPREHEN METABOLIC PANEL: CPT | Performed by: STUDENT IN AN ORGANIZED HEALTH CARE EDUCATION/TRAINING PROGRAM

## 2024-06-17 PROCEDURE — 85025 COMPLETE CBC W/AUTO DIFF WBC: CPT | Performed by: STUDENT IN AN ORGANIZED HEALTH CARE EDUCATION/TRAINING PROGRAM

## 2024-06-17 PROCEDURE — 1125F AMNT PAIN NOTED PAIN PRSNT: CPT | Performed by: STUDENT IN AN ORGANIZED HEALTH CARE EDUCATION/TRAINING PROGRAM

## 2024-06-17 PROCEDURE — 99214 OFFICE O/P EST MOD 30 MIN: CPT | Performed by: STUDENT IN AN ORGANIZED HEALTH CARE EDUCATION/TRAINING PROGRAM

## 2024-06-17 PROCEDURE — 3074F SYST BP LT 130 MM HG: CPT | Performed by: STUDENT IN AN ORGANIZED HEALTH CARE EDUCATION/TRAINING PROGRAM

## 2024-07-01 ENCOUNTER — TELEPHONE (OUTPATIENT)
Dept: CARDIOLOGY | Facility: CLINIC | Age: 79
End: 2024-07-01
Payer: MEDICARE

## 2024-07-01 NOTE — TELEPHONE ENCOUNTER
"Received message from office that patient's wife had concerns regarding patient's pacemaker.  Wife reported pacemaker was \"vibrating.\"  I requested patient to send download.  Download received.  There were no new episodes and lead measurements are WNL.    I called and spoke with wife.  She stated patient did not feel the vibration, she is the one who felt it when they were laying in bed. Reports there were no cell phones in bed that would vibrate.  She has read that device will vibrate when the battery needs to be replaced.  I explained the patient has at least 4 years on his battery and his leads look ok.  Advised it is very uncommon for someone other than the patient to feel activity from the pacemaker.     Patient has an appt with Dr. Shah 9/18/24, new to him, former Dr. Penn patient.  I discussed with Dr. Shah who recommended an in office device check, next available with Kaylynn.             "

## 2024-07-09 ENCOUNTER — LAB (OUTPATIENT)
Dept: LAB | Facility: HOSPITAL | Age: 79
End: 2024-07-09
Payer: MEDICARE

## 2024-07-09 DIAGNOSIS — Z12.5 PROSTATE CANCER SCREENING: Primary | ICD-10-CM

## 2024-07-09 DIAGNOSIS — Z12.5 PROSTATE CANCER SCREENING: ICD-10-CM

## 2024-07-09 LAB
CK SERPL-CCNC: 100 U/L (ref 20–200)
PSA SERPL-MCNC: 1.01 NG/ML (ref 0–4)

## 2024-07-09 PROCEDURE — 82550 ASSAY OF CK (CPK): CPT | Performed by: STUDENT IN AN ORGANIZED HEALTH CARE EDUCATION/TRAINING PROGRAM

## 2024-07-09 PROCEDURE — G0103 PSA SCREENING: HCPCS

## 2024-07-11 ENCOUNTER — OFFICE VISIT (OUTPATIENT)
Dept: UROLOGY | Facility: CLINIC | Age: 79
End: 2024-07-11
Payer: MEDICARE

## 2024-07-11 VITALS
DIASTOLIC BLOOD PRESSURE: 62 MMHG | SYSTOLIC BLOOD PRESSURE: 112 MMHG | WEIGHT: 210 LBS | HEIGHT: 74 IN | BODY MASS INDEX: 26.95 KG/M2

## 2024-07-11 DIAGNOSIS — N40.0 BENIGN PROSTATIC HYPERPLASIA WITHOUT LOWER URINARY TRACT SYMPTOMS: Primary | ICD-10-CM

## 2024-07-11 DIAGNOSIS — Z12.5 PROSTATE CANCER SCREENING: ICD-10-CM
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
## 2024-07-12 LAB — URINE VOLUME: 0

## 2024-07-17 ENCOUNTER — CLINICAL SUPPORT NO REQUIREMENTS (OUTPATIENT)
Dept: CARDIOLOGY | Facility: CLINIC | Age: 79
End: 2024-07-17
Payer: MEDICARE

## 2024-07-17 DIAGNOSIS — I49.5 SSS (SICK SINUS SYNDROME): Primary | ICD-10-CM

## 2024-07-17 DIAGNOSIS — Z95.0 CARDIAC PACEMAKER: ICD-10-CM

## 2024-07-17 NOTE — PROGRESS NOTES
Normal Dual Chamber Pacemaker Device Interrogation and Device Testing.  Normal evaluation of device function and lead measurements.  No optimization was needed of parameters or maximization of device longevity.  Patient is on manual downloads and I have requested monthly downloads for battery life, episode observation.

## 2024-07-23 ENCOUNTER — OFFICE VISIT (OUTPATIENT)
Dept: PULMONOLOGY | Facility: CLINIC | Age: 79
End: 2024-07-23
Payer: MEDICARE

## 2024-07-23 VITALS
BODY MASS INDEX: 27.77 KG/M2 | OXYGEN SATURATION: 99 % | DIASTOLIC BLOOD PRESSURE: 83 MMHG | SYSTOLIC BLOOD PRESSURE: 143 MMHG | RESPIRATION RATE: 16 BRPM | HEIGHT: 74 IN | HEART RATE: 75 BPM | WEIGHT: 216.4 LBS | TEMPERATURE: 97.1 F

## 2024-07-23 DIAGNOSIS — R06.02 SOB (SHORTNESS OF BREATH): ICD-10-CM

## 2024-07-23 DIAGNOSIS — G47.34 NOCTURNAL HYPOXIA: Primary | ICD-10-CM

## 2024-07-23 DIAGNOSIS — J98.4 RESTRICTIVE LUNG DISEASE: ICD-10-CM

## 2024-07-23 PROCEDURE — 1159F MED LIST DOCD IN RCRD: CPT | Performed by: INTERNAL MEDICINE

## 2024-07-23 PROCEDURE — 1160F RVW MEDS BY RX/DR IN RCRD: CPT | Performed by: INTERNAL MEDICINE

## 2024-07-23 PROCEDURE — 3079F DIAST BP 80-89 MM HG: CPT | Performed by: INTERNAL MEDICINE

## 2024-07-23 PROCEDURE — 99213 OFFICE O/P EST LOW 20 MIN: CPT | Performed by: INTERNAL MEDICINE

## 2024-07-23 PROCEDURE — 3077F SYST BP >= 140 MM HG: CPT | Performed by: INTERNAL MEDICINE

## 2024-07-23 RX ORDER — FLUTICASONE FUROATE, UMECLIDINIUM BROMIDE AND VILANTEROL TRIFENATATE 100; 62.5; 25 UG/1; UG/1; UG/1
1 POWDER RESPIRATORY (INHALATION)
Qty: 1 EACH | Refills: 0 | COMMUNITY
Start: 2024-07-23 | End: 2024-07-24

## 2024-07-23 NOTE — PROGRESS NOTES
"Chief Complaint  Cough, Shortness of Breath, Follow-up, and Nocturnal Hypoxia (Pt here for 6 month follow up)    Subjective        Elmer Garvin presents to [facility] PULMONARY & CRITICAL CARE MEDICINE  History of Present Illness  Pleasant male here for follow-up  He is complaining of some shortness of breath  Has had dry cough  Does have some occasional chest tightness  Objective   Vital Signs:  /83 (BP Location: Left arm, Patient Position: Sitting, Cuff Size: Adult)   Pulse 75   Temp 97.1 °F (36.2 °C) (Temporal)   Resp 16   Ht 188 cm (74\")   Wt 98.2 kg (216 lb 6.4 oz)   SpO2 99% Comment: room air/Nocturnal O2/ 2 liters  BMI 27.78 kg/m²   Estimated body mass index is 27.78 kg/m² as calculated from the following:    Height as of this encounter: 188 cm (74\").    Weight as of this encounter: 98.2 kg (216 lb 6.4 oz).               Physical Exam   Vital Signs Reviewed  General WDWN, Alert, NAD.    Chest:  good aeration, clear to auscultation bilaterally, tympanic to percussion bilaterally, no work of breathing noted  CV: RRR, no MGR, .  EXT:  no clubbing, no cyanosis, no edema, no joint tenderness  Neuro:  A&Ox3, CN grossly intact, no focal deficits.  Skin: No rashes or lesions noted  Result Review :                     Assessment and Plan     Diagnoses and all orders for this visit:    1. Nocturnal hypoxia (Primary)    2. SOB (shortness of breath)  -     Bronchial Challenge With Methacholine; Future    3. Restrictive lung disease    Other orders  -     Fluticasone-Umeclidin-Vilant (Trelegy Ellipta) 100-62.5-25 MCG/ACT inhaler; Inhale 1 puff Daily for 1 day.  Dispense: 1 each; Refill: 0    Plan  Suspect patient may have some underlying reactive airway disease  Will give patient a sample for Trelegy 100 today in the office  Continue with as needed albuterol  Will obtain methacholine challenge study  Continue nocturnal oxygen         Follow Up     Return in about 6 weeks (around " 9/3/2024).  Patient was given instructions and counseling regarding his condition or for health maintenance advice. Please see specific information pulled into the AVS if appropriate.

## 2024-09-03 ENCOUNTER — OFFICE VISIT (OUTPATIENT)
Dept: PULMONOLOGY | Facility: CLINIC | Age: 79
End: 2024-09-03
Payer: MEDICARE

## 2024-09-03 VITALS
HEIGHT: 74 IN | SYSTOLIC BLOOD PRESSURE: 109 MMHG | RESPIRATION RATE: 18 BRPM | OXYGEN SATURATION: 99 % | BODY MASS INDEX: 27.46 KG/M2 | DIASTOLIC BLOOD PRESSURE: 73 MMHG | WEIGHT: 214 LBS | HEART RATE: 69 BPM

## 2024-09-03 DIAGNOSIS — J32.0 CHRONIC MAXILLARY SINUSITIS: ICD-10-CM

## 2024-09-03 DIAGNOSIS — J12.82 PNEUMONIA DUE TO COVID-19 VIRUS: ICD-10-CM

## 2024-09-03 DIAGNOSIS — R06.02 SHORTNESS OF BREATH: ICD-10-CM

## 2024-09-03 DIAGNOSIS — G47.34 NOCTURNAL HYPOXIA: ICD-10-CM

## 2024-09-03 DIAGNOSIS — J98.4 RESTRICTIVE LUNG DISEASE: Primary | ICD-10-CM

## 2024-09-03 DIAGNOSIS — U07.1 PNEUMONIA DUE TO COVID-19 VIRUS: ICD-10-CM

## 2024-09-03 PROCEDURE — 1159F MED LIST DOCD IN RCRD: CPT | Performed by: NURSE PRACTITIONER

## 2024-09-03 PROCEDURE — 3078F DIAST BP <80 MM HG: CPT | Performed by: NURSE PRACTITIONER

## 2024-09-03 PROCEDURE — 1160F RVW MEDS BY RX/DR IN RCRD: CPT | Performed by: NURSE PRACTITIONER

## 2024-09-03 PROCEDURE — 3074F SYST BP LT 130 MM HG: CPT | Performed by: NURSE PRACTITIONER

## 2024-09-03 PROCEDURE — 99214 OFFICE O/P EST MOD 30 MIN: CPT | Performed by: NURSE PRACTITIONER

## 2024-09-03 RX ORDER — TIMOLOL MALEATE 5 MG/ML
SOLUTION/ DROPS OPHTHALMIC DAILY
COMMUNITY

## 2024-09-03 RX ORDER — ASPIRIN 81 MG/1
81 TABLET ORAL DAILY
COMMUNITY

## 2024-09-03 RX ORDER — ALBUTEROL SULFATE 90 UG/1
2 AEROSOL, METERED RESPIRATORY (INHALATION) EVERY 4 HOURS PRN
Qty: 1 G | Refills: 6 | Status: SHIPPED | OUTPATIENT
Start: 2024-09-03

## 2024-09-03 RX ORDER — NETARSUDIL AND LATANOPROST OPHTHALMIC SOLUTION, 0.02%/0.005% .2; .05 MG/ML; MG/ML
SOLUTION/ DROPS OPHTHALMIC; TOPICAL
COMMUNITY
Start: 2024-08-15

## 2024-09-03 NOTE — PROGRESS NOTES
Primary Care Provider  Roberto Carlos Jimenez MD   Referring Provider  No ref. provider found    Patient Complaint  Shortness of Breath (Constant, worsens with ambulation ), Follow-up (6 week), and Chest Tightness (Constant, worsens with ambulation and shortness of breath )      Subjective       History of Presenting Illness  Elmer Garvin is a pleasant 79 y.o. male who presents to [facility]ULMONARY & CRITICAL CARE MEDICINE for follow-up appointment.  Patient last saw Dr. Gong 7/23/2024.  He was provided with Trelegy 100 sample at last visit.  He was also ordered a bronchial challenge with methacholine. We will followup on that order today. Patient has a history of saddle PE with right heart strain January 26, 2022 as well as CTEPH.  Patient has a history of multifocal infectious pneumonia. He is a former smoker quit in 1976. He continues using O2 at night for sleep and naps for nocturnal hypoxia. His DME company is [employer]. He has albuterol inhaler as needed for shortness of air or wheeze but rarely use it.  Patient states he feels like he has difficulty breathing out of his nose and does a lot of mouth breathing.  He has previously seen ENT in 2021.  In addition patient also states he was getting allergy shots but after having COVID and was in the hospital he did not follow back up with resuming allergy shots.  Patient feels that some of his symptoms of shortness of air are related to possibly allergies.      Patient denies coughing, wheezing, headaches, chest pain, weight loss or hemoptysis. Patient denies fevers, chills and night sweats. Elmer Garvin is able to perform ADLs without difficulties.    I have personally reviewed the review of systems, past family, social, medical and surgical histories; and agree with their findings.      Review of Systems   Constitutional: Negative.    HENT: Negative.  Positive for sinus pressure.         Difficulty breathing out of his nose   Respiratory:  Negative.  Positive for shortness of breath.    Cardiovascular: Negative.    Musculoskeletal: Negative.    Neurological: Negative.    Psychiatric/Behavioral: Negative.           Family History   Problem Relation Age of Onset    Cancer Mother     Heart disease Father     Cancer Father     Heart disease Sister         fatal heart attack    Sudden death Sister     Heart disease Brother         fatal heart attck    Cancer Paternal Grandfather     Colon cancer Neg Hx     Malig Hyperthermia Neg Hx         Social History     Socioeconomic History    Marital status:    Tobacco Use    Smoking status: Former     Current packs/day: 0.00     Average packs/day: 0.3 packs/day for 12.0 years (3.0 ttl pk-yrs)     Types: Cigarettes, Pipe     Start date: 1964     Quit date: 1976     Years since quittin.7     Passive exposure: Past    Smokeless tobacco: Never    Tobacco comments:     CAFFEINE USE 3 CUPS COFFEE DAILY   Vaping Use    Vaping status: Never Used   Substance and Sexual Activity    Alcohol use: No    Drug use: Never    Sexual activity: Not Currently        Past Medical History:   Diagnosis Date    Allergies     Arthritis 2022    Asthma shortness of breath, probably from pneumonia    Asthma 2018    Broken finger 2023    Cardiac pacemaker     GERD (gastroesophageal reflux disease)     Glaucoma (increased eye pressure)     HL (hearing loss)     Hyperlipidemia     Hypertension     Hypothyroidism     Lyme disease     Pneumonia dec 27,2020    Pulmonary embolism 2022    Shortness of breath     SSS (sick sinus syndrome)         Immunization History   Administered Date(s) Administered    Fluzone High-Dose 65+YRS 2023    Fluzone High-Dose 65+yrs 11/15/2022    OPV 1996    Pneumococcal Conjugate 20-Valent (PCV20) 2023    Td (TDVAX) 1996    Tdap 2023    Yellow Fever 1996       Allergies   Allergen Reactions    Latex Other (See Comments)     Sinus congestion, pt  "states has never had a reaction          Current Outpatient Medications:     albuterol sulfate  (90 Base) MCG/ACT inhaler, Inhale 2 puffs Every 4 (Four) Hours As Needed for Wheezing., Disp: 1 g, Rfl: 6    aspirin 81 MG EC tablet, Take 1 tablet by mouth Daily., Disp: , Rfl:     latanoprost (XALATAN) 0.005 % ophthalmic solution, Administer 1 drop to both eyes Daily., Disp: , Rfl:     losartan (COZAAR) 100 MG tablet, Take 1 tablet by mouth every day, Disp: 90 tablet, Rfl: 3    Melatonin 10 MG tablet, Take 1 tablet by mouth Every Night., Disp: , Rfl:     Omega-3 Fatty Acids (fish oil) 1000 MG capsule capsule, Take  by mouth Daily With Breakfast., Disp: , Rfl:     Rocklatan 0.02-0.005 % solution, , Disp: , Rfl:     sulindac (CLINORIL) 150 MG tablet, Take 1 tablet by mouth two times a day - take with food, Disp: 180 tablet, Rfl: 2    Synthroid 100 MCG tablet, Take 1 tablet by mouth Every Morning., Disp: 90 tablet, Rfl: 3    brimonidine (ALPHAGAN) 0.2 % ophthalmic solution, 1 drop 2 (Two) Times a Day. (Patient not taking: Reported on 9/3/2024), Disp: , Rfl:     timolol (TIMOPTIC) 0.5 % ophthalmic solution, Apply  to eye(s) as directed by provider Daily. (Patient not taking: Reported on 9/3/2024), Disp: , Rfl:          Vital Signs   /73 (BP Location: Left arm, Patient Position: Sitting, Cuff Size: Large Adult)   Pulse 69   Resp 18   Ht 188 cm (74\")   Wt 97.1 kg (214 lb)   SpO2 99% Comment: Room air. Uses 2L when sleeping  BMI 27.48 kg/m²       Objective     Physical Exam  Vitals reviewed.   Constitutional:       General: He is not in acute distress.     Appearance: Normal appearance. He is not ill-appearing.   HENT:      Head: Normocephalic and atraumatic.      Nose: Congestion present.      Mouth/Throat:      Mouth: Mucous membranes are moist.      Pharynx: Oropharynx is clear.   Eyes:      Extraocular Movements: Extraocular movements intact.      Conjunctiva/sclera: Conjunctivae normal.      Pupils: " Pupils are equal, round, and reactive to light.   Cardiovascular:      Rate and Rhythm: Normal rate and regular rhythm.      Pulses: Normal pulses.      Heart sounds: Normal heart sounds.   Pulmonary:      Effort: Pulmonary effort is normal. No respiratory distress.      Breath sounds: Normal breath sounds. No stridor. No wheezing, rhonchi or rales.   Abdominal:      General: Bowel sounds are normal.   Musculoskeletal:         General: Normal range of motion.      Cervical back: Normal range of motion and neck supple.   Skin:     General: Skin is warm and dry.   Neurological:      Mental Status: He is alert and oriented to person, place, and time.   Psychiatric:         Behavior: Behavior normal.         Results Review  I have personally reviewed the prior office notes, hospital records, labs, and diagnostics.    Assessment         Patient Active Problem List   Diagnosis    Angina at rest    Essential hypertension    SSS (sick sinus syndrome)    Mild intermittent asthma without complication    Gastroesophageal reflux disease without esophagitis    Taylor's esophagus without dysplasia    Acquired hypothyroidism    MRSA (methicillin resistant Staphylococcus aureus)    Pre-diabetes    Acute respiratory failure with hypoxia    Arthritis    Asthma    Benign prostatic hyperplasia    Cataract    Deviated nasal septum    Family history of prostate cancer in father    Hypertrophy of both inferior nasal turbinates    Impaired glucose tolerance    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    Nasal congestion    Nasal obstruction    Presence of cardiac pacemaker    Acute on chronic respiratory failure with hypoxia    Acute pulmonary embolism    Bilateral vitreous detachment    Epiretinal membrane    Glaucomatous atrophy of optic disc    After cataract    Pneumonia due to COVID-19 virus    CTEPH (chronic thromboembolic pulmonary hypertension)    Shortness of breath    Other specified hypothyroidism     Personal history of COVID-19    History of pulmonary embolism    Mixed hyperlipidemia    Colon cancer screening    Fibrous papule of nose    Chronic respiratory failure with hypoxia    Nocturnal hypoxia    Restrictive lung disease        Plan     Diagnoses and all orders for this visit:    1. Restrictive lung disease (Primary)  Comments:  Bronchial challenge with methacholine ordered  Orders:  -     Bronchial Challenge With Methacholine; Future    2. Nocturnal hypoxia  Comments:  Continue with O2 at 2 L for nocturnal hypoxia    3. Shortness of breath  Comments:  Albuterol inhaler sent to pharmacy  Orders:  -     Bronchial Challenge With Methacholine; Future  -     albuterol sulfate  (90 Base) MCG/ACT inhaler; Inhale 2 puffs Every 4 (Four) Hours As Needed for Wheezing.  Dispense: 1 g; Refill: 6    4. Pneumonia due to COVID-19 virus  -     albuterol sulfate  (90 Base) MCG/ACT inhaler; Inhale 2 puffs Every 4 (Four) Hours As Needed for Wheezing.  Dispense: 1 g; Refill: 6    5. Chronic maxillary sinusitis  Comments:  Referral to ENT  Orders:  -     Ambulatory Referral to ENT (Otolaryngology)             Smoking status:  reports that he quit smoking about 48 years ago. His smoking use included cigarettes and pipe. He started smoking about 60 years ago. He has a 3 pack-year smoking history. He has been exposed to tobacco smoke. He has never used smokeless tobacco.  Vaccination status: Reviewed  Immunization History   Administered Date(s) Administered    Fluzone High-Dose 65+YRS 11/01/2023    Fluzone High-Dose 65+yrs 11/15/2022    OPV 04/09/1996    Pneumococcal Conjugate 20-Valent (PCV20) 12/12/2023    Td (TDVAX) 04/09/1996    Tdap 09/02/2023    Yellow Fever 04/09/1996      Medications personally reviewed    Follow Up  Return in about 3 months (around 12/3/2024) for with Dr. Gong after bronchial challenge.    Patient was given instructions and counseling regarding his condition or for health maintenance  advice. Please see specific information pulled into the AVS if appropriate.     I spent 25 minutes caring for Elmer Garvin on this date of service. This time includes time spent by me in the following activities:preparing for the visit, reviewing tests, obtaining and/or reviewing a separately obtained history, performing a medically appropriate examination and/or evaluation, counseling and educating the patient/family/caregiver, ordering medications, tests, or procedures, documenting information in the medical record, independently interpreting results and communicating that information with the patient/family/caregiver and answered questions family members, discuss medications.

## 2024-09-16 ENCOUNTER — PATIENT MESSAGE (OUTPATIENT)
Dept: INTERNAL MEDICINE | Facility: CLINIC | Age: 79
End: 2024-09-16
Payer: MEDICARE

## 2024-09-16 DIAGNOSIS — R29.898 LIMB WEAKNESS: Primary | ICD-10-CM

## 2024-09-26 ENCOUNTER — OFFICE VISIT (OUTPATIENT)
Dept: INTERNAL MEDICINE | Facility: CLINIC | Age: 79
End: 2024-09-26
Payer: MEDICARE

## 2024-09-26 VITALS
HEART RATE: 61 BPM | WEIGHT: 213.6 LBS | HEIGHT: 74 IN | OXYGEN SATURATION: 97 % | SYSTOLIC BLOOD PRESSURE: 105 MMHG | TEMPERATURE: 97 F | BODY MASS INDEX: 27.41 KG/M2 | DIASTOLIC BLOOD PRESSURE: 67 MMHG

## 2024-09-26 DIAGNOSIS — R73.03 PRE-DIABETES: ICD-10-CM

## 2024-09-26 DIAGNOSIS — Z00.00 MEDICARE ANNUAL WELLNESS VISIT, SUBSEQUENT: Primary | ICD-10-CM

## 2024-09-26 DIAGNOSIS — E03.8 OTHER SPECIFIED HYPOTHYROIDISM: ICD-10-CM

## 2024-09-26 DIAGNOSIS — E78.2 MIXED HYPERLIPIDEMIA: ICD-10-CM

## 2024-09-26 DIAGNOSIS — I10 ESSENTIAL HYPERTENSION: ICD-10-CM

## 2024-09-26 DIAGNOSIS — R29.898 WEAKNESS OF BOTH LOWER EXTREMITIES: ICD-10-CM

## 2024-09-26 DIAGNOSIS — I27.24 CTEPH (CHRONIC THROMBOEMBOLIC PULMONARY HYPERTENSION): ICD-10-CM

## 2024-09-26 DIAGNOSIS — Z95.0 CARDIAC PACEMAKER: ICD-10-CM

## 2024-09-26 DIAGNOSIS — R54 AGE-RELATED PHYSICAL DEBILITY: ICD-10-CM

## 2024-09-26 DIAGNOSIS — I49.5 SSS (SICK SINUS SYNDROME): ICD-10-CM

## 2024-09-26 DIAGNOSIS — J96.11 CHRONIC RESPIRATORY FAILURE WITH HYPOXIA: ICD-10-CM

## 2024-09-26 DIAGNOSIS — Z97.4 HEARING AID WORN: ICD-10-CM

## 2024-09-26 DIAGNOSIS — E66.3 OVERWEIGHT (BMI 25.0-29.9): ICD-10-CM

## 2024-09-26 LAB
ALBUMIN SERPL-MCNC: 4.2 G/DL (ref 3.5–5.2)
ALBUMIN/GLOB SERPL: 1.9 G/DL
ALP SERPL-CCNC: 54 U/L (ref 39–117)
ALT SERPL W P-5'-P-CCNC: 11 U/L (ref 1–41)
ANION GAP SERPL CALCULATED.3IONS-SCNC: 11 MMOL/L (ref 5–15)
AST SERPL-CCNC: 12 U/L (ref 1–40)
BASOPHILS # BLD AUTO: 0.03 10*3/MM3 (ref 0–0.2)
BASOPHILS NFR BLD AUTO: 0.5 % (ref 0–1.5)
BILIRUB SERPL-MCNC: 0.4 MG/DL (ref 0–1.2)
BUN SERPL-MCNC: 21 MG/DL (ref 8–23)
BUN/CREAT SERPL: 17.5 (ref 7–25)
CALCIUM SPEC-SCNC: 9.7 MG/DL (ref 8.6–10.5)
CHLORIDE SERPL-SCNC: 102 MMOL/L (ref 98–107)
CHOLEST SERPL-MCNC: 200 MG/DL (ref 0–200)
CO2 SERPL-SCNC: 24 MMOL/L (ref 22–29)
CREAT SERPL-MCNC: 1.2 MG/DL (ref 0.76–1.27)
DEPRECATED RDW RBC AUTO: 38.4 FL (ref 37–54)
EGFRCR SERPLBLD CKD-EPI 2021: 61.9 ML/MIN/1.73
EOSINOPHIL # BLD AUTO: 0.08 10*3/MM3 (ref 0–0.4)
EOSINOPHIL NFR BLD AUTO: 1.3 % (ref 0.3–6.2)
ERYTHROCYTE [DISTWIDTH] IN BLOOD BY AUTOMATED COUNT: 12.6 % (ref 12.3–15.4)
GLOBULIN UR ELPH-MCNC: 2.2 GM/DL
GLUCOSE SERPL-MCNC: 110 MG/DL (ref 65–99)
HBA1C MFR BLD: 5.8 % (ref 4.8–5.6)
HCT VFR BLD AUTO: 40.9 % (ref 37.5–51)
HDLC SERPL-MCNC: 36 MG/DL (ref 40–60)
HGB BLD-MCNC: 13.4 G/DL (ref 13–17.7)
IMM GRANULOCYTES # BLD AUTO: 0.01 10*3/MM3 (ref 0–0.05)
IMM GRANULOCYTES NFR BLD AUTO: 0.2 % (ref 0–0.5)
LDLC SERPL CALC-MCNC: 124 MG/DL (ref 0–100)
LDLC/HDLC SERPL: 3.29 {RATIO}
LYMPHOCYTES # BLD AUTO: 1.45 10*3/MM3 (ref 0.7–3.1)
LYMPHOCYTES NFR BLD AUTO: 24.3 % (ref 19.6–45.3)
MCH RBC QN AUTO: 27.7 PG (ref 26.6–33)
MCHC RBC AUTO-ENTMCNC: 32.8 G/DL (ref 31.5–35.7)
MCV RBC AUTO: 84.7 FL (ref 79–97)
MONOCYTES # BLD AUTO: 0.46 10*3/MM3 (ref 0.1–0.9)
MONOCYTES NFR BLD AUTO: 7.7 % (ref 5–12)
NEUTROPHILS NFR BLD AUTO: 3.93 10*3/MM3 (ref 1.7–7)
NEUTROPHILS NFR BLD AUTO: 66 % (ref 42.7–76)
NRBC BLD AUTO-RTO: 0 /100 WBC (ref 0–0.2)
PLATELET # BLD AUTO: 238 10*3/MM3 (ref 140–450)
PMV BLD AUTO: 10.6 FL (ref 6–12)
POTASSIUM SERPL-SCNC: 4.7 MMOL/L (ref 3.5–5.2)
PROT SERPL-MCNC: 6.4 G/DL (ref 6–8.5)
RBC # BLD AUTO: 4.83 10*6/MM3 (ref 4.14–5.8)
SODIUM SERPL-SCNC: 137 MMOL/L (ref 136–145)
TRIGL SERPL-MCNC: 227 MG/DL (ref 0–150)
TSH SERPL DL<=0.05 MIU/L-ACNC: 2.11 UIU/ML (ref 0.27–4.2)
VLDLC SERPL-MCNC: 40 MG/DL (ref 5–40)
WBC NRBC COR # BLD AUTO: 5.96 10*3/MM3 (ref 3.4–10.8)

## 2024-09-26 PROCEDURE — 1125F AMNT PAIN NOTED PAIN PRSNT: CPT | Performed by: STUDENT IN AN ORGANIZED HEALTH CARE EDUCATION/TRAINING PROGRAM

## 2024-09-26 PROCEDURE — 3078F DIAST BP <80 MM HG: CPT | Performed by: STUDENT IN AN ORGANIZED HEALTH CARE EDUCATION/TRAINING PROGRAM

## 2024-09-26 PROCEDURE — G0439 PPPS, SUBSEQ VISIT: HCPCS | Performed by: STUDENT IN AN ORGANIZED HEALTH CARE EDUCATION/TRAINING PROGRAM

## 2024-09-26 PROCEDURE — 80061 LIPID PANEL: CPT | Performed by: STUDENT IN AN ORGANIZED HEALTH CARE EDUCATION/TRAINING PROGRAM

## 2024-09-26 PROCEDURE — 80053 COMPREHEN METABOLIC PANEL: CPT | Performed by: STUDENT IN AN ORGANIZED HEALTH CARE EDUCATION/TRAINING PROGRAM

## 2024-09-26 PROCEDURE — 85025 COMPLETE CBC W/AUTO DIFF WBC: CPT | Performed by: STUDENT IN AN ORGANIZED HEALTH CARE EDUCATION/TRAINING PROGRAM

## 2024-09-26 PROCEDURE — 83036 HEMOGLOBIN GLYCOSYLATED A1C: CPT | Performed by: STUDENT IN AN ORGANIZED HEALTH CARE EDUCATION/TRAINING PROGRAM

## 2024-09-26 PROCEDURE — 84443 ASSAY THYROID STIM HORMONE: CPT | Performed by: STUDENT IN AN ORGANIZED HEALTH CARE EDUCATION/TRAINING PROGRAM

## 2024-09-26 PROCEDURE — 3074F SYST BP LT 130 MM HG: CPT | Performed by: STUDENT IN AN ORGANIZED HEALTH CARE EDUCATION/TRAINING PROGRAM

## 2024-09-26 PROCEDURE — 1170F FXNL STATUS ASSESSED: CPT | Performed by: STUDENT IN AN ORGANIZED HEALTH CARE EDUCATION/TRAINING PROGRAM

## 2024-09-26 PROCEDURE — 99214 OFFICE O/P EST MOD 30 MIN: CPT | Performed by: STUDENT IN AN ORGANIZED HEALTH CARE EDUCATION/TRAINING PROGRAM

## 2024-09-27 DIAGNOSIS — E78.2 MIXED HYPERLIPIDEMIA: Primary | ICD-10-CM

## 2024-09-27 RX ORDER — EZETIMIBE 10 MG/1
10 TABLET ORAL DAILY
Qty: 90 TABLET | Refills: 2 | Status: SHIPPED | OUTPATIENT
Start: 2024-09-27

## 2024-10-01 RX ORDER — METHACHOLINE CHLORIDE 48 MG/3 ML
3 VIAL, NEBULIZER (ML) INHALATION
Status: ACTIVE | OUTPATIENT
Start: 2024-10-09 | End: 2024-10-09

## 2024-10-01 RX ORDER — ALBUTEROL SULFATE 0.83 MG/ML
2.5 SOLUTION RESPIRATORY (INHALATION) ONCE
Status: DISCONTINUED | OUTPATIENT
Start: 2024-10-09 | End: 2024-10-10 | Stop reason: HOSPADM

## 2024-10-01 RX ORDER — ALBUTEROL SULFATE 0.83 MG/ML
2.5 SOLUTION RESPIRATORY (INHALATION) ONCE AS NEEDED
Status: DISCONTINUED | OUTPATIENT
Start: 2024-10-01 | End: 2024-10-10 | Stop reason: HOSPADM

## 2024-10-01 RX ORDER — METHACHOLINE CHLORIDE 0.1875/3ML
3 VIAL, NEBULIZER (ML) INHALATION
Status: ACTIVE | OUTPATIENT
Start: 2024-10-09 | End: 2024-10-09

## 2024-10-01 RX ORDER — METHACHOLINE CHLORIDE 12 MG/3 ML
3 VIAL, NEBULIZER (ML) INHALATION
Status: ACTIVE | OUTPATIENT
Start: 2024-10-09 | End: 2024-10-09

## 2024-10-01 RX ORDER — SODIUM CHLORIDE FOR INHALATION 0.9 %
3 VIAL, NEBULIZER (ML) INHALATION ONCE AS NEEDED
Status: DISCONTINUED | OUTPATIENT
Start: 2024-10-09 | End: 2024-10-10 | Stop reason: HOSPADM

## 2024-10-01 RX ORDER — METHACHOLINE CHLORIDE 0.75/3ML
3 VIAL, NEBULIZER (ML) INHALATION
Status: ACTIVE | OUTPATIENT
Start: 2024-10-09 | End: 2024-10-09

## 2024-10-01 RX ORDER — METHACHOLINE CHLORIDE 3 MG/3 ML
3 VIAL, NEBULIZER (ML) INHALATION
Status: ACTIVE | OUTPATIENT
Start: 2024-10-09 | End: 2024-10-09

## 2024-10-01 RX ORDER — METHACHOLINE CHLORIDE 0 MG/3 ML
3 VIAL, NEBULIZER (ML) INHALATION
Status: DISPENSED | OUTPATIENT
Start: 2024-10-09 | End: 2024-10-09

## 2024-10-09 ENCOUNTER — HOSPITAL ENCOUNTER (OUTPATIENT)
Dept: RESPIRATORY THERAPY | Facility: HOSPITAL | Age: 79
Discharge: HOME | End: 2024-10-09
Payer: MEDICARE

## 2024-10-09 ENCOUNTER — HOSPITAL ENCOUNTER (OUTPATIENT)
Facility: HOSPITAL | Age: 79
Discharge: HOME | End: 2024-10-09
Payer: MEDICARE

## 2024-10-09 DIAGNOSIS — R06.02 SHORTNESS OF BREATH: ICD-10-CM

## 2024-10-09 DIAGNOSIS — J98.4 RESTRICTIVE LUNG DISEASE: ICD-10-CM

## 2024-10-09 DIAGNOSIS — R29.898 LIMB WEAKNESS: ICD-10-CM

## 2024-10-09 PROCEDURE — 95886 MUSC TEST DONE W/N TEST COMP: CPT

## 2024-10-09 PROCEDURE — 95912 NRV CNDJ TEST 11-12 STUDIES: CPT

## 2024-10-14 PROBLEM — G62.9 PERIPHERAL POLYNEUROPATHY: Status: ACTIVE | Noted: 2024-10-14

## 2024-11-15 ENCOUNTER — TELEPHONE (OUTPATIENT)
Dept: PULMONOLOGY | Facility: CLINIC | Age: 79
End: 2024-11-15
Payer: MEDICARE

## 2024-11-15 NOTE — TELEPHONE ENCOUNTER
Methacholine challenge scheduled for 10/09/2024.  Received call from Christiano in methacholine lab.  States patient reports an aortic root dilitation that was identified by Dr. Ugarte.  This test is contraindicated by presence of an aneurysm.    Patient and wife would like to discuss with Dr. Gong at next follow up visit before rescheduling methacholine challenge test.

## 2024-12-09 ENCOUNTER — OFFICE VISIT (OUTPATIENT)
Dept: PULMONOLOGY | Facility: CLINIC | Age: 79
End: 2024-12-09
Payer: MEDICARE

## 2024-12-09 VITALS
BODY MASS INDEX: 27.85 KG/M2 | OXYGEN SATURATION: 98 % | TEMPERATURE: 97.6 F | DIASTOLIC BLOOD PRESSURE: 78 MMHG | HEIGHT: 74 IN | RESPIRATION RATE: 16 BRPM | HEART RATE: 77 BPM | SYSTOLIC BLOOD PRESSURE: 136 MMHG | WEIGHT: 217 LBS

## 2024-12-09 DIAGNOSIS — G47.34 NOCTURNAL HYPOXIA: ICD-10-CM

## 2024-12-09 DIAGNOSIS — J98.4 RESTRICTIVE LUNG DISEASE: Primary | ICD-10-CM

## 2024-12-09 PROCEDURE — 1159F MED LIST DOCD IN RCRD: CPT | Performed by: INTERNAL MEDICINE

## 2024-12-09 PROCEDURE — 1160F RVW MEDS BY RX/DR IN RCRD: CPT | Performed by: INTERNAL MEDICINE

## 2024-12-09 PROCEDURE — 99213 OFFICE O/P EST LOW 20 MIN: CPT | Performed by: INTERNAL MEDICINE

## 2024-12-09 PROCEDURE — 3075F SYST BP GE 130 - 139MM HG: CPT | Performed by: INTERNAL MEDICINE

## 2024-12-09 PROCEDURE — 3078F DIAST BP <80 MM HG: CPT | Performed by: INTERNAL MEDICINE

## 2024-12-09 NOTE — PROGRESS NOTES
"Chief Complaint  Follow-up (3 month follow up ), Nocturnal hypoxia, Asthma, Restrictive Lung Disease, Shortness of Breath, and Cough (Productive- clear/light yellow)    Subjective        Alexandruarianne Garvin presents to [facility] PULMONARY & CRITICAL CARE MEDICINE  History of Present Illness  History of Present Illness  The patient is a 79-year-old male who presents for follow-up.    He has been diagnosed with neuropathy, which has led him to request a long-term parking permit. He reports occasional weakness in his legs and is able to walk short distances without excessive fatigue but requires a wheelchair for longer distances, such as when shopping at [employer].    He has been dealing with lower back issues for approximately 60 to 65 years and has opted against surgery due to concerns about scar tissue. He has been receiving chiropractic treatment for several years, with his most recent visit being this morning. His chiropractor has noted that his right leg is shorter than his left, leading him to believe he may have disc degeneration.    He has an upcoming appointment with a licensed practical nurse, during which he plans to request x-rays of his spine and knees. He is interested in exploring potential procedures that could alleviate his nerve issues.    Objective   Vital Signs:  /78 (BP Location: Left arm, Patient Position: Sitting, Cuff Size: Adult)   Pulse 77   Temp 97.6 °F (36.4 °C) (Temporal)   Resp 16   Ht 188 cm (74\")   Wt 98.4 kg (217 lb)   SpO2 98% Comment: room air  BMI 27.86 kg/m²   Estimated body mass index is 27.86 kg/m² as calculated from the following:    Height as of this encounter: 188 cm (74\").    Weight as of this encounter: 98.4 kg (217 lb).            Physical Exam   Physical Exam  Pleasant male  [facility] critical  No acute distress  Lungs are clear    Result Review :         Results  Imaging  Nerve testing showed some nerve disease. Lung scan showed chronic " fibrosis.    Testing  Lung function test showed about 70% of expected lung capacity.              Assessment and Plan   Diagnoses and all orders for this visit:    1. Restrictive lung disease (Primary)    2. Nocturnal hypoxia      Assessment & Plan  1. Chronic fibrosis.  His lung function test results indicate a forced vital capacity and total lung capacity of 73% and 76% respectively. The chronic scarring from fibrosis is contributing to his respiratory issues. A permanent handicap parking permit will be provided due to the chronic nature of his condition.    2. Neuropathy.  The nerve testing showed the presence of neuropathy. This condition, along with chronic fibrosis, is contributing to his increased breathlessness and muscle weakness. He reports that his legs sometimes feel very weak. The combination of these conditions likely exacerbates his symptoms.  Patient given prescription today for permanent disability tag    3 continue nocturnal oxygen             Follow Up   Return in about 4 months (around 4/9/2025).  Patient was given instructions and counseling regarding his condition or for health maintenance advice. Please see specific information pulled into the AVS if appropriate.         Patient or patient representative verbalized consent for the use of Ambient Listening during the visit with  Wilton Gong DO for chart documentation. 12/9/2024  15:01 EST

## 2024-12-12 ENCOUNTER — OFFICE VISIT (OUTPATIENT)
Dept: INTERNAL MEDICINE | Age: 79
End: 2024-12-12
Payer: MEDICARE

## 2024-12-12 VITALS
SYSTOLIC BLOOD PRESSURE: 128 MMHG | HEIGHT: 74 IN | TEMPERATURE: 97.8 F | DIASTOLIC BLOOD PRESSURE: 70 MMHG | OXYGEN SATURATION: 98 % | WEIGHT: 216.2 LBS | BODY MASS INDEX: 27.75 KG/M2 | HEART RATE: 73 BPM

## 2024-12-12 DIAGNOSIS — E06.3 HYPOTHYROIDISM DUE TO HASHIMOTO THYROIDITIS: ICD-10-CM

## 2024-12-12 DIAGNOSIS — E78.49 OTHER HYPERLIPIDEMIA: ICD-10-CM

## 2024-12-12 DIAGNOSIS — I10 ESSENTIAL HYPERTENSION: ICD-10-CM

## 2024-12-12 DIAGNOSIS — M19.90 ARTHRITIS: ICD-10-CM

## 2024-12-12 DIAGNOSIS — E55.9 VITAMIN D DEFICIENCY: Primary | ICD-10-CM

## 2024-12-12 PROCEDURE — G2211 COMPLEX E/M VISIT ADD ON: HCPCS | Performed by: NURSE PRACTITIONER

## 2024-12-12 PROCEDURE — 1125F AMNT PAIN NOTED PAIN PRSNT: CPT | Performed by: NURSE PRACTITIONER

## 2024-12-12 PROCEDURE — 99214 OFFICE O/P EST MOD 30 MIN: CPT | Performed by: NURSE PRACTITIONER

## 2024-12-12 PROCEDURE — 3074F SYST BP LT 130 MM HG: CPT | Performed by: NURSE PRACTITIONER

## 2024-12-12 PROCEDURE — 1159F MED LIST DOCD IN RCRD: CPT | Performed by: NURSE PRACTITIONER

## 2024-12-12 PROCEDURE — 1160F RVW MEDS BY RX/DR IN RCRD: CPT | Performed by: NURSE PRACTITIONER

## 2024-12-12 PROCEDURE — 3078F DIAST BP <80 MM HG: CPT | Performed by: NURSE PRACTITIONER

## 2024-12-12 RX ORDER — LOSARTAN POTASSIUM 100 MG/1
100 TABLET ORAL DAILY
Qty: 90 TABLET | Refills: 3 | Status: SHIPPED | OUTPATIENT
Start: 2024-12-12

## 2024-12-12 RX ORDER — LEVOTHYROXINE SODIUM 100 MCG
100 TABLET ORAL
Qty: 90 TABLET | Refills: 3 | Status: SHIPPED | OUTPATIENT
Start: 2024-12-12

## 2024-12-12 NOTE — PROGRESS NOTES
Chief Complaint  Establish Care (Pt is here to establish care from Dr. Jimenez. Recent neuropathy dx on both legs. Pt would like to see about spinal XR to check for degeneration. Pt says his knees are in extreme pain and could be contributing to his neuropathy and Would like to have his ears checked.Pt reports fever a few times a week with nausea.)    Subjective      Elmer Garvin is a 79 y.o. male who presents to [facility]         History of Present Illness  The patient presents to establish care.  He is a prior patient of Dr. Jimenez who is leaving and he needs to find a new primary care..  I have reviewed with him all of his history and medications.    He has a history of hypertension, which is currently well-managed with medication. He is on losartan 100 mg daily and requires a refill as his current supply will be exhausted by Saturday. He also takes a low-dose aspirin daily. He reports no history of myocardial infarction or cerebrovascular accident.    He has been diagnosed with arthritis and is currently on quinaretic, administered at a dosage of 100 mg twice daily. Despite this treatment, he continues to experience pain, although it appears to prevent further deterioration. He expresses reluctance to alter his medication regimen but is open to increasing the dosage if necessary.    He has been informed by Dr. Guerrero that he is prediabetic for the past 30 years.  Not currently on medication    He has not initiated Zetia therapy for his cholesterol management. Instead, he has opted for natural remedies, including red yeast rice and fish oil    He has been experiencing discomfort in his right ear, which he describes as feeling plugged. He has previously consulted an ENT specialist and has a follow-up appointment scheduled for 02/2025. He does not use any nasal sprays such as Flonase.  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 Gong. He experiences shortness of breath due to this condition but does not use an inhaler. His oxygen level is consistently good. He does not get short of breath at night. He can not lay flat as something in the back of his throat obstructs his airway, so he sleeps on his side. He does not use a CPAP machine.    He has a history of pulmonary embolism in the left lung, which was surgically treated. He has a pacemaker implanted 10 to 11 years ago by Dr. Boo in [employer]. He has seen a cardiologist, Dr. Barlow, who conducted several tests and found his heart to be fine, with no need for medication. There is a little bit of blockage, but it has not increased significantly.    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    SOCIAL HISTORY  He was a  for 18.5 years.    MEDICATIONS  Losartan, baby aspirin, quinaretic, vitamin D supplement, red yeast rice, fish oil         Objective   Vital Signs:   Vitals:    12/12/24 1337   BP: 128/70   BP Location: Left arm   Patient Position: Sitting   Cuff Size: Adult   Pulse: 73   Temp: 97.8 °F (36.6 °C)   TempSrc: Skin   SpO2: 98%   Weight: 98.1 kg (216 lb 3.2 oz)   Height: 188 cm (74.02\")     Body mass index is 27.75 kg/m².    Wt Readings from Last 3 Encounters:   12/12/24 98.1 kg (216 lb 3.2 oz)   12/09/24 98.4 kg (217 lb)   09/26/24 96.9 kg (213 lb 9.6 oz)     BP Readings from Last 3 Encounters:   12/12/24 128/70   12/09/24 136/78   09/26/24 105/67       Health Maintenance   Topic Date Due    ZOSTER VACCINE (1 of 2) 12/12/2024 (Originally [birth year]/17/1996)    INFLUENZA VACCINE  12/29/2024 (Originally 7/1/2024)    RSV Vaccine - Adults (1 - 1-dose 75+ series) 01/09/2025 (Originally 3/17/2021)    COVID-19 Vaccine (1 - 2024-25 season) 02/28/2025 (Originally 9/1/2024)    ANNUAL WELLNESS VISIT  " 09/26/2025    LIPID PANEL  09/26/2025    BMI FOLLOWUP  09/26/2025    TDAP/TD VACCINES (3 - Td or Tdap) 09/02/2033    HEPATITIS C SCREENING  Completed    Pneumococcal Vaccine 65+  Completed    COLORECTAL CANCER SCREENING  Discontinued       Past Medical History:   Diagnosis Date    Allergic     Seasonal    Allergies     Arthritis 01/17/2022    Asthma shortness of breath, probably from pneumonia    Asthma 01/19/2018    Benign prostatic hyperplasia     Broken finger 09/02/2023    Cancer melanoma    Cardiac pacemaker     Cataract     Removed    Chronic idiopathic fibrosing alveolitis     Fibrosis found with CTA 2024    Deep vein thrombosis     GERD (gastroesophageal reflux disease)     Glaucoma (increased eye pressure)     HL (hearing loss)     Hyperlipidemia     Hypertension     Hypothyroidism     Lyme disease     Pneumonia dec 27,2020    Pulmonary embolism Feb 2022    Shortness of breath     SSS (sick sinus syndrome)      Past Surgical History:   Procedure Laterality Date    CARDIAC CATHETERIZATION N/A 07/25/2019    Procedure: Coronary angiography;  Surgeon: Wilton Brown MD;  Location: [facility] CATH INVASIVE LOCATION;  Service: Cardiovascular    CARDIAC CATHETERIZATION N/A 07/25/2019    Procedure: Left heart cath;  Surgeon: Wilton Brown MD;  Location: [facility] CATH INVASIVE LOCATION;  Service: Cardiovascular    CARDIAC CATHETERIZATION N/A 07/25/2019    Procedure: Left ventriculography;  Surgeon: Wilton Brown MD;  Location: [facility] CATH INVASIVE LOCATION;  Service: Cardiovascular    CARDIAC CATHETERIZATION N/A 07/25/2019    Procedure: Right heart cath;  Surgeon: Wilton Brown MD;  Location: [facility] CATH INVASIVE LOCATION;  Service: Cardiovascular    CARDIAC CATHETERIZATION Right 01/26/2022    Procedure: Percutaneous Manual Thrombectomy;  Surgeon: Maninder Penn MD;  Location: [facility] CATH INVASIVE LOCATION;  Service: Cardiovascular;  Laterality: Right;    CATARACT EXTRACTION      COLONOSCOPY  2019     COLONOSCOPY N/A 10/13/2022    Procedure: COLONOSCOPY FOR SCREENING;  Surgeon: Deng Monteiro MD;  Location: [facility] ENDOSCOPY;  Service: Gastroenterology;  Laterality: N/A;  DIVERTICULOSIS, HEMORRHOIDS    FRACTURE SURGERY  2023    INSERT / REPLACE / REMOVE PACEMAKER  2014    INTERVENTIONAL RADIOLOGY PROCEDURE Right 2022    Procedure: Pulmonary Angiogram;  Surgeon: Maninder Penn MD;  Location: [facility] CATH INVASIVE LOCATION;  Service: Cardiovascular;  Laterality: Right;    PACEMAKER IMPLANTATION      PULMONARY EMBOLISM SURGERY  2022    SKIN CANCER EXCISION N/A     NOSE     Family History   Problem Relation Age of Onset    Cancer Mother     Heart disease Father     Cancer Father     Heart disease Sister         fatal heart attack    Sudden death Sister     Heart disease Brother         fatal heart attck    Cancer Paternal Grandfather     Colon cancer Neg Hx     Malig Hyperthermia Neg Hx      Social History     Socioeconomic History    Marital status:    Tobacco Use    Smoking status: Former     Current packs/day: 0.00     Average packs/day: 0.3 packs/day for 12.0 years (3.0 ttl pk-yrs)     Types: Cigarettes, Pipe     Start date: 1964     Quit date: 1976     Years since quittin.9     Passive exposure: Past    Smokeless tobacco: Never    Tobacco comments:     Clean quit for 48+ yrs   Vaping Use    Vaping status: Never Used   Substance and Sexual Activity    Alcohol use: Never    Drug use: Never    Sexual activity: Not Currently     Partners: Female     Comment: Wife only       Current Outpatient Medications   Medication Instructions    aspirin 81 mg, Daily    brimonidine (ALPHAGAN) 0.2 % ophthalmic solution 1 drop, 2 Times Daily    latanoprost (XALATAN) 0.005 % ophthalmic solution 1 drop, Daily    losartan (COZAAR) 100 mg, Oral, Daily    Melatonin 10 mg, Nightly    Omega-3 Fatty Acids (fish oil) 1000 MG capsule capsule Daily With Breakfast    sulindac (CLINORIL) 150  MG tablet Take 1 tablet by mouth two times a day - take with food    Synthroid 100 mcg, Oral, Every Early Morning       Medications Discontinued During This Encounter   Medication Reason    albuterol sulfate  (90 Base) MCG/ACT inhaler *Therapy completed    ezetimibe (Zetia) 10 MG tablet *Therapy completed    losartan (COZAAR) 100 MG tablet Reorder    Synthroid 100 MCG tablet Reorder        Physical Exam  Vitals and nursing note reviewed.   Constitutional:       Appearance: Normal appearance.   HENT:      Head: Normocephalic.   Eyes:      Extraocular Movements: Extraocular movements intact.      Pupils: Pupils are equal, round, and reactive to light.   Cardiovascular:      Rate and Rhythm: Normal rate and regular rhythm.      Pulses: Normal pulses.   Pulmonary:      Effort: Pulmonary effort is normal.      Breath sounds: Normal breath sounds.   Abdominal:      Palpations: Abdomen is soft.   Musculoskeletal:         General: Normal range of motion.      Cervical back: Normal range of motion.   Skin:     General: Skin is warm and dry.   Neurological:      General: No focal deficit present.      Mental Status: He is alert.   Psychiatric:         Mood and Affect: Mood normal.         Behavior: Behavior normal.         Thought Content: Thought content normal.          Physical Exam  Fluid is present behind both eardrums.  Lungs were auscultated.  Heart was examined.       Result Review :  The following data was reviewed by: EDWINA Birmingham on 12/12/2024:    Labs  No visits with results within 2 Month(s) from this visit.   Latest known visit with results is:   Office Visit on 09/26/2024   Component Date Value Ref Range Status    TSH 09/26/2024 2.110  0.270 - 4.200 uIU/mL Final    Total Cholesterol 09/26/2024 200  0 - 200 mg/dL Final    Triglycerides 09/26/2024 227 (H)  0 - 150 mg/dL Final    HDL Cholesterol 09/26/2024 36 (L)  40 - 60 mg/dL Final    LDL Cholesterol  09/26/2024 124 (H)  0 - 100 mg/dL Final     VLDL Cholesterol 09/26/2024 40  5 - 40 mg/dL Final    LDL/HDL Ratio 09/26/2024 3.29   Final    Glucose 09/26/2024 110 (H)  65 - 99 mg/dL Final    BUN 09/26/2024 21  8 - 23 mg/dL Final    Creatinine 09/26/2024 1.20  0.76 - 1.27 mg/dL Final    Sodium 09/26/2024 137  136 - 145 mmol/L Final    Potassium 09/26/2024 4.7  3.5 - 5.2 mmol/L Final    Chloride 09/26/2024 102  98 - 107 mmol/L Final    CO2 09/26/2024 24.0  22.0 - 29.0 mmol/L Final    Calcium 09/26/2024 9.7  8.6 - 10.5 mg/dL Final    Total Protein 09/26/2024 6.4  6.0 - 8.5 g/dL Final    Albumin 09/26/2024 4.2  3.5 - 5.2 g/dL Final    ALT (SGPT) 09/26/2024 11  1 - 41 U/L Final    AST (SGOT) 09/26/2024 12  1 - 40 U/L Final    Alkaline Phosphatase 09/26/2024 54  39 - 117 U/L Final    Total Bilirubin 09/26/2024 0.4  0.0 - 1.2 mg/dL Final    Globulin 09/26/2024 2.2  gm/dL Final    A/G Ratio 09/26/2024 1.9  g/dL Final    BUN/Creatinine Ratio 09/26/2024 17.5  7.0 - 25.0 Final    Anion Gap 09/26/2024 11.0  5.0 - 15.0 mmol/L Final    eGFR 09/26/2024 61.9  >60.0 mL/min/1.73 Final    Hemoglobin A1C 09/26/2024 5.80 (H)  4.80 - 5.60 % Final    WBC 09/26/2024 5.96  3.40 - 10.80 10*3/mm3 Final    RBC 09/26/2024 4.83  4.14 - 5.80 10*6/mm3 Final    Hemoglobin 09/26/2024 13.4  13.0 - 17.7 g/dL Final    Hematocrit 09/26/2024 40.9  37.5 - 51.0 % Final    MCV 09/26/2024 84.7  79.0 - 97.0 fL Final    MCH 09/26/2024 27.7  26.6 - 33.0 pg Final    MCHC 09/26/2024 32.8  31.5 - 35.7 g/dL Final    RDW 09/26/2024 12.6  12.3 - 15.4 % Final    RDW-SD 09/26/2024 38.4  37.0 - 54.0 fl Final    MPV 09/26/2024 10.6  6.0 - 12.0 fL Final    Platelets 09/26/2024 238  140 - 450 10*3/mm3 Final    Neutrophil % 09/26/2024 66.0  42.7 - 76.0 % Final    Lymphocyte % 09/26/2024 24.3  19.6 - 45.3 % Final    Monocyte % 09/26/2024 7.7  5.0 - 12.0 % Final    Eosinophil % 09/26/2024 1.3  0.3 - 6.2 % Final    Basophil % 09/26/2024 0.5  0.0 - 1.5 % Final    Immature Grans % 09/26/2024 0.2  0.0 - 0.5 % Final     Neutrophils, Absolute 09/26/2024 3.93  1.70 - 7.00 10*3/mm3 Final    Lymphocytes, Absolute 09/26/2024 1.45  0.70 - 3.10 10*3/mm3 Final    Monocytes, Absolute 09/26/2024 0.46  0.10 - 0.90 10*3/mm3 Final    Eosinophils, Absolute 09/26/2024 0.08  0.00 - 0.40 10*3/mm3 Final    Basophils, Absolute 09/26/2024 0.03  0.00 - 0.20 10*3/mm3 Final    Immature Grans, Absolute 09/26/2024 0.01  0.00 - 0.05 10*3/mm3 Final    nRBC 09/26/2024 0.0  0.0 - 0.2 /100 WBC Final       Imaging  No Images in the past 120 days found..    Results  Laboratory Studies  Kidney function was normal in September.       Procedures       ASSESSMENT & PLAN  Diagnoses and all orders for this visit:    1. Vitamin D deficiency (Primary)  -     losartan (COZAAR) 100 MG tablet; Take 1 tablet by mouth Daily.  Dispense: 90 tablet; Refill: 3  -     Synthroid 100 MCG tablet; Take 1 tablet by mouth Every Morning.  Dispense: 90 tablet; Refill: 3  -     Vitamin D,25-Hydroxy; Future  -     TSH Rfx On Abnormal To Free T4; Future  -     Lipid Panel; Future  -     Comprehensive Metabolic Panel; Future  -     CBC & Differential; Future    2. Essential hypertension  -     losartan (COZAAR) 100 MG tablet; Take 1 tablet by mouth Daily.  Dispense: 90 tablet; Refill: 3  -     Synthroid 100 MCG tablet; Take 1 tablet by mouth Every Morning.  Dispense: 90 tablet; Refill: 3  -     Vitamin D,25-Hydroxy; Future  -     TSH Rfx On Abnormal To Free T4; Future  -     Lipid Panel; Future  -     Comprehensive Metabolic Panel; Future  -     CBC & Differential; Future    3. Arthritis  -     losartan (COZAAR) 100 MG tablet; Take 1 tablet by mouth Daily.  Dispense: 90 tablet; Refill: 3  -     Synthroid 100 MCG tablet; Take 1 tablet by mouth Every Morning.  Dispense: 90 tablet; Refill: 3  -     Vitamin D,25-Hydroxy; Future  -     TSH Rfx On Abnormal To Free T4; Future  -     Lipid Panel; Future  -     Comprehensive Metabolic Panel; Future  -     CBC & Differential; Future    4. Other  hyperlipidemia  -     losartan (COZAAR) 100 MG tablet; Take 1 tablet by mouth Daily.  Dispense: 90 tablet; Refill: 3  -     Synthroid 100 MCG tablet; Take 1 tablet by mouth Every Morning.  Dispense: 90 tablet; Refill: 3  -     Vitamin D,25-Hydroxy; Future  -     TSH Rfx On Abnormal To Free T4; Future  -     Lipid Panel; Future  -     Comprehensive Metabolic Panel; Future  -     CBC & Differential; Future    5. Hypothyroidism due to Hashimoto thyroiditis  -     losartan (COZAAR) 100 MG tablet; Take 1 tablet by mouth Daily.  Dispense: 90 tablet; Refill: 3  -     Synthroid 100 MCG tablet; Take 1 tablet by mouth Every Morning.  Dispense: 90 tablet; Refill: 3  -     Vitamin D,25-Hydroxy; Future  -     TSH Rfx On Abnormal To Free T4; Future  -     Lipid Panel; Future  -     Comprehensive Metabolic Panel; Future  -     CBC & Differential; Future         Assessment & Plan  1. Hypertension.  His blood pressure is well-controlled with losartan 100 mg daily. A prescription for losartan 100 mg has been sent to [facility] pharmacy. Baseline labs will be ordered to monitor his condition.    2. Arthritis.  He is currently on sulindac 100 mg twice a day. The medication is not fully effective in managing his pain, but it is preventing the condition from worsening. Blood work will be ordered to assess kidney function before considering any changes to his medication regimen.    3. Prediabetes.  He has been informed by Dr. Guerrero that he is prediabetic for the past 30 years. Baseline labs will be ordered to monitor his condition.    4. Cholesterol management.  He has not started Zetia as previously recommended by Dr. Washington. He is currently using natural remedies such as red yeast rice and fish oil. Fasting blood work will be ordered to assess his cholesterol levels and determine the effectiveness of his current regimen. If cholesterol levels are high, a statin may be considered.    5. Right ear discomfort.  He reports a sensation  of fullness in his right ear. Examination revealed fluid behind the eardrum, not cerumen impaction. He is advised to use Flonase nasal spray, 2 sprays in each nostril, to help open the eustachian tube and facilitate drainage. He should avoid using it if it causes nosebleeds.    6. Fibrosis.  He has fibrosis from COVID-19, affecting 28% of one lung and 27% of the other. He experiences shortness of breath but does not use an inhaler.    7. History of pulmonary embolism.  He has a history of a blood clot in the left pulmonary artery, which was surgically treated. He has a pacemaker implanted 10 to 11 years ago.    Follow-up  The patient will follow up in 2 weeks.  We will go over labs and discuss treatment plan    PROCEDURE  The patient has a history of pulmonary embolism in the left lung, which was surgically treated. He also has a pacemaker implanted 10 to 11 years ago.                  FOLLOW UP  Return in about 2 weeks (around 12/26/2024).  Patient was given instructions and counseling regarding his condition or for health maintenance advice. Please see specific information pulled into the AVS if appropriate.     Patient or patient representative verbalized consent for the use of Ambient Listening during the visit with  EDWINA Birmingham for chart documentation. 12/12/2024  22:05 EDWINA Araujo  12/12/24  22:06 EST

## 2024-12-16 ENCOUNTER — LAB (OUTPATIENT)
Dept: LAB | Facility: HOSPITAL | Age: 79
End: 2024-12-16
Payer: MEDICARE

## 2024-12-16 DIAGNOSIS — E55.9 VITAMIN D DEFICIENCY: ICD-10-CM

## 2024-12-16 DIAGNOSIS — I10 ESSENTIAL HYPERTENSION: ICD-10-CM

## 2024-12-16 DIAGNOSIS — M19.90 ARTHRITIS: ICD-10-CM

## 2024-12-16 DIAGNOSIS — E78.49 OTHER HYPERLIPIDEMIA: ICD-10-CM

## 2024-12-16 DIAGNOSIS — E06.3 HYPOTHYROIDISM DUE TO HASHIMOTO THYROIDITIS: ICD-10-CM

## 2024-12-16 LAB
25(OH)D3 SERPL-MCNC: 48.6 NG/ML (ref 30–100)
ALBUMIN SERPL-MCNC: 4.2 G/DL (ref 3.5–5.2)
ALBUMIN/GLOB SERPL: 1.6 G/DL
ALP SERPL-CCNC: 44 U/L (ref 39–117)
ALT SERPL W P-5'-P-CCNC: 15 U/L (ref 1–41)
ANION GAP SERPL CALCULATED.3IONS-SCNC: 8 MMOL/L (ref 5–15)
AST SERPL-CCNC: 16 U/L (ref 1–40)
BASOPHILS # BLD AUTO: 0.04 10*3/MM3 (ref 0–0.2)
BASOPHILS NFR BLD AUTO: 0.7 % (ref 0–1.5)
BILIRUB SERPL-MCNC: 0.5 MG/DL (ref 0–1.2)
BUN SERPL-MCNC: 22 MG/DL (ref 8–23)
BUN/CREAT SERPL: 16.8 (ref 7–25)
CALCIUM SPEC-SCNC: 9.3 MG/DL (ref 8.6–10.5)
CHLORIDE SERPL-SCNC: 102 MMOL/L (ref 98–107)
CHOLEST SERPL-MCNC: 195 MG/DL (ref 0–200)
CO2 SERPL-SCNC: 28 MMOL/L (ref 22–29)
CREAT SERPL-MCNC: 1.31 MG/DL (ref 0.76–1.27)
DEPRECATED RDW RBC AUTO: 38.7 FL (ref 37–54)
EGFRCR SERPLBLD CKD-EPI 2021: 55.7 ML/MIN/1.73
EOSINOPHIL # BLD AUTO: 0.16 10*3/MM3 (ref 0–0.4)
EOSINOPHIL NFR BLD AUTO: 2.9 % (ref 0.3–6.2)
ERYTHROCYTE [DISTWIDTH] IN BLOOD BY AUTOMATED COUNT: 12.6 % (ref 12.3–15.4)
GLOBULIN UR ELPH-MCNC: 2.6 GM/DL
GLUCOSE SERPL-MCNC: 105 MG/DL (ref 65–99)
HCT VFR BLD AUTO: 44.8 % (ref 37.5–51)
HDLC SERPL-MCNC: 40 MG/DL (ref 40–60)
HGB BLD-MCNC: 14.8 G/DL (ref 13–17.7)
IMM GRANULOCYTES # BLD AUTO: 0.02 10*3/MM3 (ref 0–0.05)
IMM GRANULOCYTES NFR BLD AUTO: 0.4 % (ref 0–0.5)
LDLC SERPL CALC-MCNC: 129 MG/DL (ref 0–100)
LDLC/HDLC SERPL: 3.17 {RATIO}
LYMPHOCYTES # BLD AUTO: 1.68 10*3/MM3 (ref 0.7–3.1)
LYMPHOCYTES NFR BLD AUTO: 30.8 % (ref 19.6–45.3)
MCH RBC QN AUTO: 28.4 PG (ref 26.6–33)
MCHC RBC AUTO-ENTMCNC: 33 G/DL (ref 31.5–35.7)
MCV RBC AUTO: 85.8 FL (ref 79–97)
MONOCYTES # BLD AUTO: 0.45 10*3/MM3 (ref 0.1–0.9)
MONOCYTES NFR BLD AUTO: 8.3 % (ref 5–12)
NEUTROPHILS NFR BLD AUTO: 3.1 10*3/MM3 (ref 1.7–7)
NEUTROPHILS NFR BLD AUTO: 56.9 % (ref 42.7–76)
NRBC BLD AUTO-RTO: 0 /100 WBC (ref 0–0.2)
PLATELET # BLD AUTO: 234 10*3/MM3 (ref 140–450)
PMV BLD AUTO: 10.8 FL (ref 6–12)
POTASSIUM SERPL-SCNC: 5.1 MMOL/L (ref 3.5–5.2)
PROT SERPL-MCNC: 6.8 G/DL (ref 6–8.5)
RBC # BLD AUTO: 5.22 10*6/MM3 (ref 4.14–5.8)
SODIUM SERPL-SCNC: 138 MMOL/L (ref 136–145)
TRIGL SERPL-MCNC: 142 MG/DL (ref 0–150)
TSH SERPL DL<=0.05 MIU/L-ACNC: 2.65 UIU/ML (ref 0.27–4.2)
VLDLC SERPL-MCNC: 26 MG/DL (ref 5–40)
WBC NRBC COR # BLD AUTO: 5.45 10*3/MM3 (ref 3.4–10.8)

## 2024-12-16 PROCEDURE — 80061 LIPID PANEL: CPT

## 2024-12-16 PROCEDURE — 85025 COMPLETE CBC W/AUTO DIFF WBC: CPT

## 2024-12-16 PROCEDURE — 80053 COMPREHEN METABOLIC PANEL: CPT

## 2024-12-16 PROCEDURE — 36415 COLL VENOUS BLD VENIPUNCTURE: CPT

## 2024-12-16 PROCEDURE — 82306 VITAMIN D 25 HYDROXY: CPT

## 2024-12-16 PROCEDURE — 84443 ASSAY THYROID STIM HORMONE: CPT

## 2024-12-26 ENCOUNTER — OFFICE VISIT (OUTPATIENT)
Dept: INTERNAL MEDICINE | Age: 79
End: 2024-12-26
Payer: MEDICARE

## 2024-12-26 VITALS
OXYGEN SATURATION: 97 % | HEIGHT: 74 IN | TEMPERATURE: 97.8 F | HEART RATE: 82 BPM | BODY MASS INDEX: 27.93 KG/M2 | WEIGHT: 217.6 LBS | DIASTOLIC BLOOD PRESSURE: 62 MMHG | SYSTOLIC BLOOD PRESSURE: 104 MMHG

## 2024-12-26 DIAGNOSIS — G89.29 CHRONIC PAIN OF BOTH KNEES: Primary | ICD-10-CM

## 2024-12-26 DIAGNOSIS — E55.9 VITAMIN D DEFICIENCY: ICD-10-CM

## 2024-12-26 DIAGNOSIS — I10 ESSENTIAL HYPERTENSION: ICD-10-CM

## 2024-12-26 DIAGNOSIS — M54.50 CHRONIC BILATERAL LOW BACK PAIN WITHOUT SCIATICA: ICD-10-CM

## 2024-12-26 DIAGNOSIS — Z95.0 CARDIAC PACEMAKER: ICD-10-CM

## 2024-12-26 DIAGNOSIS — R73.03 PRE-DIABETES: ICD-10-CM

## 2024-12-26 DIAGNOSIS — M25.562 CHRONIC PAIN OF BOTH KNEES: Primary | ICD-10-CM

## 2024-12-26 DIAGNOSIS — E78.2 MIXED HYPERLIPIDEMIA: ICD-10-CM

## 2024-12-26 DIAGNOSIS — G89.29 CHRONIC BILATERAL LOW BACK PAIN WITHOUT SCIATICA: ICD-10-CM

## 2024-12-26 DIAGNOSIS — I49.5 SSS (SICK SINUS SYNDROME): ICD-10-CM

## 2024-12-26 DIAGNOSIS — E06.3 HYPOTHYROIDISM DUE TO HASHIMOTO THYROIDITIS: ICD-10-CM

## 2024-12-26 DIAGNOSIS — M25.561 CHRONIC PAIN OF BOTH KNEES: Primary | ICD-10-CM

## 2024-12-26 PROCEDURE — 1125F AMNT PAIN NOTED PAIN PRSNT: CPT | Performed by: NURSE PRACTITIONER

## 2024-12-26 PROCEDURE — 3078F DIAST BP <80 MM HG: CPT | Performed by: NURSE PRACTITIONER

## 2024-12-26 PROCEDURE — 1160F RVW MEDS BY RX/DR IN RCRD: CPT | Performed by: NURSE PRACTITIONER

## 2024-12-26 PROCEDURE — G2211 COMPLEX E/M VISIT ADD ON: HCPCS | Performed by: NURSE PRACTITIONER

## 2024-12-26 PROCEDURE — 99214 OFFICE O/P EST MOD 30 MIN: CPT | Performed by: NURSE PRACTITIONER

## 2024-12-26 PROCEDURE — 1159F MED LIST DOCD IN RCRD: CPT | Performed by: NURSE PRACTITIONER

## 2024-12-26 PROCEDURE — 3074F SYST BP LT 130 MM HG: CPT | Performed by: NURSE PRACTITIONER

## 2024-12-26 RX ORDER — MUPIROCIN 20 MG/G
1 OINTMENT TOPICAL DAILY
COMMUNITY
Start: 2024-12-16

## 2024-12-26 NOTE — PATIENT INSTRUCTIONS
Do xrays  Will call with results may need MRI pending results  Labs in 3 months to monitor kidney function diabetic status  Decreased concentrated sweets  Follow-up with cardiology as scheduled   should monitor blood pressure goal of less than 140/90 however blood pressure pressure today is low -systolic should not be below 100 monitor closely if notices his blood pressure is running systolic 100 or less we will cut losartan in half

## 2024-12-26 NOTE — PROGRESS NOTES
Chief Complaint  Primary Care Follow-Up (Pt is here for routine follow-up with labs. No other concerns)    Subjective      Elmer Garvin is a 79 y.o. male who presents to [facility]     History of Present Illness  The patient presents for evaluation of hypertension, hyperlipidemia, cardiac disease, neuropathy, OA,  prediabetes, blood pressure, low back pain, knee pain, and some new complaints with right ear being stopped up.. He is accompanied by his wife.    He reports no significant changes in his health status, with the exception of persistent leg neuropathy. He has been on a treatment regimen of gabapentin for this condition for the past month, but has been informed it will take an additional 2 months to assess its effectiveness.    He has been using Flonase nasal spray, for his ear discomfort.  He feels like his right ear is always stopped up.. He has not experienced any nosebleeds, but voices concern possible increase in the eye pressure due to his glaucoma explained no steroid involved.  He also has a scheduled visit with an ENT specialist in 02/2025.     Labs discussed    Blood pressure today is on the low normal 104/62, when questioning him father he occasionally experiences lightheadedness upon standing, he is on losartan 100 mg each day have explained to him his blood pressure goes below 100 systolic would like for him to cut that in half.  Concerned about hypotension.  He is to monitor his blood pressure and let me know how he does in the next couple weeks.  He is to maintain hydration, He has a home blood pressure monitor but does not use it regularly.  But is willing to do so and watch his blood pressure closely.  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 He has not sustained any knee injuries and has been managing the pain with anti-inflammatories. He has undergone EMG  "testing for neuropathy, which yielded normal results. He also reports an injury in the past he fell out of the back of a 2-ton truck, at least 3 feet off the ground, and hit the pavement on his feet.     Supplemental Information    MEDICATIONS  Current: Flonase, losartan, fish oil         Objective   Vital Signs:   Vitals:    12/26/24 1302   BP: 104/62   BP Location: Left arm   Patient Position: Sitting   Cuff Size: Adult   Pulse: 82   Temp: 97.8 °F (36.6 °C)   TempSrc: Skin   SpO2: 97%   Weight: 98.7 kg (217 lb 9.6 oz)   Height: 188 cm (74.02\")     Body mass index is 27.93 kg/m².    Wt Readings from Last 3 Encounters:   12/26/24 98.7 kg (217 lb 9.6 oz)   12/12/24 98.1 kg (216 lb 3.2 oz)   12/09/24 98.4 kg (217 lb)     BP Readings from Last 3 Encounters:   12/26/24 104/62   12/12/24 128/70   12/09/24 136/78       Health Maintenance   Topic Date Due    ZOSTER VACCINE (1 of 2) Never done    INFLUENZA VACCINE  12/29/2024 (Originally 7/1/2024)    RSV Vaccine - Adults (1 - 1-dose 75+ series) 01/09/2025 (Originally 3/17/2021)    COVID-19 Vaccine (1 - 2024-25 season) 02/28/2025 (Originally 9/1/2024)    ANNUAL WELLNESS VISIT  09/26/2025    BMI FOLLOWUP  09/26/2025    LIPID PANEL  12/16/2025    TDAP/TD VACCINES (3 - Td or Tdap) 09/02/2033    HEPATITIS C SCREENING  Completed    Pneumococcal Vaccine 65+  Completed    COLORECTAL CANCER SCREENING  Discontinued       Past Medical History:   Diagnosis Date    Allergic     Seasonal    Allergies     Arthritis 01/17/2022    Asthma shortness of breath, probably from pneumonia    Asthma 01/19/2018    Benign prostatic hyperplasia     Broken finger 09/02/2023    Cancer melanoma    Cardiac pacemaker     Cataract     Removed    Chronic idiopathic fibrosing alveolitis     Fibrosis found with CTA 2024    Deep vein thrombosis     GERD (gastroesophageal reflux disease)     Glaucoma (increased eye pressure)     HL (hearing loss)     Hyperlipidemia     Hypertension     Hypothyroidism     Lyme " disease     Pneumonia dec 27,2020    Pulmonary embolism Feb 2022    Shortness of breath     SSS (sick sinus syndrome)      Past Surgical History:   Procedure Laterality Date    CARDIAC CATHETERIZATION N/A 07/25/2019    Procedure: Coronary angiography;  Surgeon: Wilton Brown MD;  Location: [facility] CATH INVASIVE LOCATION;  Service: Cardiovascular    CARDIAC CATHETERIZATION N/A 07/25/2019    Procedure: Left heart cath;  Surgeon: Wilton Brown MD;  Location: [facility] CATH INVASIVE LOCATION;  Service: Cardiovascular    CARDIAC CATHETERIZATION N/A 07/25/2019    Procedure: Left ventriculography;  Surgeon: Wilton Brown MD;  Location: [facility] CATH INVASIVE LOCATION;  Service: Cardiovascular    CARDIAC CATHETERIZATION N/A 07/25/2019    Procedure: Right heart cath;  Surgeon: Wilton Brown MD;  Location: [facility] CATH INVASIVE LOCATION;  Service: Cardiovascular    CARDIAC CATHETERIZATION Right 01/26/2022    Procedure: Percutaneous Manual Thrombectomy;  Surgeon: Maninder Penn MD;  Location: [facility] CATH INVASIVE LOCATION;  Service: Cardiovascular;  Laterality: Right;    CATARACT EXTRACTION      COLONOSCOPY  2019    COLONOSCOPY N/A 10/13/2022    Procedure: COLONOSCOPY FOR SCREENING;  Surgeon: Deng Monteiro MD;  Location: [facility] ENDOSCOPY;  Service: Gastroenterology;  Laterality: N/A;  DIVERTICULOSIS, HEMORRHOIDS    FRACTURE SURGERY  9/02/2023    INSERT / REPLACE / REMOVE PACEMAKER  08/2014    INTERVENTIONAL RADIOLOGY PROCEDURE Right 01/26/2022    Procedure: Pulmonary Angiogram;  Surgeon: Maninder Penn MD;  Location: [facility] CATH INVASIVE LOCATION;  Service: Cardiovascular;  Laterality: Right;    PACEMAKER IMPLANTATION      PULMONARY EMBOLISM SURGERY  01/2022    SKIN CANCER EXCISION N/A 2022    NOSE     Family History   Problem Relation Age of Onset    Cancer Mother     Heart disease Father     Cancer Father     Heart disease Sister         fatal heart attack    Sudden death Sister     Heart disease  Brother         fatal heart attck    Cancer Paternal Grandfather     Colon cancer Neg Hx     Malig Hyperthermia Neg Hx      Social History     Socioeconomic History    Marital status:    Tobacco Use    Smoking status: Former     Current packs/day: 0.00     Average packs/day: 0.3 packs/day for 12.0 years (3.0 ttl pk-yrs)     Types: Cigarettes, Pipe     Start date: 1964     Quit date: 1976     Years since quittin.0     Passive exposure: Past    Smokeless tobacco: Never    Tobacco comments:     Clean quit 48+ yrs   Vaping Use    Vaping status: Never Used   Substance and Sexual Activity    Alcohol use: Never    Drug use: Never    Sexual activity: Not Currently     Partners: Female     Comment: Wife only       Current Outpatient Medications   Medication Instructions    aspirin 81 mg, Daily    brimonidine (ALPHAGAN) 0.2 % ophthalmic solution 1 drop, 2 Times Daily    latanoprost (XALATAN) 0.005 % ophthalmic solution 1 drop, Daily    losartan (COZAAR) 100 mg, Oral, Daily    Melatonin 10 mg, Nightly    mupirocin (BACTROBAN) 2 % ointment 1 Application, Daily    Omega-3 Fatty Acids (fish oil) 1000 MG capsule capsule Daily With Breakfast    sulindac (CLINORIL) 150 MG tablet Take 1 tablet by mouth two times a day - take with food    Synthroid 100 mcg, Oral, Every Early Morning       There are no discontinued medications.     Physical Exam  Vitals and nursing note reviewed.   Constitutional:       Appearance: Normal appearance.   HENT:      Head: Normocephalic.      Ears:      Comments: Hearing aids to the right , tympanic membrane dull  Eyes:      Extraocular Movements: Extraocular movements intact.      Pupils: Pupils are equal, round, and reactive to light.   Cardiovascular:      Rate and Rhythm: Normal rate and regular rhythm.      Pulses: Normal pulses.   Pulmonary:      Effort: Pulmonary effort is normal.      Breath sounds: Normal breath sounds.   Abdominal:      Palpations: Abdomen is soft.    Musculoskeletal:         General: Normal range of motion.      Cervical back: Normal range of motion.   Skin:     General: Skin is warm and dry.   Neurological:      General: No focal deficit present.      Mental Status: He is alert.   Psychiatric:         Mood and Affect: Mood normal.         Behavior: Behavior normal.         Thought Content: Thought content normal.          Physical Exam         Result Review :  The following data was reviewed by: EDWINA Birmingham on 12/26/2024:    Labs  Lab on 12/16/2024   Component Date Value Ref Range Status    25 Hydroxy, Vitamin D 12/16/2024 48.6  30.0 - 100.0 ng/ml Final    TSH 12/16/2024 2.650  0.270 - 4.200 uIU/mL Final    Total Cholesterol 12/16/2024 195  0 - 200 mg/dL Final    Triglycerides 12/16/2024 142  0 - 150 mg/dL Final    HDL Cholesterol 12/16/2024 40  40 - 60 mg/dL Final    LDL Cholesterol  12/16/2024 129 (H)  0 - 100 mg/dL Final    VLDL Cholesterol 12/16/2024 26  5 - 40 mg/dL Final    LDL/HDL Ratio 12/16/2024 3.17   Final    Glucose 12/16/2024 105 (H)  65 - 99 mg/dL Final    BUN 12/16/2024 22  8 - 23 mg/dL Final    Creatinine 12/16/2024 1.31 (H)  0.76 - 1.27 mg/dL Final    Sodium 12/16/2024 138  136 - 145 mmol/L Final    Potassium 12/16/2024 5.1  3.5 - 5.2 mmol/L Final    Chloride 12/16/2024 102  98 - 107 mmol/L Final    CO2 12/16/2024 28.0  22.0 - 29.0 mmol/L Final    Calcium 12/16/2024 9.3  8.6 - 10.5 mg/dL Final    Total Protein 12/16/2024 6.8  6.0 - 8.5 g/dL Final    Albumin 12/16/2024 4.2  3.5 - 5.2 g/dL Final    ALT (SGPT) 12/16/2024 15  1 - 41 U/L Final    AST (SGOT) 12/16/2024 16  1 - 40 U/L Final    Alkaline Phosphatase 12/16/2024 44  39 - 117 U/L Final    Total Bilirubin 12/16/2024 0.5  0.0 - 1.2 mg/dL Final    Globulin 12/16/2024 2.6  gm/dL Final    A/G Ratio 12/16/2024 1.6  g/dL Final    BUN/Creatinine Ratio 12/16/2024 16.8  7.0 - 25.0 Final    Anion Gap 12/16/2024 8.0  5.0 - 15.0 mmol/L Final    eGFR 12/16/2024 55.7 (L)  >60.0  mL/min/1.73 Final    WBC 12/16/2024 5.45  3.40 - 10.80 10*3/mm3 Final    RBC 12/16/2024 5.22  4.14 - 5.80 10*6/mm3 Final    Hemoglobin 12/16/2024 14.8  13.0 - 17.7 g/dL Final    Hematocrit 12/16/2024 44.8  37.5 - 51.0 % Final    MCV 12/16/2024 85.8  79.0 - 97.0 fL Final    MCH 12/16/2024 28.4  26.6 - 33.0 pg Final    MCHC 12/16/2024 33.0  31.5 - 35.7 g/dL Final    RDW 12/16/2024 12.6  12.3 - 15.4 % Final    RDW-SD 12/16/2024 38.7  37.0 - 54.0 fl Final    MPV 12/16/2024 10.8  6.0 - 12.0 fL Final    Platelets 12/16/2024 234  140 - 450 10*3/mm3 Final    Neutrophil % 12/16/2024 56.9  42.7 - 76.0 % Final    Lymphocyte % 12/16/2024 30.8  19.6 - 45.3 % Final    Monocyte % 12/16/2024 8.3  5.0 - 12.0 % Final    Eosinophil % 12/16/2024 2.9  0.3 - 6.2 % Final    Basophil % 12/16/2024 0.7  0.0 - 1.5 % Final    Immature Grans % 12/16/2024 0.4  0.0 - 0.5 % Final    Neutrophils, Absolute 12/16/2024 3.10  1.70 - 7.00 10*3/mm3 Final    Lymphocytes, Absolute 12/16/2024 1.68  0.70 - 3.10 10*3/mm3 Final    Monocytes, Absolute 12/16/2024 0.45  0.10 - 0.90 10*3/mm3 Final    Eosinophils, Absolute 12/16/2024 0.16  0.00 - 0.40 10*3/mm3 Final    Basophils, Absolute 12/16/2024 0.04  0.00 - 0.20 10*3/mm3 Final    Immature Grans, Absolute 12/16/2024 0.02  0.00 - 0.05 10*3/mm3 Final    nRBC 12/16/2024 0.0  0.0 - 0.2 /100 WBC Final       Imaging  No Images in the past 120 days found..    Results  Laboratory Studies  Hemoglobin A1c is 5.8. Kidney function (filtration rate) is 55. HDL cholesterol is 40, LDL cholesterol is 129.       Procedures       ASSESSMENT & PLAN  Diagnoses and all orders for this visit:    1. Chronic pain of both knees (Primary)  -     XR Knee 3 View Bilateral; Future    2. Chronic bilateral low back pain without sciatica  -     XR Spine Lumbar 4+ View; Future    3. Vitamin D deficiency    4. Hypothyroidism due to Hashimoto thyroiditis    5. Mixed hyperlipidemia    6. Pre-diabetes    7. SSS (sick sinus syndrome)    8. Cardiac  pacemaker    9. Essential hypertension         Assessment & Plan  1. Prediabetes.  His hemoglobin A1c level remains at 5.8, indicating a consistent prediabetic state without progression to diabetes. He is advised to limit the intake of simple sugars, including sodas, cookies, cakes, pasta, white flour, potatoes, and pasta, to manage his blood glucose levels.    2. Glaucoma.  He has an upcoming appointment with an ophthalmologist in 02/2025. His previous eye drops were discontinued due to cost. He is advised to continue using Flonase nasal spray unless it exacerbates his eye pressure. He should inform his ENT specialist about his glaucoma during their appointment in 02/2025.    3. Cholesterol management.  His HDL cholesterol level is 40, which is within the normal range. However, his LDL cholesterol level has increased from 114 to 129 over the past 3 years. He is advised to increase his fiber intake and maintain an active lifestyle. He can continue taking garlic and fish oil supplements. He should inform cardiology that he is not currently taking Zetia.    4. Blood pressure management.  His blood pressure is slightly low today, which could be due to fatigue or recent holiday activities. He is advised to monitor his blood pressure 3 to 4 times a week at home. If his systolic blood pressure consistently remains below 110, he should reduce his losartan dosage by half and make me aware.    5. Low back pain.  He has a history of chronic low back pain since his teenage years, with one x-ray suggesting scoliosis. He has had a couple of injuries to his lower back when he was younger. He has frequent, severe headaches. He has been seeing a chiropractor biweekly. X-rays of his lower back will be ordered to assess for disc deterioration or degeneration. He will be contacted with the results.    6. Knee pain.  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 He has not sustained  any knee injuries and has been managing the pain with anti-inflammatories.X-rays of his knees will be ordered to evaluate the cause of his pain. He will be contacted with the results.    7. Neuropathy.  He reports that his neuropathy in the legs is still about the same. He has been on the current treatment plan for a month and will need to wait 3 months to evaluate its effectiveness.    8. Kidney function monitoring.  His kidney function is slightly reduced but stable compared to the previous year. He is advised to maintain adequate hydration and avoid over-the-counter anti-inflammatory medications.                  FOLLOW UP  Return in about 3 months (around 3/26/2025).  Patient was given instructions and counseling regarding his condition or for health maintenance advice. Please see specific information pulled into the AVS if appropriate.     Patient or patient representative verbalized consent for the use of Ambient Listening during the visit with  EDWINA Birmingham for chart documentation. 12/29/2024  11:41 EDWINA Araujo  12/29/24  11:41 EST

## 2025-01-29 ENCOUNTER — OFFICE VISIT (OUTPATIENT)
Dept: ORTHOPEDIC SURGERY | Facility: CLINIC | Age: 80
End: 2025-01-29
Payer: MEDICARE

## 2025-01-29 VITALS
DIASTOLIC BLOOD PRESSURE: 77 MMHG | OXYGEN SATURATION: 97 % | HEIGHT: 74 IN | WEIGHT: 205 LBS | BODY MASS INDEX: 26.31 KG/M2 | SYSTOLIC BLOOD PRESSURE: 124 MMHG | HEART RATE: 70 BPM

## 2025-01-29 DIAGNOSIS — M75.52 BURSITIS OF LEFT SHOULDER: ICD-10-CM

## 2025-01-29 DIAGNOSIS — M54.50 LOW BACK PAIN, UNSPECIFIED BACK PAIN LATERALITY, UNSPECIFIED CHRONICITY, UNSPECIFIED WHETHER SCIATICA PRESENT: ICD-10-CM

## 2025-01-29 DIAGNOSIS — M75.22 BICEPS TENDONITIS ON LEFT: ICD-10-CM

## 2025-01-29 DIAGNOSIS — M25.512 LEFT SHOULDER PAIN, UNSPECIFIED CHRONICITY: Primary | ICD-10-CM

## 2025-01-29 RX ADMIN — LIDOCAINE HYDROCHLORIDE 5 ML: 10 INJECTION, SOLUTION INFILTRATION; PERINEURAL at 14:15

## 2025-01-29 RX ADMIN — TRIAMCINOLONE ACETONIDE 40 MG: 40 INJECTION, SUSPENSION INTRA-ARTICULAR; INTRAMUSCULAR at 14:15

## 2025-01-29 RX ADMIN — TRIAMCINOLONE ACETONIDE 40 MG: 40 INJECTION, SUSPENSION INTRA-ARTICULAR; INTRAMUSCULAR at 14:16

## 2025-01-29 RX ADMIN — LIDOCAINE HYDROCHLORIDE 2 ML: 10 INJECTION, SOLUTION INFILTRATION; PERINEURAL at 14:16

## 2025-01-29 NOTE — PROGRESS NOTES
"Chief Complaint  Initial Evaluation of the Left Shoulder and Initial Evaluation of the Left Hip     Subjective      Elmer Garvin presents to [facility] for initial evaluation of the left shoulder and left hip.  He has no drilling holes in steel post 2-3 days after Juliana.  He has pain in the bicipital groove.  He has had low back pain for years.  The pain goes from the low back down to his toes.  He has had no injury or fall.      Allergies   Allergen Reactions    Latex Other (See Comments)     Sinus congestion, pt states has never had a reaction        Social History     Socioeconomic History    Marital status:    Tobacco Use    Smoking status: Former     Current packs/day: 0.00     Average packs/day: 0.3 packs/day for 12.0 years (3.0 ttl pk-yrs)     Types: Cigarettes, Pipe     Start date: 1964     Quit date: 1976     Years since quittin.1     Passive exposure: Past    Smokeless tobacco: Never    Tobacco comments:     Clean quit 48+ yrs   Vaping Use    Vaping status: Never Used   Substance and Sexual Activity    Alcohol use: Never    Drug use: Never    Sexual activity: Not Currently     Partners: Female     Comment: Wife only        I reviewed the patient's chief complaint, history of present illness, review of systems, past medical history, surgical history, family history, social history, medications, and allergy list.     Review of Systems     Constitutional: Denies fevers, chills, weight loss  Cardiovascular: Denies chest pain, shortness of breath  Skin: Denies rashes, acute skin changes  Neurologic: Denies headache, loss of consciousness      Vital Signs:   /77   Pulse 70   Ht 188 cm (74\")   Wt 93 kg (205 lb)   SpO2 97%   BMI 26.32 kg/m²          Physical Exam  General: Alert. No acute distress    Ortho Exam        LEFT SHOULDER .Forward flexion 175. Abduction 100. External rotation 40. Internal rotation L#. Positive Cross body adduction. " Supraspinatus strength 4+/5. Infraspinatus Strength 5/5. Infrared subscap 5/5. Positive Reyes. Positive Neer. Negative Apprehension. Negative Lift off. (Negative Obriens. Sensation intact to light touch, median, radial, ulnar nerve. Positive AIN, PIN, ulnar nerve motor. Positive pulses. Positive Impingement signs. Good strength in triceps, biceps, deltoid, wrist extensors and wrist flexors. Tender palpation of the Bicipital groove    LEFT HIP Mildly full ROM of the hip.  No skin discoloration, atrophy or swelling. Positive EHL, FHL, GS, and TA. Sensation intact to all 5 nerves of the foot. Pain with  straight leg raise. Leg lengths appear equal. Ambulates with Antalgic gait Negative Aga. Negative Rigo. Good strength to hamstrings, quadriceps, dorsiflexors, and plantar flexors. Knee Extensor Mechanism  intact  Pain in the lumbar spine down to the toes.        Large Joint Arthrocentesis: L subacromial bursa  Date/Time: 1/29/2025 2:15 PM  Consent given by: patient  Site marked: site marked  Timeout: Immediately prior to procedure a time out was called to verify the correct patient, procedure, equipment, support staff and site/side marked as required   Supporting Documentation  Indications: pain   Procedure Details  Location: shoulder - L subacromial bursa  Needle gauge: 21 G.  Medications administered: 5 mL lidocaine 1 %; 40 mg triamcinolone acetonide 40 MG/ML  Patient tolerance: patient tolerated the procedure well with no immediate complications      Left Bicep Injection  Date/Time: 1/29/2025 2:16 PM  Supporting Documentation  Indications: pain   Procedure Details  Location: -   Location: Left Bicep Injection.Needle gauge: 21G.  Medications administered: 2 mL lidocaine 1 %; 40 mg triamcinolone acetonide 40 MG/ML  Patient tolerance: patient tolerated the procedure well with no immediate complications        This injection documentation was Scribed for Fer Munoz MD by Adan Warner.  01/29/25   14:16  EST      Imaging Results (Most Recent)       Procedure Component Value Units Date/Time    XR Scapula Left [[id number]] Resulted: 01/29/25 1353     Updated: 01/29/25 1359             Result Review :     X-Ray Report:  Left scapula X-Ray  Indication: Evaluation of the left scapula  AP/Lateral view(s)  Findings: Mild AC joint degenerative changes.    Prior studies available for comparison: no             Assessment and Plan     Diagnoses and all orders for this visit:    1. Left shoulder pain, unspecified chronicity (Primary)  -     XR Scapula Left  -     Large Joint Arthrocentesis: L subacromial bursa    2. Biceps tendonitis on left  -     Left Bicep Injection    3. Bursitis of left shoulder    4. Low back pain, unspecified back pain laterality, unspecified chronicity, unspecified whether sciatica present  -     Ambulatory Referral to Neurosurgery  -     MRI Lumbar Spine Without Contrast; Future        Discussed the treatment plan with the patient. I reviewed the X-rays that were obtained today with the patient.     Discussed the risks and benefits of conservative measures. The patient expressed understanding and wished to proceed with a left shoulder steroid and left biceps injection.  He tolerated the injections well.     Discussed with the patient that due to the steroid injection given today in the office they may see an increase in blood sugar for a few days. Advised patient to monitor sugar after receiving the injection.     Discussed possibility of a reaction from the injection.  Discussed the possibility that the injection may not completely improve or remove the pain.  Discussed the risk of infection.    MRI of the lumbar spine and refer to Dr Mcclain.      Call or return if worsening symptoms.    Follow Up     PRN      Patient was given instructions and counseling regarding his condition or for health maintenance advice. Please see specific information pulled into the AVS if appropriate.     Scribed for  Fer Munoz MD by Stacy Gamez MA.  01/29/25   14:06 EST    I have personally performed the services described in this document as scribed by the above individual and it is both accurate and complete. Fer Munoz MD 01/30/25

## 2025-01-30 RX ORDER — TRIAMCINOLONE ACETONIDE 40 MG/ML
40 INJECTION, SUSPENSION INTRA-ARTICULAR; INTRAMUSCULAR
Status: COMPLETED | OUTPATIENT
Start: 2025-01-29 | End: 2025-01-29

## 2025-01-30 RX ORDER — LIDOCAINE HYDROCHLORIDE 10 MG/ML
2 INJECTION, SOLUTION INFILTRATION; PERINEURAL
Status: COMPLETED | OUTPATIENT
Start: 2025-01-29 | End: 2025-01-29

## 2025-01-30 RX ORDER — LIDOCAINE HYDROCHLORIDE 10 MG/ML
5 INJECTION, SOLUTION INFILTRATION; PERINEURAL
Status: COMPLETED | OUTPATIENT
Start: 2025-01-29 | End: 2025-01-29

## 2025-03-12 ENCOUNTER — HOSPITAL ENCOUNTER (OUTPATIENT)
Dept: GENERAL RADIOLOGY | Facility: HOSPITAL | Age: 80
Discharge: HOME | End: 2025-03-12
Payer: MEDICARE

## 2025-03-12 DIAGNOSIS — M25.561 BILATERAL CHRONIC KNEE PAIN: ICD-10-CM

## 2025-03-12 DIAGNOSIS — G89.29 CHRONIC PAIN OF BOTH KNEES: ICD-10-CM

## 2025-03-12 DIAGNOSIS — M25.562 BILATERAL CHRONIC KNEE PAIN: ICD-10-CM

## 2025-03-12 DIAGNOSIS — M25.562 CHRONIC PAIN OF BOTH KNEES: ICD-10-CM

## 2025-03-12 DIAGNOSIS — M25.561 CHRONIC PAIN OF BOTH KNEES: ICD-10-CM

## 2025-03-12 DIAGNOSIS — G89.29 BILATERAL CHRONIC KNEE PAIN: ICD-10-CM

## 2025-03-12 DIAGNOSIS — G89.29 CHRONIC BILATERAL LOW BACK PAIN WITHOUT SCIATICA: ICD-10-CM

## 2025-03-12 DIAGNOSIS — M54.50 CHRONIC BILATERAL LOW BACK PAIN WITHOUT SCIATICA: ICD-10-CM

## 2025-03-12 PROCEDURE — 73562 X-RAY EXAM OF KNEE 3: CPT

## 2025-03-12 PROCEDURE — 72110 X-RAY EXAM L-2 SPINE 4/>VWS: CPT

## 2025-03-13 ENCOUNTER — TRANSCRIBE ORDERS (OUTPATIENT)
Dept: ADMINISTRATIVE | Facility: HOSPITAL | Age: 80
End: 2025-03-13
Payer: MEDICARE

## 2025-03-13 ENCOUNTER — OFFICE VISIT (OUTPATIENT)
Dept: NEUROSURGERY | Facility: CLINIC | Age: 80
End: 2025-03-13
Payer: MEDICARE

## 2025-03-13 VITALS
BODY MASS INDEX: 27.01 KG/M2 | DIASTOLIC BLOOD PRESSURE: 64 MMHG | HEIGHT: 74 IN | WEIGHT: 210.5 LBS | SYSTOLIC BLOOD PRESSURE: 102 MMHG

## 2025-03-13 DIAGNOSIS — M47.27 OSTEOARTHRITIS OF SPINE WITH RADICULOPATHY, LUMBOSACRAL REGION: Primary | ICD-10-CM

## 2025-03-13 DIAGNOSIS — G62.9 PERIPHERAL POLYNEUROPATHY: ICD-10-CM

## 2025-03-13 DIAGNOSIS — J34.2 DEVIATED NASAL SEPTUM: ICD-10-CM

## 2025-03-13 DIAGNOSIS — J32.9 CHRONIC SINUSITIS, UNSPECIFIED LOCATION: Primary | ICD-10-CM

## 2025-03-13 DIAGNOSIS — G50.1 ATYPICAL FACIAL PAIN: ICD-10-CM

## 2025-03-13 NOTE — PROGRESS NOTES
Chief Complaint  Back Pain    Subjective          Elmer Garvin who is a 79 y.o. year old male who presents to [facility] for evaluation of lumbar spine.    History of Present Illness  The patient is a 79-year-old male presenting for low back pain and foot numbness.    He has been experiencing chronic back pain, which has recently worsened. The pain radiates from his lower back down to his feet, with constant foot pain that he attributes to a lifetime of standing on concrete. He describes the pain as an ache in the muscles surrounding his back, rather than a shooting pain. He also reports generalized weakness and rates his pain as a 3 on a scale of 0 to 10. He is interested in exploring the use of a brace to support his lower back and avoid surgery. However, he notes that braces exacerbate his GERD symptoms. He takes Tylenol for headaches rather than his back issues. He recalls an incident in his teenage years where he fell on solid ice while snow sliding, resulting in a dislocated tailbone. He believes this incident may have initiated his back issues.    In October 2024, he was diagnosed with neuropathy in both legs following a nerve study. He experiences numbness and tingling in his feet, more pronounced in the toes and worse on the right side. He reports that the numbness and tingling do not extend up the leg.  He has a history of 3 tickborne diseases and is currently on a regimen of medications to address potential Lyme disease-related neuropathy.    He also reports irregular bowel movements and constipation.       History of Previous Spinal Surgery?: No    Nicotine use: former smoker, quit 50 years ago    BMI: Body mass index is 27.03 kg/m².      Review of Systems   Musculoskeletal:  Positive for arthralgias, back pain, gait problem and myalgias.   Neurological:  Positive for weakness and numbness.   All other systems reviewed and are negative.       Objective  "  Vital Signs:   /64 (BP Location: Right arm, Patient Position: Sitting)   Ht 188 cm (74\")   Wt 95.5 kg (210 lb 8 oz)   BMI 27.03 kg/m²       Physical Exam  Vitals reviewed.   Constitutional:       Appearance: Normal appearance.   Musculoskeletal:      Lumbar back: Tenderness present. Negative right straight leg raise test and negative left straight leg raise test.      Right hip: No tenderness. Normal range of motion.      Left hip: No tenderness. Normal range of motion.   Neurological:      Mental Status: He is alert.      Motor: Motor strength is normal.       Neurological Exam  Mental Status  Alert.    Motor   Strength is 5/5 throughout all four extremities.    Sensory  Right: Loss of sensation in the L5 dermatome.  Reduced sensation distal to the ankles bilaterally and right lateral calf..      Physical Exam  Back was examined. Reflexes were checked.       Result Review :       Data reviewed : Radiologic studies XR Lumbar Spine on 3/12/25 at [facility] personally reviewed and interpreted. Degenerative changes, particularly in the lower lumbar spine with significant disc space narrowing at L4/5 and L5/S1.           Assessment and Plan    Diagnoses and all orders for this visit:    1. Osteoarthritis of spine with radiculopathy, lumbosacral region (Primary)  -     CT Lumbar Spine Without Contrast; Future  -     Ambulatory Referral to Physical Therapy for Evaluation & Treatment    2. Peripheral polyneuropathy  -     Ambulatory Referral to Physical Therapy for Evaluation & Treatment        Assessment & Plan  1. Low back pain.  The patient's exam suggest a potential sensory deficit correlating with the L4-5 and L5 nerve roots. The primary concern appears to be back pain and neuropathy, rather than radicular pain. There is no evidence of instability in the lumbar spine or vertebral shifting, but there is disc degeneration with arthritis changes. The nerve study did not indicate any pinched nerves originating from " the back, but rather a peripheral neuropathy. The x-ray revealed degenerative changes in the lower lumbar spine, necessitating further imaging. The patient has been informed that back surgery would primarily address leg symptoms, not back pain. The use of a constant back brace is not recommended due to the lack of instability in the lumbar spine. The patient has been advised that lumbar disc replacements are not currently available. The patient has been informed that the numbness is likely to persist chronically, but the goal is to prevent further worsening. Physical therapy has been recommended to alleviate strain on the joints and strengthen the muscles. The patient has been advised to discontinue physical therapy if it exacerbates the pain. A CT scan of the lumbar spine will be ordered to further investigate the cause of the back pain. If the back pain persists, alternative treatment options will be considered.    2. Peripheral neuropathy.  The patient reports numbness and tingling in the feet, more pronounced in the toes and worse on the right side. The nerve study primarily showed peripheral neuropathy, which could be related to prior tickborne illnesses. The patient has been on alpha lipoic acid and other treatments for neuropathy. Physical therapy has been recommended to help manage symptoms and strengthen muscles. If the patient experiences significantly more pain during physical therapy, alternative pain management options, such as injections, will be considered.    He will follow up after physical therapy.          I spent 45 minutes caring for Elmer on this date of service. This time includes time spent by me in the following activities:preparing for the visit, reviewing tests, obtaining and/or reviewing a separately obtained history, performing a medically appropriate examination and/or evaluation , counseling and educating the patient/family/caregiver, ordering medications, tests, or procedures,  referring and communicating with other health care professionals , documenting information in the medical record, independently interpreting results and communicating that information with the patient/family/caregiver, and care coordination.    Follow Up   Return in about 6 weeks (around 4/24/2025).  Patient was given instructions and counseling regarding his condition or for health maintenance advice.     Patient or patient representative verbalized consent for the use of Ambient Listening during the visit with  EDWINA Colvin for chart documentation. 3/13/2025  08:55 EDT    -Physical therapy  -CT Lumbar Spine  -Follow up after physical therapy

## 2025-03-20 ENCOUNTER — HOSPITAL ENCOUNTER (OUTPATIENT)
Dept: CT IMAGING | Facility: HOSPITAL | Age: 80
Discharge: HOME | End: 2025-03-20
Admitting: NURSE PRACTITIONER
Payer: MEDICARE

## 2025-03-20 DIAGNOSIS — J32.9 CHRONIC SINUSITIS, UNSPECIFIED LOCATION: ICD-10-CM

## 2025-03-20 DIAGNOSIS — G50.1 ATYPICAL FACIAL PAIN: ICD-10-CM

## 2025-03-20 DIAGNOSIS — M47.27 OSTEOARTHRITIS OF SPINE WITH RADICULOPATHY, LUMBOSACRAL REGION: ICD-10-CM

## 2025-03-20 DIAGNOSIS — J34.2 DEVIATED NASAL SEPTUM: ICD-10-CM

## 2025-03-20 PROCEDURE — 70486 CT MAXILLOFACIAL W/O DYE: CPT

## 2025-03-20 PROCEDURE — 72131 CT LUMBAR SPINE W/O DYE: CPT

## 2025-03-21 ENCOUNTER — PATIENT ROUNDING (BHMG ONLY) (OUTPATIENT)
Dept: NEUROSURGERY | Facility: CLINIC | Age: 80
End: 2025-03-21
Payer: MEDICARE

## 2025-03-26 ENCOUNTER — RESULTS FOLLOW-UP (OUTPATIENT)
Dept: CT IMAGING | Facility: HOSPITAL | Age: 80
End: 2025-03-26
Payer: MEDICARE

## 2025-03-26 ENCOUNTER — OFFICE VISIT (OUTPATIENT)
Age: 80
End: 2025-03-26
Payer: MEDICARE

## 2025-03-26 VITALS
OXYGEN SATURATION: 99 % | DIASTOLIC BLOOD PRESSURE: 64 MMHG | SYSTOLIC BLOOD PRESSURE: 120 MMHG | HEIGHT: 74 IN | WEIGHT: 214.3 LBS | HEART RATE: 79 BPM | BODY MASS INDEX: 27.5 KG/M2

## 2025-03-26 DIAGNOSIS — Z80.42 FAMILY HISTORY OF PROSTATE CANCER IN FATHER: ICD-10-CM

## 2025-03-26 DIAGNOSIS — R73.03 PRE-DIABETES: ICD-10-CM

## 2025-03-26 DIAGNOSIS — F51.01 PRIMARY INSOMNIA: ICD-10-CM

## 2025-03-26 DIAGNOSIS — R27.8 COORDINATION IMPAIRMENT: Primary | ICD-10-CM

## 2025-03-26 DIAGNOSIS — E55.9 VITAMIN D DEFICIENCY: ICD-10-CM

## 2025-03-26 DIAGNOSIS — G62.9 PERIPHERAL POLYNEUROPATHY: ICD-10-CM

## 2025-03-26 DIAGNOSIS — N40.0 BENIGN PROSTATIC HYPERPLASIA WITHOUT LOWER URINARY TRACT SYMPTOMS: ICD-10-CM

## 2025-03-26 DIAGNOSIS — K22.70 BARRETT'S ESOPHAGUS WITHOUT DYSPLASIA: ICD-10-CM

## 2025-03-26 DIAGNOSIS — R53.1 WEAKNESS: ICD-10-CM

## 2025-03-26 DIAGNOSIS — I10 ESSENTIAL HYPERTENSION: ICD-10-CM

## 2025-03-26 RX ORDER — LOSARTAN POTASSIUM 50 MG/1
50 TABLET ORAL 2 TIMES DAILY
Qty: 60 TABLET | Refills: 5 | Status: SHIPPED | OUTPATIENT
Start: 2025-03-26

## 2025-03-26 RX ORDER — QUETIAPINE FUMARATE 25 MG/1
25 TABLET, FILM COATED ORAL NIGHTLY
Qty: 30 TABLET | Refills: 2 | Status: SHIPPED | OUTPATIENT
Start: 2025-03-26

## 2025-03-26 NOTE — PROGRESS NOTES
Chief Complaint  Knee Pain and Follow-up (3 month follow up and would natalie to discuss his blood pressure)    Subjective      Elmer Garvin is a 80 y.o. male who presents to [facility]     History of Present Illness  The patient presents for evaluation of blood pressure, prediabetes, shortness of breath, sleep issues, and he would like to discuss possibility of Parkinson's disease.    Hypertension -he has been monitoring his blood pressure at home, with readings ranging from 101 to 160 systolic and 60-80's diastolic. He reduced his losartan 100 mg to half tablet.  As discussed at his last visit.  However when it got up to the 160 range he went to taking three quarters of a tablet.  He has been monitoring his blood pressure and primarily it is in the good range today in the office it is 120s I have instructed him that we will continue him on the 50 mg however I will send over a prescription for 50 mg twice daily and if his blood pressure is consistently above 140/90 then he can take 1/2 tablet in the afternoon.  He has been maintaining a consistent hydration level, consuming approximately 60 +ounces of water daily.  He denies chest pain or palpitations.      Shortness of air -he does however have complaints of shortness of air.  He has an upcoming appointment with his cardiologist next month for a pacemaker check. Prior to the pacemaker implantation, he experienced significant dyspnea, which improved post-implantation. However, he reports a recent decrease in physical activity and subsequent muscle loss.  He thinks that may also be some issues with his decreased endurance weakness and shortness of air.  He has an upcoming appointment with his pulmonologist in June 2025. He was informed by his allergist that his lung function remains stable at 80%     Prediabetes  Episodes of shakiness in am feels like has been fasting, shaking-he has been experiencing episodes of shakiness in his  arms and legs, which occur sporadically throughout the day, even when he is not fasting. These episodes have been observed in the morning, afternoon, and occasionally at night after dinner. He does not experience any associated chest pain or palpitations during these episodes. He has not monitored his blood pressure during these episodes. His diet primarily consists of plain Cheerios without any added sugar. He does not consume soda. He was diagnosed with prediabetes 35 years ago by Dr. Guerrero.  His A1c continues prediabetes I have instructed him I did like for him to eat something with protein every 3 hours he should avoid simple sugar to see if that will help with the shakiness.  No long fasting periods    Insomnia -he has been experiencing poor sleep quality for several years.  He goes to bed typically around 1030 but a lot of nights waking up at 3 AM. If he is unable to return to sleep by 6:00 AM, he will get up and often falls asleep again in the afternoon. He has been tested for sleep apnea in the past, with negative results. He takes melatonin as needed.    Concern of possible Parkinson's -he has been experiencing shakiness and difficulty with coordination issues for the past year, which have been causing him concern. He reports difficulty performing fine motor tasks, such as turning pages in a book. He has no known family history of Parkinson's disease.  But he feels like his weakness lack of coordination is more than simple aging and acknowledges it could be related to neuropathy and back pain with his decrease in activity.  But is concerned it could be more..  He did have a recent CT of sinuses , no brain abnormality noted on this limited exam.  Will refer him over to neurology for him to discuss further.  In the interim we will try to improve his endurance with maintaining his blood pressure at normal range avoiding any fluctuations of blood sugar by eating protein every 3 hours and increasing his activity  "3 times a week.  And continue with physical therapy for his back and leg pain.    .    FAMILY HISTORY  He does not have a family history of Parkinson's disease that he is aware of.    MEDICATIONS  Current: Melatonin   hypertension, hyperlipidemia, cardiac disease, neuropathy, OA,  prediabetes, blood pressure, low back pain, knee pain,         Objective   Vital Signs:   Vitals:    03/26/25 1254   BP: 120/64   Pulse: 79   SpO2: 99%   Weight: 97.2 kg (214 lb 4.8 oz)   Height: 188 cm (74.02\")     Body mass index is 27.5 kg/m².    Wt Readings from Last 3 Encounters:   03/26/25 97.2 kg (214 lb 4.8 oz)   03/13/25 95.5 kg (210 lb 8 oz)   01/29/25 93 kg (205 lb)     BP Readings from Last 3 Encounters:   03/26/25 120/64   03/13/25 102/64   01/29/25 124/77       Health Maintenance   Topic Date Due    ZOSTER VACCINE (1 of 2) Never done    RSV Vaccine - Adults (1 - 1-dose 75+ series) Never done    INFLUENZA VACCINE  07/01/2024    COVID-19 Vaccine (1 - 2024-25 season) Never done    ANNUAL WELLNESS VISIT  09/26/2025    LIPID PANEL  12/16/2025    TDAP/TD VACCINES (3 - Td or Tdap) 09/02/2033    HEPATITIS C SCREENING  Completed    Pneumococcal Vaccine 50+  Completed    COLORECTAL CANCER SCREENING  Discontinued       Physical Exam  Vitals and nursing note reviewed.   Constitutional:       Appearance: Normal appearance.   HENT:      Head: Normocephalic.   Eyes:      Extraocular Movements: Extraocular movements intact.      Pupils: Pupils are equal, round, and reactive to light.   Cardiovascular:      Rate and Rhythm: Normal rate and regular rhythm.      Pulses: Normal pulses.   Pulmonary:      Effort: Pulmonary effort is normal.      Breath sounds: Normal breath sounds.   Abdominal:      Palpations: Abdomen is soft.   Musculoskeletal:         General: Normal range of motion.      Cervical back: Normal range of motion.      Comments: Uses a cane for gait stability   Skin:     General: Skin is warm and dry.   Neurological:      General: " No focal deficit present.      Mental Status: He is alert.   Psychiatric:         Mood and Affect: Mood normal.         Behavior: Behavior normal.         Thought Content: Thought content normal.          Physical Exam  Lungs were auscultated.  Heart was examined.    Vital Signs  Oxygen saturation is at 99%.       Result Review :  The following data was reviewed by: EDWINA Birmingham on 03/26/2025:    Labs  No visits with results within 2 Month(s) from this visit.   Latest known visit with results is:   Lab on 12/16/2024   Component Date Value Ref Range Status    25 Hydroxy, Vitamin D 12/16/2024 48.6  30.0 - 100.0 ng/ml Final    TSH 12/16/2024 2.650  0.270 - 4.200 uIU/mL Final    Total Cholesterol 12/16/2024 195  0 - 200 mg/dL Final    Triglycerides 12/16/2024 142  0 - 150 mg/dL Final    HDL Cholesterol 12/16/2024 40  40 - 60 mg/dL Final    LDL Cholesterol  12/16/2024 129 (H)  0 - 100 mg/dL Final    VLDL Cholesterol 12/16/2024 26  5 - 40 mg/dL Final    LDL/HDL Ratio 12/16/2024 3.17   Final    Glucose 12/16/2024 105 (H)  65 - 99 mg/dL Final    BUN 12/16/2024 22  8 - 23 mg/dL Final    Creatinine 12/16/2024 1.31 (H)  0.76 - 1.27 mg/dL Final    Sodium 12/16/2024 138  136 - 145 mmol/L Final    Potassium 12/16/2024 5.1  3.5 - 5.2 mmol/L Final    Chloride 12/16/2024 102  98 - 107 mmol/L Final    CO2 12/16/2024 28.0  22.0 - 29.0 mmol/L Final    Calcium 12/16/2024 9.3  8.6 - 10.5 mg/dL Final    Total Protein 12/16/2024 6.8  6.0 - 8.5 g/dL Final    Albumin 12/16/2024 4.2  3.5 - 5.2 g/dL Final    ALT (SGPT) 12/16/2024 15  1 - 41 U/L Final    AST (SGOT) 12/16/2024 16  1 - 40 U/L Final    Alkaline Phosphatase 12/16/2024 44  39 - 117 U/L Final    Total Bilirubin 12/16/2024 0.5  0.0 - 1.2 mg/dL Final    Globulin 12/16/2024 2.6  gm/dL Final    A/G Ratio 12/16/2024 1.6  g/dL Final    BUN/Creatinine Ratio 12/16/2024 16.8  7.0 - 25.0 Final    Anion Gap 12/16/2024 8.0  5.0 - 15.0 mmol/L Final    eGFR 12/16/2024 55.7 (L)  >60.0  mL/min/1.73 Final    WBC 12/16/2024 5.45  3.40 - 10.80 10*3/mm3 Final    RBC 12/16/2024 5.22  4.14 - 5.80 10*6/mm3 Final    Hemoglobin 12/16/2024 14.8  13.0 - 17.7 g/dL Final    Hematocrit 12/16/2024 44.8  37.5 - 51.0 % Final    MCV 12/16/2024 85.8  79.0 - 97.0 fL Final    MCH 12/16/2024 28.4  26.6 - 33.0 pg Final    MCHC 12/16/2024 33.0  31.5 - 35.7 g/dL Final    RDW 12/16/2024 12.6  12.3 - 15.4 % Final    RDW-SD 12/16/2024 38.7  37.0 - 54.0 fl Final    MPV 12/16/2024 10.8  6.0 - 12.0 fL Final    Platelets 12/16/2024 234  140 - 450 10*3/mm3 Final    Neutrophil % 12/16/2024 56.9  42.7 - 76.0 % Final    Lymphocyte % 12/16/2024 30.8  19.6 - 45.3 % Final    Monocyte % 12/16/2024 8.3  5.0 - 12.0 % Final    Eosinophil % 12/16/2024 2.9  0.3 - 6.2 % Final    Basophil % 12/16/2024 0.7  0.0 - 1.5 % Final    Immature Grans % 12/16/2024 0.4  0.0 - 0.5 % Final    Neutrophils, Absolute 12/16/2024 3.10  1.70 - 7.00 10*3/mm3 Final    Lymphocytes, Absolute 12/16/2024 1.68  0.70 - 3.10 10*3/mm3 Final    Monocytes, Absolute 12/16/2024 0.45  0.10 - 0.90 10*3/mm3 Final    Eosinophils, Absolute 12/16/2024 0.16  0.00 - 0.40 10*3/mm3 Final    Basophils, Absolute 12/16/2024 0.04  0.00 - 0.20 10*3/mm3 Final    Immature Grans, Absolute 12/16/2024 0.02  0.00 - 0.05 10*3/mm3 Final    nRBC 12/16/2024 0.0  0.0 - 0.2 /100 WBC Final        Imaging  CT Sinus Without Contrast  Result Date: 3/25/2025  Impression: 1. Changes of chronic frontal and maxillary sinusitis similar to prior exam. 2. Deviation of the bony nasal septum to the left. 3. Asymmetric soft tissue thickening involving the left Divide tonsil and glossotonsillar sulcus. Dedicated CT scan of the neck with IV contrast would be recommended for further evaluation when clinically feasible. Electronically Signed: Chriss Edwards MD  3/25/2025 9:54 AM EDT  Workstation ID: [id number]    CT Lumbar Spine Without Contrast  Result Date: 3/25/2025  Impression: 1. Multilevel degenerative disc disease  and degenerative facet change resulting in mild multilevel canal stenosis and neural foraminal narrowing as detailed above. Electronically Signed: Chriss Edwards MD  3/25/2025 9:39 AM EDT  Workstation ID: [id number]    XR Spine Lumbar Complete 4+VW  Result Date: 3/16/2025  Impression: Degenerative change. No acute osseous abnormalities are identified. Electronically Signed: Luis Gary MD  3/16/2025 2:28 PM EDT  Workstation ID: [id number]    XR Knee 3 View Left  Result Date: 3/12/2025  Impression: Grossly unremarkable bilateral radiographs of the knees Electronically Signed: Arnold Strickland MD  3/12/2025 3:52 PM EDT  Workstation ID: [id number]    XR Knee 3 View Right  Result Date: 3/12/2025  Impression: Grossly unremarkable bilateral radiographs of the knees Electronically Signed: Arnold Strickland MD  3/12/2025 3:52 PM EDT  Workstation ID: [id number]      Results  Laboratory Studies  A1c indicates prediabetic stage.       Procedures         ASSESSMENT & PLAN  Diagnoses and all orders for this visit:    1. Coordination impairment (Primary)  -     Ambulatory Referral to Neurology    2. Weakness  -     Ambulatory Referral to Neurology    3. Essential hypertension  -     losartan (Cozaar) 50 MG tablet; Take 1 tablet by mouth 2 (Two) Times a Day.  Dispense: 60 tablet; Refill: 5  -     Vitamin D,25-Hydroxy; Future  -     TSH Rfx On Abnormal To Free T4; Future  -     Lipid Panel; Future  -     Comprehensive Metabolic Panel; Future  -     CBC & Differential; Future  -     Hepatitis C Antibody; Future  -     Hemoglobin A1c; Future  -     Microalbumin / Creatinine Urine Ratio - Urine, Clean Catch; Future  -     Ambulatory Referral to Neurology    4. Taylor's esophagus without dysplasia  -     losartan (Cozaar) 50 MG tablet; Take 1 tablet by mouth 2 (Two) Times a Day.  Dispense: 60 tablet; Refill: 5  -     Vitamin D,25-Hydroxy; Future  -     TSH Rfx On Abnormal To Free T4; Future  -     Lipid Panel; Future  -     Comprehensive  Metabolic Panel; Future  -     CBC & Differential; Future  -     Hepatitis C Antibody; Future  -     Hemoglobin A1c; Future  -     Microalbumin / Creatinine Urine Ratio - Urine, Clean Catch; Future  -     Ambulatory Referral to Neurology    5. Pre-diabetes  -     losartan (Cozaar) 50 MG tablet; Take 1 tablet by mouth 2 (Two) Times a Day.  Dispense: 60 tablet; Refill: 5  -     Vitamin D,25-Hydroxy; Future  -     TSH Rfx On Abnormal To Free T4; Future  -     Lipid Panel; Future  -     Comprehensive Metabolic Panel; Future  -     CBC & Differential; Future  -     Hepatitis C Antibody; Future  -     Hemoglobin A1c; Future  -     Microalbumin / Creatinine Urine Ratio - Urine, Clean Catch; Future  -     Ambulatory Referral to Neurology    6. Benign prostatic hyperplasia without lower urinary tract symptoms  -     losartan (Cozaar) 50 MG tablet; Take 1 tablet by mouth 2 (Two) Times a Day.  Dispense: 60 tablet; Refill: 5  -     Vitamin D,25-Hydroxy; Future  -     TSH Rfx On Abnormal To Free T4; Future  -     Lipid Panel; Future  -     Comprehensive Metabolic Panel; Future  -     CBC & Differential; Future  -     Hepatitis C Antibody; Future  -     Hemoglobin A1c; Future  -     Microalbumin / Creatinine Urine Ratio - Urine, Clean Catch; Future  -     Ambulatory Referral to Neurology    7. Family history of prostate cancer in father  -     losartan (Cozaar) 50 MG tablet; Take 1 tablet by mouth 2 (Two) Times a Day.  Dispense: 60 tablet; Refill: 5  -     Vitamin D,25-Hydroxy; Future  -     TSH Rfx On Abnormal To Free T4; Future  -     Lipid Panel; Future  -     Comprehensive Metabolic Panel; Future  -     CBC & Differential; Future  -     Hepatitis C Antibody; Future  -     Hemoglobin A1c; Future  -     Microalbumin / Creatinine Urine Ratio - Urine, Clean Catch; Future  -     Ambulatory Referral to Neurology    8. Peripheral polyneuropathy  -     losartan (Cozaar) 50 MG tablet; Take 1 tablet by mouth 2 (Two) Times a Day.   Dispense: 60 tablet; Refill: 5  -     Vitamin D,25-Hydroxy; Future  -     TSH Rfx On Abnormal To Free T4; Future  -     Lipid Panel; Future  -     Comprehensive Metabolic Panel; Future  -     CBC & Differential; Future  -     Hepatitis C Antibody; Future  -     Hemoglobin A1c; Future  -     Microalbumin / Creatinine Urine Ratio - Urine, Clean Catch; Future  -     Ambulatory Referral to Neurology    9. Primary insomnia  -     QUEtiapine (SEROquel) 25 MG tablet; Take 1 tablet by mouth Every Night.  Dispense: 30 tablet; Refill: 2    10. Vitamin D deficiency  -     Vitamin D,25-Hydroxy; Future         Assessment & Plan  1.  Hypertension and blood pressure management.  His blood pressure readings have been consistently within the normal range, with occasional fluctuations. Kidney function tests have returned normal results. He will continue his current regimen of half a tablet of antihypertensive medication. A prescription for Seroquel 25 mg has been provided to aid in sleep regulation. He is advised to take one tablet an hour prior to bedtime. If his blood pressure readings exceed 160/90, he is instructed to increase his medication dosage to one full tablet. If his blood pressure remains elevated, he may take an additional half tablet in the afternoon.    2. Prediabetes.  His A1c levels indicate a prediabetic state, which has shown slight improvement. He is advised to incorporate protein into his diet every three hours to prevent hypoglycemia. He is also encouraged to maintain adequate hydration and engage in regular physical activity. Blood work will be repeated in three months to monitor his condition.    3. Shortness of breath.  His oxygen saturation levels are currently at 99 percent.  However he continues to complain of increased shortness of air with any exertion he is advised to engage in pulmonary rehabilitation exercises three times a week to improve lung function and endurance.  Such as increase activity cough  and deep breathing    4. Suspected Parkinson's disease.  A referral to neurology has been made for further evaluation of lack of coordination and weakness. If his symptoms improve with his current treatment of rehab/physical therapy before the appointment, the appointment will be canceled.    5. Sleep issues.  He is advised to take Seroquel 25 mg one tablet an hour prior to bedtime.      Follow-up  The patient will follow up in three months.                  FOLLOW UP  Return in about 3 months (around 6/26/2025) for Labs then a visit.  Patient was given instructions and counseling regarding his condition or for health maintenance advice. Please see specific information pulled into the AVS if appropriate.     Patient or patient representative verbalized consent for the use of Ambient Listening during the visit with  EDWINA Birmingham for chart documentation. 3/26/2025  14:32 EDT    EDWINA Birmingham  03/26/25  14:35 EDT

## 2025-03-26 NOTE — PATIENT INSTRUCTIONS
Fu with physical therapy   Set aside 3 days a week for lung and endurance exercises   Eat protein every 3 hours  Losartan 50 mg  and take each am and then check b/p if consistently higher 140/90  take 1/2 tablet in afternoon   Any chest pain or increase soa palpitations has to be seen  Will refer to neurology for further workup with concerns of weakness

## 2025-04-04 ENCOUNTER — TRANSCRIBE ORDERS (OUTPATIENT)
Dept: ADMINISTRATIVE | Facility: HOSPITAL | Age: 80
End: 2025-04-04
Payer: MEDICARE

## 2025-04-04 DIAGNOSIS — L29.89 PRURITUS SENILIS: Primary | ICD-10-CM

## 2025-04-10 ENCOUNTER — HOSPITAL ENCOUNTER (OUTPATIENT)
Dept: CT IMAGING | Facility: HOSPITAL | Age: 80
Discharge: HOME | End: 2025-04-10
Admitting: OTOLARYNGOLOGY
Payer: MEDICARE

## 2025-04-10 DIAGNOSIS — L29.89 PRURITUS SENILIS: ICD-10-CM

## 2025-04-10 LAB
CREAT BLDA-MCNC: 1.2 MG/DL (ref 0.6–1.3)
EGFRCR SERPLBLD CKD-EPI 2021: 61.5 ML/MIN/1.73

## 2025-04-10 PROCEDURE — 25510000001 IOPAMIDOL PER 1 ML: Performed by: OTOLARYNGOLOGY

## 2025-04-10 PROCEDURE — 70491 CT SOFT TISSUE NECK W/DYE: CPT

## 2025-04-10 PROCEDURE — 82565 ASSAY OF CREATININE: CPT

## 2025-04-10 RX ORDER — IOPAMIDOL 755 MG/ML
100 INJECTION, SOLUTION INTRAVASCULAR
Status: COMPLETED | OUTPATIENT
Start: 2025-04-10 | End: 2025-04-10

## 2025-04-10 RX ADMIN — IOPAMIDOL 100 ML: 755 INJECTION, SOLUTION INTRAVENOUS at 09:47

## 2025-04-14 ENCOUNTER — TELEPHONE (OUTPATIENT)
Dept: PULMONOLOGY | Facility: CLINIC | Age: 80
End: 2025-04-14

## 2025-04-14 NOTE — TELEPHONE ENCOUNTER
Hub staff attempted to follow warm transfer process and was unsuccessful     Caller: MYRNA MUÑIZ    Relationship to patient: Emergency Contact    Best call back number:     [phone]        Patient is needing: THE PT HAD A MISSED CALL WITH NO VM LEFT. PLEASE ADVISE

## 2025-04-24 ENCOUNTER — OFFICE VISIT (OUTPATIENT)
Dept: NEUROSURGERY | Facility: CLINIC | Age: 80
End: 2025-04-24
Payer: MEDICARE

## 2025-04-24 VITALS
HEIGHT: 74 IN | SYSTOLIC BLOOD PRESSURE: 101 MMHG | DIASTOLIC BLOOD PRESSURE: 64 MMHG | BODY MASS INDEX: 27.36 KG/M2 | WEIGHT: 213.2 LBS

## 2025-04-24 DIAGNOSIS — M47.27 OSTEOARTHRITIS OF SPINE WITH RADICULOPATHY, LUMBOSACRAL REGION: Primary | ICD-10-CM

## 2025-04-24 NOTE — PROGRESS NOTES
Chief Complaint  Back Pain    Subjective          Elmer Garvin who is a 80 y.o. year old male who presents to [facility] for follow up. CT Lumbar Spine.     History of Present Illness  The patient is a 80-year-old male following up after a CT scan of the lumbar spine.    He reports a gradual improvement in his condition, with a noted increase in flexibility and range of motion. He experiences less pain in his lower back and right leg compared to previous visits. He also mentions that his back appears to be aligning better, reducing the visible signs of scoliosis. His physical therapy sessions have primarily focused on his lower back, with one session involving traction treatment, which he tolerated well without any associated pain. He acknowledges the benefits of exercise, even though he has not been particularly fond of it in the past.       Interval History 3/13/25    The patient is a 79-year-old male presenting for low back pain and foot numbness.     He has been experiencing chronic back pain, which has recently worsened. The pain radiates from his lower back down to his feet, with constant foot pain that he attributes to a lifetime of standing on concrete. He describes the pain as an ache in the muscles surrounding his back, rather than a shooting pain. He also reports generalized weakness and rates his pain as a 3 on a scale of 0 to 10. He is interested in exploring the use of a brace to support his lower back and avoid surgery. However, he notes that braces exacerbate his GERD symptoms. He takes Tylenol for headaches rather than his back issues. He recalls an incident in his teenage years where he fell on solid ice while snow sliding, resulting in a dislocated tailbone. He believes this incident may have initiated his back issues.     In October 2024, he was diagnosed with neuropathy in both legs following a nerve study. He experiences numbness and tingling in his  "feet, more pronounced in the toes and worse on the right side. He reports that the numbness and tingling do not extend up the leg.  He has a history of 3 tickborne diseases and is currently on a regimen of medications to address potential Lyme disease-related neuropathy.     He also reports irregular bowel movements and constipation.        History of Previous Spinal Surgery?: No     Nicotine use: former smoker, quit 50 years ago     BMI: Body mass index is 27.03 kg/m².    Recent Interventions: PT      Review of Systems   Musculoskeletal:  Positive for arthralgias, back pain, gait problem and myalgias.   Neurological:  Positive for numbness.   All other systems reviewed and are negative.       Objective   Vital Signs:   /64 (BP Location: Left arm, Patient Position: Sitting)   Ht 188 cm (74.02\")   Wt 96.7 kg (213 lb 3.2 oz)   BMI 27.36 kg/m²       Physical Exam  Vitals reviewed.   Constitutional:       Appearance: Normal appearance.   Musculoskeletal:      Lumbar back: Tenderness present. Negative right straight leg raise test and negative left straight leg raise test.      Right hip: No tenderness. Normal range of motion.      Left hip: No tenderness. Normal range of motion.   Neurological:      Mental Status: He is alert.      Motor: Motor strength is normal.       Neurological Exam  Mental Status  Alert.    Motor   Strength is 5/5 throughout all four extremities.    Sensory  Right: Loss of sensation in the L5 dermatome.  Reduced sensation distal to the ankles bilaterally and right lateral calf..      Physical Exam         Result Review :       Data reviewed : Radiologic studies CT Lumbar Spine on 3/20/25 at [facility] personally reviewed and interpreted. Multilevel degenerative changes, resulting in multilevel spinal canal and foraminal stenosis.            Assessment and Plan    Diagnoses and all orders for this visit:    1. Osteoarthritis of spine with radiculopathy, lumbosacral region (Primary)  -     " Ambulatory Referral to Physical Therapy for Evaluation & Treatment        Assessment & Plan  1. Degenerative disc disease.  The CT scan of the lumbar spine shows significant degeneration at L4-5 and L5-S1, with inflammation on the vertebrae due to bone grinding. This degeneration is contributing to his back pain symptoms. There is no significant narrowing of the spinal canal or nerve roots that would necessitate surgical intervention. Physical therapy has shown good progress, with increased flexibility and range of motion, and reduced radicular pain by approximately 50%. He is advised to continue physical therapy twice a week for an additional 4 weeks. If pain symptoms worsen or if therapy is not as effective as before, he should contact us to discuss alternative treatment options.         Follow Up   Return if symptoms worsen or fail to improve.  Patient was given instructions and counseling regarding his condition or for health maintenance advice.     Patient or patient representative verbalized consent for the use of Ambient Listening during the visit with  EDWINA Colvin for chart documentation. 4/24/2025  14:18 EDT

## 2025-06-08 NOTE — PROGRESS NOTES
Primary Care Provider  Esperanza Rangel APRN   Referring Provider  No ref. provider found    Patient Complaint  Follow-up, Shortness of Breath (Short of breath on exertion ), and fatgiued    Patient or patient representative verbalized consent for the use of Ambient Listening during the visit with  EDWINA Esposito for chart documentation. 6/9/2025  08:42 EDT      Subjective       History of Presenting Illness  Elmer Garvin is a pleasant 80 y.o. male  of  Dr. Gong who presents to [facility] MED[facility] with history of fibrosis, restrictive lung component, nocturnal hypoxia, suspected lung disease, history of PE with right heart strain in 2022, multifocal infectious pneumonia, here for follow-up appointment.  Patient was last seen 12/9/2020 for the kind.  He continues on O2 at night and naps for nocturnal hypoxia and is benefiting from the supplemental oxygen.  He is currently not on any medications for his lungs.    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  History of Present Illness  The patient is a 80-year-old male presenting for a follow-up appointment.    He reports no hospitalizations since his last consultation with Dr. Gong. He has not required urgent care or the use of steroids or antibiotics. His respiratory function remains stable, with no recent episodes of respiratory distress or chest colds. He continues to use supplemental oxygen as needed, changing the tubing when it becomes stiff. He experiences shortness of breath during certain activities but has been generally well over the past 6 months. He receives his oxygen supply from [employer] and has not requested an oxygen pack. He uses his oxygen at night and during naps. He finds the oxygen bottle cumbersome to handle and has attempted to carry it in a backpack, which was unsuccessful. He  has considered attaching it to his belt for easier management. He is currently not on any pulmonary medications and does not feel the need for a rescue inhaler or nebulizer. He recalls a severe illness in , which required hospitalization and was informed that his condition was critical. He expresses concern about the progression of his fibrosis and the potential need for an inhaler.     At present time patient denies coughing, wheezing, headaches, chest pain, weight loss or hemoptysis. Patient denies fevers, chills and night sweats. Elmer Garvin is able to perform ADLs.      I have personally reviewed the review of systems, past family, social, medical and surgical histories; and agree with their findings.      Review of Systems   Constitutional: Negative.    HENT: Negative.     Respiratory:  Positive for shortness of breath.         Shortness of air with exertional activities, recovers easily with rest   Cardiovascular: Negative.    Musculoskeletal: Negative.    Neurological: Negative.    Psychiatric/Behavioral: Negative.           Family History   Problem Relation Age of Onset    Cancer Mother         Breast cancer    Migraines Mother     Heart disease Father     Cancer Father         Prostate cancer    Heart disease Sister         fatal heart attack    Sudden death Sister     Heart disease Brother         fatal heart attck    Cancer Paternal Grandfather     COPD Brother     Heart disease Sister     Colon cancer Neg Hx     Malig Hyperthermia Neg Hx         Social History     Socioeconomic History    Marital status:    Tobacco Use    Smoking status: Former     Current packs/day: 0.00     Average packs/day: 1.7 packs/day for 26.0 years (45.0 ttl pk-yrs)     Types: Cigarettes, Pipe     Start date: 1964     Quit date: 1976     Years since quittin.4     Passive exposure: Past    Smokeless tobacco: Never    Tobacco comments:     Clean quit 48+ yrs   Vaping Use    Vaping status: Never Used    Substance and Sexual Activity    Alcohol use: Never    Drug use: Never    Sexual activity: Not Currently     Partners: Female     Comment: Wife only        Past Medical History:   Diagnosis Date    Allergic     Seasonal    Allergies     Arthritis 01/17/2022    Asthma shortness of breath, probably from pneumonia    Asthma 01/19/2018    Benign prostatic hyperplasia     Broken finger 09/02/2023    Cancer melanoma    Cardiac pacemaker     Cataract     Removed    Cervical disc disorder     Under chiropractic care    Chronic idiopathic fibrosing alveolitis     Fibrosis found with CTA 2024    Deep vein thrombosis     Due to covid pneumonia/clots gone    GERD (gastroesophageal reflux disease)     Glaucoma (increased eye pressure)     HL (hearing loss)     Wears hearing aids    Hyperlipidemia     Mildly elevated    Hypertension     Controlled with meds    Hypothyroidism     Low back pain     For years    Lumbosacral disc disease     Lyme disease     Peripheral neuropathy     Pneumonia dec 27,2020    Pulmonary embolism Feb 2022    Due to covid pneumonia/clots gone    Rotator cuff syndrome 12/24    Shortness of breath     SSS (sick sinus syndrome)     Thoracic disc disorder     For years        Immunization History   Administered Date(s) Administered    Fluzone High-Dose 65+YRS 11/01/2023    Fluzone High-Dose 65+yrs 11/15/2022    OPV 04/09/1996    Pneumococcal Conjugate 20-Valent (PCV20) 12/12/2023    Td (TDVAX) 04/09/1996    Tdap 09/02/2023    Yellow Fever 04/09/1996       Allergies   Allergen Reactions    Latex Other (See Comments)     Sinus congestion, pt states has never had a reaction          Current Outpatient Medications:     aspirin 81 MG EC tablet, Take 1 tablet by mouth Daily., Disp: , Rfl:     atorvastatin (LIPITOR) 10 MG tablet, Take 1 tablet by mouth Daily., Disp: , Rfl:     brimonidine (ALPHAGAN) 0.2 % ophthalmic solution, 1 drop 2 (Two) Times a Day., Disp: , Rfl:     latanoprost (XALATAN) 0.005 % ophthalmic  "solution, Administer 1 drop to both eyes Daily., Disp: , Rfl:     losartan (Cozaar) 50 MG tablet, Take 1 tablet by mouth 2 (Two) Times a Day., Disp: 60 tablet, Rfl: 5    Melatonin 10 MG tablet, Take 1 tablet by mouth Every Night., Disp: , Rfl:     mupirocin (BACTROBAN) 2 % ointment, Apply 1 Application topically to the appropriate area as directed Daily., Disp: , Rfl:     Omega-3 Fatty Acids (fish oil) 1000 MG capsule capsule, Take  by mouth Daily With Breakfast., Disp: , Rfl:     sulindac (CLINORIL) 150 MG tablet, Take 1 tablet by mouth two times a day - take with food, Disp: 180 tablet, Rfl: 2    Synthroid 100 MCG tablet, Take 1 tablet by mouth Every Morning., Disp: 90 tablet, Rfl: 3    TURMERIC PO, Take 1,000 mg by mouth Daily., Disp: , Rfl:          Vital Signs   /71 (BP Location: Right arm, Patient Position: Sitting, Cuff Size: Adult)   Pulse 72   Temp 97.6 °F (36.4 °C)   Resp 16   Ht 188 cm (74.02\")   Wt 96.6 kg (213 lb)   SpO2 98%   BMI 27.33 kg/m²       Objective     Physical Exam  Vitals reviewed.   Constitutional:       General: He is not in acute distress.     Appearance: Normal appearance. He is not ill-appearing.   HENT:      Head: Normocephalic and atraumatic.      Nose: Nose normal.      Mouth/Throat:      Mouth: Mucous membranes are moist.      Pharynx: Oropharynx is clear.   Eyes:      Extraocular Movements: Extraocular movements intact.      Conjunctiva/sclera: Conjunctivae normal.      Pupils: Pupils are equal, round, and reactive to light.   Cardiovascular:      Rate and Rhythm: Normal rate and regular rhythm.      Pulses: Normal pulses.      Heart sounds: Normal heart sounds.   Pulmonary:      Effort: Pulmonary effort is normal. No respiratory distress.      Breath sounds: Normal breath sounds. No stridor. No wheezing, rhonchi or rales.   Abdominal:      General: Bowel sounds are normal.   Musculoskeletal:         General: Normal range of motion.      Cervical back: Normal range of " motion and neck supple.   Skin:     General: Skin is warm and dry.   Neurological:      Mental Status: He is alert and oriented to person, place, and time.   Psychiatric:         Behavior: Behavior normal.         Physical Exam  No crackles heard in the lungs, lung clear.  Heart was examined.    Vital Signs  Oxygen saturation is at 98%.         Results Review  I have personally reviewed the prior office notes, hospital records, labs, and diagnostics.  CTA Chest  Order: [id number]  Narrative    CTA CHEST WITH CONTRAST    HISTORY:  Thoracic aortic ectasia.    COMPARISON:  None available.    PROCEDURE:    Noncontrast CTA images were initially obtained through the chest. Following administration of non-ionic IV contrast, postcontrast CTA images were obtained through the chest. 3D reconstructions were performed. Dose reduction techniques including Automated Exposure Control (AEC) and adjustment of mA and kV were utilized.  Number of CT scans and/or myocardial perfusion studies in the past 12 months: 0    FINDINGS:    Vascular: No aneurysms. Ascending thoracic artery measures 3.3 cm. Atherosclerotic calcification of the aorta and major branches. No dissection. Although not optimized to detect pulmonary embolism, no large central pulmonary emboli are seen.    CTA chest without contrast: No intramural hematoma. Coarse calcification of the aortic arch and its major branches.  The heart is normal in size.  No pericardial effusion. No suspicious mediastinal or axillary lymph nodes. No focal consolidations, pleural effusions or pneumothorax. Moderate fibrosis in a peripheral distribution. No eliot honeycombing. Airways are patent.  No suspicious pulmonary nodules or masses.    Limited images of the upper abdomen are unremarkable.    No aggressive osseous lesions.    IMPRESSION:  1.  Thoracic aorta is normal. No evidence of ectasia.    2. Fibrosis of the lungs. Correlate clinically.    Dictated by: Gilbert Muñoz Signed by Gilbert  Toni on 10/22/2024 12:25  ##### Final #####    Dictated by:    VERNA DUARTE MD-RAD  Dictated DT/TM: 10/22/2024 12:25 pm  Interpreted and electronically signed by:  VERNA DUARTE MD-RAD  Signed DT/TM:  10/22/2024 12:25 pm  Exam End: --    Specimen Collected: 10/21/24 14:06 Last Resulted: 10/21/24 14:06   Received From: [facility]  Result Received: 12/09/24 14:     Results  Imaging  CTA in October showed moderate fibrosis, no suspicious nodules or masses.    Testing  Pulmonary function test in 2023 showed mild restrictive component.          Assessment         Patient Active Problem List   Diagnosis    Angina at rest    Essential hypertension    SSS (sick sinus syndrome)    Mild intermittent asthma without complication    Gastroesophageal reflux disease without esophagitis    Taylor's esophagus without dysplasia    Acquired hypothyroidism    MRSA (methicillin resistant Staphylococcus aureus)    Pre-diabetes    Acute respiratory failure with hypoxia    Arthritis    Asthma    Benign prostatic hyperplasia    Cataract    Deviated nasal septum    Family history of prostate cancer in father    Hypertrophy of both inferior nasal turbinates    Impaired glucose tolerance    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    Nasal congestion    Nasal obstruction    Presence of cardiac pacemaker    Acute on chronic respiratory failure with hypoxia    Acute pulmonary embolism    Bilateral vitreous detachment    Epiretinal membrane    Glaucomatous atrophy of optic disc    After cataract    Pneumonia due to COVID-19 virus    CTEPH (chronic thromboembolic pulmonary hypertension)    Shortness of breath    Other specified hypothyroidism    Personal history of COVID-19    History of pulmonary embolism    Mixed hyperlipidemia    Colon cancer screening    Fibrous papule of nose    Chronic respiratory failure with hypoxia    Nocturnal hypoxia    Restrictive lung disease    Peripheral polyneuropathy         Plan     Diagnoses and all orders for this visit:    1. Restrictive lung disease (Primary)  -     Complete PFT - Pre & Post Bronchodilator; Future  -     CT Chest Hi Resolution Diagnostic; Future    2. Nocturnal hypoxia  -     Complete PFT - Pre & Post Bronchodilator; Future  -     CT Chest Hi Resolution Diagnostic; Future    3. Shortness of breath  -     Complete PFT - Pre & Post Bronchodilator; Future  -     CT Chest Hi Resolution Diagnostic; Future    4. Pulmonary fibrosis  -     Complete PFT - Pre & Post Bronchodilator; Future  -     CT Chest Hi Resolution Diagnostic; Future         Assessment & Plan  1. Pulmonary fibrosis.  His vital signs are within normal limits, indicating stability. Oxygen saturation is at a commendable 98%. His condition appears to be predominantly scarring, with no evidence of honeycombing. He is currently not on continuous oxygen therapy and is not taking any pulmonary medications. His symptoms are limited to exertional dyspnea, from which he recovers. A comprehensive discussion was held regarding the nature of his condition, its potential progression, and the possibility of initiating antifibrotic therapy to decelerate the fibrosing process. A repeat pulmonary function test will be scheduled around October 2025 to assess the progression of his lung function. A high-resolution CT scan will also be arranged for comparison with previous scans.    Follow-up  The patient will follow up in November or December 2025.      Smoking status:  reports that he quit smoking about 49 years ago. His smoking use included cigarettes and pipe. He started smoking about 61 years ago. He has a 45 pack-year smoking history. He has been exposed to tobacco smoke. He has never used smokeless tobacco.    Vaccination status: Reviewed  Immunization History   Administered Date(s) Administered    Fluzone High-Dose 65+YRS 11/01/2023    Fluzone High-Dose 65+yrs 11/15/2022    OPV 04/09/1996    Pneumococcal  Conjugate 20-Valent (PCV20) 12/12/2023    Td (TDVAX) 04/09/1996    Tdap 09/02/2023    Yellow Fever 04/09/1996        Medications personally reviewed    Follow Up  Return in about 6 months (around 12/9/2025) for Dr. Gong.    Patient was given instructions and counseling regarding his condition or for health maintenance advice. Please see specific information pulled into the AVS if appropriate.     I spent 28 minutes caring for Elmer Garvin on this date of service. This time includes time spent by me in the following activities:preparing for the visit, reviewing tests, obtaining and/or reviewing a separately obtained history, performing a medically appropriate examination and/or evaluation, counseling and educating the patient/family/caregiver, ordering medications, tests, or procedures, documenting information in the medical record, independently interpreting results and communicating that information with the patient/family/caregiver and answered questions family members, discuss medications.

## 2025-06-09 ENCOUNTER — OFFICE VISIT (OUTPATIENT)
Dept: PULMONOLOGY | Facility: CLINIC | Age: 80
End: 2025-06-09
Payer: MEDICARE

## 2025-06-09 VITALS
HEART RATE: 72 BPM | DIASTOLIC BLOOD PRESSURE: 71 MMHG | SYSTOLIC BLOOD PRESSURE: 111 MMHG | BODY MASS INDEX: 27.34 KG/M2 | TEMPERATURE: 97.6 F | RESPIRATION RATE: 16 BRPM | OXYGEN SATURATION: 98 % | HEIGHT: 74 IN | WEIGHT: 213 LBS

## 2025-06-09 DIAGNOSIS — M19.90 ARTHRITIS: ICD-10-CM

## 2025-06-09 DIAGNOSIS — J84.10 PULMONARY FIBROSIS: ICD-10-CM

## 2025-06-09 DIAGNOSIS — G47.34 NOCTURNAL HYPOXIA: ICD-10-CM

## 2025-06-09 DIAGNOSIS — J98.4 RESTRICTIVE LUNG DISEASE: Primary | ICD-10-CM

## 2025-06-09 DIAGNOSIS — R06.02 SHORTNESS OF BREATH: ICD-10-CM

## 2025-06-09 PROCEDURE — 1159F MED LIST DOCD IN RCRD: CPT | Performed by: NURSE PRACTITIONER

## 2025-06-09 PROCEDURE — 3074F SYST BP LT 130 MM HG: CPT | Performed by: NURSE PRACTITIONER

## 2025-06-09 PROCEDURE — 1160F RVW MEDS BY RX/DR IN RCRD: CPT | Performed by: NURSE PRACTITIONER

## 2025-06-09 PROCEDURE — 99214 OFFICE O/P EST MOD 30 MIN: CPT | Performed by: NURSE PRACTITIONER

## 2025-06-09 PROCEDURE — 3078F DIAST BP <80 MM HG: CPT | Performed by: NURSE PRACTITIONER

## 2025-06-09 RX ORDER — SULINDAC 150 MG/1
TABLET ORAL
Qty: 180 TABLET | Refills: 2 | OUTPATIENT
Start: 2025-06-09

## 2025-06-09 RX ORDER — ATORVASTATIN CALCIUM 10 MG/1
10 TABLET, FILM COATED ORAL DAILY
COMMUNITY
Start: 2025-04-30 | End: 2026-04-30

## 2025-06-09 NOTE — TELEPHONE ENCOUNTER
Caller: MYRNA MUÑIZ    Relationship: Emergency Contact    Best call back number:     [phone]      Requested Prescriptions:   Requested Prescriptions     Pending Prescriptions Disp Refills    sulindac (CLINORIL) 150 MG tablet 180 tablet 2      Pharmacy where request should be sent: [facility] [address] [place] [address] - [phone]  - [phone] FX     Last office visit with prescribing clinician: 3/26/2025   Last telemedicine visit with prescribing clinician: Visit date not found   Next office visit with prescribing clinician: 6/26/2025     Additional details provided by patient:     Does the patient have less than a 3 day supply:  [] Yes  [] No    Would you like a call back once the refill request has been completed: [] Yes [] No    If the office needs to give you a call back, can they leave a voicemail: [] Yes [] No    Favian Mistry Rep   06/09/25 12:28 EDT

## 2025-06-10 RX ORDER — SULINDAC 150 MG/1
150 TABLET ORAL 2 TIMES DAILY
Qty: 180 TABLET | Refills: 1 | Status: SHIPPED | OUTPATIENT
Start: 2025-06-10

## 2025-06-26 ENCOUNTER — LAB (OUTPATIENT)
Dept: LAB | Facility: HOSPITAL | Age: 80
End: 2025-06-26
Payer: MEDICARE

## 2025-06-26 DIAGNOSIS — R73.03 PRE-DIABETES: ICD-10-CM

## 2025-06-26 DIAGNOSIS — Z12.5 PROSTATE CANCER SCREENING: ICD-10-CM

## 2025-06-26 DIAGNOSIS — G62.9 PERIPHERAL POLYNEUROPATHY: ICD-10-CM

## 2025-06-26 DIAGNOSIS — E55.9 VITAMIN D DEFICIENCY: ICD-10-CM

## 2025-06-26 DIAGNOSIS — I10 ESSENTIAL HYPERTENSION: ICD-10-CM

## 2025-06-26 DIAGNOSIS — K22.70 BARRETT'S ESOPHAGUS WITHOUT DYSPLASIA: ICD-10-CM

## 2025-06-26 DIAGNOSIS — Z80.42 FAMILY HISTORY OF PROSTATE CANCER IN FATHER: ICD-10-CM

## 2025-06-26 DIAGNOSIS — N40.0 BENIGN PROSTATIC HYPERPLASIA WITHOUT LOWER URINARY TRACT SYMPTOMS: ICD-10-CM

## 2025-06-26 LAB
25(OH)D3 SERPL-MCNC: 47.3 NG/ML (ref 30–100)
ALBUMIN SERPL-MCNC: 4.2 G/DL (ref 3.5–5.2)
ALBUMIN UR-MCNC: <1.2 MG/DL
ALBUMIN/GLOB SERPL: 1.7 G/DL
ALP SERPL-CCNC: 53 U/L (ref 39–117)
ALT SERPL W P-5'-P-CCNC: 19 U/L (ref 1–41)
ANION GAP SERPL CALCULATED.3IONS-SCNC: 12.1 MMOL/L (ref 5–15)
AST SERPL-CCNC: 22 U/L (ref 1–40)
BASOPHILS # BLD AUTO: 0.04 10*3/MM3 (ref 0–0.2)
BASOPHILS NFR BLD AUTO: 0.7 % (ref 0–1.5)
BILIRUB SERPL-MCNC: 0.4 MG/DL (ref 0–1.2)
BUN SERPL-MCNC: 14 MG/DL (ref 8–23)
BUN/CREAT SERPL: 11.2 (ref 7–25)
CALCIUM SPEC-SCNC: 9.2 MG/DL (ref 8.6–10.5)
CHLORIDE SERPL-SCNC: 102 MMOL/L (ref 98–107)
CHOLEST SERPL-MCNC: 187 MG/DL (ref 0–200)
CO2 SERPL-SCNC: 24.9 MMOL/L (ref 22–29)
CREAT SERPL-MCNC: 1.25 MG/DL (ref 0.76–1.27)
CREAT UR-MCNC: 74.9 MG/DL
DEPRECATED RDW RBC AUTO: 38.7 FL (ref 37–54)
EGFRCR SERPLBLD CKD-EPI 2021: 58.6 ML/MIN/1.73
EOSINOPHIL # BLD AUTO: 0.1 10*3/MM3 (ref 0–0.4)
EOSINOPHIL NFR BLD AUTO: 1.8 % (ref 0.3–6.2)
ERYTHROCYTE [DISTWIDTH] IN BLOOD BY AUTOMATED COUNT: 12 % (ref 12.3–15.4)
GLOBULIN UR ELPH-MCNC: 2.5 GM/DL
GLUCOSE SERPL-MCNC: 78 MG/DL (ref 65–99)
HBA1C MFR BLD: 6 % (ref 4.8–5.6)
HCT VFR BLD AUTO: 42 % (ref 37.5–51)
HCV AB SER QL: NORMAL
HDLC SERPL-MCNC: 39 MG/DL (ref 40–60)
HGB BLD-MCNC: 13.5 G/DL (ref 13–17.7)
IMM GRANULOCYTES # BLD AUTO: 0.02 10*3/MM3 (ref 0–0.05)
IMM GRANULOCYTES NFR BLD AUTO: 0.4 % (ref 0–0.5)
LDLC SERPL CALC-MCNC: 115 MG/DL (ref 0–100)
LDLC/HDLC SERPL: 2.84 {RATIO}
LYMPHOCYTES # BLD AUTO: 1.71 10*3/MM3 (ref 0.7–3.1)
LYMPHOCYTES NFR BLD AUTO: 31.3 % (ref 19.6–45.3)
MCH RBC QN AUTO: 28.4 PG (ref 26.6–33)
MCHC RBC AUTO-ENTMCNC: 32.1 G/DL (ref 31.5–35.7)
MCV RBC AUTO: 88.4 FL (ref 79–97)
MICROALBUMIN/CREAT UR: NORMAL MG/G{CREAT}
MONOCYTES # BLD AUTO: 0.54 10*3/MM3 (ref 0.1–0.9)
MONOCYTES NFR BLD AUTO: 9.9 % (ref 5–12)
NEUTROPHILS NFR BLD AUTO: 3.06 10*3/MM3 (ref 1.7–7)
NEUTROPHILS NFR BLD AUTO: 55.9 % (ref 42.7–76)
NRBC BLD AUTO-RTO: 0 /100 WBC (ref 0–0.2)
PLATELET # BLD AUTO: 217 10*3/MM3 (ref 140–450)
PMV BLD AUTO: 10.9 FL (ref 6–12)
POTASSIUM SERPL-SCNC: 4.6 MMOL/L (ref 3.5–5.2)
PROT SERPL-MCNC: 6.7 G/DL (ref 6–8.5)
PSA SERPL-MCNC: 1.18 NG/ML (ref 0–4)
RBC # BLD AUTO: 4.75 10*6/MM3 (ref 4.14–5.8)
SODIUM SERPL-SCNC: 139 MMOL/L (ref 136–145)
TRIGL SERPL-MCNC: 186 MG/DL (ref 0–150)
TSH SERPL DL<=0.05 MIU/L-ACNC: 2.6 UIU/ML (ref 0.27–4.2)
VLDLC SERPL-MCNC: 33 MG/DL (ref 5–40)
WBC NRBC COR # BLD AUTO: 5.47 10*3/MM3 (ref 3.4–10.8)

## 2025-06-26 PROCEDURE — G0103 PSA SCREENING: HCPCS

## 2025-06-26 PROCEDURE — 82570 ASSAY OF URINE CREATININE: CPT

## 2025-06-26 PROCEDURE — 83036 HEMOGLOBIN GLYCOSYLATED A1C: CPT

## 2025-06-26 PROCEDURE — 80061 LIPID PANEL: CPT

## 2025-06-26 PROCEDURE — 82043 UR ALBUMIN QUANTITATIVE: CPT

## 2025-06-26 PROCEDURE — 84443 ASSAY THYROID STIM HORMONE: CPT

## 2025-06-26 PROCEDURE — 85025 COMPLETE CBC W/AUTO DIFF WBC: CPT

## 2025-06-26 PROCEDURE — 82306 VITAMIN D 25 HYDROXY: CPT

## 2025-06-26 PROCEDURE — 80053 COMPREHEN METABOLIC PANEL: CPT

## 2025-06-26 PROCEDURE — 86803 HEPATITIS C AB TEST: CPT

## 2025-06-26 PROCEDURE — 36415 COLL VENOUS BLD VENIPUNCTURE: CPT

## 2025-07-01 ENCOUNTER — OFFICE VISIT (OUTPATIENT)
Age: 80
End: 2025-07-01
Payer: MEDICARE

## 2025-07-01 VITALS
HEIGHT: 74 IN | WEIGHT: 213.6 LBS | HEART RATE: 75 BPM | DIASTOLIC BLOOD PRESSURE: 58 MMHG | SYSTOLIC BLOOD PRESSURE: 94 MMHG | BODY MASS INDEX: 27.41 KG/M2 | OXYGEN SATURATION: 94 %

## 2025-07-01 DIAGNOSIS — N40.0 BENIGN PROSTATIC HYPERPLASIA WITHOUT LOWER URINARY TRACT SYMPTOMS: ICD-10-CM

## 2025-07-01 DIAGNOSIS — E55.9 VITAMIN D DEFICIENCY: ICD-10-CM

## 2025-07-01 DIAGNOSIS — F51.01 PRIMARY INSOMNIA: ICD-10-CM

## 2025-07-01 DIAGNOSIS — H69.93 DYSFUNCTION OF BOTH EUSTACHIAN TUBES: Primary | ICD-10-CM

## 2025-07-01 DIAGNOSIS — J35.1 TONSILLAR ENLARGEMENT: ICD-10-CM

## 2025-07-01 DIAGNOSIS — R06.00 DYSPNEA, UNSPECIFIED TYPE: ICD-10-CM

## 2025-07-01 DIAGNOSIS — R73.03 PRE-DIABETES: ICD-10-CM

## 2025-07-01 DIAGNOSIS — I10 ESSENTIAL HYPERTENSION: ICD-10-CM

## 2025-07-01 DIAGNOSIS — I25.10 CORONARY ARTERY DISEASE INVOLVING NATIVE CORONARY ARTERY WITHOUT ANGINA PECTORIS, UNSPECIFIED WHETHER NATIVE OR TRANSPLANTED HEART: ICD-10-CM

## 2025-07-01 RX ORDER — NEOMYCIN SULFATE, POLYMYXIN B SULFATE, HYDROCORTISONE 3.5; 10000; 1 MG/ML; [USP'U]/ML; MG/ML
4 SOLUTION/ DROPS AURICULAR (OTIC) 3 TIMES DAILY
Qty: 10 ML | Refills: 0 | Status: SHIPPED | OUTPATIENT
Start: 2025-07-01

## 2025-07-01 RX ORDER — UBIDECARENONE 100 MG
100 CAPSULE ORAL DAILY
COMMUNITY

## 2025-07-01 RX ORDER — LOSARTAN POTASSIUM 25 MG/1
25 TABLET ORAL 2 TIMES DAILY
Qty: 60 TABLET | Refills: 3 | Status: SHIPPED | OUTPATIENT
Start: 2025-07-01

## 2025-07-01 NOTE — PATIENT INSTRUCTIONS
Will send over new referral for second opinion ENT   Calling to get sooner appointment with cardiology due to fluctuations in blood pressure increase fatigue with shortness of air   Decrease losartan to 25 mg each  labs then wellness in 3 months

## 2025-07-01 NOTE — PROGRESS NOTES
Chief Complaint  Follow-up (Follow up  go over lab work today. Also noticed more trouble breathing. Could be due to weather. Neuropathy is still bothering him. Can low B/p make you feel more tired. Pt feels very tired. )    Subjective      Elmer Garvin is a 80 y.o. male who presents to [facility] here for follow-up on hypertension and insomnia dyspnea and to go over his labs.    History of Present Illness  Hypertension -in the office today 94/58.  He has been monitoring his blood pressure at home, with readings ranging in the low 100s systolic.  We had reduced his losartan 100 mg to half tablet.  50 mg at his last visit.  He is complaining of increased fatigue with some shortness of air upon exertion.. Going to decrease to 25 mg once each am but if b/p 140/90 then go back to 50 mg each day.  He has consulted with a pulmonologist for his shortness of breath and recently had his pacemaker checked     He does see cardiology Dr. Ugarte according to my chart looks like it had an echocardiogram in September however I do not have that results.  I am going to call and see if I can get him back into see cardiology with his fluctuating blood pressure and his increased shortness of air.  He denies pedal edema no palpitations no chest pain.     He continues to have ear issues, He reports persistent itching in his ears, accompanied by drainage. His audiologist identified a fungal growth in his right ear and prescribed ear drops, but no follow-up was scheduled. He also mentions the presence of thin layers of dead skin, which he believes to be drainage. During his last visit, fluid was detected behind his eardrums, and he was prescribed a nasal spray.  He would like to see a different ENT we will send over referral, he had a CT of his sinuses and his neck which showed thickening of the left tonsil he is concerned because it noted that it could be possible neoplasm he would like a second  "opinion from a different ENT.     Insomnia - his sleep pattern remains unchanged, with him waking up at 3:00 AM. He decided against using a medication Seroquel due to potential side effects and continues to take melatonin.       Cancelled neurology appointment, decided wanted to wait.      Review of Systems-as per HPI    Objective   Vital Signs:   Vitals:    07/01/25 1133   BP: 94/58   Pulse: 75   SpO2: 94%   Weight: 96.9 kg (213 lb 9.6 oz)   Height: 188 cm (74.02\")     Body mass index is 27.41 kg/m².    Wt Readings from Last 3 Encounters:   07/01/25 96.9 kg (213 lb 9.6 oz)   06/09/25 96.6 kg (213 lb)   04/24/25 96.7 kg (213 lb 3.2 oz)     BP Readings from Last 3 Encounters:   07/01/25 94/58   06/09/25 111/71   04/24/25 101/64       Health Maintenance   Topic Date Due    ZOSTER VACCINE (1 of 2) Never done    RSV Vaccine - Adults (1 - 1-dose 75+ series) Never done    COVID-19 Vaccine (1 - 2024-25 season) Never done    INFLUENZA VACCINE  07/01/2025    ANNUAL WELLNESS VISIT  09/26/2025    LIPID PANEL  06/26/2026    TDAP/TD VACCINES (3 - Td or Tdap) 09/02/2033    HEPATITIS C SCREENING  Completed    Pneumococcal Vaccine 50+  Completed    COLORECTAL CANCER SCREENING  Discontinued       Past Medical History:   Diagnosis Date    Allergic     Seasonal    Allergies     Arthritis 01/17/2022    Asthma shortness of breath, probably from pneumonia    Asthma 01/19/2018    Benign prostatic hyperplasia     Broken finger 09/02/2023    Cancer melanoma    Cardiac pacemaker     Cataract     Removed    Cervical disc disorder     Under chiropractic care    Chronic idiopathic fibrosing alveolitis     Fibrosis found with CTA 2024    Deep vein thrombosis     Due to covid pneumonia/clots gone    GERD (gastroesophageal reflux disease)     Glaucoma (increased eye pressure)     HL (hearing loss)     Wears hearing aids    Hyperlipidemia     Mildly elevated    Hypertension     Controlled with meds    Hypothyroidism     Low back pain     For years "    Lumbosacral disc disease     Lyme disease     Peripheral neuropathy     Pneumonia dec 27,2020    Pulmonary embolism Feb 2022    Due to covid pneumonia/clots gone    Rotator cuff syndrome 12/24    Shortness of breath     SSS (sick sinus syndrome)     Thoracic disc disorder     For years     Past Surgical History:   Procedure Laterality Date    CARDIAC CATHETERIZATION N/A 07/25/2019    Procedure: Coronary angiography;  Surgeon: Wilton Brown MD;  Location: [facility] CATH INVASIVE LOCATION;  Service: Cardiovascular    CARDIAC CATHETERIZATION N/A 07/25/2019    Procedure: Left heart cath;  Surgeon: Wilton Brown MD;  Location: [facility] CATH INVASIVE LOCATION;  Service: Cardiovascular    CARDIAC CATHETERIZATION N/A 07/25/2019    Procedure: Left ventriculography;  Surgeon: Wilton Brown MD;  Location: [facility] CATH INVASIVE LOCATION;  Service: Cardiovascular    CARDIAC CATHETERIZATION N/A 07/25/2019    Procedure: Right heart cath;  Surgeon: Wilton Brown MD;  Location: [facility] CATH INVASIVE LOCATION;  Service: Cardiovascular    CARDIAC CATHETERIZATION Right 01/26/2022    Procedure: Percutaneous Manual Thrombectomy;  Surgeon: Maninder Penn MD;  Location: [facility] CATH INVASIVE LOCATION;  Service: Cardiovascular;  Laterality: Right;    CATARACT EXTRACTION      COLONOSCOPY  2019    COLONOSCOPY N/A 10/13/2022    Procedure: COLONOSCOPY FOR SCREENING;  Surgeon: Deng Monteiro MD;  Location: [facility] ENDOSCOPY;  Service: Gastroenterology;  Laterality: N/A;  DIVERTICULOSIS, HEMORRHOIDS    EYE SURGERY      FRACTURE SURGERY  9/02/2023    HAND SURGERY      Broken trigger finger right hand    INSERT / REPLACE / REMOVE PACEMAKER  08/2014    INTERVENTIONAL RADIOLOGY PROCEDURE Right 01/26/2022    Procedure: Pulmonary Angiogram;  Surgeon: Maninder Penn MD;  Location: [facility] CATH INVASIVE LOCATION;  Service: Cardiovascular;  Laterality: Right;    PACEMAKER IMPLANTATION      PULMONARY EMBOLISM SURGERY  01/2022    SKIN  CANCER EXCISION N/A     NOSE    TRIGGER POINT INJECTION      Left shoulder     Family History   Problem Relation Age of Onset    Cancer Mother         Breast cancer    Migraines Mother     Heart disease Father     Cancer Father         Prostate cancer    Heart disease Sister         fatal heart attack    Sudden death Sister     Heart disease Brother         fatal heart attck    Cancer Paternal Grandfather     COPD Brother     Heart disease Sister     Colon cancer Neg Hx     Malig Hyperthermia Neg Hx      Social History     Socioeconomic History    Marital status:    Tobacco Use    Smoking status: Former     Current packs/day: 0.00     Average packs/day: 1.7 packs/day for 26.0 years (45.0 ttl pk-yrs)     Types: Cigarettes, Pipe     Start date: 1964     Quit date: 1976     Years since quittin.5     Passive exposure: Past    Smokeless tobacco: Never    Tobacco comments:     Clean quit 48+ yrs   Vaping Use    Vaping status: Never Used   Substance and Sexual Activity    Alcohol use: Never    Drug use: Never    Sexual activity: Not Currently     Partners: Female     Comment: Wife only       Current Outpatient Medications   Medication Instructions    aspirin 81 mg, Daily    brimonidine (ALPHAGAN) 0.2 % ophthalmic solution 1 drop, 2 Times Daily    coenzyme Q10 100 mg, Daily    latanoprost (XALATAN) 0.005 % ophthalmic solution 1 drop, Daily    losartan (COZAAR) 25 mg, Oral, 2 Times Daily    Melatonin 10 mg, Nightly    neomycin-polymyxin-hydrocortisone (CORTISPORIN) 1 % solution otic solution 4 drops, Both Ears, 3 Times Daily, Instill drops into affected ear and keep ear upright for 5 minutes.    Omega-3 Fatty Acids (fish oil) 1000 MG capsule capsule Daily With Breakfast    sulindac (CLINORIL) 150 mg, Oral, 2 Times Daily    Synthroid 100 mcg, Oral, Every Early Morning    TURMERIC PO 1,000 mg, Daily       Medications Discontinued During This Encounter   Medication Reason    mupirocin (BACTROBAN) 2 %  ointment Historical Med - Therapy completed    losartan (Cozaar) 50 MG tablet Historical Med - Therapy completed        Physical Exam  Vitals and nursing note reviewed.   Constitutional:       Appearance: Normal appearance.   HENT:      Head: Normocephalic and atraumatic.   Eyes:      Extraocular Movements: Extraocular movements intact.      Pupils: Pupils are equal, round, and reactive to light.   Cardiovascular:      Rate and Rhythm: Normal rate and regular rhythm.      Pulses: Normal pulses.   Pulmonary:      Effort: Pulmonary effort is normal.      Breath sounds: Normal breath sounds.   Abdominal:      Palpations: Abdomen is soft.   Musculoskeletal:         General: Normal range of motion.      Cervical back: Normal range of motion.   Skin:     General: Skin is warm and dry.   Neurological:      General: No focal deficit present.      Mental Status: He is alert.   Psychiatric:         Mood and Affect: Mood normal.         Behavior: Behavior normal.         Thought Content: Thought content normal.         Judgment: Judgment normal.          Physical Exam  Ears: Presence of debris, no wax build-up. Eardrums appear dull.       Result Review :  The following data was reviewed by: EDWINA Birmingham on 07/01/2025:    Labs  Lab on 06/26/2025   Component Date Value Ref Range Status    PSA 06/26/2025 1.180  0.000 - 4.000 ng/mL Final    25 Hydroxy, Vitamin D 06/26/2025 47.3  30.0 - 100.0 ng/ml Final    TSH 06/26/2025 2.600  0.270 - 4.200 uIU/mL Final    Total Cholesterol 06/26/2025 187  0 - 200 mg/dL Final    Triglycerides 06/26/2025 186 (H)  0 - 150 mg/dL Final    HDL Cholesterol 06/26/2025 39 (L)  40 - 60 mg/dL Final    LDL Cholesterol  06/26/2025 115 (H)  0 - 100 mg/dL Final    VLDL Cholesterol 06/26/2025 33  5 - 40 mg/dL Final    LDL/HDL Ratio 06/26/2025 2.84   Final    Glucose 06/26/2025 78  65 - 99 mg/dL Final    BUN 06/26/2025 14.0  8.0 - 23.0 mg/dL Final    Creatinine 06/26/2025 1.25  0.76 - 1.27 mg/dL  Final    Sodium 06/26/2025 139  136 - 145 mmol/L Final    Potassium 06/26/2025 4.6  3.5 - 5.2 mmol/L Final    Chloride 06/26/2025 102  98 - 107 mmol/L Final    CO2 06/26/2025 24.9  22.0 - 29.0 mmol/L Final    Calcium 06/26/2025 9.2  8.6 - 10.5 mg/dL Final    Total Protein 06/26/2025 6.7  6.0 - 8.5 g/dL Final    Albumin 06/26/2025 4.2  3.5 - 5.2 g/dL Final    ALT (SGPT) 06/26/2025 19  1 - 41 U/L Final    AST (SGOT) 06/26/2025 22  1 - 40 U/L Final    Alkaline Phosphatase 06/26/2025 53  39 - 117 U/L Final    Total Bilirubin 06/26/2025 0.4  0.0 - 1.2 mg/dL Final    Globulin 06/26/2025 2.5  gm/dL Final    A/G Ratio 06/26/2025 1.7  g/dL Final    BUN/Creatinine Ratio 06/26/2025 11.2  7.0 - 25.0 Final    Anion Gap 06/26/2025 12.1  5.0 - 15.0 mmol/L Final    eGFR 06/26/2025 58.6 (L)  >60.0 mL/min/1.73 Final    Hepatitis C Ab 06/26/2025 Non-Reactive  Non-Reactive Final    Hemoglobin A1C 06/26/2025 6.00 (H)  4.80 - 5.60 % Final    WBC 06/26/2025 5.47  3.40 - 10.80 10*3/mm3 Final    RBC 06/26/2025 4.75  4.14 - 5.80 10*6/mm3 Final    Hemoglobin 06/26/2025 13.5  13.0 - 17.7 g/dL Final    Hematocrit 06/26/2025 42.0  37.5 - 51.0 % Final    MCV 06/26/2025 88.4  79.0 - 97.0 fL Final    MCH 06/26/2025 28.4  26.6 - 33.0 pg Final    MCHC 06/26/2025 32.1  31.5 - 35.7 g/dL Final    RDW 06/26/2025 12.0 (L)  12.3 - 15.4 % Final    RDW-SD 06/26/2025 38.7  37.0 - 54.0 fl Final    MPV 06/26/2025 10.9  6.0 - 12.0 fL Final    Platelets 06/26/2025 217  140 - 450 10*3/mm3 Final    Neutrophil % 06/26/2025 55.9  42.7 - 76.0 % Final    Lymphocyte % 06/26/2025 31.3  19.6 - 45.3 % Final    Monocyte % 06/26/2025 9.9  5.0 - 12.0 % Final    Eosinophil % 06/26/2025 1.8  0.3 - 6.2 % Final    Basophil % 06/26/2025 0.7  0.0 - 1.5 % Final    Immature Grans % 06/26/2025 0.4  0.0 - 0.5 % Final    Neutrophils, Absolute 06/26/2025 3.06  1.70 - 7.00 10*3/mm3 Final    Lymphocytes, Absolute 06/26/2025 1.71  0.70 - 3.10 10*3/mm3 Final    Monocytes, Absolute 06/26/2025  0.54  0.10 - 0.90 10*3/mm3 Final    Eosinophils, Absolute 06/26/2025 0.10  0.00 - 0.40 10*3/mm3 Final    Basophils, Absolute 06/26/2025 0.04  0.00 - 0.20 10*3/mm3 Final    Immature Grans, Absolute 06/26/2025 0.02  0.00 - 0.05 10*3/mm3 Final    nRBC 06/26/2025 0.0  0.0 - 0.2 /100 WBC Final    Microalbumin/Creatinine Ratio 06/26/2025    Final    Unable to calculate    Creatinine, Urine 06/26/2025 74.9  mg/dL Final    Microalbumin, Urine 06/26/2025 <1.2  mg/dL Final       Imaging  CT Soft Tissue Neck With Contrast  Result Date: 4/11/2025  Impression: As noted on recent CT sinus, there is asymmetric mucosal and submucosal soft tissue thickening present involving the left tonsillar pillar and tongue base, without definite measurable circumscribed mass remaining concerning for possible underlying primary neoplasm. Consider correlation with direct laryngoscopy and potential soft tissue sampling. There is otherwise no evidence of pathologic adenopathy or other findings to suggest metastatic disease. There are no acute or unexpected findings. Electronically Signed: Walter Velasquez MD  4/11/2025 8:22 AM EDT  Workstation ID: [id number]    CT Sinus Without Contrast  Result Date: 3/25/2025  Impression: 1. Changes of chronic frontal and maxillary sinusitis similar to prior exam. 2. Deviation of the bony nasal septum to the left. 3. Asymmetric soft tissue thickening involving the left [place] tonsil and glossotonsillar sulcus. Dedicated CT scan of the neck with IV contrast would be recommended for further evaluation when clinically feasible. Electronically Signed: Chriss Edwards MD  3/25/2025 9:54 AM EDT  Workstation ID: [id number]    CT Lumbar Spine Without Contrast  Result Date: 3/25/2025  Impression: 1. Multilevel degenerative disc disease and degenerative facet change resulting in mild multilevel canal stenosis and neural foraminal narrowing as detailed above. Electronically Signed: Chriss Edwards MD  3/25/2025 9:39 AM EDT   Workstation ID: [id number]    XR Spine Lumbar Complete 4+VW  Result Date: 3/16/2025  Impression: Degenerative change. No acute osseous abnormalities are identified. Electronically Signed: Luis Gary MD  3/16/2025 2:28 PM EDT  Workstation ID: [id number]    XR Knee 3 View Left  Result Date: 3/12/2025  Impression: Grossly unremarkable bilateral radiographs of the knees Electronically Signed: Arnold Strickland MD  3/12/2025 3:52 PM EDT  Workstation ID: [id number]    XR Knee 3 View Right  Result Date: 3/12/2025  Impression: Grossly unremarkable bilateral radiographs of the knees Electronically Signed: Arnold Strickland MD  3/12/2025 3:52 PM EDT  Workstation ID: [id number]      Results  Labs   - A1c: 6.0    Imaging   - CT scan of throat: 04/2025, Possible underlying primary neoplasm.       Procedures       ASSESSMENT & PLAN  Diagnoses and all orders for this visit:    1. Dysfunction of both eustachian tubes (Primary)  -     neomycin-polymyxin-hydrocortisone (CORTISPORIN) 1 % solution otic solution; Administer 4 drops into both ears 3 (Three) Times a Day. Instill drops into affected ear and keep ear upright for 5 minutes.  Dispense: 10 mL; Refill: 0  -     Ambulatory Referral to ENT (Otolaryngology)    2. Tonsillar enlargement  -     Ambulatory Referral to ENT (Otolaryngology)    3. Essential hypertension  -     Hemoglobin A1c; Future  -     Microalbumin / Creatinine Urine Ratio - Urine, Clean Catch; Future  -     Vitamin D,25-Hydroxy; Future  -     TSH Rfx On Abnormal To Free T4; Future  -     Lipid Panel; Future  -     Comprehensive Metabolic Panel; Future  -     CBC & Differential; Future    4. Dyspnea, unspecified type  -     Cancel: Adult Transthoracic Echo Complete W/ Cont if Necessary Per Protocol; Future  -     Cancel: ECG 12 Lead  -     Cancel: Ambulatory Referral to Cardiology for Hypertension, Other; Dyspnea    5. Benign prostatic hyperplasia without lower urinary tract symptoms    6. Pre-diabetes  -     Hemoglobin  A1c; Future  -     Microalbumin / Creatinine Urine Ratio - Urine, Clean Catch; Future  -     Vitamin D,25-Hydroxy; Future  -     TSH Rfx On Abnormal To Free T4; Future  -     Lipid Panel; Future  -     Comprehensive Metabolic Panel; Future  -     CBC & Differential; Future    7. Primary insomnia    8. Vitamin D deficiency  -     Hemoglobin A1c; Future  -     Microalbumin / Creatinine Urine Ratio - Urine, Clean Catch; Future  -     Vitamin D,25-Hydroxy; Future  -     TSH Rfx On Abnormal To Free T4; Future  -     Lipid Panel; Future  -     Comprehensive Metabolic Panel; Future  -     CBC & Differential; Future    9. Coronary artery disease involving native coronary artery without angina pectoris, unspecified whether native or transplanted heart  -     Hemoglobin A1c; Future  -     Microalbumin / Creatinine Urine Ratio - Urine, Clean Catch; Future  -     Vitamin D,25-Hydroxy; Future  -     TSH Rfx On Abnormal To Free T4; Future  -     Lipid Panel; Future  -     Comprehensive Metabolic Panel; Future  -     CBC & Differential; Future    Other orders  -     losartan (Cozaar) 25 MG tablet; Take 1 tablet by mouth 2 (Two) Times a Day.  Dispense: 60 tablet; Refill: 3         Assessment & Plan  1.  External otitis  - Reports persistent itching and drainage from ears, with a history of fungal infection.  - Examination revealed some debris but no wax buildup; eardrums appear dull.  - Advised to continue using Flonase nasal spray.  - Prescription for ear drops, 4 drops 3 times a day in both ears, will be provided. Follow-up with an ENT specialist recommended if symptoms persist.    2.  Fluctuations in blood pressure .  - Blood pressure readings are currently within the low normal range.  Despite lowering his losartan dosage.  - Advised to maintain adequate hydration and monitor blood pressure regularly.  - Dosage of losartan will be reduced again to 25 mg once daily.  - If blood pressure exceeds 140/90, revert to the 50 mg  dosage.  - See cardiology as planned  - Echocardiogram done in September will call for results    3.  Insomnia  - Reports no improvement in sleep quality and has decided against taking medication due to potential side effects.  - Advised to maintain good sleep hygiene practices.  - Symptoms will continue to be monitored.    4. Prediabetes.  - A1c level is 6.0, indicating a prediabetic state.  - Advised to consume protein-rich foods every 3 to 4 hours while awake to prevent fluctuations in blood sugar levels.  - Recommended foods include meat, cheese, peanut butter, and nuts.                  FOLLOW UP  Return in about 3 months (around 10/1/2025).  Patient was given instructions and counseling regarding his condition or for health maintenance advice. Please see specific information pulled into the AVS if appropriate.     Patient or patient representative verbalized consent for the use of Ambient Listening during the visit with  EDWINA Birmingham for chart documentation. 7/2/2025  08:23 EDT    EDWINA Birmingham  07/02/25  08:29 EDT            Answers submitted by the patient for this visit:  Chronic Condition Follow-up (Submitted on 6/26/2025)  Chief Complaint: PCP follow-up  other: Yes  Medication compliance: all of the time  Treatment barriers: no complaince problems  Exercise: weekly

## 2025-07-10 ENCOUNTER — TELEPHONE (OUTPATIENT)
Age: 80
End: 2025-07-10
Payer: MEDICARE

## 2025-07-10 NOTE — TELEPHONE ENCOUNTER
Pharmacy Name: [facility] [facility] - [place], [place] - [address] - [phone] [address] [phone]        Pharmacy representative name: MACIEJ      What medication are you calling in regards to: losartan (Cozaar) 25 MG tablet     What question does the pharmacy have: STATES THEY RECEIVED PRESCRIPTION FOR THIS, BUT HE ALREADY HAS A 50MG.. IS THIS PRESCRIPTION IN ADDITION TO OR IS IT REPLACING?    PLEASE ADVISE    Who is the provider that prescribed the medication: Esperanza Rangel APRN

## 2025-07-10 NOTE — TELEPHONE ENCOUNTER
PATIENT MADE AWARE THERE IS NOTHING CHANGED WITH PERSCRIPTION JUST A CHANGE IN HO HE TAKES IT DAILY.

## 2025-07-16 RX ORDER — DEXAMETHASONE SODIUM PHOSPHATE 1 MG/ML
SOLUTION/ DROPS OPHTHALMIC
COMMUNITY
Start: 2025-07-09

## 2025-07-17 ENCOUNTER — OFFICE VISIT (OUTPATIENT)
Dept: UROLOGY | Age: 80
End: 2025-07-17
Payer: MEDICARE

## 2025-07-17 VITALS — HEIGHT: 74 IN | BODY MASS INDEX: 27.34 KG/M2 | RESPIRATION RATE: 20 BRPM | WEIGHT: 213 LBS

## 2025-07-17 DIAGNOSIS — N40.0 BENIGN PROSTATIC HYPERPLASIA WITHOUT LOWER URINARY TRACT SYMPTOMS: ICD-10-CM

## 2025-07-17 DIAGNOSIS — Z12.5 PROSTATE CANCER SCREENING: Primary | ICD-10-CM

## 2025-07-17 LAB — URINE VOLUME: NORMAL
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
## (undated) DEVICE — PK CATH CARD 40

## (undated) DEVICE — LINER SURG CANSTR SXN S/RIGD 1500CC

## (undated) DEVICE — CATH DIAG IMPULSE FL3.5 5F 100CM

## (undated) DEVICE — DEV SEP ASP THROMB INDIGO 12F 150CM

## (undated) DEVICE — Device: Brand: DEFENDO AIR/WATER/SUCTION AND BIOPSY VALVE

## (undated) DEVICE — KT MANIFLD CARDIAC

## (undated) DEVICE — PINNACLE R/O II INTRODUCER SHEATH WITH RADIOPAQUE MARKER: Brand: PINNACLE

## (undated) DEVICE — CATH VENT MIV RADL PIG ST TIP 4F 110CM

## (undated) DEVICE — SOLIDIFIER LIQLOC PLS 1500CC BT

## (undated) DEVICE — BALN PRESS WEDGE 5F 110CM

## (undated) DEVICE — PINNACLE PRECISION ACCESS SYSTEM INTRODUCER SHEATH: Brand: PINNACLE PRECISION ACCESS SYSTEM

## (undated) DEVICE — Device

## (undated) DEVICE — SOL IRRG H2O PL/BG 1000ML STRL

## (undated) DEVICE — PINNACLE INTRODUCER SHEATH: Brand: PINNACLE

## (undated) DEVICE — RADIFOCUS GLIDEWIRE: Brand: GLIDEWIRE

## (undated) DEVICE — GLIDESHEATH BASIC HYDROPHILIC COATED INTRODUCER SHEATH: Brand: GLIDESHEATH

## (undated) DEVICE — KT CATH ASP INDIGO LIGHTNING XTORQ/TP 12F 100CM

## (undated) DEVICE — CATH F6 ST PIG 155 110CM 6SH: Brand: SUPER TORQUE

## (undated) DEVICE — CONN JET HYDRA H20 AUXILIARY DISP

## (undated) DEVICE — CATH DIAG IMPULSE FR4 5F 100CM

## (undated) DEVICE — GW EMR FIX EXCHG J STD .035 3MM 260CM